# Patient Record
Sex: FEMALE | Race: BLACK OR AFRICAN AMERICAN | Employment: OTHER | URBAN - METROPOLITAN AREA
[De-identification: names, ages, dates, MRNs, and addresses within clinical notes are randomized per-mention and may not be internally consistent; named-entity substitution may affect disease eponyms.]

---

## 2017-01-04 ENCOUNTER — HOSPITAL ENCOUNTER (OUTPATIENT)
Dept: MAMMOGRAPHY | Age: 67
Discharge: HOME | End: 2017-01-04
Attending: INTERNAL MEDICINE
Payer: MEDICARE

## 2017-01-04 DIAGNOSIS — N63.0 LUMP OF BREAST: ICD-10-CM

## 2017-01-04 DIAGNOSIS — Z12.31 VISIT FOR SCREENING MAMMOGRAM: ICD-10-CM

## 2017-01-04 PROCEDURE — 76642 ULTRASOUND BREAST LIMITED: CPT

## 2017-01-04 PROCEDURE — 77066 DX MAMMO INCL CAD BI: CPT

## 2017-09-12 ENCOUNTER — HOSPITAL ENCOUNTER (OUTPATIENT)
Dept: BONE DENSITY | Age: 67
Discharge: HOME | End: 2017-09-12
Attending: NURSE PRACTITIONER
Payer: MEDICARE

## 2017-09-12 DIAGNOSIS — M81.0 OSTEOPOROSIS, SENILE: ICD-10-CM

## 2017-09-12 PROCEDURE — 77080 DXA BONE DENSITY AXIAL: CPT

## 2018-05-25 ENCOUNTER — HOSPITAL ENCOUNTER (OUTPATIENT)
Dept: MAMMOGRAPHY | Age: 68
Discharge: HOME | End: 2018-05-25
Payer: MEDICARE

## 2018-05-25 DIAGNOSIS — R92.8 ABNORMAL MAMMOGRAM: ICD-10-CM

## 2018-05-25 PROCEDURE — 77065 DX MAMMO INCL CAD UNI: CPT

## 2018-08-26 ENCOUNTER — HOSPITAL ENCOUNTER (EMERGENCY)
Age: 68
Discharge: HOME | End: 2018-08-26
Attending: EMERGENCY MEDICINE | Admitting: EMERGENCY MEDICINE
Payer: MEDICARE

## 2018-08-26 VITALS
HEIGHT: 64 IN | SYSTOLIC BLOOD PRESSURE: 135 MMHG | BODY MASS INDEX: 23.97 KG/M2 | TEMPERATURE: 98.2 F | WEIGHT: 140.4 LBS | RESPIRATION RATE: 16 BRPM | DIASTOLIC BLOOD PRESSURE: 84 MMHG | HEART RATE: 66 BPM | OXYGEN SATURATION: 97 %

## 2018-08-26 DIAGNOSIS — B34.9 VIRAL SYNDROME: Primary | ICD-10-CM

## 2018-08-26 LAB — S PYO AG THROAT QL: NEGATIVE

## 2018-08-26 PROCEDURE — 87880 STREP A ASSAY W/OPTIC: CPT

## 2018-08-26 PROCEDURE — 74011250636 HC RX REV CODE- 250/636: Performed by: EMERGENCY MEDICINE

## 2018-08-26 PROCEDURE — 87070 CULTURE OTHR SPECIMN AEROBIC: CPT | Performed by: EMERGENCY MEDICINE

## 2018-08-26 PROCEDURE — 99283 EMERGENCY DEPT VISIT LOW MDM: CPT

## 2018-08-26 RX ORDER — DEXAMETHASONE 4 MG/1
10 TABLET ORAL
Status: COMPLETED | OUTPATIENT
Start: 2018-08-26 | End: 2018-08-26

## 2018-08-26 RX ADMIN — DEXAMETHASONE 10 MG: 4 TABLET ORAL at 08:31

## 2018-08-26 NOTE — ED TRIAGE NOTES
Triage note: Pt states she has been experiencing a sore throat \"like 2 balls in my throat\" x 1 week.

## 2018-08-26 NOTE — ED PROVIDER NOTES
HPI Comments: 70 y.o. female with past medical history significant for hepatitis C, cirrhosis, tuberculosis and neuropathy who presents with chief complaint of sore throat. Patient complains of 1 week of sore throat described as \"like there are two balls in my throat. \"  Patient states gets sinus problems seasonally and has been having sinus drainage. Patient also complains of chills. Patient is concerned that she may need an antibiotic because she is on Harvoni for hepatitis C. No known exposure to strep. Patient denies cough, sweats, appetite change, ear pain. There are no other acute medical concerns at this time. Social hx: Nonsmoker  PCP: Ady Werner NP    Note written by Deidre Arredondo. Tammy Adams, as dictated by Dawit Florez MD 8:17 AM      The history is provided by the patient. Past Medical History:   Diagnosis Date    Cirrhosis ([facility] [address]     Hepatitis C     Neuropathy     Tuberculosis        History reviewed. No pertinent surgical history. Family History:   Problem Relation Age of Onset    Breast Cancer Maternal Grandmother        Social History     Social History    Marital status:      Spouse name: N/A    Number of children: N/A    Years of education: N/A     Occupational History    Not on file. Social History Main Topics    Smoking status: Never Smoker    Smokeless tobacco: Never Used    Alcohol use No    Drug use: No    Sexual activity: Not on file     Other Topics Concern    Not on file     Social History Narrative         ALLERGIES: Review of patient's allergies indicates no known allergies. Review of Systems   Constitutional: Positive for chills. Negative for appetite change and diaphoresis. HENT: Positive for sore throat. Respiratory: Negative for cough. All other systems reviewed and are negative.       Vitals:    08/26/18 0801   BP: 135/84   Pulse: 66   Resp: 16   Temp: 98.2 °F (36.8 °C)   SpO2: 97%   Weight: 63.7 kg (140 lb 6.4 oz) Height: 5' 4\" (1.626 m)            Physical Exam   Constitutional: She is oriented to person, place, and time. She appears well-developed and well-nourished. No distress. NAD, AxOx4, speaking in complete sentences     HENT:   Head: Normocephalic and atraumatic. Head is without right periorbital erythema and without left periorbital erythema. Right Ear: Hearing and external ear normal. No drainage, swelling or tenderness. No decreased hearing is noted. Left Ear: Hearing and external ear normal. No drainage, swelling or tenderness. No decreased hearing is noted. Nose: Rhinorrhea present. No nasal septal hematoma. Right sinus exhibits no maxillary sinus tenderness and no frontal sinus tenderness. Left sinus exhibits no maxillary sinus tenderness and no frontal sinus tenderness. Mouth/Throat: Uvula is midline and oropharynx is clear and moist. No oral lesions. No trismus in the jaw. Normal dentition. No dental abscesses or uvula swelling. No oropharyngeal exudate, posterior oropharyngeal edema, posterior oropharyngeal erythema or tonsillar abscesses. Eyes: Conjunctivae and EOM are normal. Pupils are equal, round, and reactive to light. Right eye exhibits no discharge. Left eye exhibits no discharge. No scleral icterus. Neck: Normal range of motion. Neck supple. No JVD present. No tracheal deviation present. Cardiovascular: Normal rate, regular rhythm, normal heart sounds and intact distal pulses. Exam reveals no gallop and no friction rub. No murmur heard. Pulmonary/Chest: Effort normal and breath sounds normal. No respiratory distress. She has no wheezes. She has no rales. She exhibits no tenderness. Abdominal: Soft. Bowel sounds are normal. There is no tenderness. There is no rebound and no guarding. nttp     Genitourinary: No vaginal discharge found. Musculoskeletal: Normal range of motion. She exhibits no edema or tenderness.    Neurological: She is alert and oriented to person, place, and time. She has normal reflexes. No cranial nerve deficit. She exhibits normal muscle tone. Coordination normal.   pt has motor/ CV/ Sensation grossly intact to all extremities, R = L in strength;   Skin: Skin is warm and dry. No rash noted. No erythema. No pallor. Psychiatric: She has a normal mood and affect. Her behavior is normal. Thought content normal.   Nursing note and vitals reviewed. [facility]      ED Course       Procedures    8:41 AM  Deborah Flint's  results have been reviewed with her. She has been counseled regarding her diagnosis. She verbally conveys understanding and agreement of the signs, symptoms, diagnosis, treatment and prognosis and additionally agrees to Call/ Arrange follow up as recommended with Dr. Chayo aKng, [facility] in 24 - 48 hours. She also agrees with the care-plan and conveys that all of her questions have been answered. I have also put together some discharge instructions for her that include: 1) educational information regarding their diagnosis, 2) how to care for their diagnosis at home, as well a 3) list of reasons why they would want to return to the ED prior to their follow-up appointment, should their condition change or for concerns.

## 2018-08-26 NOTE — ED NOTES
MD reviewed discharge instructions and options with patient and patient verbalized understanding. RN reviewed discharge instructions using teachback method. Pt ambulated to exit without difficulty and in no signs of acute distress. Ambulatory out of department, and he  will drive home. No complaints or needs expressed at this time. Patient was counseled on medications prescribed at discharge. VSS, verbalized relief from most intense pain. Patient to call pcp in the morning for appointment.

## 2018-08-26 NOTE — DISCHARGE INSTRUCTIONS

## 2018-08-28 LAB
BACTERIA SPEC CULT: NORMAL
SERVICE CMNT-IMP: NORMAL

## 2018-11-27 ENCOUNTER — HOSPITAL ENCOUNTER (OUTPATIENT)
Dept: MAMMOGRAPHY | Age: 68
Discharge: HOME | End: 2018-11-27
Payer: MEDICARE

## 2018-11-27 DIAGNOSIS — R92.8 ABNORMAL MAMMOGRAM: ICD-10-CM

## 2018-11-27 DIAGNOSIS — R92.1 BREAST CALCIFICATIONS: ICD-10-CM

## 2018-11-27 PROCEDURE — 77065 DX MAMMO INCL CAD UNI: CPT

## 2019-03-28 ENCOUNTER — HOSPITAL ENCOUNTER (OUTPATIENT)
Dept: GENERAL RADIOLOGY | Age: 69
Discharge: HOME | End: 2019-03-28
Payer: MEDICARE

## 2019-03-28 DIAGNOSIS — M19.90 OSTEOARTHRITIS: ICD-10-CM

## 2019-03-28 PROCEDURE — 73562 X-RAY EXAM OF KNEE 3: CPT

## 2019-06-12 ENCOUNTER — HOSPITAL ENCOUNTER (OUTPATIENT)
Dept: MAMMOGRAPHY | Age: 69
Discharge: HOME | End: 2019-06-12
Attending: FAMILY MEDICINE
Payer: MEDICARE

## 2019-06-12 DIAGNOSIS — R92.8 ABNORMAL MAMMOGRAM: ICD-10-CM

## 2019-06-12 PROCEDURE — 77066 DX MAMMO INCL CAD BI: CPT

## 2019-10-21 ENCOUNTER — HOSPITAL ENCOUNTER (OUTPATIENT)
Dept: PREADMISSION TESTING | Age: 69
Discharge: HOME | End: 2019-10-21
Payer: MEDICARE

## 2019-10-21 VITALS
RESPIRATION RATE: 16 BRPM | SYSTOLIC BLOOD PRESSURE: 123 MMHG | WEIGHT: 134 LBS | BODY MASS INDEX: 23.74 KG/M2 | HEIGHT: 63 IN | DIASTOLIC BLOOD PRESSURE: 74 MMHG | HEART RATE: 59 BPM | TEMPERATURE: 98.4 F

## 2019-10-21 LAB
ABO + RH BLD: NORMAL
ANION GAP SERPL CALC-SCNC: 5 MMOL/L (ref 5–15)
APPEARANCE UR: CLEAR
ATRIAL RATE: 53 BPM
BACTERIA URNS QL MICRO: NEGATIVE /HPF
BILIRUB UR QL: NEGATIVE
BLOOD GROUP ANTIBODIES SERPL: NORMAL
BUN SERPL-MCNC: 14 MG/DL (ref 6–20)
BUN/CREAT SERPL: 17 (ref 12–20)
CALCIUM SERPL-MCNC: 8.6 MG/DL (ref 8.5–10.1)
CALCULATED P AXIS, ECG09: 58 DEGREES
CALCULATED R AXIS, ECG10: 12 DEGREES
CALCULATED T AXIS, ECG11: 22 DEGREES
CHLORIDE SERPL-SCNC: 110 MMOL/L (ref 97–108)
CO2 SERPL-SCNC: 27 MMOL/L (ref 21–32)
COLOR UR: NORMAL
CREAT SERPL-MCNC: 0.81 MG/DL (ref 0.55–1.02)
DIAGNOSIS, 93000: NORMAL
EPITH CASTS URNS QL MICRO: NORMAL /LPF
ERYTHROCYTE [DISTWIDTH] IN BLOOD BY AUTOMATED COUNT: 13.1 % (ref 11.5–14.5)
EST. AVERAGE GLUCOSE BLD GHB EST-MCNC: 108 MG/DL
GLUCOSE SERPL-MCNC: 92 MG/DL (ref 65–100)
GLUCOSE UR STRIP.AUTO-MCNC: NEGATIVE MG/DL
HBA1C MFR BLD: 5.4 % (ref 4.2–6.3)
HCT VFR BLD AUTO: 40 % (ref 35–47)
HGB BLD-MCNC: 12.8 G/DL (ref 11.5–16)
HGB UR QL STRIP: NEGATIVE
HYALINE CASTS URNS QL MICRO: NORMAL /LPF (ref 0–5)
INR PPP: 1 (ref 0.9–1.1)
KETONES UR QL STRIP.AUTO: NEGATIVE MG/DL
LEUKOCYTE ESTERASE UR QL STRIP.AUTO: NEGATIVE
MCH RBC QN AUTO: 29.9 PG (ref 26–34)
MCHC RBC AUTO-ENTMCNC: 32 G/DL (ref 30–36.5)
MCV RBC AUTO: 93.5 FL (ref 80–99)
NITRITE UR QL STRIP.AUTO: NEGATIVE
NRBC # BLD: 0 K/UL (ref 0–0.01)
NRBC BLD-RTO: 0 PER 100 WBC
P-R INTERVAL, ECG05: 152 MS
PH UR STRIP: 5.5 [PH] (ref 5–8)
PLATELET # BLD AUTO: 175 K/UL (ref 150–400)
PMV BLD AUTO: 11.8 FL (ref 8.9–12.9)
POTASSIUM SERPL-SCNC: 4.1 MMOL/L (ref 3.5–5.1)
PROT UR STRIP-MCNC: NEGATIVE MG/DL
PROTHROMBIN TIME: 10.6 SEC (ref 9–11.1)
Q-T INTERVAL, ECG07: 450 MS
QRS DURATION, ECG06: 72 MS
QTC CALCULATION (BEZET), ECG08: 422 MS
RBC # BLD AUTO: 4.28 M/UL (ref 3.8–5.2)
RBC #/AREA URNS HPF: NORMAL /HPF (ref 0–5)
SODIUM SERPL-SCNC: 142 MMOL/L (ref 136–145)
SP GR UR REFRACTOMETRY: 1.02 (ref 1–1.03)
SPECIMEN EXP DATE BLD: NORMAL
UA: UC IF INDICATED,UAUC: NORMAL
UROBILINOGEN UR QL STRIP.AUTO: 0.2 EU/DL (ref 0.2–1)
VENTRICULAR RATE, ECG03: 53 BPM
WBC # BLD AUTO: 5.4 K/UL (ref 3.6–11)
WBC URNS QL MICRO: NORMAL /HPF (ref 0–4)

## 2019-10-21 PROCEDURE — 86900 BLOOD TYPING SEROLOGIC ABO: CPT

## 2019-10-21 PROCEDURE — 85610 PROTHROMBIN TIME: CPT

## 2019-10-21 PROCEDURE — 80048 BASIC METABOLIC PNL TOTAL CA: CPT

## 2019-10-21 PROCEDURE — 93005 ELECTROCARDIOGRAM TRACING: CPT

## 2019-10-21 PROCEDURE — 83036 HEMOGLOBIN GLYCOSYLATED A1C: CPT

## 2019-10-21 PROCEDURE — 81001 URINALYSIS AUTO W/SCOPE: CPT

## 2019-10-21 PROCEDURE — 85027 COMPLETE CBC AUTOMATED: CPT

## 2019-10-21 RX ORDER — ALENDRONATE SODIUM 70 MG/1
70 TABLET ORAL
COMMUNITY
End: 2021-11-13

## 2019-10-21 RX ORDER — IBUPROFEN 800 MG/1
800 TABLET ORAL 2 TIMES DAILY
COMMUNITY
End: 2019-11-07

## 2019-10-21 RX ORDER — PREGABALIN 150 MG/1
150 CAPSULE ORAL 3 TIMES DAILY
COMMUNITY
End: 2021-05-14 | Stop reason: SDUPTHER

## 2019-10-21 NOTE — PERIOP NOTES
DO NOT EAT ANYTHING AFTER MIDNIGHT, except as instructed THE NIGHT BEFORE SURGERY. PT GIVEN OPPORTUNITY TO ASK ADDITIONAL QUESTIONS. PRE-OPERATIVE INSTRUCTIONS REVIEWED WITH PATIENT. PATIENT GIVEN 2-BOTTLES OF CHG SOAP. INSTRUCTIONS TO BE REVIEWED IN CLASS ON USE OF CHG SOAP. PATIENT GIVEN SSI INFECTION FAQ SHEET. MRSA / MSSA TREATMENT INSTRUCTION SHEET GIVEN WITH AN EXPLANATION TO PATIENT THAT THEY WILL BE NOTIFIED IF TREATMENT INSTRUCTIONS NEED TO BE INITIATED. PATIENT WAS GIVEN THE OPPORTUNITY TO ASK QUESTIONS ON THE INFORMATION PROVIDED.

## 2019-10-22 LAB
BACTERIA SPEC CULT: NORMAL
BACTERIA SPEC CULT: NORMAL
SERVICE CMNT-IMP: NORMAL

## 2019-11-01 ENCOUNTER — ANESTHESIA EVENT (OUTPATIENT)
Dept: SURGERY | Age: 69
End: 2019-11-01
Payer: MEDICARE

## 2019-11-03 NOTE — H&P
PCP: No Pcp  There is no problem list on file for this patient.     Subjective:   Chief Complaint: Pain of the Right Knee     HPI: Shade Gambino is a 78 y.o. female who presents today for evaluation and treatment of her right knee pain. She localizes the pain to her lateral joint line. She reports she is a very active and the pain interferes with her ability to exercise. She states the pain interferes with her normal daily activities. She states the pain keeps her awake at night. She reports having cortisone injections to her right knee every five months. She notes her most recent injection was in August.  She states the injection provided only temporary relief. She states she has tried ibuprofen for the pain as well with minimal relief. She is ambulating with significant antalgic gait today in clinic. She asks about hyaluronic acid injections.     Of note, she states she takes lyrica for her bilateral leg neuropathy.       Objective:      There were no vitals filed for this visit. Height: 5' 3\"   Weight: 135 lb   Body mass index is 23.91 kg/m².     Constitutional:  No acute distress. Well nourished. Well developed. Eyes:  Sclera are nonicteric. Respiratory:  No labored breathing. Cardiovascular:  No marked edema. Skin:  No marked skin ulcers. Neurological:  No marked sensory loss noted. Psychiatric: Alert and oriented x3.     Musculoskeletal : Right Knee:  She has good range of motion of the right knee with diffuse crepitus. Ligaments intact. No effusion. Negative patellofemoral grind. She is tender over the lateral joint line. Negative Karolyn's sign medially and laterally. Strong pedal pulses. No pedal edema. Skin healthy and intact. No apparent atrophy.         Radiographs:             No imaging obtained    Assessment:      1. Primary osteoarthritis of right knee    2. Pre-op evaluation       Plan:      I reviewed x-rays ordered of the right knee 3/28/19 on the [employer] system with the patient. She has valgus deformity with bone-on-bone osteoarthritis of of the lateral compartment with lateral osteophytes. She has moderate degenerative changes of the patellofemoral joint. I explained to the patient that  She may ultimately require right total knee replacement surgery in the future. I discussed the diagnosis of right knee osteoarthritis with the patient as well as treatment options. We discussed treatment options including total knee replacement surgery vs hyaluronic acid injections vs further cortisone injections. I explained to the patient that recent studies show that hyaluronic acid injections are no better than placebo for knee osteoarthritis. She reports cortisone injections have begun to provide decreasing benefit. She has failed conservative management. We discussed right total knee replacement surgery at length with pt including expected outcomes and post-op recovery as well as risks and complications, specifically DVT, PE, infection, and nerve injury. After much discussion, pt desires to proceed with surgery. Her most recent cortisone injection was in August. We will schedule surgery at pt's convenience at least three months after August.  We will refer her to PT for a pre-op evaluation today. Follow-up for scheduled surgery.             Orders Placed This Encounter    Ambulatory referral to Physical Therapy      Return for Scheduled surgery.      Medical documentation was entered by me, Luisa Dunbar, Medical Scribe for Dr. Yahaira Sutton.    10/1/2019  5:39 PM  I, Tia Velazquez MD, personally, performed the services described in this documentation, as scribed in my presence, and is accurate and complete

## 2019-11-04 ENCOUNTER — HOSPITAL ENCOUNTER (OUTPATIENT)
Age: 69
Setting detail: OBSERVATION
Discharge: HOME HEALTH | End: 2019-11-07
Attending: ORTHOPAEDIC SURGERY | Admitting: ORTHOPAEDIC SURGERY
Payer: MEDICARE

## 2019-11-04 ENCOUNTER — ANESTHESIA (OUTPATIENT)
Dept: SURGERY | Age: 69
End: 2019-11-04
Payer: MEDICARE

## 2019-11-04 DIAGNOSIS — M17.11 PRIMARY OSTEOARTHRITIS OF RIGHT KNEE: Primary | ICD-10-CM

## 2019-11-04 PROBLEM — M17.12 PRIMARY LOCALIZED OSTEOARTHRITIS OF LEFT KNEE: Status: ACTIVE | Noted: 2019-11-04

## 2019-11-04 LAB
GLUCOSE BLD STRIP.AUTO-MCNC: 106 MG/DL (ref 65–100)
SERVICE CMNT-IMP: ABNORMAL

## 2019-11-04 PROCEDURE — C1713 ANCHOR/SCREW BN/BN,TIS/BN: HCPCS | Performed by: ORTHOPAEDIC SURGERY

## 2019-11-04 PROCEDURE — 74011250636 HC RX REV CODE- 250/636: Performed by: ORTHOPAEDIC SURGERY

## 2019-11-04 PROCEDURE — 74011250636 HC RX REV CODE- 250/636: Performed by: PHYSICIAN ASSISTANT

## 2019-11-04 PROCEDURE — 77030005513 HC CATH URETH FOL11 MDII -B: Performed by: ORTHOPAEDIC SURGERY

## 2019-11-04 PROCEDURE — 77030006822 HC BLD SAW SAG BRSM -B: Performed by: ORTHOPAEDIC SURGERY

## 2019-11-04 PROCEDURE — 74011250637 HC RX REV CODE- 250/637: Performed by: PHYSICIAN ASSISTANT

## 2019-11-04 PROCEDURE — 77030040922 HC BLNKT HYPOTHRM STRY -A

## 2019-11-04 PROCEDURE — 77030007866 HC KT SPN ANES BBMI -B: Performed by: ANESTHESIOLOGY

## 2019-11-04 PROCEDURE — 82962 GLUCOSE BLOOD TEST: CPT

## 2019-11-04 PROCEDURE — 74011000250 HC RX REV CODE- 250: Performed by: NURSE ANESTHETIST, CERTIFIED REGISTERED

## 2019-11-04 PROCEDURE — C1776 JOINT DEVICE (IMPLANTABLE): HCPCS | Performed by: ORTHOPAEDIC SURGERY

## 2019-11-04 PROCEDURE — 77030011640 HC PAD GRND REM COVD -A: Performed by: ORTHOPAEDIC SURGERY

## 2019-11-04 PROCEDURE — 74011000258 HC RX REV CODE- 258: Performed by: NURSE ANESTHETIST, CERTIFIED REGISTERED

## 2019-11-04 PROCEDURE — 76210000006 HC OR PH I REC 0.5 TO 1 HR: Performed by: ORTHOPAEDIC SURGERY

## 2019-11-04 PROCEDURE — 77030018846 HC SOL IRR STRL H20 ICUM -A: Performed by: ORTHOPAEDIC SURGERY

## 2019-11-04 PROCEDURE — 74011250636 HC RX REV CODE- 250/636: Performed by: ANESTHESIOLOGY

## 2019-11-04 PROCEDURE — 77030028907 HC WRP KNEE WO BGS SOLM -B

## 2019-11-04 PROCEDURE — 76010000162 HC OR TIME 1.5 TO 2 HR INTENSV-TIER 1: Performed by: ORTHOPAEDIC SURGERY

## 2019-11-04 PROCEDURE — 74011000250 HC RX REV CODE- 250: Performed by: ANESTHESIOLOGY

## 2019-11-04 PROCEDURE — 97530 THERAPEUTIC ACTIVITIES: CPT

## 2019-11-04 PROCEDURE — 77030014077 HC TOWER MX CEM J&J -C: Performed by: ORTHOPAEDIC SURGERY

## 2019-11-04 PROCEDURE — 77030003601 HC NDL NRV BLK BBMI -A

## 2019-11-04 PROCEDURE — 77030018836 HC SOL IRR NACL ICUM -A: Performed by: ORTHOPAEDIC SURGERY

## 2019-11-04 PROCEDURE — 76060000034 HC ANESTHESIA 1.5 TO 2 HR: Performed by: ORTHOPAEDIC SURGERY

## 2019-11-04 PROCEDURE — 77030035236 HC SUT PDS STRATFX BARB J&J -B: Performed by: ORTHOPAEDIC SURGERY

## 2019-11-04 PROCEDURE — 77030031139 HC SUT VCRL2 J&J -A: Performed by: ORTHOPAEDIC SURGERY

## 2019-11-04 PROCEDURE — 74011250636 HC RX REV CODE- 250/636: Performed by: NURSE ANESTHETIST, CERTIFIED REGISTERED

## 2019-11-04 PROCEDURE — 77030000032 HC CUF TRNQT ZIMM -B: Performed by: ORTHOPAEDIC SURGERY

## 2019-11-04 PROCEDURE — 74011000250 HC RX REV CODE- 250: Performed by: PHYSICIAN ASSISTANT

## 2019-11-04 PROCEDURE — 74011000250 HC RX REV CODE- 250: Performed by: ORTHOPAEDIC SURGERY

## 2019-11-04 PROCEDURE — 97161 PT EVAL LOW COMPLEX 20 MIN: CPT

## 2019-11-04 PROCEDURE — 99218 HC RM OBSERVATION: CPT

## 2019-11-04 PROCEDURE — 65390000012 HC CONDITION CODE 44 OBSERVATION

## 2019-11-04 PROCEDURE — 77030027138 HC INCENT SPIROMETER -A

## 2019-11-04 PROCEDURE — 77030008462 HC STPLR SKN PROX J&J -A: Performed by: ORTHOPAEDIC SURGERY

## 2019-11-04 DEVICE — CAP KNEE W/ VE SURF AND PAT PERSONA: Type: IMPLANTABLE DEVICE | Site: KNEE | Status: FUNCTIONAL

## 2019-11-04 DEVICE — IMPLANTABLE DEVICE: Type: IMPLANTABLE DEVICE | Site: KNEE | Status: FUNCTIONAL

## 2019-11-04 DEVICE — PAT PSN VE POLY 35MM -- PERSONA: Type: IMPLANTABLE DEVICE | Site: KNEE | Status: FUNCTIONAL

## 2019-11-04 DEVICE — SMARTSET HV HIGH VISCOSITY BONE CEMENT 40G
Type: IMPLANTABLE DEVICE | Site: KNEE | Status: FUNCTIONAL
Brand: SMARTSET

## 2019-11-04 RX ORDER — CELECOXIB 200 MG/1
200 CAPSULE ORAL ONCE
Status: COMPLETED | OUTPATIENT
Start: 2019-11-04 | End: 2019-11-04

## 2019-11-04 RX ORDER — SODIUM CHLORIDE 0.9 % (FLUSH) 0.9 %
5-40 SYRINGE (ML) INJECTION AS NEEDED
Status: DISCONTINUED | OUTPATIENT
Start: 2019-11-04 | End: 2019-11-07 | Stop reason: HOSPADM

## 2019-11-04 RX ORDER — PREGABALIN 75 MG/1
150 CAPSULE ORAL 3 TIMES DAILY
Status: DISCONTINUED | OUTPATIENT
Start: 2019-11-04 | End: 2019-11-07 | Stop reason: HOSPADM

## 2019-11-04 RX ORDER — SODIUM CHLORIDE 0.9 % (FLUSH) 0.9 %
5-40 SYRINGE (ML) INJECTION EVERY 8 HOURS
Status: DISCONTINUED | OUTPATIENT
Start: 2019-11-04 | End: 2019-11-07 | Stop reason: HOSPADM

## 2019-11-04 RX ORDER — DIPHENHYDRAMINE HYDROCHLORIDE 50 MG/ML
12.5 INJECTION, SOLUTION INTRAMUSCULAR; INTRAVENOUS AS NEEDED
Status: DISCONTINUED | OUTPATIENT
Start: 2019-11-04 | End: 2019-11-04 | Stop reason: HOSPADM

## 2019-11-04 RX ORDER — SODIUM CHLORIDE, SODIUM LACTATE, POTASSIUM CHLORIDE, CALCIUM CHLORIDE 600; 310; 30; 20 MG/100ML; MG/100ML; MG/100ML; MG/100ML
100 INJECTION, SOLUTION INTRAVENOUS CONTINUOUS
Status: DISCONTINUED | OUTPATIENT
Start: 2019-11-04 | End: 2019-11-04 | Stop reason: HOSPADM

## 2019-11-04 RX ORDER — FENTANYL CITRATE 50 UG/ML
25 INJECTION, SOLUTION INTRAMUSCULAR; INTRAVENOUS
Status: DISCONTINUED | OUTPATIENT
Start: 2019-11-04 | End: 2019-11-04 | Stop reason: HOSPADM

## 2019-11-04 RX ORDER — MELOXICAM 7.5 MG/1
15 TABLET ORAL DAILY
Status: DISCONTINUED | OUTPATIENT
Start: 2019-11-05 | End: 2019-11-07 | Stop reason: HOSPADM

## 2019-11-04 RX ORDER — HYDROMORPHONE HYDROCHLORIDE 1 MG/ML
0.2 INJECTION, SOLUTION INTRAMUSCULAR; INTRAVENOUS; SUBCUTANEOUS
Status: DISCONTINUED | OUTPATIENT
Start: 2019-11-04 | End: 2019-11-04 | Stop reason: HOSPADM

## 2019-11-04 RX ORDER — SODIUM CHLORIDE 0.9 % (FLUSH) 0.9 %
5-40 SYRINGE (ML) INJECTION EVERY 8 HOURS
Status: DISCONTINUED | OUTPATIENT
Start: 2019-11-04 | End: 2019-11-04 | Stop reason: HOSPADM

## 2019-11-04 RX ORDER — FACIAL-BODY WIPES
10 EACH TOPICAL DAILY PRN
Status: DISCONTINUED | OUTPATIENT
Start: 2019-11-06 | End: 2019-11-07 | Stop reason: HOSPADM

## 2019-11-04 RX ORDER — TRANEXAMIC ACID 100 MG/ML
INJECTION, SOLUTION INTRAVENOUS AS NEEDED
Status: DISCONTINUED | OUTPATIENT
Start: 2019-11-04 | End: 2019-11-04 | Stop reason: HOSPADM

## 2019-11-04 RX ORDER — HYDROXYZINE HYDROCHLORIDE 10 MG/1
10 TABLET, FILM COATED ORAL
Status: DISCONTINUED | OUTPATIENT
Start: 2019-11-04 | End: 2019-11-07 | Stop reason: HOSPADM

## 2019-11-04 RX ORDER — SODIUM CHLORIDE 9 MG/ML
125 INJECTION, SOLUTION INTRAVENOUS CONTINUOUS
Status: DISPENSED | OUTPATIENT
Start: 2019-11-04 | End: 2019-11-05

## 2019-11-04 RX ORDER — PROPOFOL 10 MG/ML
INJECTION, EMULSION INTRAVENOUS AS NEEDED
Status: DISCONTINUED | OUTPATIENT
Start: 2019-11-04 | End: 2019-11-04 | Stop reason: HOSPADM

## 2019-11-04 RX ORDER — FAMOTIDINE 20 MG/1
20 TABLET, FILM COATED ORAL EVERY 12 HOURS
Status: DISCONTINUED | OUTPATIENT
Start: 2019-11-04 | End: 2019-11-07 | Stop reason: HOSPADM

## 2019-11-04 RX ORDER — ACETAMINOPHEN 500 MG
1000 TABLET ORAL ONCE
Status: COMPLETED | OUTPATIENT
Start: 2019-11-04 | End: 2019-11-04

## 2019-11-04 RX ORDER — POLYETHYLENE GLYCOL 3350 17 G/17G
17 POWDER, FOR SOLUTION ORAL DAILY
Status: DISCONTINUED | OUTPATIENT
Start: 2019-11-05 | End: 2019-11-07 | Stop reason: HOSPADM

## 2019-11-04 RX ORDER — ONDANSETRON 2 MG/ML
4 INJECTION INTRAMUSCULAR; INTRAVENOUS
Status: ACTIVE | OUTPATIENT
Start: 2019-11-04 | End: 2019-11-05

## 2019-11-04 RX ORDER — FENTANYL CITRATE 50 UG/ML
50 INJECTION, SOLUTION INTRAMUSCULAR; INTRAVENOUS AS NEEDED
Status: DISCONTINUED | OUTPATIENT
Start: 2019-11-04 | End: 2019-11-04 | Stop reason: HOSPADM

## 2019-11-04 RX ORDER — ACETAMINOPHEN 500 MG
1000 TABLET ORAL EVERY 6 HOURS
Status: DISCONTINUED | OUTPATIENT
Start: 2019-11-04 | End: 2019-11-06

## 2019-11-04 RX ORDER — SODIUM CHLORIDE 0.9 % (FLUSH) 0.9 %
5-40 SYRINGE (ML) INJECTION AS NEEDED
Status: DISCONTINUED | OUTPATIENT
Start: 2019-11-04 | End: 2019-11-04 | Stop reason: HOSPADM

## 2019-11-04 RX ORDER — BUPIVACAINE HYDROCHLORIDE 5 MG/ML
INJECTION, SOLUTION EPIDURAL; INTRACAUDAL
Status: COMPLETED | OUTPATIENT
Start: 2019-11-04 | End: 2019-11-04

## 2019-11-04 RX ORDER — ONDANSETRON 4 MG/1
4 TABLET, ORALLY DISINTEGRATING ORAL
Status: DISCONTINUED | OUTPATIENT
Start: 2019-11-04 | End: 2019-11-07 | Stop reason: HOSPADM

## 2019-11-04 RX ORDER — SODIUM CHLORIDE, SODIUM LACTATE, POTASSIUM CHLORIDE, CALCIUM CHLORIDE 600; 310; 30; 20 MG/100ML; MG/100ML; MG/100ML; MG/100ML
INJECTION, SOLUTION INTRAVENOUS
Status: DISCONTINUED | OUTPATIENT
Start: 2019-11-04 | End: 2019-11-04 | Stop reason: HOSPADM

## 2019-11-04 RX ORDER — CEFAZOLIN SODIUM/WATER 2 G/20 ML
2 SYRINGE (ML) INTRAVENOUS EVERY 8 HOURS
Status: COMPLETED | OUTPATIENT
Start: 2019-11-04 | End: 2019-11-05

## 2019-11-04 RX ORDER — OXYCODONE HYDROCHLORIDE 5 MG/1
10 TABLET ORAL
Status: DISCONTINUED | OUTPATIENT
Start: 2019-11-04 | End: 2019-11-07 | Stop reason: HOSPADM

## 2019-11-04 RX ORDER — MORPHINE SULFATE 2 MG/ML
2 INJECTION, SOLUTION INTRAMUSCULAR; INTRAVENOUS
Status: ACTIVE | OUTPATIENT
Start: 2019-11-04 | End: 2019-11-05

## 2019-11-04 RX ORDER — PROPOFOL 10 MG/ML
INJECTION, EMULSION INTRAVENOUS
Status: DISCONTINUED | OUTPATIENT
Start: 2019-11-04 | End: 2019-11-04 | Stop reason: HOSPADM

## 2019-11-04 RX ORDER — MIDAZOLAM HYDROCHLORIDE 1 MG/ML
1 INJECTION, SOLUTION INTRAMUSCULAR; INTRAVENOUS AS NEEDED
Status: DISCONTINUED | OUTPATIENT
Start: 2019-11-04 | End: 2019-11-04 | Stop reason: HOSPADM

## 2019-11-04 RX ORDER — BUPIVACAINE HYDROCHLORIDE 7.5 MG/ML
INJECTION, SOLUTION EPIDURAL; RETROBULBAR
Status: COMPLETED | OUTPATIENT
Start: 2019-11-04 | End: 2019-11-04

## 2019-11-04 RX ORDER — CEFAZOLIN SODIUM/WATER 2 G/20 ML
2 SYRINGE (ML) INTRAVENOUS ONCE
Status: COMPLETED | OUTPATIENT
Start: 2019-11-04 | End: 2019-11-04

## 2019-11-04 RX ORDER — PREGABALIN 75 MG/1
75 CAPSULE ORAL ONCE
Status: DISCONTINUED | OUTPATIENT
Start: 2019-11-04 | End: 2019-11-04 | Stop reason: HOSPADM

## 2019-11-04 RX ORDER — LIDOCAINE HYDROCHLORIDE 10 MG/ML
0.1 INJECTION, SOLUTION EPIDURAL; INFILTRATION; INTRACAUDAL; PERINEURAL AS NEEDED
Status: DISCONTINUED | OUTPATIENT
Start: 2019-11-04 | End: 2019-11-04 | Stop reason: HOSPADM

## 2019-11-04 RX ORDER — AMOXICILLIN 250 MG
1 CAPSULE ORAL 2 TIMES DAILY
Status: DISCONTINUED | OUTPATIENT
Start: 2019-11-04 | End: 2019-11-07 | Stop reason: HOSPADM

## 2019-11-04 RX ORDER — OXYCODONE HYDROCHLORIDE 5 MG/1
5 TABLET ORAL
Status: DISCONTINUED | OUTPATIENT
Start: 2019-11-04 | End: 2019-11-07 | Stop reason: HOSPADM

## 2019-11-04 RX ORDER — ROPIVACAINE HYDROCHLORIDE 5 MG/ML
150 INJECTION, SOLUTION EPIDURAL; INFILTRATION; PERINEURAL AS NEEDED
Status: DISCONTINUED | OUTPATIENT
Start: 2019-11-04 | End: 2019-11-04 | Stop reason: HOSPADM

## 2019-11-04 RX ORDER — NALOXONE HYDROCHLORIDE 0.4 MG/ML
0.4 INJECTION, SOLUTION INTRAMUSCULAR; INTRAVENOUS; SUBCUTANEOUS AS NEEDED
Status: DISCONTINUED | OUTPATIENT
Start: 2019-11-04 | End: 2019-11-07 | Stop reason: HOSPADM

## 2019-11-04 RX ORDER — MIDAZOLAM HYDROCHLORIDE 1 MG/ML
0.5 INJECTION, SOLUTION INTRAMUSCULAR; INTRAVENOUS
Status: DISCONTINUED | OUTPATIENT
Start: 2019-11-04 | End: 2019-11-04 | Stop reason: HOSPADM

## 2019-11-04 RX ADMIN — OXYCODONE HYDROCHLORIDE 5 MG: 5 TABLET ORAL at 14:25

## 2019-11-04 RX ADMIN — SODIUM CHLORIDE, POTASSIUM CHLORIDE, SODIUM LACTATE AND CALCIUM CHLORIDE: 600; 310; 30; 20 INJECTION, SOLUTION INTRAVENOUS at 10:25

## 2019-11-04 RX ADMIN — BUPIVACAINE HYDROCHLORIDE 25 ML: 5 INJECTION, SOLUTION EPIDURAL; INTRACAUDAL; PERINEURAL at 10:25

## 2019-11-04 RX ADMIN — Medication 2 G: at 10:52

## 2019-11-04 RX ADMIN — SODIUM CHLORIDE, SODIUM LACTATE, POTASSIUM CHLORIDE, AND CALCIUM CHLORIDE 100 ML/HR: 600; 310; 30; 20 INJECTION, SOLUTION INTRAVENOUS at 09:45

## 2019-11-04 RX ADMIN — PROPOFOL 50 MG: 10 INJECTION, EMULSION INTRAVENOUS at 10:32

## 2019-11-04 RX ADMIN — FENTANYL CITRATE 50 MCG: 50 INJECTION, SOLUTION INTRAMUSCULAR; INTRAVENOUS at 10:19

## 2019-11-04 RX ADMIN — ACETAMINOPHEN 1000 MG: 500 TABLET ORAL at 15:03

## 2019-11-04 RX ADMIN — PREGABALIN 150 MG: 75 CAPSULE ORAL at 15:03

## 2019-11-04 RX ADMIN — ACETAMINOPHEN 1000 MG: 500 TABLET ORAL at 09:36

## 2019-11-04 RX ADMIN — BUPIVACAINE HYDROCHLORIDE 7.5 MG: 7.5 INJECTION, SOLUTION EPIDURAL; RETROBULBAR at 10:45

## 2019-11-04 RX ADMIN — PROPOFOL 100 MCG/KG/MIN: 10 INJECTION, EMULSION INTRAVENOUS at 10:32

## 2019-11-04 RX ADMIN — OXYCODONE HYDROCHLORIDE 5 MG: 5 TABLET ORAL at 21:00

## 2019-11-04 RX ADMIN — TRANEXAMIC ACID 1 G: 100 INJECTION, SOLUTION INTRAVENOUS at 10:55

## 2019-11-04 RX ADMIN — Medication 2 G: at 18:28

## 2019-11-04 RX ADMIN — ACETAMINOPHEN 1000 MG: 500 TABLET ORAL at 21:00

## 2019-11-04 RX ADMIN — SODIUM CHLORIDE 125 ML/HR: 900 INJECTION, SOLUTION INTRAVENOUS at 21:00

## 2019-11-04 RX ADMIN — DOCUSATE SODIUM AND SENNOSIDES 1 TABLET: 50; 8.6 TABLET, FILM COATED ORAL at 18:28

## 2019-11-04 RX ADMIN — PREGABALIN 150 MG: 75 CAPSULE ORAL at 21:00

## 2019-11-04 RX ADMIN — SODIUM CHLORIDE 125 ML/HR: 900 INJECTION, SOLUTION INTRAVENOUS at 14:27

## 2019-11-04 RX ADMIN — CELECOXIB 200 MG: 200 CAPSULE ORAL at 09:36

## 2019-11-04 RX ADMIN — PHENYLEPHRINE HYDROCHLORIDE 80 MCG: 10 INJECTION INTRAVENOUS at 10:53

## 2019-11-04 RX ADMIN — MIDAZOLAM 2 MG: 1 INJECTION INTRAMUSCULAR; INTRAVENOUS at 10:19

## 2019-11-04 NOTE — OP NOTES
11/4/2019  OPERATIVE REPORT      PREOPERATIVE DIAGNOSIS: Osteoarthritis, right knee. POSTOPERATIVE DIAGNOSIS: Osteoarthritis, right knee. OPERATIVE PROCEDURE: right total knee replacement. SURGEON: Karl Monroy MD    ASSISTANT SURGEON: Leonel Dumas, [facility]    ANESTHESIA: Spinal.    Antibiotic:Ancef    INDICATIONS: : A 78 y.o.  female  with end stage osteoarthritis to the right knee, not responsive to conservative management. COMPLICATIONS:None    PROCEDURE: The patient was taken to the operating room, underwent  placement of spinal anesthesia. The knee and leg were prepped and  draped in the usual fashion. The leg was exsanguinated with the Esmarch  bandage and tourniquet inflated to 350 mmHg. A longitudinal midline  incision made down through skin and subcutaneous tissue. A medial  parapatellar arthrotomy was performed, and extended proximally along the  medial border of the quadriceps tendon, distally along the medial border of  the insertion of the patella tendon. Medial release was performed. Retropatellar fat pad was sharply excised. The patella was subluxated  laterally, the knee was flexed to 90 degrees. Drill was used to enter the  intramedullary canal of the distal femur. The 5 degree intramedullary guide  was applied and the distal femoral cut was performed. The femur was sized  to a 6. A 6 cutting block was applied, and the anterior, posterior,  chamfer cuts were performed. The extramedullary tibial guide was applied, and the tibia was cut in  neutral alignment. The tibia was sized to a F. Knee was balanced to varus  and valgus stress. Flexion and extension gaps were equal. Trial reduction  was performed, using 10 mm insert. Excellent range of motion and stability  were noted. The patella was everted, and the patella was cut using freehand  technique. Patella was sized to a 35. Drill holes were placed, and patella  button applied. The patella tracked normally.  All trial components were  removed, and surfaces were prepared using drill and punch. All residual  meniscal tissue was sharply excised. Hemostasis was obtained using Bovie  apparatus. All components were cemented in place, taking care to remove all  excess cement. The knee was maintained in full extension while the cement  hardened. The tourniquet was let down after a total tourniquet time of 37  minutes. Hemostasis was obtained using the Bovie apparatus. The joint was  extensively irrigated with antibiotic solution. The arthrotomy was repaired  using #2 Vicryl in interrupted figure-of-eight fashion. Subcutaneous tissue  was approximated using 2-0 Vicryl in interrupted fashion. Skin edges were  approximated using stapling apparatus. Joint was infiltrated with 30 mL of  0.5% Marcaine with epinephrine. Bulky sterile dressing was applied, and the  patient was transferred to recovery room in stable condition. There were no  Complications. The Physician assistant was critical during the procedure in assisting with positioning ,retraction and wound closure. ESTIMATED BLOOD LOSS: 100 cc.     SPECIMEN: Bone      Marco A Silva M.D.  11/4/2019  11:58 AM

## 2019-11-04 NOTE — PROGRESS NOTES
Problem: Mobility Impaired (Adult and Pediatric)  Goal: *Acute Goals and Plan of Care (Insert Text)  Description  FUNCTIONAL STATUS PRIOR TO ADMISSION: Patient was independent and active without use of DME.    HOME SUPPORT PRIOR TO ADMISSION: The patient lived alone with no local support. Physical Therapy Goals  Initiated 11/4/2019    1. Patient will move from supine to sit and sit to supine  and roll side to side in bed with modified independence within 4 days. 2. Patient will perform sit to stand with modified independence within 4 days. 3. Patient will ambulate with modified independence for 200 feet with the least restrictive device within 4 days. 4. Patient will ascend/descend 4 stairs with 1 handrail(s) with modified independence within 4 days. 5. Patient will perform home exercise program per protocol with modified independence within 4 days. 6. Patient will demonstrate AROM 0-90 degrees in operative joint within 4 days. Outcome: Progressing Towards Goal   PHYSICAL THERAPY EVALUATION  Patient: Chastity Whitmore [de-identified]78 y.o. female)  Date: 11/4/2019  Primary Diagnosis: Primary osteoarthritis of right knee [M17.11]  Procedure(s) (LRB):  RIGHT TOTAL KNEE REPLACEMENT. (Right) Day of Surgery   Precautions:   Fall, WBAT      ASSESSMENT  Based on the objective data described below, the patient presents with decreased mobility after R TKA, POD0. Patient was able to mobilize to the [facility] and back with the walker with min to mod assist and VSS. Limited walking to marching in place for 2 minutes at the bedside due to residual numbness and decreased control of the RLE post spinal.    Patient lives alone and will need a RW for home use. She will need to be able to care for all of her own self care in order to be able to return home safely and may benefit from an OT consult to address ADL deficits and concerns.   Expect that she will progress well, however, and be able to return home with HHPT per pathway. Current Level of Function Impacting Discharge (mobility/balance): mod assist due to residual weakness/decreased sensation in RLE post spinal    Functional Outcome Measure: The patient scored Total: 55/100 on the Barthel Index which is indicative of moderately impaired ability to care for basic self needs/dependency on others. Other factors to consider for discharge: patient lives alone     Patient will benefit from skilled therapy intervention to address the above noted impairments. PLAN :  Recommendations and Planned Interventions: bed mobility training, transfer training, gait training, therapeutic exercises, patient and family training/education and therapeutic activities      Frequency/Duration: Patient will be followed by physical therapy:  twice daily to address goals. Recommendation for discharge: (in order for the patient to meet his/her long term goals)  Physical therapy at least 2 days/week in the home     This discharge recommendation:  Has been made in collaboration with the attending provider and/or case management    IF patient discharges home will need the following DME: rolling walker and order placed in chart today          SUBJECTIVE:   Patient stated I want to do everything I need to do.     OBJECTIVE DATA SUMMARY:   HISTORY:    Past Medical History:   Diagnosis Date    'light-for-dates' infant with signs of fetal malnutrition     Arthritis     OSTEO    Chronic pain     Cirrhosis ([facility] [address]     Hepatitis C     Liver disease 1980s    HEPATITIS C, TREATED AND NOW GONE    Neuropathy     Tuberculosis     Tuberculosis 1962    +ANNA MARIE TEST # 3; TREATED AND NOW LUNGS HAVE ALWAYS BEEN CLEAR     Past Surgical History:   Procedure Laterality Date    HX HEENT      WISDOM TEETH    HX ORTHOPAEDIC Left 2018    BROKEN WRIST, HAS A PLATE       Personal factors and/or comorbidities impacting plan of care: R TKA, lives alone    Home Situation  Home Environment: Apartment  One/Two Story Residence: One story  Living Alone: Yes  Support Systems: Friends \ neighbors  Patient Expects to be Discharged to[de-identified] Apartment  Current DME Used/Available at Home: None    EXAMINATION/PRESENTATION/DECISION MAKING:   Critical Behavior:  Neurologic State: Alert  Orientation Level: Oriented X4  Cognition: Follows commands     Hearing: Auditory  Auditory Impairment: None  Skin:  postop ace in place with no drainage ntoed  Edema: none noted  Range Of Motion:  AROM: Within functional limits(R knee -5 to 70 with post op bandage in place)                       Strength:    Strength: Within functional limits(RLE 3/5 grossly)                    Tone & Sensation:   Tone: Normal              Sensation: Impaired(decreased RLE post spinal)               Coordination:  Coordination: Within functional limits(decreased RLE post spinal)  Vision:      Functional Mobility:  Bed Mobility:     Supine to Sit: Modified independent; Additional time  Sit to Supine: Modified independent; Additional time     Transfers:  Sit to Stand: Minimum assistance; Adaptive equipment; Additional time  Stand to Sit: Minimum assistance; Additional time; Adaptive equipment        Bed to Chair: Moderate assistance; Adaptive equipment; Additional time              Balance:   Sitting: Intact; Without support  Standing: Impaired; With support  Standing - Static: Good  Standing - Dynamic : Fair  Ambulation/Gait Training:  Distance (ft): 3 Feet (ft)  Assistive Device: Gait belt;Walker, rolling  Ambulation - Level of Assistance: Moderate assistance; Adaptive equipment; Additional time        Gait Abnormalities: Antalgic;Decreased step clearance; Step to gait; Path deviations(walked in place at the EOB for 2 min due to residual numbnes)        Base of Support: Widened;Shift to left  Stance: Right decreased  Speed/Anita: Pace decreased (<100 feet/min)  Step Length: Left shortened;Right shortened  Swing Pattern: Right asymmetrical     Interventions: Safety awareness training; Tactile cues; Verbal cues            Stairs: Therapeutic Exercises: Ankle pumps, quad sets, heel slides    Functional Measure:  Barthel Index:    Bathin  Bladder: 10  Bowels: 10  Groomin  Dressin  Feeding: 10  Mobility: 0  Stairs: 0  Toilet Use: 5  Transfer (Bed to Chair and Back): 10  Total: 55/100       The Barthel ADL Index: Guidelines  1. The index should be used as a record of what a patient does, not as a record of what a patient could do. 2. The main aim is to establish degree of independence from any help, physical or verbal, however minor and for whatever reason. 3. The need for supervision renders the patient not independent. 4. A patient's performance should be established using the best available evidence. Asking the patient, friends/relatives and nurses are the usual sources, but direct observation and common sense are also important. However direct testing is not needed. 5. Usually the patient's performance over the preceding 24-48 hours is important, but occasionally longer periods will be relevant. 6. Middle categories imply that the patient supplies over 50 per cent of the effort. 7. Use of aids to be independent is allowed. Ian Durand., Barthel, D.W. ([id number]). Functional evaluation: the Barthel Index. [facility] (14)2. Aden Barone, SENAITJCLINT, Jamil Lizama., Coby Matos., [place], [address] [place] [address] (). Measuring the change indisability after inpatient rehabilitation; comparison of the responsiveness of the Barthel Index and Functional Norton Measure. Journal of Neurology, Neurosurgery, and Psychiatry, 66(4), 852-845. Zeke Pack, N.J.A, SOPHY Valentin, & Skyler Melgar, MKelechiA. (2004.) Assessment of post-stroke quality of life in cost-effectiveness studies: The usefulness of the Barthel Index and the EuroQoL-5D.  Quality of Life Research, 15, 162-34        Physical Therapy Evaluation Charge Determination   History Examination Presentation Decision-Making   MEDIUM  Complexity : 1-2 comorbidities / personal factors will impact the outcome/ POC  MEDIUM Complexity : 3 Standardized tests and measures addressing body structure, function, activity limitation and / or participation in recreation  MEDIUM Complexity : Evolving with changing characteristics  LOW Complexity : FOTO score of       Based on the above components, the patient evaluation is determined to be of the following complexity level: LOW     Pain Rating:  Minimal to moderate pain, primarily posterior    Activity Tolerance:   Good  Please refer to the flowsheet for vital signs taken during this treatment. After treatment patient left in no apparent distress:   Supine in bed, Call bell within reach, Caregiver / family present, Side rails x 3 and       COMMUNICATION/EDUCATION:   The patients plan of care was discussed with: Registered Nurse. Fall prevention education was provided and the patient/caregiver indicated understanding., Patient/family have participated as able in goal setting and plan of care. and Patient/family agree to work toward stated goals and plan of care.     Thank you for this referral.  Sulaiman Joe, PT   Time Calculation: 21 mins

## 2019-11-04 NOTE — PROGRESS NOTES
Occupational Therapy   Orders received, chart review completed. Note patient POD #0 from RIGHT TOTAL KNEE REPLACEMENT. OT will follow up tomorrow for evaluation. Recommend OOB to chair three times a day for meals and self-completion of ADLs as able and medically stable. Thank you.

## 2019-11-04 NOTE — ANESTHESIA PROCEDURE NOTES
Peripheral Block    Performed by: Farhat Gloria MD  Authorized by: Farhat Gloria MD       Pre-procedure: Indications: at surgeon's request and post-op pain management    Preanesthetic Checklist: patient identified, risks and benefits discussed, site marked, timeout performed, anesthesia consent given and patient being monitored      Block Type:   Block Type:   Adductor canal  Monitoring:  Standard ASA monitoring, responsive to questions, continuous pulse ox, frequent vital sign checks, heart rate and oxygen  Injection Technique:  Single shot  Procedures: ultrasound guided    Patient Position: supine  Prep: alcohol    Location:  Mid thigh  Needle Gauge:  21 G  Needle Localization:  Ultrasound guidance    Assessment:    Injection Assessment:  Incremental injection every 5 mL, local visualized surrounding nerve on ultrasound, negative aspiration for blood, no intravascular symptoms, ultrasound image on chart, no paresthesia and negative aspiration for CSF  Patient tolerance:  Patient tolerated the procedure well with no immediate complications

## 2019-11-04 NOTE — ANESTHESIA POSTPROCEDURE EVALUATION
Procedure(s):  RIGHT TOTAL KNEE REPLACEMENT. Jatinder Ortizia spinal    Anesthesia Post Evaluation      Multimodal analgesia: multimodal analgesia used between 6 hours prior to anesthesia start to PACU discharge  Patient location during evaluation: bedside  Patient participation: waiting for patient participation  Level of consciousness: awake  Pain management: adequate  Airway patency: patent  Anesthetic complications: no  Cardiovascular status: acceptable  Respiratory status: unassisted  Hydration status: acceptable  Comments: Post-Anesthesia Evaluation and Assessment    I have evaluated the patient and they are ready for PACU discharge. Patient: Falguni Jiménez MRN: [id number]  SSN: [id number]   YOB: 1950  Age: 78 y.o. Sex: female      Cardiovascular Function/Vital Signs  /61   Pulse 64   Temp 36.5 °C (97.7 °F)   Resp 16   Ht 5' 3\" (1.6 m)   Wt 60.8 kg (134 lb)   SpO2 99%   BMI 23.74 kg/m²     Patient is status post Spinal anesthesia for Procedure(s):  RIGHT TOTAL KNEE REPLACEMENT. .    Nausea/Vomiting: None    Postoperative hydration reviewed and adequate. Pain:  Pain Scale 1: Numeric (0 - 10) (11/04/19 1232)  Pain Intensity 1: 0 (11/04/19 1232)   Managed    Neurological Status:   Neuro (WDL): Exceptions to WDL (11/04/19 1232)  Neuro  LLE Motor Response: Numbness (11/04/19 1232)  RLE Motor Response: Numbness (11/04/19 1232)   At baseline    Mental Status, Level of Consciousness: Alert and  oriented to person, place, and time    Pulmonary Status:   O2 Device: Nasal cannula (11/04/19 1232)   Adequate oxygenation and airway patent    Complications related to anesthesia: None    Post-anesthesia assessment completed.  No concerns    Signed By: Ivonne Salcido MD    November 4, 2019                   Vitals Value Taken Time   /61 11/4/2019 12:45 PM   Temp 36.5 °C (97.7 °F) 11/4/2019 12:32 PM   Pulse 60 11/4/2019 12:58 PM   Resp 13 11/4/2019 12:58 PM   SpO2 99 % 11/4/2019 12:58 PM   Vitals shown include unvalidated device data.

## 2019-11-04 NOTE — PROGRESS NOTES
CM received notification that patient has been downgraded from Impatient to Observation status. Code 40 completed and provided to patient for review. Observation notice provided in writing to patient and/or caregiver as well as verbal explanation of the policy. Patients who are in outpatient status also receive the Observation notice.       RONAL Barnes/FAY  Care Management  5:12 PM

## 2019-11-04 NOTE — ANESTHESIA PREPROCEDURE EVALUATION
Relevant Problems   No relevant active problems       Anesthetic History   No history of anesthetic complications            Review of Systems / Medical History  Patient summary reviewed, nursing notes reviewed and pertinent labs reviewed    Pulmonary  Within defined limits                 Neuro/Psych   Within defined limits           Cardiovascular  Within defined limits                     GI/Hepatic/Renal  Within defined limits              Endo/Other  Within defined limits           Other Findings              Physical Exam    Airway  Mallampati: II  TM Distance: > 6 cm  Neck ROM: normal range of motion   Mouth opening: Normal     Cardiovascular  Regular rate and rhythm,  S1 and S2 normal,  no murmur, click, rub, or gallop             Dental  No notable dental hx       Pulmonary  Breath sounds clear to auscultation               Abdominal  GI exam deferred       Other Findings            Anesthetic Plan    ASA: 2  Anesthesia type: spinal      Post-op pain plan if not by surgeon: peripheral nerve block single    Induction: Intravenous  Anesthetic plan and risks discussed with: Patient

## 2019-11-04 NOTE — ANESTHESIA PROCEDURE NOTES
Spinal Block    Start time: 11/4/2019 10:35 AM  End time: 11/4/2019 10:45 AM  Performed by: Liss Hamilton CRNA  Authorized by: Rehan Mccloud MD     Pre-procedure:   Indications: primary anesthetic  Preanesthetic Checklist: patient identified, risks and benefits discussed, anesthesia consent, site marked, patient being monitored and timeout performed    Timeout Time: 10:35          Spinal Block:   Patient Position:  Seated  Prep Region:  Lumbar  Prep: DuraPrep and patient draped      Location:  L2-3  Technique:  Single shot    Local Dose (mL):  1    Needle:   Needle Type:  Pencil-tip  Needle Gauge:  25 G  Attempts:  2      Events: CSF confirmed and no blood with aspiration        Assessment:  Insertion:  Uncomplicated  Patient tolerance:  Patient tolerated the procedure well with no immediate complications

## 2019-11-04 NOTE — BRIEF OP NOTE
BRIEF OPERATIVE NOTE    Date of Procedure: 11/4/2019   Preoperative Diagnosis: OSTEOARTHRITIS OF THE RIGHT KNEE  Postoperative Diagnosis: OSTEOARTHRITIS OF THE RIGHT KNEE    Procedure(s):  RIGHT TOTAL KNEE REPLACEMENT. Surgeon(s) and Role:     * Tete Quevedo MD - Primary         Surgical Assistant: Valentin Shea, [facility]    Surgical Staff:  Jacob Star: Héctor Dumont RN  Circ-Relief: Migdalia Woods RN  Physician Assistant: Shad Lopez PA-C  Scrub Tech-1: Beverly Garrido  Scrub RN-Relief: Norbert Tam RN  Surg Asst-1: Harinderfie Curb  Event Time In Time Out   Incision Start 1113    Incision Close       Anesthesia: Spinal   Estimated Blood Loss: 100cc  Specimens: * No specimens in log *   Findings: DJD   Complications: none  Implants:   Implant Name Type Inv.  Item Serial No.  Lot No. LRB No. Used Action   CEMENT BNE SMARTSET HV 40GM -- ORDER IN SETS OF 20 - SNA  CEMENT BNE SMARTSET HV 40GM -- ORDER IN SETS OF 20 NA [facility] [employer] [id number] Right 2 Implanted   PAT PSN VE POLY 35MM -- PERSONA - SNA  PAT PSN VE POLY 35MM -- PERSONA NA [employer] [id number] Right 1 Implanted   FEM CR AFSANEH CCR STD SZ 6 RT -- PERSONA - SNA  FEM CR AFSANEH CCR STD SZ 6 RT -- PERSONA [employer] [id number] Right 1 Implanted   TIB PRN NP STM 5 DEG SZ FR -- PERSONA - SNA  TIB PRN NP STM 5 DEG SZ FR -- PERSONA NA [employer] [id number] Right 1 Implanted   PERSONA The Personalized Knee System Vivacit-E Highly Crosslinked Polyethylene Articular Surface Medial Congruent Right 10 mm Height   NA [employer] [id number] Right 1 Implanted

## 2019-11-04 NOTE — PERIOP NOTES
TRANSFER - OUT REPORT:    Verbal report given to Krystal(name) on Deborah Us  being transferred to 565(unit) for routine post - op       Report consisted of patients Situation, Background, Assessment and   Recommendations(SBAR). Time Pre op antibiotic given:1052  Anesthesia Stop time: 6306  Allen Present on Transfer to floor:no  Order for Allen on Chart:no  Discharge Prescriptions with Chart:no    Information from the following report(s) SBAR, Kardex, OR Summary, Procedure Summary, Intake/Output, MAR, Recent Results and Med Rec Status was reviewed with the receiving nurse. Opportunity for questions and clarification was provided. Is the patient on 02? NO       L/Min        Other     Is the patient on a monitor? NO    Is the nurse transporting with the patient? NO    Surgical Waiting Area notified of patient's transfer from PACU? YES      The following personal items collected during your admission accompanied patient upon transfer:   Dental Appliance:    Vision:    Hearing Aid:    Jewelry:    Clothing: Clothing: With patient(clothing bag to [employer])  Other Valuables: Other Valuables: Other (comment)(dentures to pacu)  Valuables sent to safe:        Dentures in mouth. Clothes sent to floor.

## 2019-11-04 NOTE — INTERVAL H&P NOTE
H&P Update: 
Mckenzie Leigh was seen and examined. History and physical has been reviewed. The patient has been examined.  There have been no significant clinical changes since the completion of the originally dated History and Physical.

## 2019-11-04 NOTE — PROGRESS NOTES
TRANSFER - IN REPORT:    Verbal report received from Orestes Tucker RN(name) on [address]  being received from PACU(unit) for ordered procedure      Report consisted of patients Situation, Background, Assessment and   Recommendations(SBAR). Information from the following report(s) SBAR, Kardex and Recent Results was reviewed with the receiving nurse. Opportunity for questions and clarification was provided. Assessment completed upon patients arrival to unit and care assumed.          Primary Nurse Cara Vickers RN and Asya Rayo RN performed a dual skin assessment on this patient No impairment noted  Logan score is 20

## 2019-11-04 NOTE — ROUTINE PROCESS
Patient: Mary Stern MRN: [id number]  SSN: [id number] YOB: 1950  Age: 78 y.o. Sex: female Patient is status post Procedure(s): RIGHT TOTAL KNEE REPLACEMENT. [place] Surgeon(s) and Role: Oly Medrano MD - Primary Local/Dose/Irrigation:  See [facility] Peripheral IV 11/04/19 Left Arm (Active) Airway - Endotracheal Tube 11/04/19 (Active) Dressing/Packing:  Wound Knee Right-Dressing Type: 4 x 4;ABD pad;Cast padding;Elastic bandage;Non adherent (11/04/19 1155) Splint/Cast:  ] Other:

## 2019-11-05 LAB
ANION GAP SERPL CALC-SCNC: 4 MMOL/L (ref 5–15)
BUN SERPL-MCNC: 13 MG/DL (ref 6–20)
BUN/CREAT SERPL: 15 (ref 12–20)
CALCIUM SERPL-MCNC: 7.9 MG/DL (ref 8.5–10.1)
CHLORIDE SERPL-SCNC: 112 MMOL/L (ref 97–108)
CO2 SERPL-SCNC: 26 MMOL/L (ref 21–32)
CREAT SERPL-MCNC: 0.85 MG/DL (ref 0.55–1.02)
GLUCOSE SERPL-MCNC: 92 MG/DL (ref 65–100)
HGB BLD-MCNC: 10.9 G/DL (ref 11.5–16)
POTASSIUM SERPL-SCNC: 3.5 MMOL/L (ref 3.5–5.1)
SODIUM SERPL-SCNC: 142 MMOL/L (ref 136–145)

## 2019-11-05 PROCEDURE — 74011250637 HC RX REV CODE- 250/637: Performed by: PHYSICIAN ASSISTANT

## 2019-11-05 PROCEDURE — 36415 COLL VENOUS BLD VENIPUNCTURE: CPT

## 2019-11-05 PROCEDURE — 85018 HEMOGLOBIN: CPT

## 2019-11-05 PROCEDURE — 97116 GAIT TRAINING THERAPY: CPT

## 2019-11-05 PROCEDURE — 80048 BASIC METABOLIC PNL TOTAL CA: CPT

## 2019-11-05 PROCEDURE — 74011250636 HC RX REV CODE- 250/636: Performed by: PHYSICIAN ASSISTANT

## 2019-11-05 PROCEDURE — 99218 HC RM OBSERVATION: CPT

## 2019-11-05 RX ADMIN — OXYCODONE HYDROCHLORIDE 10 MG: 5 TABLET ORAL at 15:01

## 2019-11-05 RX ADMIN — ACETAMINOPHEN 1000 MG: 500 TABLET ORAL at 16:09

## 2019-11-05 RX ADMIN — OXYCODONE HYDROCHLORIDE 10 MG: 5 TABLET ORAL at 22:01

## 2019-11-05 RX ADMIN — ACETAMINOPHEN 1000 MG: 500 TABLET ORAL at 21:22

## 2019-11-05 RX ADMIN — APIXABAN 2.5 MG: 2.5 TABLET, FILM COATED ORAL at 06:54

## 2019-11-05 RX ADMIN — OXYCODONE HYDROCHLORIDE 10 MG: 5 TABLET ORAL at 06:54

## 2019-11-05 RX ADMIN — SODIUM CHLORIDE 500 ML: 900 INJECTION, SOLUTION INTRAVENOUS at 10:42

## 2019-11-05 RX ADMIN — SODIUM CHLORIDE 125 ML/HR: 900 INJECTION, SOLUTION INTRAVENOUS at 10:37

## 2019-11-05 RX ADMIN — OXYCODONE HYDROCHLORIDE 10 MG: 5 TABLET ORAL at 11:30

## 2019-11-05 RX ADMIN — OXYCODONE HYDROCHLORIDE 10 MG: 5 TABLET ORAL at 18:54

## 2019-11-05 RX ADMIN — Medication 10 ML: at 15:03

## 2019-11-05 RX ADMIN — DOCUSATE SODIUM AND SENNOSIDES 1 TABLET: 50; 8.6 TABLET, FILM COATED ORAL at 09:12

## 2019-11-05 RX ADMIN — PREGABALIN 150 MG: 75 CAPSULE ORAL at 09:13

## 2019-11-05 RX ADMIN — MELOXICAM 15 MG: 7.5 TABLET ORAL at 09:13

## 2019-11-05 RX ADMIN — PREGABALIN 150 MG: 75 CAPSULE ORAL at 16:09

## 2019-11-05 RX ADMIN — APIXABAN 2.5 MG: 2.5 TABLET, FILM COATED ORAL at 20:05

## 2019-11-05 RX ADMIN — Medication 2 G: at 02:45

## 2019-11-05 RX ADMIN — POLYETHYLENE GLYCOL 3350 17 G: 17 POWDER, FOR SOLUTION ORAL at 09:14

## 2019-11-05 RX ADMIN — DOCUSATE SODIUM AND SENNOSIDES 1 TABLET: 50; 8.6 TABLET, FILM COATED ORAL at 18:55

## 2019-11-05 RX ADMIN — ACETAMINOPHEN 1000 MG: 500 TABLET ORAL at 10:35

## 2019-11-05 RX ADMIN — OXYCODONE HYDROCHLORIDE 5 MG: 5 TABLET ORAL at 02:45

## 2019-11-05 RX ADMIN — PREGABALIN 150 MG: 75 CAPSULE ORAL at 21:23

## 2019-11-05 RX ADMIN — ACETAMINOPHEN 1000 MG: 500 TABLET ORAL at 02:45

## 2019-11-05 NOTE — PROGRESS NOTES
Bedside shift change report given to Alvaro Grayson RN (oncoming nurse) by PRIMO King RN (offgoing nurse). Report included the following information SBAR, Kardex and Recent Results.

## 2019-11-05 NOTE — PROGRESS NOTES
Problem: Falls - Risk of  Goal: *Absence of Falls  Description  Document Rand Kinsley Fall Risk and appropriate interventions in the flowsheet.   Outcome: Progressing Towards Goal  Note:   Fall Risk Interventions:  Mobility Interventions: PT Consult for mobility concerns         Medication Interventions: Patient to call before getting OOB    Elimination Interventions: Call light in reach, Patient to call for help with toileting needs              Problem: Knee Replacement: Post-Op Day 1  Goal: *Hemodynamically stable  Outcome: Progressing Towards Goal

## 2019-11-05 NOTE — PROGRESS NOTES
Occupational Therapy Note  11/5/2019    Holding OT at this time due to pt near syncope with physical therapy. Will follow up with pt at later time pending availability and appropriateness.     Thank you,  ROZ Donaldson/LEAH

## 2019-11-05 NOTE — PROGRESS NOTES
Problem: Mobility Impaired (Adult and Pediatric)  Goal: *Acute Goals and Plan of Care (Insert Text)  Description  FUNCTIONAL STATUS PRIOR TO ADMISSION: Patient was independent and active without use of DME.    HOME SUPPORT PRIOR TO ADMISSION: The patient lived alone with no local support. Physical Therapy Goals  Initiated 11/4/2019    1. Patient will move from supine to sit and sit to supine  and roll side to side in bed with modified independence within 4 days. 2. Patient will perform sit to stand with modified independence within 4 days. 3. Patient will ambulate with modified independence for 200 feet with the least restrictive device within 4 days. 4. Patient will ascend/descend 4 stairs with 1 handrail(s) with modified independence within 4 days. 5. Patient will perform home exercise program per protocol with modified independence within 4 days. 6. Patient will demonstrate AROM 0-90 degrees in operative joint within 4 days. Outcome: Progressing Towards Goal   PHYSICAL THERAPY TREATMENT  Patient: Pablo Mcgill (15 y.o. female)  Date: 11/5/2019  Diagnosis: Primary osteoarthritis of right knee [M17.11]  Primary localized osteoarthritis of left knee [M17.12] Primary osteoarthritis of right knee  Procedure(s) (LRB):  RIGHT TOTAL KNEE REPLACEMENT. (Right) 1 Day Post-Op  Precautions: Fall, WBAT  Chart, physical therapy assessment, plan of care and goals were reviewed. ASSESSMENT  Patient continues with skilled PT services and is progressing towards goals. Her motivation is excellent, but she was only able to walk about 61' before becoming vagal.  BPs as noted in flowsheet. She was returned to bed and RN in room to assist.  In addition, she has foot drop on the R side which impacts her gait greatly and increases her fall risk. An AFO is less than idea at this stage due to expected post op edema and we would like to encourage muscle activation as much as possible.   Expect that this will increase her length of stay, however, in order to ensure that she is able to return home safely and maximize her recovery. We will follow up later today to progress activity as she is able to tolerate as long as her VS are stable. Current Level of Function Impacting Discharge (mobility/balance): only tolerating short bits of activity due to hypotension with activity    Other factors to consider for discharge: lives alone, foot drop         PLAN :  Patient continues to benefit from skilled intervention to address the above impairments. Continue treatment per established plan of care. to address goals. Recommendation for discharge: (in order for the patient to meet his/her long term goals)  To be determined: most likely home with HHPT, but pending progress     This discharge recommendation:  Has been made in collaboration with the attending provider and/or case management    IF patient discharges home will need the following DME: rolling walker and it has been ordered        SUBJECTIVE:   Patient stated I want to push myself as much as I can.     OBJECTIVE DATA SUMMARY:   Critical Behavior:  Neurologic State: Appropriate for age  Orientation Level: Appropriate for age  Cognition: Appropriate for age attention/concentration     Functional Mobility Training:  Bed Mobility:     Supine to Sit: Modified independent; Additional time  Sit to Supine: Modified independent; Additional time           Transfers:  Sit to Stand: Minimum assistance; Adaptive equipment; Additional time  Stand to Sit: Contact guard assistance; Adaptive equipment; Additional time        Bed to Chair: Minimum assistance; Adaptive equipment; Additional time                    Balance:  Sitting: Intact; Without support  Standing: Impaired; With support  Standing - Static: Good  Standing - Dynamic : Fair(needs encouragement to put R foot flat on floor)  Ambulation/Gait Training:  Distance (ft): 60 Feet (ft)  Assistive Device: Gait belt;Walker, rolling  Ambulation - Level of Assistance: Minimal assistance; Adaptive equipment; Additional time        Gait Abnormalities: Antalgic;Decreased step clearance; Foot drop(on the R)        Base of Support: Widened;Shift to left  Stance: Right decreased  Speed/Anita: Pace decreased (<100 feet/min)  Step Length: Left shortened;Right shortened  Swing Pattern: Right asymmetrical                 Stairs: Therapeutic Exercises:     Pain Rating: Moderate pain after activity, primarily in the posteriomedial RLE    Activity Tolerance:   Poor and signs and symptoms of orthostatic hypotension  Please refer to the flowsheet for vital signs taken during this treatment.     After treatment patient left in no apparent distress:   Supine in bed, Call bell within reach, Caregiver / family present and Side rails x 3    COMMUNICATION/COLLABORATION:   The patients plan of care was discussed with: Registered Nurse,  and NAIDA Gabriel, PT   Time Calculation: 30 mins

## 2019-11-05 NOTE — ROUTINE PROCESS
Bedside shift change report given to Carlito Lu and Jenn Fernandes (oncoming nurse) by Rg Solis (offgoing nurse). Report included the following information SBAR.

## 2019-11-05 NOTE — PROGRESS NOTES
CARMEN: Home with home health. [facility] patient. Home health pending. Care Management Interventions  PCP Verified by CM: Yes  Mode of Transport at Discharge: Other (see comment)(friends/car)  Transition of Care Consult (CM Consult): Home Health, Discharge [address]: Yes  MyChart Signup: No  Discharge Durable Medical Equipment: No(patient owns cane and walker)  Physical Therapy Consult: Yes  Occupational Therapy Consult: Yes  Speech Therapy Consult: No  Current Support Network: Lives Alone  Confirm Follow Up Transport: Friends  Plan discussed with Pt/Family/Caregiver: Yes  Freedom of Choice Offered: Yes  Discharge Location  Discharge Placement: Home with home health     Reason for Admission:  RIGHT TOTAL KNEE REPLACEMENT                    RRAT Score:     8                Plan for utilizing home health:  Offered freedom of choice, preference is [address]. Referral sent. [place] of choice letter signed and placed on hard chart. Current Advanced Directive/Advance Care Plan: not on file                         Transition of Care Plan:   Home with home health. CM received call from [place] with [address]. They are not in network with [facility]on Sykes. CM sent referral to [facility] preferred provider, [facility].     Laurel Huston, BSW/CRM

## 2019-11-05 NOTE — PROGRESS NOTES
Problem: Mobility Impaired (Adult and Pediatric)  Goal: *Acute Goals and Plan of Care (Insert Text)  Description  FUNCTIONAL STATUS PRIOR TO ADMISSION: Patient was independent and active without use of DME.    HOME SUPPORT PRIOR TO ADMISSION: The patient lived alone with no local support. Physical Therapy Goals  Initiated 11/4/2019    1. Patient will move from supine to sit and sit to supine  and roll side to side in bed with modified independence within 4 days. 2. Patient will perform sit to stand with modified independence within 4 days. 3. Patient will ambulate with modified independence for 200 feet with the least restrictive device within 4 days. 4. Patient will ascend/descend 4 stairs with 1 handrail(s) with modified independence within 4 days. 5. Patient will perform home exercise program per protocol with modified independence within 4 days. 6. Patient will demonstrate AROM 0-90 degrees in operative joint within 4 days. 11/5/2019 1124 by Silvia Hernandez PT  Outcome: Progressing Towards Goal   PHYSICAL THERAPY TREATMENT  Patient: Deana Dash (57 y.o. female)  Date: 11/5/2019  Diagnosis: Primary osteoarthritis of right knee [M17.11]  Primary localized osteoarthritis of left knee [M17.12] Primary osteoarthritis of right knee  Procedure(s) (LRB):  RIGHT TOTAL KNEE REPLACEMENT. (Right) 1 Day Post-Op  Precautions: Fall, WBAT  Chart, physical therapy assessment, plan of care and goals were reviewed. ASSESSMENT  Patient continues with skilled PT services and is progressing towards goals. She was able to better tolerate activity this afternoon, with no complaints of lightheadedness or dizziness. Her gait is less painful with a short step length and she is tolerating weight bearing on the RLE better. She still demonstrates R foot drop with gait.   Discussed with patient the importance of fall prevention especially in light of the foot drop and will continue to reinforce that as we prepare for eventual D/C home. She is not clear for D/C home from a PT standpoint, but expect that she will continue to progress and be able to return home with HHPT in 1-2 days. Current Level of Function Impacting Discharge (mobility/balance): min assist for short to moderate distance ambulation    Other factors to consider for discharge: lives alone, R foot drop         PLAN :  Patient continues to benefit from skilled intervention to address the above impairments. Continue treatment per established plan of care. to address goals. Recommendation for discharge: (in order for the patient to meet his/her long term goals)  Physical therapy at least 2 days/week in the home     This discharge recommendation:  Has been made in collaboration with the attending provider and/or case management    IF patient discharges home will need the following DME: RW has been delivered for discharge       SUBJECTIVE:   Patient stated I feel it stretching, but that's OK.     OBJECTIVE DATA SUMMARY:   Critical Behavior:  Neurologic State: Alert, Appropriate for age  Orientation Level: Oriented X4  Cognition: Follows commands, Appropriate safety awareness, Appropriate for age attention/concentration, Appropriate decision making     Functional Mobility Training:  Bed Mobility:     Supine to Sit: Modified independent; Additional time  Sit to Supine: (up in chair after)           Transfers:  Sit to Stand: Minimum assistance; Adaptive equipment; Additional time  Stand to Sit: Contact guard assistance; Adaptive equipment; Additional time        Bed to Chair: Minimum assistance; Adaptive equipment; Additional time                    Balance:  Sitting: Intact; Without support  Standing: Impaired; With support  Standing - Static: Good  Standing - Dynamic : Fair(needs encouragement to put R foot flat on floor)  Ambulation/Gait Training:  Distance (ft): 90 Feet (ft)  Assistive Device: Gait belt;Walker, rolling  Ambulation - Level of Assistance: Minimal assistance; Adaptive equipment; Additional time        Gait Abnormalities: Antalgic;Decreased step clearance; Foot drop(on the R)        Base of Support: Widened;Shift to left  Stance: Right decreased  Speed/Anita: Pace decreased (<100 feet/min)  Step Length: Left shortened;Right shortened  Swing Pattern: Right asymmetrical                 Stairs: Therapeutic Exercises:   Knee flexion stretching in chair, ankle DF stretching in bed  Pain Rating: Moderate pain after activity    Activity Tolerance:   Fair and requires rest breaks  Please refer to the flowsheet for vital signs taken during this treatment.     After treatment patient left in no apparent distress:   Sitting in chair, Call bell within reach and Caregiver / family present    COMMUNICATION/COLLABORATION:   The patients plan of care was discussed with: Registered Nurse    Pilar Pelletier, PT   Time Calculation: 25 mins

## 2019-11-05 NOTE — PROGRESS NOTES
Ortho Daily Progress Note    11/5/2019  10:04 AM    POD:  1 Day Post-Op  S/P:  Procedure(s):  RIGHT TOTAL KNEE REPLACEMENT. Afebrile/VSS NAD, A&O x 3  Doing well without complaints of nausea  Pain well controlled  Calves soft/NTTP Bilaterally  Incision OK, no drainage or dehiscence. leg soft. Dressing clean and dry  Moving lower extremities well. Neurocirculatory exam intact and within normal range, except on the right where she has an apparent foot drop. She is unable to dorsiflex the foot, and also describes numbness along her anteromedial calf     Lab Results   Component Value Date/Time    HGB 10.9 (L) 11/05/2019 02:57 AM    INR 1.0 10/21/2019 08:40 AM     Recent Labs     11/05/19  0257 10/21/19  0840   CREA 0.85 0.81   BUN 13 14     Estimated Creatinine Clearance: 50.9 mL/min (based on SCr of 0.85 mg/dL).     PLAN: Watch RLE closely, may be transient, will likely obtain AFO if this persists by time of DC expected by tomorrow  DVT prophylaxis: Eliquis 2.5 mg bid for 2 weeks post op  WBAT with PT-mobilization  Pain Control: tylenol, oxycodone, mobic  Plan to D/C likely tomorrow      NAIDA Lvoe

## 2019-11-06 PROCEDURE — 97116 GAIT TRAINING THERAPY: CPT

## 2019-11-06 PROCEDURE — 97110 THERAPEUTIC EXERCISES: CPT

## 2019-11-06 PROCEDURE — 97535 SELF CARE MNGMENT TRAINING: CPT

## 2019-11-06 PROCEDURE — 99218 HC RM OBSERVATION: CPT

## 2019-11-06 PROCEDURE — 74011250637 HC RX REV CODE- 250/637: Performed by: PHYSICIAN ASSISTANT

## 2019-11-06 PROCEDURE — 97165 OT EVAL LOW COMPLEX 30 MIN: CPT

## 2019-11-06 RX ORDER — AMOXICILLIN 250 MG
1 CAPSULE ORAL 2 TIMES DAILY
Qty: 30 TAB | Refills: 0 | Status: SHIPPED | OUTPATIENT
Start: 2019-11-06 | End: 2020-03-13

## 2019-11-06 RX ORDER — ACETAMINOPHEN 325 MG/1
325 TABLET ORAL EVERY 6 HOURS
Status: DISCONTINUED | OUTPATIENT
Start: 2019-11-06 | End: 2019-11-07 | Stop reason: HOSPADM

## 2019-11-06 RX ORDER — OXYCODONE HYDROCHLORIDE 5 MG/1
5 TABLET ORAL
Qty: 60 TAB | Refills: 0 | Status: SHIPPED | OUTPATIENT
Start: 2019-11-06 | End: 2019-11-16

## 2019-11-06 RX ORDER — MELOXICAM 7.5 MG/1
7.5 TABLET ORAL DAILY
Qty: 30 TAB | Refills: 0 | Status: SHIPPED | OUTPATIENT
Start: 2019-11-07 | End: 2020-03-13

## 2019-11-06 RX ADMIN — Medication 10 ML: at 19:02

## 2019-11-06 RX ADMIN — DOCUSATE SODIUM AND SENNOSIDES 1 TABLET: 50; 8.6 TABLET, FILM COATED ORAL at 08:52

## 2019-11-06 RX ADMIN — OXYCODONE HYDROCHLORIDE 10 MG: 5 TABLET ORAL at 07:12

## 2019-11-06 RX ADMIN — ACETAMINOPHEN 325 MG: 325 TABLET, FILM COATED ORAL at 14:44

## 2019-11-06 RX ADMIN — PREGABALIN 150 MG: 75 CAPSULE ORAL at 08:52

## 2019-11-06 RX ADMIN — OXYCODONE HYDROCHLORIDE 10 MG: 5 TABLET ORAL at 10:41

## 2019-11-06 RX ADMIN — PREGABALIN 150 MG: 75 CAPSULE ORAL at 20:37

## 2019-11-06 RX ADMIN — FAMOTIDINE 20 MG: 20 TABLET ORAL at 08:52

## 2019-11-06 RX ADMIN — OXYCODONE HYDROCHLORIDE 10 MG: 5 TABLET ORAL at 13:47

## 2019-11-06 RX ADMIN — PREGABALIN 150 MG: 75 CAPSULE ORAL at 16:21

## 2019-11-06 RX ADMIN — APIXABAN 2.5 MG: 2.5 TABLET, FILM COATED ORAL at 07:12

## 2019-11-06 RX ADMIN — MELOXICAM 15 MG: 7.5 TABLET ORAL at 08:52

## 2019-11-06 RX ADMIN — OXYCODONE HYDROCHLORIDE 10 MG: 5 TABLET ORAL at 20:02

## 2019-11-06 RX ADMIN — ACETAMINOPHEN 1000 MG: 500 TABLET ORAL at 07:12

## 2019-11-06 RX ADMIN — APIXABAN 2.5 MG: 2.5 TABLET, FILM COATED ORAL at 19:01

## 2019-11-06 RX ADMIN — DOCUSATE SODIUM AND SENNOSIDES 1 TABLET: 50; 8.6 TABLET, FILM COATED ORAL at 18:59

## 2019-11-06 RX ADMIN — POLYETHYLENE GLYCOL 3350 17 G: 17 POWDER, FOR SOLUTION ORAL at 08:52

## 2019-11-06 RX ADMIN — ACETAMINOPHEN 325 MG: 325 TABLET, FILM COATED ORAL at 20:02

## 2019-11-06 NOTE — PROGRESS NOTES
CARMEN: [facility] has accepted patient. Anticipate discharge home tomorrow. CM received notification that [facility] is unable to accept patient. [facility] has also denied referral. ORIN called and left message for Yusuf Hector ([phone]) to find out what other home care companies are in network with [facility]. ORIN spoke with Rico Donald. She recommended sending a referral to [facility]. CM sent referral via [facility].     SHIVA Mendoza/CRM

## 2019-11-06 NOTE — PROGRESS NOTES
Problem: Mobility Impaired (Adult and Pediatric)  Goal: *Acute Goals and Plan of Care (Insert Text)  Description  FUNCTIONAL STATUS PRIOR TO ADMISSION: Patient was independent and active without use of DME.    HOME SUPPORT PRIOR TO ADMISSION: The patient lived alone with no local support. Physical Therapy Goals  Initiated 11/4/2019    1. Patient will move from supine to sit and sit to supine  and roll side to side in bed with modified independence within 4 days. 2. Patient will perform sit to stand with modified independence within 4 days. 3. Patient will ambulate with modified independence for 200 feet with the least restrictive device within 4 days. 4. Patient will ascend/descend 4 stairs with 1 handrail(s) with modified independence within 4 days. 5. Patient will perform home exercise program per protocol with modified independence within 4 days. 6. Patient will demonstrate AROM 0-90 degrees in operative joint within 4 days. 11/6/2019 1303 by Moe Arreguin PT  Outcome: Progressing Towards Goal   PHYSICAL THERAPY TREATMENT  Patient: Travon Hernandez (11 y.o. female)  Date: 11/6/2019  Diagnosis: Primary osteoarthritis of right knee [M17.11]  Primary localized osteoarthritis of left knee [M17.12] Primary osteoarthritis of right knee  Procedure(s) (LRB):  RIGHT TOTAL KNEE REPLACEMENT. (Right) 2 Days Post-Op  Precautions: Fall, WBAT  Chart, physical therapy assessment, plan of care and goals were reviewed. ASSESSMENT  Based on the objective data described below, patient demonstrates improvements in gait mechanics. Pt received AFO for RLE from orthopedic team. Pt with improvements in step clearance and bernardo. Pt required moderate cues for managing rolling walker, performing heel strike at initial contact on RLE, and upright posture. Pt with fair carryover and compensating with R hip flexion to clear RLE. Pt then returned to room and transferred to chair to perform RLE exercises.  Pt then returned to bed to rest.   Patient would benefit from at least 1-2 more inpatient PT sessions to continue gait training with AFO and increase activity tolerance, RN made aware. Current Level of Function Impacting Discharge (mobility/balance): supervision for transfers and ambulation    Other factors to consider for discharge: R foot drop, received AFO, lives alone, weakness         PLAN :  Patient continues to benefit from skilled intervention to address the above impairments. Continue treatment per established plan of care. to address goals. Recommendation for discharge: (in order for the patient to meet his/her long term goals)  Physical therapy at least 2 days/week in the home     This discharge recommendation:  Has been made in collaboration with the attending provider and/or case management    IF patient discharges home will need the following DME: patient owns DME required for discharge       SUBJECTIVE:   Patient stated Shelley Grijalva, this is strange.  referring to ambulating with AFO     OBJECTIVE DATA SUMMARY:   Critical Behavior:  Neurologic State: Alert  Orientation Level: Oriented X4  Cognition: Appropriate decision making, Appropriate for age attention/concentration, Appropriate safety awareness, Follows commands  Safety/Judgement: Awareness of environment, Fall prevention, Good awareness of safety precautions, Home safety, Insight into deficits    Range of Motion:  AROM: Within functional limits    Functional Mobility Training:  Bed Mobility:     Supine to Sit: Modified independent  Sit to Supine: Supervision  Scooting: Modified independent    Transfers:  Sit to Stand: Contact guard assistance; Additional time; Adaptive equipment  Stand to Sit: Contact guard assistance; Adaptive equipment; Additional time    Balance:  Sitting: Intact  Standing: Impaired; With support  Standing - Static: Good  Standing - Dynamic : Fair  Ambulation/Gait Training:  Distance (ft): 80 Feet (ft)  Assistive Device: Gait belt;Walker, rolling  Ambulation - Level of Assistance: Contact guard assistance; Adaptive equipment; Additional time        Gait Abnormalities: Antalgic;Decreased step clearance     Base of Support: Shift to left  Stance: Right decreased  Speed/Anita: Slow  Step Length: Left shortened  Swing Pattern: Left asymmetrical;Right asymmetrical      Therapeutic Exercises:     EXERCISE   Sets   Reps   Active Active Assist   Passive Self ROM   Comments   Ankle Pumps 1 10 ?                                        ?                                        ?                                        ?                                        Assistance for R DF   Quad Sets   ? ?                                        ?                                        ?                                           Hamstring Sets   ? ?                                        ?                                        ?                                           Short Arc Quads   ? ?                                        ?                                        ?                                           Knee Extension Stretch     ? ?                                          ?                                          ?                                           Heel Slides 1 10 ?                                        ?                                        ?                                        ?                                           Long Arc Quads 1 10 ?                                        ?                                        ?                                        ?                                           Knee Flexion Stretch   ?                                         ?                                        ?                                        ? Straight Leg Raises   ? ?                                        ?                                        ?                                               Activity Tolerance:   Good  Please refer to the flowsheet for vital signs taken during this treatment.     After treatment patient left in no apparent distress:   Supine in bed, Call bell within reach and Side rails x 3    COMMUNICATION/COLLABORATION:   The patients plan of care was discussed with: Registered Nurse    Akbar Farmer, PT, DPT   Time Calculation: 23 mins

## 2019-11-06 NOTE — PROGRESS NOTES
Ortho Daily Progress Note    11/6/2019  9:01 AM    POD:  2 Days Post-Op  S/P:  Procedure(s):  RIGHT TOTAL KNEE REPLACEMENT. Afebrile/VSS, NAD, A&O x3  Doing well without complaints of nausea  Pain well controlled  Calves soft/NTTP Bilaterally  Incision OK, no drainage or dehiscence. Leg soft. Dressing clean and dry- will change today   Neurocirculatory exam intact and within normal range, except right foot drop that persists and numbness on the anterolateral aspect of right calf. No numbness on right thigh. Lab Results   Component Value Date/Time    HGB 10.9 (L) 11/05/2019 02:57 AM    INR 1.0 10/21/2019 08:40 AM     Recent Labs     11/05/19  0257 10/21/19  0840   CREA 0.85 0.81   BUN 13 14     Estimated Creatinine Clearance: 50.9 mL/min (based on SCr of 0.85 mg/dL).     PLAN: will fit with AFO today   DVT prophylaxis: Eliquis 2.5mg BID for 2 weeks    WBAT with PT-mobilization  Pain Control: Oxycodone, tylenol, ice    Plan to D/C home today after PT      NAIDA Enrique

## 2019-11-06 NOTE — PROGRESS NOTES
Bedside and Verbal shift change report given to Llewellyn Hatchet, RN (oncoming nurse) by Mary Elizondo RN (offgoing nurse). Report included the following information SBAR, Kardex, Procedure Summary, Intake/Output, MAR and Recent Results.

## 2019-11-06 NOTE — PROGRESS NOTES
Spiritual Care Assessment/Progress Note  [facility]      NAME: Yolanda Jack      MRN: [id number]  AGE: 78 y.o.  SEX: female  Baptist Affiliation: [facility]   Language: English     11/6/2019     Total Time (in minutes): 35     Spiritual Assessment begun in [facility] through conversation with:         [x]Patient        [] Family    [] Friend(s)        Reason for Consult: Request by patient     Spiritual beliefs: (Please include comment if needed)     [x] Identifies with a joceline tradition:         [x] Supported by a joceline community:            [] Claims no spiritual orientation:           [] Seeking spiritual identity:                [] Adheres to an individual form of spirituality:           [] Not able to assess:                           Identified resources for coping:      [x] Prayer                               [] Music                  [] Guided Imagery     [x] Family/friends                 [] Pet visits     [] Devotional reading                         [] Unknown     [] Other:                                              Interventions offered during this visit: (See comments for more details)    Patient Interventions: Affirmation of emotions/emotional suffering, Affirmation of joceline, Bible or other spiritual literature provided, Coping skills reviewed/reinforced, Iconic (affirming the presence of God/Higher Power), Initial/Spiritual assessment, patient floor, Normalization of emotional/spiritual concerns, Prayer (actual), Prayer (assurance of), Baptist beliefs/image of God discussed           Plan of Care:     [x] Support spiritual and/or cultural needs    [] Support AMD and/or advance care planning process      [] Support grieving process   [] Coordinate Rites and/or Rituals    [] Coordination with community clergy   [] No spiritual needs identified at this time   [] Detailed Plan of Care below (See Comments)  [] Make referral to Music Therapy  [] Make referral to Pet Therapy     [] Make referral to Addiction services  [] Make referral to [facility]  [] Make referral to Spiritual Care Partner  [] No future visits requested        [x] Follow up visits as needed     Comments: Pt requested Bible.  visit in room 565.  listened as pt shared about her joceline,  Pt relies on her joceline and prayer to cope. Pt attends [employer] and has much support from her Taoist family. [facility] scripture verses that encouraged both of us.  prayed with pt.  left a Bible with patient and assured pt of continued prayer and spiritual support.     Carmen Reza,  Intern, MDiv  [phone]

## 2019-11-06 NOTE — PROGRESS NOTES
Bedside and Verbal shift change report given to Liza Watson RN (oncoming nurse) by Rosana Tello RN (offgoing nurse). Report included the following information SBAR, Kardex, OR Summary, Intake/Output, MAR and Recent Results.

## 2019-11-06 NOTE — PROGRESS NOTES
Foot drop persists  Had valgus knee, presumed stretch injury to peroneal nerve  Will get fitted for AFO  Discussed at length with patient

## 2019-11-06 NOTE — DISCHARGE SUMMARY
Orthopedic Service Discharge Summary    Patient ID:  Santosh Donaldson  [id number]  female  78 y.o.  1950    Admit date: 11/4/2019    Discharge date and time: 11/7/19     Admitting Physician: Brit Mensah MD     Discharge Physician: Brit Mensah MD    Consulting Physician(s): Treatment Team: Attending Provider: Aurelia Castro MD; Utilization Review: Darline Tabor RN; Care Manager: Jonathan Zafar    Date of Surgery: 11/4/2019     Preoperative Diagnosis:  OSTEOARTHRITIS OF THE RIGHT KNEE    Postoperative Diagnosis: OSTEOARTHRITIS OF THE RIGHT KNEE    Procedure(s): Procedure(s):  RIGHT TOTAL KNEE REPLACEMENT. Surgeon: Nathanael Waldrop) and Role:     * Aurelia Castro MD - Primary      Anesthesia:  Spinal    Preoperative Medical Clearance:                           HPI:  Pt is a 78 y.o. female who has a history of Primary osteoarthritis of right knee [M17.11]  Primary localized osteoarthritis of left knee [M17.12]  with pain and limitations of activities of daily living who presents at this time for a right TKA following the failure of conservative management. PMH:   Past Medical History:   Diagnosis Date    'light-for-dates' infant with signs of fetal malnutrition     Arthritis     OSTEO    Chronic pain     Cirrhosis ([facility] [address]     Hepatitis C     Liver disease 1980s    HEPATITIS C, TREATED AND NOW GONE    Neuropathy     Tuberculosis     Tuberculosis 1962    +ANNA MARIE TEST # 3; TREATED AND NOW LUNGS HAVE ALWAYS BEEN CLEAR       Medications upon admission :   Prior to Admission Medications   Prescriptions Last Dose Informant Patient Reported? Taking? alendronate (FOSAMAX) 70 mg tablet 11/1/2019  Yes No   Sig: Take 70 mg by mouth every seven (7) days. PT TAKES EVERY FRIDAY   ascorbic acid/collagen hydr (COLLAGEN PLUS VITAMIN C PO) 10/28/2019 at Unknown time  Yes Yes   Sig: Take 1 Dose by mouth daily.    cholecalciferol, vitamin D3, (VITAMIN D3 PO) 11/3/2019 at Unknown time  Yes Yes   Sig: Take 1 Tab by mouth every thirty (30) days. ibuprofen (MOTRIN) 600 mg tablet 11/1/2019  No No   Sig: Take 1 Tab by mouth every six (6) hours as needed for Pain. ibuprofen (MOTRIN) 800 mg tablet 11/1/2019  Yes No   Sig: Take 800 mg by mouth two (2) times a day. multivitamin (MULTI-DELYN, WELLESSE) liqd 10/28/2019 at Unknown time  Yes Yes   Sig: Take 5 mL by mouth daily. pregabalin (LYRICA) 150 mg capsule 11/4/2019 at 0700  Yes Yes   Sig: Take 150 mg by mouth three (3) times daily. traMADol (ULTRAM) 50 mg tablet Unknown at Unknown time  No No   Sig: Take 1 Tab by mouth every six (6) hours as needed for Pain. Max Daily Amount: 200 mg. Facility-Administered Medications: None        Allergies:  No Known Allergies     Hospital Course: The patient underwent surgery. Complications:  Right foot drop likely due to peroneal stretch from valgus knee deformity; patient tolerated the procedure well. Was taken to the PACU in stable condition and then transferred to the Orthopedics floor. Condition on discharge:  Stable    Perioperative Antibiotics:  Ancef     Postoperative Pain Management:  Tylenol, oxycodone, Meloxicam    DVT Prophylaxis: Eliquis 2.5mg BID for 2 weeks    Postoperative transfusions: none    Post Op complications: right foot drop    Hemoglobin at discharge:    Lab Results   Component Value Date/Time    HGB 10.9 (L) 11/05/2019 02:57 AM    INR 1.0 10/21/2019 08:40 AM       Incision - clean, dry and intact. No significant erythema or swelling. Neurovascular exam within normal limits. Wound appears to be healing without any evidence of infection. Physical Therapy started on the day following surgery and progressed to ambulation with the aid of a rolling walker for distances of 150 feet with moderate assistance. Range of motion  limited by pain. At the time of discharge, able to go up and down stairs and had understanding of precautions needed following surgery.       Discharged to: Home.    Discharge instructions:  -See Full Summary of discharge instructions attached  -Anticoagulate with Eliquis  -Resume pre hospital diet            -Resume home medications   Current Discharge Medication List      START taking these medications    Details   apixaban (ELIQUIS) 2.5 mg tablet Take 1 Tab by mouth every twelve (12) hours. Indications: Deep Vein Thrombosis Prevention  Qty: 26 Tab, Refills: 0      meloxicam (MOBIC) 7.5 mg tablet Take 1 Tab by mouth daily. Qty: 30 Tab, Refills: 0      oxyCODONE IR (ROXICODONE) 5 mg immediate release tablet Take 1 Tab by mouth every four (4) hours as needed for Pain for up to 10 days. Max Daily Amount: 30 mg.  Qty: 60 Tab, Refills: 0    Associated Diagnoses: Primary osteoarthritis of right knee      senna-docusate (PERICOLACE) 8.6-50 mg per tablet Take 1 Tab by mouth two (2) times a day. Indications: constipation  Qty: 30 Tab, Refills: 0         CONTINUE these medications which have NOT CHANGED    Details   pregabalin (LYRICA) 150 mg capsule Take 150 mg by mouth three (3) times daily. multivitamin (MULTI-DELYN, WELLESSE) liqd Take 5 mL by mouth daily. ascorbic acid/collagen hydr (COLLAGEN PLUS VITAMIN C PO) Take 1 Dose by mouth daily. cholecalciferol, vitamin D3, (VITAMIN D3 PO) Take 1 Tab by mouth every thirty (30) days. alendronate (FOSAMAX) 70 mg tablet Take 70 mg by mouth every seven (7) days. PT TAKES EVERY FRIDAY      traMADol (ULTRAM) 50 mg tablet Take 1 Tab by mouth every six (6) hours as needed for Pain. Max Daily Amount: 200 mg.   Qty: 20 Tab, Refills: 0         STOP taking these medications       ibuprofen (MOTRIN) 800 mg tablet Comments:   Reason for Stopping:         ibuprofen (MOTRIN) 600 mg tablet Comments:   Reason for Stopping:            per medical continuation form  -Discharge activity: Activity as tolerated and No heavy lifting, pushing, pulling with the implant side for 2 months, wear AFO most of the time, except for showering, bathing, etc.   -Ambulate with Walkers, Type:  Walker, appropriate total joint protocol  -Wound Care Keep wound clean and dry, Reinforce dressing PRN, Ice to area for comfort and As directed  -Follow up in office in 2-3 weeks      Signed:  NAIDA Valle  11/6/2019  9:21 AM        Rickey Desai MD

## 2019-11-06 NOTE — PROGRESS NOTES
Bedside shift change report given to [address] [place], [place] [address]akiko Medley (oncoming nurse) by Sarwat Sales (offgoing nurse). Report included the following information SBAR, Kardex, Intake/Output, MAR and Recent Results.

## 2019-11-06 NOTE — PROGRESS NOTES
Problem: Falls - Risk of  Goal: *Absence of Falls  Description  Document Chayo Yao Fall Risk and appropriate interventions in the flowsheet.   Note:   Fall Risk Interventions:  Mobility Interventions: Communicate number of staff needed for ambulation/transfer, Patient to call before getting OOB, PT Consult for mobility concerns         Medication Interventions: Evaluate medications/consider consulting pharmacy, Patient to call before getting OOB, Teach patient to arise slowly    Elimination Interventions: Call light in reach, Elevated toilet seat, Patient to call for help with toileting needs, Stay With Me (per policy), Toilet paper/wipes in reach, Toileting schedule/hourly rounds              Problem: Knee Replacement: Post-Op Day 2  Goal: Activity/Safety  Note:   Assist patient with ambulation with walker  Patient to call for assistance  Call bell within reach  Patient to work with physical therapy  Goal: *Optimal pain control with oral analgesia  Note:   Assess pain level and characteristics using numeric pain scale  Administer pain medication as ordered/needed  Reassess pain level and characteristics using numeric pain scale  Goal: *Hemodynamically stable  Note:   Assess vital signs as ordered and monitor for changes in vital signs

## 2019-11-06 NOTE — PROGRESS NOTES
Problem: Mobility Impaired (Adult and Pediatric)  Goal: *Acute Goals and Plan of Care (Insert Text)  Description  FUNCTIONAL STATUS PRIOR TO ADMISSION: Patient was independent and active without use of DME.    HOME SUPPORT PRIOR TO ADMISSION: The patient lived alone with no local support. Physical Therapy Goals  Initiated 11/4/2019    1. Patient will move from supine to sit and sit to supine  and roll side to side in bed with modified independence within 4 days. 2. Patient will perform sit to stand with modified independence within 4 days. 3. Patient will ambulate with modified independence for 200 feet with the least restrictive device within 4 days. 4. Patient will ascend/descend 4 stairs with 1 handrail(s) with modified independence within 4 days. 5. Patient will perform home exercise program per protocol with modified independence within 4 days. 6. Patient will demonstrate AROM 0-90 degrees in operative joint within 4 days. Outcome: Progressing Towards Goal   PHYSICAL THERAPY TREATMENT  Patient: Jose Oliva (22 y.o. female)  Date: 11/6/2019  Diagnosis: Primary osteoarthritis of right knee [M17.11]  Primary localized osteoarthritis of left knee [M17.12] Primary osteoarthritis of right knee  Procedure(s) (LRB):  RIGHT TOTAL KNEE REPLACEMENT. (Right) 2 Days Post-Op  Precautions: Fall, WBAT  Chart, physical therapy assessment, plan of care and goals were reviewed. ASSESSMENT  Based on the objective data described below, patient continues to present with R foot drop. Pt received pain meds prior to session. Pt required increased time to ambulate 2/2 foot drop. Pt appears to present with improved clearance of R foot, yet required multiple standing breaks to focus on gait mechanics. Pt fairly steady with no significant LOB or buckling of knees. Pt required to turn around and return to room 2/2 pain. Pt remained seated EOB for OT session.     Of note, pt stated MD to order AFO and awaiting arrival of brace.     Current Level of Function Impacting Discharge (mobility/balance): CGA for transfers and ambulation     Other factors to consider for discharge: lives alone, R foot drop         PLAN :  Patient continues to benefit from skilled intervention to address the above impairments. Continue treatment per established plan of care. to address goals. Recommendation for discharge: (in order for the patient to meet his/her long term goals)  Physical therapy at least 2 days/week in the home     This discharge recommendation:  Has been made in collaboration with the attending provider and/or case management    IF patient discharges home will need the following DME: patient owns DME required for discharge       SUBJECTIVE:   Patient stated I do not know if I am ready to leave today.     OBJECTIVE DATA SUMMARY:   Critical Behavior:  Neurologic State: Alert  Orientation Level: Oriented X4  Cognition: Appropriate decision making, Appropriate for age attention/concentration, Appropriate safety awareness, Follows commands       Range of Motion:      Functional Mobility Training:  Bed Mobility:     Supine to Sit: Modified independent              Transfers:  Sit to Stand: Contact guard assistance; Additional time; Adaptive equipment  Stand to Sit: Contact guard assistance; Adaptive equipment    Balance:  Sitting: Intact  Standing: Impaired; With support  Standing - Static: Good  Standing - Dynamic : Fair  Ambulation/Gait Training:  Distance (ft): 60 Feet (ft)  Assistive Device: Gait belt;Walker, rolling  Ambulation - Level of Assistance: Contact guard assistance; Adaptive equipment; Additional time        Gait Abnormalities: Antalgic;Decreased step clearance; Foot drop(R foot drop)        Base of Support: Shift to left  Stance: Right decreased  Speed/Anita: Pace decreased (<100 feet/min); Slow  Step Length: Left shortened  Swing Pattern: Right asymmetrical      Activity Tolerance:   Fair  Please refer to the flowsheet for vital signs taken during this treatment.     After treatment patient left in no apparent distress:   Sitting EOB with friend present awaiting OT     COMMUNICATION/COLLABORATION:   The patients plan of care was discussed with: Occupational Therapist and Registered Nurse    Bossman Gonzalez PT, DPT   Time Calculation: 15 mins

## 2019-11-06 NOTE — PROGRESS NOTES
Problem: Self Care Deficits Care Plan (Adult)  Goal: *Acute Goals and Plan of Care (Insert Text)  Description  FUNCTIONAL STATUS PRIOR TO ADMISSION: Patient was independent and active without use of DME. Patient was independent for basic and instrumental ADLs. Patient is very active (enjoys playing pickleball and working out at the [employer]) and has good social support from friends    HOME SUPPORT: The patient lived alone with friends to provide assistance and bring meals. Occupational Therapy Goals  Initiated 11/6/2019    1. Patient will perform lower body dressing at supervision/set-up using LH shoe horn and reacher within 7 days. 2. Patient will perform toilet transfers at supervision/set-up using Walkers, Type: Rolling Walker within 7 days. 3. Patient will perform all aspects of toileting at supervision/set-up within 7 days. 4. Patient will tolerate greater than 8 minutes of standing without a rest break at supervision/set-up using Walkers, Type: Rolling Walker during ADL's within 7 days. Outcome: Progressing Towards Goal     OCCUPATIONAL THERAPY EVALUATION  Patient: Robyn Fung (12 y.o. female)  Date: 11/6/2019  Primary Diagnosis: Primary osteoarthritis of right knee [M17.11]  Primary localized osteoarthritis of left knee [M17.12]  Procedure(s) (LRB):  RIGHT TOTAL KNEE REPLACEMENT. (Right) 2 Days Post-Op   Precautions: Fall, WBAT    ASSESSMENT  Based on the objective data described below, the patient presents with increased pain, good participation, good safety awareness, intact balance with rolling walker, and decreased ADL performance and mobility. Patient is POD 2 s/p R TKA and is cleared to participate with therapy with orders for WBAT. Patient is received sitting EOB and offers excellent efforts with therapy. She is receptive to all education regarding home safety, energy conservation, ADL adaptation, and fall prevention.  She performs lower body dressing at EOB without AE with overall min A. Patient with R foot drop and receptive to pacing activity/mobility for fall prevention. Patient will benefit from continued skilled acute OT services to maximize safety and facilitate return to independent ADL performance and related mobility. Current Level of Function Impacting Discharge (ADLs/self-care): min A for lower body dressing, infer CGA for standing toileting and grooming, set up upper body dressing, independent self feeding    Functional Outcome Measure: The patient scored 55/100 on the Barthel outcome measure which is indicative of moderate impairment in ADLs and mobility. Other factors to consider for discharge: lives alone, good support from friends     Patient will benefit from skilled therapy intervention to address the above noted impairments. PLAN :  Recommendations and Planned Interventions: self care training, functional mobility training, therapeutic exercise, balance training, therapeutic activities, endurance activities, patient education, home safety training and family training/education    Frequency/Duration: Patient will be followed by occupational therapy 5 times a week to address goals. Recommendation for discharge: (in order for the patient to meet his/her long term goals)  Occupational therapy at least 2 days/week in the home     This discharge recommendation:  A follow-up discussion with the attending provider and/or case management is planned    IF patient discharges home will need the following DME: patient owns DME required for discharge       SUBJECTIVE:   Patient stated Rafita Elva will do what you all tell me to do.  RN cleared patient for therapy. Patient is pleasant and agreeable to participate.      OBJECTIVE DATA SUMMARY:   HISTORY:   Past Medical History:   Diagnosis Date    'light-for-dates' infant with signs of fetal malnutrition     Arthritis     OSTEO    Chronic pain     Cirrhosis ([facility] [address]     Hepatitis C     Liver disease 1980s    HEPATITIS C, TREATED AND NOW GONE    Neuropathy     Tuberculosis     Tuberculosis 1962    +ANNA MARIE TEST # 3; TREATED AND NOW LUNGS HAVE ALWAYS BEEN CLEAR     Past Surgical History:   Procedure Laterality Date    HX HEENT      WISDOM TEETH    HX ORTHOPAEDIC Left 2018    BROKEN WRIST, HAS A PLATE       Expanded or extensive additional review of patient history:     Home Situation  Home Environment: Apartment  One/Two Story Residence: One story  Living Alone: Yes  Support Systems: Friends \ neighbors  Patient Expects to be Discharged to[de-identified] Apartment  Current DME Used/Available at Home: Cane, straight, Walker, rolling, Raised toilet seat, Grab bars, Shower chair  Tub or Shower Type: Tub/Shower combination    Hand dominance: Right    EXAMINATION OF PERFORMANCE DEFICITS:  Cognitive/Behavioral Status:  Neurologic State: Alert  Orientation Level: Oriented X4  Cognition: Appropriate decision making; Appropriate for age attention/concentration; Appropriate safety awareness; Follows commands  Perception: Appears intact  Perseveration: No perseveration noted  Safety/Judgement: Awareness of environment; Fall prevention;Good awareness of safety precautions; Home safety; Insight into deficits    Hearing: Auditory  Auditory Impairment: None    Vision/Perceptual:    Diplopia: No    Corrective Lenses: Reading glasses    Range of Motion:  AROM: Within functional limits In bilateral UEs    Strength:  Strength: Within functional limits In bilateral UEs    Coordination:  Coordination: Within functional limits In bilateral UEs  Fine Motor Skills-Upper: Left Intact; Right Intact    Gross Motor Skills-Upper: Left Intact; Right Intact    Tone & Sensation:  Tone: Normal  Sensation: Intact    Balance:  Sitting: Intact  Standing: Impaired; With support  Standing - Static: Good  Standing - Dynamic : Fair    Functional Mobility and Transfers for ADLs:  Bed Mobility:  Supine to Sit: Modified independent  Sit to Supine: Minimum assistance(for R LE management)  Scooting: Modified independent    Transfers:  Sit to Stand: Contact guard assistance; Additional time; Adaptive equipment  Stand to Sit: Contact guard assistance; Adaptive equipment    ADL Assessment:  Feeding: Independent    Oral Facial Hygiene/Grooming: Contact guard assistance(infer based on observations)    Bathing: Contact guard assistance;Minimum assistance(infer based on observations)    Upper Body Dressing: Setup(seated)    Lower Body Dressing: Minimum assistance; Additional time(without AE; to dress R LE)    Toileting: Contact guard assistance(infer based on observations)    ADL Intervention and task modifications:  Lower Body Dressing Assistance  Underpants: Stand-by assistance  Pants With Elastic Waist: Minimum assistance(to manage donning over R LE)  Socks: Stand-by assistance  Shoes with Cloth Laces: Minimum assistance(cues to loosen shoe; A to get heel of shoe over R heel)  Leg Crossed Method Used: No  Position Performed: Bending forward method;Seated edge of bed  Cues: Don;Physical assistance;Verbal cues provided; Tactile cues provided    Cognitive Retraining  Safety/Judgement: Awareness of environment; Fall prevention;Good awareness of safety precautions; Home safety; Insight into deficits    Bathing: Patient instructed and indicated understanding when bathing to not submerge wound in water, stand to shower or sponge bathe, cover wound with plastic and tape to ensure no water reaches bandage/wound without cues. Instructed patient to perform shower transfer (when ready) dry for safety prior to attempting wet for safety and fall prevention. Instructed patient to have supervision from friend when performing wet shower transfer for safety. Instructed patient to use clean washcloth and towel to clean/pat dry area around incision for infection control. Patient verbalized understanding of same. Dressing joint: Patient instructed and demonstrated understanding to don/doff Right LE first/last with Minimum assistance. Patient instructed and demonstrated to don all clothing while sitting prior to standing, doff all clothing to knees while standing, then sit to doff clothing off from knees to feet in order to facilitate fall prevention, pain management, and energy conservation with Contact guard assistance. Dressing joint reach exercise: To increase independence with lower body dressing, patient instructed and demonstrated to reach down Right LE in a seated position slowly to prevent tearing/shearing until slight pull is felt, hold at end range for 10 seconds, then return to starting upright position with Supervision. Patient instructed to complete three sets of three repetitions each daily. Home safety: Patient instructed and indicated understanding on home modifications and safety (raise height of ADL objects, appropriate height of chair surfaces, recliner safety, change of floor surfaces, clear pathways) to increase independence and fall prevention. Standing: Patient instructed to walk up to sink/counter top/surfaces, step into walker to increase safety of joint and fall prevention. Patient educated about knee anatomy and educated to avoid rotation of Right LE. Instructed to apply concept to ADLs within the home (no twisting of knee during reaching across body, square off while using objects, slide objects along surfaces). Patient instructed and indicated understanding to increase amount of time standing, observe standing position during ADLs in order to increase even weight bearing through bilateral LEs in order to increase independence with ADLs. Goal to be reached 30 days post - op, per orthopedic surgeon or per PT. Tub/shower transfer: Patient instructed and indicated understanding regarding when it is safe to begin transfer into tub/shower (complete stairs with PT, advance exercises with PT high enough to clear tub/shower height).   Patient instructed to use the same technique as used with stairs when entering and exiting tub/shower (\"up with the non-surgical, down with the surgical leg\"). Rolling Walker Safety: Educated patient about importance of keeping hands free at all times when using rolling walker for fall prevention. Provided information about walker bags/baskets/trays to assist with transporting items safely while in the home to prevent falls. Patient indicated understanding of same. Instructed patient on safe and appropriate hand placement during transfers (push up from sitting surface when standing up, reach back for sitting surface when sitting down, use grab bars, etc.), including never to pull up on rolling walker for fall prevention and safety. Instructed patient to push rolling walker up to surface (countertop, sink, etc.) and  it as opposed to abandoning rolling walker on the side for safety. Patient verbalized understanding of same. Functional Measure:  Barthel Index:    Bathin  Bladder: 10  Bowels: 10  Groomin  Dressin  Feeding: 10  Mobility: 0  Stairs: 0  Toilet Use: 5  Transfer (Bed to Chair and Back): 10  Total: 55/100        The Barthel ADL Index: Guidelines  1. The index should be used as a record of what a patient does, not as a record of what a patient could do. 2. The main aim is to establish degree of independence from any help, physical or verbal, however minor and for whatever reason. 3. The need for supervision renders the patient not independent. 4. A patient's performance should be established using the best available evidence. Asking the patient, friends/relatives and nurses are the usual sources, but direct observation and common sense are also important. However direct testing is not needed. 5. Usually the patient's performance over the preceding 24-48 hours is important, but occasionally longer periods will be relevant. 6. Middle categories imply that the patient supplies over 50 per cent of the effort.   7. Use of aids to be independent is allowed. Franklyn Martinez., Barthel, D.W. ([id number]). Functional evaluation: the Barthel Index. [address] (14)2. MOHIT Bailey, Elige Heimlich., Onelia Gruber., Jose, [address] (1999). Measuring the change indisability after inpatient rehabilitation; comparison of the responsiveness of the Barthel Index and Functional [place] Measure. Journal of Neurology, Neurosurgery, and Psychiatry, 66(4), 720-758. Mulu Brice, ALAN, SOPHY Valentin, & Kristian Man M.A. (2004.) Assessment of post-stroke quality of life in cost-effectiveness studies: The usefulness of the Barthel Index and the EuroQoL-5D. Quality of Life Research, 15, 057-61     Occupational Therapy Evaluation Charge Determination   History Examination Decision-Making   LOW Complexity : Brief history review  LOW Complexity : 1-3 performance deficits relating to physical, cognitive , or psychosocial skils that result in activity limitations and / or participation restrictions  LOW Complexity : No comorbidities that affect functional and no verbal or physical assistance needed to complete eval tasks       Based on the above components, the patient evaluation is determined to be of the following complexity level: LOW   Pain Rating:  Patient reporting minimal to moderate pain in R knee during activity, not limiting to participation. Ice pack applied at end of session. Activity Tolerance:   Good  Please refer to the flowsheet for vital signs taken during this treatment. After treatment patient left in no apparent distress:    Supine in bed, Call bell within reach, Side rails x 3 and ice pack on knee     COMMUNICATION/EDUCATION:   The patients plan of care was discussed with: Physical Therapist and Registered Nurse. Home safety education was provided and the patient/caregiver indicated understanding., Patient/family have participated as able in goal setting and plan of care.  and Patient/family agree to work toward stated goals and plan of care.    This patients plan of care is appropriate for delegation to SHAHID.     Thank you for this referral.  Miriam Gambino, OT  Time Calculation: 41 mins

## 2019-11-07 VITALS
SYSTOLIC BLOOD PRESSURE: 108 MMHG | HEIGHT: 63 IN | BODY MASS INDEX: 23.74 KG/M2 | OXYGEN SATURATION: 98 % | RESPIRATION RATE: 16 BRPM | HEART RATE: 76 BPM | WEIGHT: 134 LBS | DIASTOLIC BLOOD PRESSURE: 72 MMHG | TEMPERATURE: 99.1 F

## 2019-11-07 PROCEDURE — 74011250637 HC RX REV CODE- 250/637: Performed by: PHYSICIAN ASSISTANT

## 2019-11-07 PROCEDURE — 97110 THERAPEUTIC EXERCISES: CPT

## 2019-11-07 PROCEDURE — 99218 HC RM OBSERVATION: CPT

## 2019-11-07 PROCEDURE — 97116 GAIT TRAINING THERAPY: CPT

## 2019-11-07 RX ADMIN — PREGABALIN 150 MG: 75 CAPSULE ORAL at 08:52

## 2019-11-07 RX ADMIN — APIXABAN 2.5 MG: 2.5 TABLET, FILM COATED ORAL at 08:35

## 2019-11-07 RX ADMIN — OXYCODONE HYDROCHLORIDE 10 MG: 5 TABLET ORAL at 08:35

## 2019-11-07 RX ADMIN — ACETAMINOPHEN 325 MG: 325 TABLET, FILM COATED ORAL at 08:52

## 2019-11-07 RX ADMIN — OXYCODONE HYDROCHLORIDE 10 MG: 5 TABLET ORAL at 03:22

## 2019-11-07 RX ADMIN — MELOXICAM 15 MG: 7.5 TABLET ORAL at 08:52

## 2019-11-07 RX ADMIN — ACETAMINOPHEN 325 MG: 325 TABLET, FILM COATED ORAL at 03:22

## 2019-11-07 NOTE — PROGRESS NOTES
Patient wants to wait until she takes pain medication(oxycodone) to take tylenol. 2005-Bedside and Verbal shift change report given to Jana Valle RN (oncoming nurse) by Kamla Porras RN (offgoing nurse). Report included the following information SBAR, Kardex, OR Summary, Intake/Output, MAR and Recent Results.          All charting done by Terrence Ventura RN reviewed by Kamla Porras RN

## 2019-11-07 NOTE — PROGRESS NOTES
Bedside shift change report given to Amaya (oncoming nurse) by Jose Ca (offgoing nurse). Report included the following information SBAR, Kardex, Intake/Output, MAR and Recent Results.

## 2019-11-07 NOTE — PROGRESS NOTES
Ortho Daily Progress Note    11/7/2019  9:21 AM    POD:  3 Days Post-Op  S/P:  Procedure(s):  RIGHT TOTAL KNEE REPLACEMENT. Afebrile/VSS NAD, A&O x 3  Doing well without complaints of nausea  Pain well controlled  Calves soft/NTTP Bilaterally  Incision OK, no drainage or dehiscence. leg soft. Persistent foot drop, has carbon AFO- pt likes the way it fits and feels  Dressing clean and dry  Moving lower extremities well. Neurocirculatory exam intact and within normal range. Lab Results   Component Value Date/Time    HGB 10.9 (L) 11/05/2019 02:57 AM    INR 1.0 10/21/2019 08:40 AM     Recent Labs     11/05/19  0257 10/21/19  0840   CREA 0.85 0.81   BUN 13 14     Estimated Creatinine Clearance: 50.9 mL/min (by C-G formula based on SCr of 0.85 mg/dL).     PLAN: Wear the AFO as directed  DVT prophylaxis: Eliquis 2.5 mh bid for 2 weeks post op  WBAT with PT-mobilization  Pain Control: oxycodone, mobic, tylenol  Plan to D/C home today after PT      NAIDA Estrada

## 2019-11-07 NOTE — PROGRESS NOTES
CARMEN: [facility] has accepted patient. Discharge home today.  notified Umu Lele with Leo Sy ([phone]) of discharge today.     SHIVA Alva/CRM

## 2019-11-07 NOTE — PROGRESS NOTES
Problem: Mobility Impaired (Adult and Pediatric)  Goal: *Acute Goals and Plan of Care (Insert Text)  Description  FUNCTIONAL STATUS PRIOR TO ADMISSION: Patient was independent and active without use of DME.    HOME SUPPORT PRIOR TO ADMISSION: The patient lived alone with no local support. Physical Therapy Goals  Initiated 11/4/2019    1. Patient will move from supine to sit and sit to supine  and roll side to side in bed with modified independence within 4 days. 2. Patient will perform sit to stand with modified independence within 4 days. 3. Patient will ambulate with modified independence for 200 feet with the least restrictive device within 4 days. 4. Patient will ascend/descend 4 stairs with 1 handrail(s) with modified independence within 4 days. 5. Patient will perform home exercise program per protocol with modified independence within 4 days. 6. Patient will demonstrate AROM 0-90 degrees in operative joint within 4 days. Outcome: Progressing Towards Goal   PHYSICAL THERAPY TREATMENT  Patient: Sudha Casas (79 y.o. female)  Date: 11/7/2019  Diagnosis: Primary osteoarthritis of right knee [M17.11]  Primary localized osteoarthritis of left knee [M17.12] Primary osteoarthritis of right knee  Procedure(s) (LRB):  RIGHT TOTAL KNEE REPLACEMENT. (Right) 3 Days Post-Op  Precautions: Fall, WBAT  Chart, physical therapy assessment, plan of care and goals were reviewed. ASSESSMENT  Patient continues with skilled PT services and is progressing towards goals. She is ambulating well with the AFO in place and her safety awareness is good overall. Without the AFO, she has notable foot drop on the R, and often needs to self correct to clear the R foot with a high step. She performed post op exercises in bed, needing assist with ankle DF and worked on activating R ankle DF in both standing and supine. There is some trace activation of the anterior tib but it fatigues quickly.  She is planning on D/C home today and will follow up with HHPT. Reviewed safety considerations and activity recommendations and restrictions and she is clear for D/C home from a PT standpoint. Current Level of Function Impacting Discharge (mobility/balance): modified independent with the AFO in place but supervision without it    Other factors to consider for discharge: R foot drop, lives alone, but has as strong support network         PLAN :  Patient continues to benefit from skilled intervention to address the above impairments. Continue treatment per established plan of care. to address goals. Recommendation for discharge: (in order for the patient to meet his/her long term goals)  Physical therapy at least 2 days/week in the home     This discharge recommendation:  Has been made in collaboration with the attending provider and/or case management    IF patient discharges home will need the following DME: RW has been delivered for discharge       SUBJECTIVE:   Patient stated Edmond Ponce got up to the bathroom myself and it went really well.     OBJECTIVE DATA SUMMARY:   Critical Behavior:  Neurologic State: Alert  Orientation Level: Oriented X4  Cognition: Appropriate decision making, Appropriate for age attention/concentration, Appropriate safety awareness, Follows commands  Safety/Judgement: Awareness of environment, Fall prevention, Good awareness of safety precautions, Home safety, Insight into deficits  Functional Mobility Training:  Bed Mobility:     Supine to Sit: Modified independent; Additional time  Sit to Supine: Modified independent; Additional time; Adaptive equipment(using strap to assist RLE)           Transfers:  Sit to Stand: Modified independent; Adaptive equipment; Additional time  Stand to Sit: Modified independent; Adaptive equipment; Additional time        Bed to Chair: Modified independent; Adaptive equipment; Additional time                    Balance:  Sitting: Intact; Without support  Standing: Intact; With support  Ambulation/Gait Training:  Distance (ft): 120 Feet (ft)(twice)  Assistive Device: Gait belt;Brace/Splint; Walker, rolling(one walk with AFO and one without)  Ambulation - Level of Assistance: Adaptive equipment; Additional time;Supervision;Modified independent(mod I with AFO, supervision without it)        Gait Abnormalities: Antalgic;Decreased step clearance; Foot drop(AFO in place eliminates foot drop)        Base of Support: Widened;Shift to left  Stance: Right decreased  Speed/Anita: Pace decreased (<100 feet/min)  Step Length: Right shortened;Left shortened  Swing Pattern: Right asymmetrical     Interventions: Visual/Demos; Verbal cues;Manual cues; Safety awareness training; Tactile cues           Stairs: Therapeutic Exercises: Ankle pumps, quad sets, heel slides, SAQ, SLR, needing assist for SAQ and SLR  Pain Rating:  Minimal pain    Activity Tolerance:   Good  Please refer to the flowsheet for vital signs taken during this treatment.     After treatment patient left in no apparent distress:   Supine in bed, Call bell within reach and ice in place     COMMUNICATION/COLLABORATION:   The patients plan of care was discussed with: Registered Nurse and NAIDA Coffman, PT   Time Calculation: 37 mins

## 2020-03-13 ENCOUNTER — HOSPITAL ENCOUNTER (OUTPATIENT)
Dept: PREADMISSION TESTING | Age: 70
Discharge: HOME | End: 2020-03-13
Payer: MEDICARE

## 2020-03-13 VITALS
BODY MASS INDEX: 23.92 KG/M2 | TEMPERATURE: 98 F | SYSTOLIC BLOOD PRESSURE: 115 MMHG | WEIGHT: 135 LBS | HEART RATE: 55 BPM | HEIGHT: 63 IN | DIASTOLIC BLOOD PRESSURE: 72 MMHG

## 2020-03-13 LAB
ABO + RH BLD: NORMAL
ANION GAP SERPL CALC-SCNC: 7 MMOL/L (ref 5–15)
APPEARANCE UR: CLEAR
BACTERIA URNS QL MICRO: NEGATIVE /HPF
BILIRUB UR QL CFM: POSITIVE
BLOOD GROUP ANTIBODIES SERPL: NORMAL
BUN SERPL-MCNC: 18 MG/DL (ref 6–20)
BUN/CREAT SERPL: 23 (ref 12–20)
CALCIUM SERPL-MCNC: 9.3 MG/DL (ref 8.5–10.1)
CHLORIDE SERPL-SCNC: 108 MMOL/L (ref 97–108)
CO2 SERPL-SCNC: 25 MMOL/L (ref 21–32)
COLOR UR: ABNORMAL
CREAT SERPL-MCNC: 0.77 MG/DL (ref 0.55–1.02)
EPITH CASTS URNS QL MICRO: ABNORMAL /LPF
ERYTHROCYTE [DISTWIDTH] IN BLOOD BY AUTOMATED COUNT: 13.2 % (ref 11.5–14.5)
EST. AVERAGE GLUCOSE BLD GHB EST-MCNC: 108 MG/DL
GLUCOSE SERPL-MCNC: 89 MG/DL (ref 65–100)
GLUCOSE UR STRIP.AUTO-MCNC: NEGATIVE MG/DL
HBA1C MFR BLD: 5.4 % (ref 4–5.6)
HCT VFR BLD AUTO: 41.8 % (ref 35–47)
HGB BLD-MCNC: 13.6 G/DL (ref 11.5–16)
HGB UR QL STRIP: NEGATIVE
INR PPP: 1 (ref 0.9–1.1)
KETONES UR QL STRIP.AUTO: ABNORMAL MG/DL
LEUKOCYTE ESTERASE UR QL STRIP.AUTO: NEGATIVE
MCH RBC QN AUTO: 29.8 PG (ref 26–34)
MCHC RBC AUTO-ENTMCNC: 32.5 G/DL (ref 30–36.5)
MCV RBC AUTO: 91.7 FL (ref 80–99)
MUCOUS THREADS URNS QL MICRO: ABNORMAL /LPF
NITRITE UR QL STRIP.AUTO: NEGATIVE
NRBC # BLD: 0 K/UL (ref 0–0.01)
NRBC BLD-RTO: 0 PER 100 WBC
PH UR STRIP: 5 [PH] (ref 5–8)
PLATELET # BLD AUTO: 177 K/UL (ref 150–400)
PMV BLD AUTO: 12.1 FL (ref 8.9–12.9)
POTASSIUM SERPL-SCNC: 4 MMOL/L (ref 3.5–5.1)
PROT UR STRIP-MCNC: NEGATIVE MG/DL
PROTHROMBIN TIME: 10.6 SEC (ref 9–11.1)
RBC # BLD AUTO: 4.56 M/UL (ref 3.8–5.2)
RBC #/AREA URNS HPF: ABNORMAL /HPF (ref 0–5)
SODIUM SERPL-SCNC: 140 MMOL/L (ref 136–145)
SP GR UR REFRACTOMETRY: 1.03 (ref 1–1.03)
SPECIMEN EXP DATE BLD: NORMAL
UA: UC IF INDICATED,UAUC: ABNORMAL
UROBILINOGEN UR QL STRIP.AUTO: 1 EU/DL (ref 0.2–1)
WBC # BLD AUTO: 7.3 K/UL (ref 3.6–11)
WBC URNS QL MICRO: ABNORMAL /HPF (ref 0–4)

## 2020-03-13 PROCEDURE — 81001 URINALYSIS AUTO W/SCOPE: CPT

## 2020-03-13 PROCEDURE — 80048 BASIC METABOLIC PNL TOTAL CA: CPT

## 2020-03-13 PROCEDURE — 85610 PROTHROMBIN TIME: CPT

## 2020-03-13 PROCEDURE — 85027 COMPLETE CBC AUTOMATED: CPT

## 2020-03-13 PROCEDURE — 83036 HEMOGLOBIN GLYCOSYLATED A1C: CPT

## 2020-03-13 PROCEDURE — 86900 BLOOD TYPING SEROLOGIC ABO: CPT

## 2020-03-13 RX ORDER — IBUPROFEN 200 MG
200 TABLET ORAL
COMMUNITY
End: 2022-04-24

## 2020-03-13 NOTE — PERIOP NOTES
PATIENT GIVEN SURGICAL SITE INFECTION FAQ HANDOUT AND HAND WASHING TIP SHEET. PREOP INSTRUCTIONS REVIEWED AND PATIENT VERBALIZES UNDERSTANDING OF INSTRUCTIONS. PATIENT HAS BEEN GIVEN THE OPPORTUNITY TO ASK ADDITIONAL QUESTIONS. GAVE PATIENT 2 BOTTLES OF CHG CLEANSER AND INSTRUCTIONS. PATIENT VERBALIZED UNDERSTANDING.     NO PREOP JOINT CLASS TODAY, PATIENT HAD RT TOTAL KNEE REPLACEMENT 11/2019 , EKG FROM 10/2019 IN [facility].

## 2020-03-14 LAB
BACTERIA SPEC CULT: NORMAL
BACTERIA SPEC CULT: NORMAL
SERVICE CMNT-IMP: NORMAL

## 2020-03-16 NOTE — PERIOP NOTES
Spoke with Alex Galeano at Dr. Amy Hernandez office and reported + bilirubin. Labs and EKG faxed to Dr. Amy Hernandez office.   Labs and ekg faxed to PCP    Chart given to Rosemary Fournier NP

## 2020-09-13 ENCOUNTER — HOSPITAL ENCOUNTER (EMERGENCY)
Age: 70
Discharge: HOME | End: 2020-09-13
Attending: STUDENT IN AN ORGANIZED HEALTH CARE EDUCATION/TRAINING PROGRAM | Admitting: STUDENT IN AN ORGANIZED HEALTH CARE EDUCATION/TRAINING PROGRAM
Payer: MEDICARE

## 2020-09-13 VITALS
TEMPERATURE: 98.3 F | DIASTOLIC BLOOD PRESSURE: 78 MMHG | RESPIRATION RATE: 18 BRPM | HEART RATE: 72 BPM | SYSTOLIC BLOOD PRESSURE: 167 MMHG | OXYGEN SATURATION: 100 %

## 2020-09-13 DIAGNOSIS — M79.10 MYALGIA: ICD-10-CM

## 2020-09-13 DIAGNOSIS — R52 GENERALIZED BODY ACHES: ICD-10-CM

## 2020-09-13 DIAGNOSIS — Z20.822 ENCOUNTER FOR LABORATORY TESTING FOR COVID-19 VIRUS: Primary | ICD-10-CM

## 2020-09-13 PROCEDURE — 87635 SARS-COV-2 COVID-19 AMP PRB: CPT

## 2020-09-13 PROCEDURE — 99282 EMERGENCY DEPT VISIT SF MDM: CPT

## 2020-09-13 RX ORDER — CYCLOBENZAPRINE HCL 10 MG
10 TABLET ORAL
Qty: 12 TAB | Refills: 0 | Status: SHIPPED | OUTPATIENT
Start: 2020-09-13 | End: 2021-11-13

## 2020-09-13 NOTE — ED TRIAGE NOTES
Arrived from home c/o body aches for one week, which is keeping patient up. Inability to taste food. Patient has been taking Ibuprofen with no relief.

## 2020-09-13 NOTE — ED PROVIDER NOTES
Pleasant 25-year-old woman presenting with concern for possible coronavirus exposure/mild symptoms of COVID-19. She states that she is normally in very good health. Does not have any chronic medical conditions, she had a history of hep C but this is been cured. She does not drink alcohol or smoke cigarettes. She is normally very active. She has felt fatigued for the past week, has lost her appetite and feels that her sense of taste and smell is diminished. She has not however had cough, fever or shortness of breath. No headache. No vomiting. She did have some mild diarrhea. No hematochezia.   No            Past Medical History:   Diagnosis Date    'light-for-dates' infant with signs of fetal malnutrition     Arthritis     OSTEO    Chronic pain     Cirrhosis ([facility] [address]     Hepatitis C     Liver disease 1980s    HEPATITIS C, TREATED AND NOW GONE    Neuropathy     Tuberculosis     Tuberculosis 1962    +ANNA MARIE TEST # 3; TREATED AND NOW LUNGS HAVE ALWAYS BEEN CLEAR       Past Surgical History:   Procedure Laterality Date    HX COLONOSCOPY      HISTORY OF POLYP    HX ENDOSCOPY      HX HEENT      WISDOM TEETH    HX KNEE REPLACEMENT Right 11/2019    RIGHT TOTAL KNEE REPLACEMENT    HX ORTHOPAEDIC Left 2018    BROKEN WRIST, HAS A PLATE         Family History:   Problem Relation Age of Onset    Other Mother         UNKNOWN HEALTH HX    Other Father         UNKNOWN HEALTH HX    Anesth Problems Neg Hx     Breast Cancer Maternal Grandmother        Social History     Socioeconomic History    Marital status: SINGLE     Spouse name: Not on file    Number of children: Not on file    Years of education: Not on file    Highest education level: Not on file   Occupational History    Not on file   Social Needs    Financial resource strain: Not on file    Food insecurity     Worry: Not on file     Inability: Not on file    Transportation needs     Medical: Not on file     Non-medical: Not on file   Tobacco Use  Smoking status: Former Smoker     Packs/day: 1.00     Last attempt to quit: 1970     Years since quittin.7    Smokeless tobacco: Never Used   Substance and Sexual Activity    Alcohol use: Not Currently    Drug use: Not Currently    Sexual activity: Not on file   Lifestyle    Physical activity     Days per week: Not on file     Minutes per session: Not on file    Stress: Not on file   Relationships    Social connections     Talks on phone: Not on file     Gets together: Not on file     Attends [employer] service: Not on file     Active member of club or organization: Not on file     Attends meetings of clubs or organizations: Not on file     Relationship status: Not on file    Intimate partner violence     Fear of current or ex partner: Not on file     Emotionally abused: Not on file     Physically abused: Not on file     Forced sexual activity: Not on file   Other Topics Concern    Not on file   Social History Narrative    ** Merged History Encounter **              ALLERGIES: Patient has no known allergies. Review of Systems   Constitutional: Negative for chills and fever. Eyes: Negative for photophobia. Respiratory: Negative for shortness of breath. Cardiovascular: Negative for chest pain. Gastrointestinal: Positive for diarrhea. Negative for abdominal pain, nausea and vomiting. Genitourinary: Negative for dysuria. Musculoskeletal: Negative for back pain. Neurological: Negative for headaches. Psychiatric/Behavioral: Negative for confusion. All other systems reviewed and are negative. Vitals:    20 1254   BP: (!) 167/78   Pulse: 72   Resp: 18   Temp: 98.3 °F (36.8 °C)   SpO2: 100%            Physical Exam  Vitals signs reviewed. Constitutional:       General: She is not in acute distress. HENT:      Head: Normocephalic and atraumatic. Mouth/Throat:      Mouth: Mucous membranes are moist.      Pharynx: Oropharynx is clear.    Eyes:      Pupils: Pupils are equal, round, and reactive to light. Cardiovascular:      Rate and Rhythm: Normal rate and regular rhythm. Heart sounds: Normal heart sounds. Pulmonary:      Effort: Pulmonary effort is normal.      Breath sounds: Normal breath sounds. Abdominal:      General: There is no distension. Tenderness: There is no abdominal tenderness. There is no guarding or rebound. Musculoskeletal: Normal range of motion. Skin:     General: Skin is warm and dry. Capillary Refill: Capillary refill takes less than 2 seconds. Neurological:      General: No focal deficit present. Mental Status: She is alert and oriented to person, place, and time. Psychiatric:         Mood and Affect: Mood normal.         Behavior: Behavior normal.          MDM  Number of Diagnoses or Management Options  Encounter for laboratory testing for COVID-19 virus:   Generalized body aches:   Myalgia:   Diagnosis management comments: 69year-old with myalgias and generalized body aches. Decreased sense of smell/taste. Will check for COVID-19. Lives alone. Will isolate until her results are available. Well-appearing. Vital signs without concerning features for severe or moderate severity COVID-19. Alternatively she may simply have had another viral infection or nonspecific symptoms. Otherwise we will follow-up with her primary care physician.          Procedures

## 2020-09-14 ENCOUNTER — PATIENT OUTREACH (OUTPATIENT)
Dept: CASE MANAGEMENT | Age: 70
End: 2020-09-14

## 2020-09-14 LAB
HEALTH STATUS, XMCV2T: NORMAL
SARS-COV-2, COV2NT: NOT DETECTED
SOURCE, COVRS: NORMAL
SPECIMEN SOURCE, FCOV2M: NORMAL
SPECIMEN TYPE, XMCV1T: NORMAL

## 2020-09-14 NOTE — ACP (ADVANCE CARE PLANNING)
Advance Care Planning:   Does patient have an Advance Directive: currently not on file; education provided    Keiko Hernández remains as healthcare decision maker.

## 2020-09-14 NOTE — PROGRESS NOTES
Education Provided due to At Risk Condition:  Myalgia/COVID testing  Patient contacted regarding recent discharge and COVID-19 risk. Discussed COVID-19 related testing which was available at this time. Test results were negative. Patient informed of results, if available? yes    Care Transition Nurse/ Ambulatory Care Manager/ LPN Care Coordinator contacted the patient by telephone to perform post discharge assessment. Verified name and  with patient as identifiers. Patient has following risk factors of: diabetes. CTN/ACM/LPN reviewed discharge instructions, medical action plan and red flags related to discharge diagnosis. Reviewed and educated them on any new and changed medications related to discharge diagnosis. Advised obtaining a 90-day supply of all daily and as-needed medications. Advance Care Planning:   Does patient have an Advance Directive: currently not on file; education provided    Bonita Knight remains as healthcare decision maker. Education provided regarding infection prevention, and signs and symptoms of COVID-19 and when to seek medical attention with patient who verbalized understanding. Discussed exposure protocols and quarantine from [address] you at higher risk for severe illness  and given an opportunity for questions and concerns. The patient agrees to contact the COVID-19 hotline [phone] or PCP office for questions related to their healthcare. CTN/ACM/LPN provided contact information for future reference. From CDC: Are you at higher risk for severe illness?  Wash your hands often.  Avoid close contact (6 feet, which is about two arm lengths) with people who are sick.  Put distance between yourself and other people if COVID-19 is spreading in your community.  Clean and disinfect frequently touched surfaces.  Avoid all cruise travel and non-essential air travel.    Call your healthcare professional if you have concerns about COVID-19 and your underlying condition or if you are sick. For more information on steps you can take to protect yourself, see CDC's How to Protect Yourself      Patient/family/caregiver given information for [facility] and agrees to enroll no    Plan for follow-up call in 7-14 days based on severity of symptoms and risk factors.

## 2020-09-15 ENCOUNTER — PATIENT OUTREACH (OUTPATIENT)
Dept: CASE MANAGEMENT | Age: 70
End: 2020-09-15

## 2020-09-15 NOTE — PROGRESS NOTES
Education Provided due to At Risk Condition:  Myalgia/COVID testing f/u  Medications discussed:  Patient reports participating in a contact sport;  States masks are not worn and perspiration is transferred. Discussion:  Vitamin supplements:  C, B and green tea tincture which she places in water. Adequate hydration and proper rest/sleep recommended and follow-up with PCP as agreed.   DILLON

## 2020-09-30 ENCOUNTER — PATIENT OUTREACH (OUTPATIENT)
Dept: CASE MANAGEMENT | Age: 70
End: 2020-09-30

## 2020-09-30 NOTE — PROGRESS NOTES
Education Provided due to At Risk Condition:  Myalgia/COVID testing f/u  Patient resolved from Transition of Care episode on 9/30/2020. ACM/CTN was unsuccessful at contacting this patient today. Patient/family was provided the following resources and education related to COVID-19 during the initial call:                         Signs, symptoms and red flags related to COVID-19            CDC exposure and quarantine guidelines            [facility]it exposure contact - [phone]            Contact for their local Department of Health                 Patient has not had any additional ED or hospital visits. No further outreach scheduled with this CTN/ACM. Episode of Care resolved. Patient has this CTN/ACM contact information if future needs arise.

## 2021-05-14 ENCOUNTER — OFFICE VISIT (OUTPATIENT)
Dept: INTERNAL MEDICINE CLINIC | Age: 71
End: 2021-05-14
Payer: MEDICARE

## 2021-05-14 VITALS
OXYGEN SATURATION: 94 % | HEART RATE: 76 BPM | HEIGHT: 63 IN | WEIGHT: 180.2 LBS | BODY MASS INDEX: 31.93 KG/M2 | DIASTOLIC BLOOD PRESSURE: 87 MMHG | RESPIRATION RATE: 16 BRPM | TEMPERATURE: 98.6 F | SYSTOLIC BLOOD PRESSURE: 146 MMHG

## 2021-05-14 DIAGNOSIS — Z13.31 SCREENING FOR DEPRESSION: ICD-10-CM

## 2021-05-14 DIAGNOSIS — Z00.00 WELLNESS EXAMINATION: ICD-10-CM

## 2021-05-14 DIAGNOSIS — G62.9 PERIPHERAL POLYNEUROPATHY: ICD-10-CM

## 2021-05-14 DIAGNOSIS — R03.0 WHITE COAT SYNDROME WITH HIGH BLOOD PRESSURE BUT WITHOUT HYPERTENSION: ICD-10-CM

## 2021-05-14 DIAGNOSIS — E66.9 OBESITY (BMI 30.0-34.9): Primary | ICD-10-CM

## 2021-05-14 DIAGNOSIS — E78.5 DYSLIPIDEMIA: ICD-10-CM

## 2021-05-14 PROCEDURE — G8399 PT W/DXA RESULTS DOCUMENT: HCPCS | Performed by: INTERNAL MEDICINE

## 2021-05-14 PROCEDURE — 99214 OFFICE O/P EST MOD 30 MIN: CPT | Performed by: INTERNAL MEDICINE

## 2021-05-14 PROCEDURE — 1101F PT FALLS ASSESS-DOCD LE1/YR: CPT | Performed by: INTERNAL MEDICINE

## 2021-05-14 PROCEDURE — 1090F PRES/ABSN URINE INCON ASSESS: CPT | Performed by: INTERNAL MEDICINE

## 2021-05-14 PROCEDURE — 3017F COLORECTAL CA SCREEN DOC REV: CPT | Performed by: INTERNAL MEDICINE

## 2021-05-14 PROCEDURE — G8417 CALC BMI ABV UP PARAM F/U: HCPCS | Performed by: INTERNAL MEDICINE

## 2021-05-14 PROCEDURE — G0439 PPPS, SUBSEQ VISIT: HCPCS | Performed by: INTERNAL MEDICINE

## 2021-05-14 PROCEDURE — G8536 NO DOC ELDER MAL SCRN: HCPCS | Performed by: INTERNAL MEDICINE

## 2021-05-14 PROCEDURE — G8427 DOCREV CUR MEDS BY ELIG CLIN: HCPCS | Performed by: INTERNAL MEDICINE

## 2021-05-14 PROCEDURE — G8432 DEP SCR NOT DOC, RNG: HCPCS | Performed by: INTERNAL MEDICINE

## 2021-05-14 NOTE — PROGRESS NOTES
[address] [place] [address] and Primary Care  [place] [address]  [address]  [place] [address]  Phone:  [phone]  Fax: [phone]       Chief Complaint   Patient presents with    New Patient     establish care, patient states that she is in need of refills (states that she is in a Pfizer trial for Lyrica).  Annual Wellness Visit   . SUBJECTIVE:    Lis Eisenberg is a 69 y.o. female comes in as a new patient. The patient states that she had hepatitis C a decade ago, which was complicated by apparent fibrosis. She was effectively treated and has not had any adverse effects since then. She developed paresthesia and numbness of her distal lower extremities, which she attributes to hepatitis C, apparently being told this by a physician at [facility]. As a result, she takes Lyrica, which has been the only effective agent to date. Gabapentin has not been effective. She gets Lyrica from [employer], discounted. She has been healthy all of her life. She has a history of obesity and osteoarthritis. Current Outpatient Medications   Medication Sig Dispense Refill    pregabalin (LYRICA) 150 mg capsule Take 150 mg by mouth three (3) times daily.  cyclobenzaprine (FLEXERIL) 10 mg tablet Take 1 Tab by mouth two (2) times daily as needed for Muscle Spasm(s). 12 Tab 0    ibuprofen (MOTRIN) 200 mg tablet Take 200 mg by mouth every six (6) hours as needed for Pain.  multivitamin (MULTI-DELYN, WELLESSE) liqd Take 5 mL by mouth daily.  ascorbic acid/collagen hydr (COLLAGEN PLUS VITAMIN C PO) Take 1 Dose by mouth daily.  cholecalciferol, vitamin D3, (VITAMIN D3 PO) Take 1 Tab by mouth every thirty (30) days. 50,000 UNITS      alendronate (FOSAMAX) 70 mg tablet Take 70 mg by mouth every seven (7) days. PT TAKES EVERY Friday.        Past Medical History:   Diagnosis Date    'light-for-dates' infant with signs of fetal malnutrition     Arthritis     OSTEO    Chronic pain     Cirrhosis ([facility] [address]     Hepatitis C     Liver disease     HEPATITIS C, TREATED AND NOW GONE    Neuropathy     Tuberculosis     Tuberculosis     +ANNA MARIE TEST # 3; TREATED AND NOW LUNGS HAVE ALWAYS BEEN CLEAR     Past Surgical History:   Procedure Laterality Date    HX COLONOSCOPY      HISTORY OF POLYP    HX ENDOSCOPY      HX HEENT      WISDOM TEETH    HX KNEE REPLACEMENT Right 2019    RIGHT TOTAL KNEE REPLACEMENT    HX ORTHOPAEDIC Left 2018    BROKEN WRIST, HAS A PLATE     No Known Allergies      REVIEW OF SYSTEMS:  General: negative for - chills or fever  ENT: negative for - headaches, nasal congestion or tinnitus  Respiratory: negative for - cough, hemoptysis, shortness of breath or wheezing  Cardiovascular : negative for - chest pain, edema, palpitations or shortness of breath  Gastrointestinal: negative for - abdominal pain, blood in stools, heartburn or nausea/vomiting  Genito-Urinary: no dysuria, trouble voiding, or hematuria  Musculoskeletal: negative for - gait disturbance, joint pain, joint stiffness or joint swelling  Neurological: no TIA or stroke symptoms  Hematologic: no bruises, no bleeding, no swollen glands  Integument: no lumps, mole changes, nail changes or rash  Endocrine: no malaise/lethargy or unexpected weight changes      Social History     Socioeconomic History    Marital status: SINGLE     Spouse name: Not on file    Number of children: Not on file    Years of education: Not on file    Highest education level: Not on file   Tobacco Use    Smoking status: Former Smoker     Packs/day: 1.00     Quit date:      Years since quittin.4    Smokeless tobacco: Never Used   Substance and Sexual Activity    Alcohol use: Not Currently    Drug use: Not Currently    Sexual activity: Not Currently   Social History Narrative    ** Merged History Encounter **          Family History   Problem Relation Age of Onset    Other Mother         UNKNOWN HEALTH [place] Other Father UNKNOWN HEALTH HX    Anesth Problems Neg Hx     Breast Cancer Maternal Grandmother        OBJECTIVE:    Visit Vitals  BP (!) 146/87   Pulse 76   Temp 98.6 °F (37 °C) (Oral)   Resp 16   Ht 5' 3\" (1.6 m)   Wt 180 lb 3.2 oz (81.7 kg)   SpO2 94%   BMI 31.92 kg/m²     CONSTITUTIONAL: well , well nourished, appears age appropriate  EYES: perrla, eom intact  ENMT:moist mucous membranes, pharynx clear  NECK: supple. Thyroid normal  RESPIRATORY: Chest: clear to ascultation and percussion   CARDIOVASCULAR: Heart: regular rate and rhythm  GASTROINTESTINAL: Abdomen: soft, bowel sounds active  HEMATOLOGIC: no pathological lymph nodes palpated  MUSCULOSKELETAL: Extremities: no edema, pulse 1+   INTEGUMENT: No unusual rashes or suspicious skin lesions noted. Nails appear normal.  NEUROLOGIC: non-focal exam   MENTAL STATUS: alert and oriented, appropriate affect      ASSESSMENT:  1. Obesity (BMI 30.0-34.9)    2. Peripheral polyneuropathy    3. White coat syndrome with high blood pressure but without hypertension    4. Dyslipidemia        PLAN:  1. The patient is indeed obese and does need to lose weight. This can be accomplished by eating meals, eliminating snacks, and avoiding the consumption of processed carbohydrates. 2. She has a severe peripheral neuropathy of undetermined etiology. In any event she remains on her Lyrica, which has been quite effective. She is getting this discounted from [employer]. 3. Blood pressure is probably normal for now. I will continue to monitor this. 4. She has an increased cardiovascular risk which would be deemed mild to moderate. I will await the results of her inflammatory marker and lipid profile.     .  Orders Placed This Encounter    APOLIPOPROTEIN B    CBC WITH AUTOMATED DIFF    CRP, HIGH SENSITIVITY    LIPID PANEL    METABOLIC PANEL, COMPREHENSIVE    TSH 3RD GENERATION    URINALYSIS W/ RFLX MICROSCOPIC               Merritt Montalvo MD  This is the Subsequent Medicare Annual Wellness Exam, performed 12 months or more after the Initial AWV or the last Subsequent AWV    I have reviewed the patient's medical history in detail and updated the computerized patient record. Assessment/Plan   Education and counseling provided:  Are appropriate based on today's review and evaluation    1. Obesity (BMI 30.0-34.9)  2. Peripheral polyneuropathy  -     pregabalin (Lyrica) 150 mg capsule; Take 1 Cap by mouth three (3) times daily. Max Daily Amount: 450 mg., Normal, Disp-270 Cap, R-3  -     CBC WITH AUTOMATED DIFF; Future  -     COLLECTION VENOUS BLOOD,VENIPUNCTURE  -     METABOLIC PANEL, COMPREHENSIVE; Future  -     URINALYSIS W/ RFLX MICROSCOPIC; Future  3. White coat syndrome with high blood pressure but without hypertension  4. Dyslipidemia  -     APOLIPOPROTEIN B; Future  -     CRP, HIGH SENSITIVITY; Future  -     LIPID PANEL; Future  -     TSH 3RD GENERATION; Future       Depression Risk Factor Screening   No flowsheet data found. Alcohol Risk Screen    Do you average more than 1 drink per night or more than 7 drinks a week:  No    On any one occasion in the past three months have you have had more than 3 drinks containing alcohol:  No        Functional Ability and Level of Safety    Hearing: Hearing is good. Activities of Daily Living: The home contains: no safety equipment. Patient does total self care      Ambulation: with no difficulty     Fall Risk:  No flowsheet data found.    Abuse Screen:  Patient is not abused       Cognitive Screening    Has your family/caregiver stated any concerns about your memory: no     Cognitive Screening: Not applicable    Health Maintenance Due     Health Maintenance Due   Topic Date Due    Hepatitis C Screening  Never done    COVID-19 Vaccine (1) Never done    DTaP/Tdap/Td series (1 - Tdap) Never done    Colorectal Cancer Screening Combo  Never done    Pneumococcal 65+ years (1 of 1 - PPSV23) Never done    Breast Cancer Screen Mammogram 06/12/2021       Patient Care Team   Patient Care Team:  Ephraim Westfall MD as PCP - General (Family Medicine)  Henna Dobson MD as PCP - REHABILITATION [facility] Empaneled Provider  Ephraim Westfall MD (Family Medicine)    History     Patient Active Problem List   Diagnosis Code    Primary osteoarthritis of right knee M17.11    Primary localized osteoarthritis of left knee M17.12     Past Medical History:   Diagnosis Date    'light-for-dates' infant with signs of fetal malnutrition     Arthritis     OSTEO    Chronic pain     Cirrhosis ([address]     Hepatitis C     Liver disease 1980s    HEPATITIS C, TREATED AND NOW GONE    Neuropathy     Tuberculosis     Tuberculosis 1962    +ANNA MARIE TEST # 3; TREATED AND NOW LUNGS HAVE ALWAYS BEEN CLEAR      Past Surgical History:   Procedure Laterality Date    HX COLONOSCOPY      HISTORY OF POLYP    HX ENDOSCOPY      HX HEENT      WISDOM TEETH    HX KNEE REPLACEMENT Right 11/2019    RIGHT TOTAL KNEE REPLACEMENT    HX ORTHOPAEDIC Left 2018    BROKEN WRIST, HAS A PLATE     Current Outpatient Medications   Medication Sig Dispense Refill    pregabalin (Lyrica) 150 mg capsule Take 1 Cap by mouth three (3) times daily. Max Daily Amount: 450 mg. 270 Cap 3    cyclobenzaprine (FLEXERIL) 10 mg tablet Take 1 Tab by mouth two (2) times daily as needed for Muscle Spasm(s). 12 Tab 0    ibuprofen (MOTRIN) 200 mg tablet Take 200 mg by mouth every six (6) hours as needed for Pain.  multivitamin (MULTI-DELYN, WELLESSE) liqd Take 5 mL by mouth daily.  ascorbic acid/collagen hydr (COLLAGEN PLUS VITAMIN C PO) Take 1 Dose by mouth daily.  cholecalciferol, vitamin D3, (VITAMIN D3 PO) Take 1 Tab by mouth every thirty (30) days. 50,000 UNITS      alendronate (FOSAMAX) 70 mg tablet Take 70 mg by mouth every seven (7) days. PT TAKES EVERY Friday.        No Known Allergies    Family History   Problem Relation Age of Onset    Other Mother         UNKNOWN HEALTH HX    Other Father         UNKNOWN HEALTH HX    Anesth Problems Neg Hx     Breast Cancer Maternal Grandmother      Social History     Tobacco Use    Smoking status: Former Smoker     Packs/day: 1.00     Quit date:      Years since quittin.4    Smokeless tobacco: Never Used   Substance Use Topics    Alcohol use: Not Currently         Marcellus Murphy MD

## 2021-05-14 NOTE — PROGRESS NOTES
Chief Complaint   Patient presents with    New Patient     establish care, patient states that she is in need of refills (states that she is in a Pfizer trial for Lyrica).  Annual Wellness Visit         1. Have you been to the ER, urgent care clinic since your last visit? Hospitalized since your last visit? No    2. Have you seen or consulted any other health care providers outside of the [address] [place] [address] since your last visit? Include any pap smears or colon screening.  No

## 2021-05-15 LAB
ALBUMIN SERPL-MCNC: 4.1 G/DL (ref 3.7–4.7)
ALBUMIN/GLOB SERPL: 1.3 {RATIO} (ref 1.2–2.2)
ALP SERPL-CCNC: 152 IU/L (ref 39–117)
ALT SERPL-CCNC: 107 IU/L (ref 0–32)
APO B SERPL-MCNC: 76 MG/DL
APPEARANCE UR: CLEAR
AST SERPL-CCNC: 82 IU/L (ref 0–40)
BASOPHILS # BLD AUTO: 0 X10E3/UL (ref 0–0.2)
BASOPHILS NFR BLD AUTO: 0 %
BILIRUB SERPL-MCNC: 0.4 MG/DL (ref 0–1.2)
BILIRUB UR QL STRIP: NEGATIVE
BUN SERPL-MCNC: 18 MG/DL (ref 8–27)
BUN/CREAT SERPL: 25 (ref 12–28)
CALCIUM SERPL-MCNC: 9 MG/DL (ref 8.7–10.3)
CHLORIDE SERPL-SCNC: 107 MMOL/L (ref 96–106)
CHOLEST SERPL-MCNC: 183 MG/DL (ref 100–199)
CO2 SERPL-SCNC: 24 MMOL/L (ref 20–29)
COLOR UR: YELLOW
CREAT SERPL-MCNC: 0.73 MG/DL (ref 0.57–1)
CRP SERPL HS-MCNC: 1.02 MG/L (ref 0–3)
EOSINOPHIL # BLD AUTO: 0 X10E3/UL (ref 0–0.4)
EOSINOPHIL NFR BLD AUTO: 1 %
ERYTHROCYTE [DISTWIDTH] IN BLOOD BY AUTOMATED COUNT: 13.5 % (ref 11.7–15.4)
GLOBULIN SER CALC-MCNC: 3.2 G/DL (ref 1.5–4.5)
GLUCOSE SERPL-MCNC: 93 MG/DL (ref 65–99)
GLUCOSE UR QL: NEGATIVE
HCT VFR BLD AUTO: 40.4 % (ref 34–46.6)
HDLC SERPL-MCNC: 66 MG/DL
HGB BLD-MCNC: 13.4 G/DL (ref 11.1–15.9)
HGB UR QL STRIP: NEGATIVE
IMM GRANULOCYTES # BLD AUTO: 0 X10E3/UL (ref 0–0.1)
IMM GRANULOCYTES NFR BLD AUTO: 0 %
KETONES UR QL STRIP: NEGATIVE
LDLC SERPL CALC-MCNC: 72 MG/DL (ref 0–99)
LEUKOCYTE ESTERASE UR QL STRIP: NEGATIVE
LYMPHOCYTES # BLD AUTO: 1.2 X10E3/UL (ref 0.7–3.1)
LYMPHOCYTES NFR BLD AUTO: 20 %
MCH RBC QN AUTO: 30 PG (ref 26.6–33)
MCHC RBC AUTO-ENTMCNC: 33.2 G/DL (ref 31.5–35.7)
MCV RBC AUTO: 90 FL (ref 79–97)
MICRO URNS: NORMAL
MONOCYTES # BLD AUTO: 0.6 X10E3/UL (ref 0.1–0.9)
MONOCYTES NFR BLD AUTO: 10 %
NEUTROPHILS # BLD AUTO: 4 X10E3/UL (ref 1.4–7)
NEUTROPHILS NFR BLD AUTO: 69 %
NITRITE UR QL STRIP: NEGATIVE
PH UR STRIP: 7 [PH] (ref 5–7.5)
PLATELET # BLD AUTO: 175 X10E3/UL (ref 150–450)
POTASSIUM SERPL-SCNC: 4.3 MMOL/L (ref 3.5–5.2)
PROT SERPL-MCNC: 7.3 G/DL (ref 6–8.5)
PROT UR QL STRIP: NORMAL
RBC # BLD AUTO: 4.47 X10E6/UL (ref 3.77–5.28)
SODIUM SERPL-SCNC: 145 MMOL/L (ref 134–144)
SP GR UR: 1.02 (ref 1–1.03)
TRIGL SERPL-MCNC: 281 MG/DL (ref 0–149)
TSH SERPL DL<=0.005 MIU/L-ACNC: 1.52 UIU/ML (ref 0.45–4.5)
UROBILINOGEN UR STRIP-MCNC: 1 MG/DL (ref 0.2–1)
VLDLC SERPL CALC-MCNC: 45 MG/DL (ref 5–40)
WBC # BLD AUTO: 5.8 X10E3/UL (ref 3.4–10.8)

## 2021-05-15 RX ORDER — PREGABALIN 150 MG/1
150 CAPSULE ORAL 3 TIMES DAILY
Qty: 270 CAP | Refills: 3 | Status: SHIPPED | OUTPATIENT
Start: 2021-05-15 | End: 2021-11-09

## 2021-05-15 NOTE — PATIENT INSTRUCTIONS
Medicare Wellness Visit, Female The best way to live healthy is to have a lifestyle where you eat a well-balanced diet, exercise regularly, limit alcohol use, and quit all forms of tobacco/nicotine, if applicable. Regular preventive services are another way to keep healthy. Preventive services (vaccines, screening tests, monitoring & exams) can help personalize your care plan, which helps you manage your own care. Screening tests can find health problems at the earliest stages, when they are easiest to treat. [facility] follows the current, evidence-based guidelines published by the [facility] Teddy Busch ([facility]) when recommending preventive services for our patients. Because we follow these guidelines, sometimes recommendations change over time as research supports it. (For example, mammograms used to be recommended annually. Even though Medicare will still pay for an annual mammogram, the newer guidelines recommend a mammogram every two years for women of average risk). Of course, you and your doctor may decide to screen more often for some diseases, based on your risk and your co-morbidities (chronic disease you are already diagnosed with). Preventive services for you include: - Medicare offers their members a free annual wellness visit, which is time for you and your primary care provider to discuss and plan for your preventive service needs. Take advantage of this benefit every year! 
-All adults over the age of 72 should receive the recommended pneumonia vaccines. Current USPSTF guidelines recommend a series of two vaccines for the best pneumonia protection.  
-All adults should have a flu vaccine yearly and a tetanus vaccine every 10 years.  
-All adults age 48 and older should receive the shingles vaccines (series of two vaccines).      
-All adults age 38-68 who are overweight should have a diabetes screening test once every three years.  
-All adults born between [birth year] and 1965 should be screened once for Hepatitis C. 
-Other screening tests and preventive services for persons with diabetes include: an eye exam to screen for diabetic retinopathy, a kidney function test, a foot exam, and stricter control over your cholesterol.  
-Cardiovascular screening for adults with routine risk involves an electrocardiogram (ECG) at intervals determined by your doctor.  
-Colorectal cancer screenings should be done for adults age 54-65 with no increased risk factors for colorectal cancer. There are a number of acceptable methods of screening for this type of cancer. Each test has its own benefits and drawbacks. Discuss with your doctor what is most appropriate for you during your annual wellness visit. The different tests include: colonoscopy (considered the best screening method), a fecal occult blood test, a fecal DNA test, and sigmoidoscopy. 
 
-A bone mass density test is recommended when a woman turns 65 to screen for osteoporosis. This test is only recommended one time, as a screening. Some providers will use this same test as a disease monitoring tool if you already have osteoporosis. -Breast cancer screenings are recommended every other year for women of normal risk, age 54-69. 
-Cervical cancer screenings for women over age 72 are only recommended with certain risk factors. Here is a list of your current Health Maintenance items (your personalized list of preventive services) with a due date: 
Health Maintenance Due Topic Date Due  
 Hepatitis C Test  Never done  COVID-19 Vaccine (1) Never done  DTaP/Tdap/Td  (1 - Tdap) Never done  Colorectal Screening  Never done  Pneumococcal Vaccine (1 of 1 - PPSV23) Never done  Mammogram  06/12/2021

## 2021-11-10 ENCOUNTER — OFFICE VISIT (OUTPATIENT)
Dept: INTERNAL MEDICINE CLINIC | Age: 71
End: 2021-11-10
Payer: MEDICARE

## 2021-11-10 VITALS
DIASTOLIC BLOOD PRESSURE: 83 MMHG | WEIGHT: 187 LBS | TEMPERATURE: 98.7 F | OXYGEN SATURATION: 96 % | RESPIRATION RATE: 16 BRPM | HEART RATE: 75 BPM | SYSTOLIC BLOOD PRESSURE: 138 MMHG | BODY MASS INDEX: 33.13 KG/M2 | HEIGHT: 63 IN

## 2021-11-10 DIAGNOSIS — E66.9 OBESITY (BMI 30.0-34.9): ICD-10-CM

## 2021-11-10 DIAGNOSIS — G62.9 PERIPHERAL POLYNEUROPATHY: Primary | ICD-10-CM

## 2021-11-10 DIAGNOSIS — J30.0 VASOMOTOR RHINITIS: ICD-10-CM

## 2021-11-10 PROCEDURE — 3017F COLORECTAL CA SCREEN DOC REV: CPT | Performed by: INTERNAL MEDICINE

## 2021-11-10 PROCEDURE — G8432 DEP SCR NOT DOC, RNG: HCPCS | Performed by: INTERNAL MEDICINE

## 2021-11-10 PROCEDURE — G8536 NO DOC ELDER MAL SCRN: HCPCS | Performed by: INTERNAL MEDICINE

## 2021-11-10 PROCEDURE — G8399 PT W/DXA RESULTS DOCUMENT: HCPCS | Performed by: INTERNAL MEDICINE

## 2021-11-10 PROCEDURE — 99213 OFFICE O/P EST LOW 20 MIN: CPT | Performed by: INTERNAL MEDICINE

## 2021-11-10 PROCEDURE — G8427 DOCREV CUR MEDS BY ELIG CLIN: HCPCS | Performed by: INTERNAL MEDICINE

## 2021-11-10 PROCEDURE — 1090F PRES/ABSN URINE INCON ASSESS: CPT | Performed by: INTERNAL MEDICINE

## 2021-11-10 PROCEDURE — G8417 CALC BMI ABV UP PARAM F/U: HCPCS | Performed by: INTERNAL MEDICINE

## 2021-11-10 PROCEDURE — 1101F PT FALLS ASSESS-DOCD LE1/YR: CPT | Performed by: INTERNAL MEDICINE

## 2021-11-10 RX ORDER — PREGABALIN 150 MG/1
CAPSULE ORAL
Qty: 90 CAPSULE | Refills: 4 | Status: SHIPPED | OUTPATIENT
Start: 2021-11-10 | End: 2021-11-13 | Stop reason: SDUPTHER

## 2021-11-10 NOTE — PROGRESS NOTES
Chief Complaint   Patient presents with    Medication Refill     Lyrica   . SUBECTIVE:    Andressa Beaulieu is a 66 y.o. female comes in for return visit stating that she can no longer get her Lyrica paid for from the drug company. She will have to pay cash. This is will be a bit much for her. She still would like to try because this is the only thing that has helped her. She has used gabapentin in the past, but it was not successful in alleviating the discomfort. She has had no meaningful weight loss since I last saw her. She also has a nasal congestion with post nasal drip. Current Outpatient Medications   Medication Sig Dispense Refill    pregabalin (Lyrica) 150 mg capsule Take 1 capsule by mouth 2 times a day as directed by physician. max daily dose is 450mg 60 Capsule 4    ibuprofen (MOTRIN) 200 mg tablet Take 200 mg by mouth every six (6) hours as needed for Pain.  multivitamin (MULTI-DELYN, WELLESSE) liqd Take 5 mL by mouth daily.  ascorbic acid/collagen hydr (COLLAGEN PLUS VITAMIN C PO) Take 1 Dose by mouth daily.  cholecalciferol, vitamin D3, (VITAMIN D3 PO) Take 1 Tab by mouth every thirty (30) days.  50,000 UNITS (Patient not taking: Reported on 11/10/2021)       Past Medical History:   Diagnosis Date    'light-for-dates' infant with signs of fetal malnutrition     Arthritis     OSTEO    Chronic pain     Cirrhosis ([facility] [address]     Hepatitis C     Liver disease 1980s    HEPATITIS C, TREATED AND NOW GONE    Neuropathy     Tuberculosis     Tuberculosis 1962    +ANNA MARIE TEST # 3; TREATED AND NOW LUNGS HAVE ALWAYS BEEN CLEAR     Past Surgical History:   Procedure Laterality Date    HX COLONOSCOPY      HISTORY OF POLYP    HX ENDOSCOPY      HX HEENT      WISDOM TEETH    HX KNEE REPLACEMENT Right 11/2019    RIGHT TOTAL KNEE REPLACEMENT    HX ORTHOPAEDIC Left 2018    BROKEN WRIST, HAS A PLATE     No Known Allergies    REVIEW OF SYSTEMS:  Review of Systems - Negative except ENT ROS: negative for - headaches, hearing change, nasal congestion, oral lesions, tinnitus, visual changes or   Respiratory ROS: no cough, shortness of breath, or wheezing  Cardiovascular ROS: no chest pain or dyspnea on exertion  Gastrointestinal ROS: no abdominal pain, change in bowel habits, or black or blood  Genito-Urinary ROS: no dysuria, trouble voiding, or hematuria  Musculoskeletal ROS: negative  Neurological ROS: no TIA or stroke symptoms      Social History     Socioeconomic History    Marital status: SINGLE   Tobacco Use    Smoking status: Former Smoker     Packs/day: 1.00     Quit date:      Years since quittin.9    Smokeless tobacco: Never Used   Vaping Use    Vaping Use: Never used   Substance and Sexual Activity    Alcohol use: Not Currently    Drug use: Not Currently    Sexual activity: Not Currently   Social History Narrative    ** Merged History Encounter **        r  Family History   Problem Relation Age of Onset    Other Mother         UNKNOWN HEALTH HX    Other Father         UNKNOWN HEALTH HX    Anesth Problems Neg Hx     Breast Cancer Maternal Grandmother        OBJECTIVE:  Visit Vitals  /83   Pulse 75   Temp 98.7 °F (37.1 °C) (Oral)   Resp 16   Ht 5' 3\" (1.6 m)   Wt 187 lb (84.8 kg)   SpO2 96%   BMI 33.13 kg/m²     ENT: perrla,  eom intact  NECK: supple. Thyroid normal, no JVD  CHEST: clear to ascultation and percussion   HEART: regular rate and rhythm  ABD: soft, bowel sounds active,   EXTREMITIES: no edema, pulse 1+  INTEGUMENT: clear      ASSESSMENT:  1. Peripheral polyneuropathy    2. Obesity (BMI 30.0-34.9)    3. Vasomotor rhinitis        PLAN:  1. The patient has peripheral polyneuropathy. I will give her Lyrica, put in a 90 day supply and give her discount in her prescription card. Hopefully this will be reasonable enough that she can afford it. 2. She needs to lose weight.   This can be accomplished by eating meals, eliminating snacks, and avoiding the consumption of processed carbohydrates. 3. She has a vasomotor rhinitis and I suggest a topical steroid nasal spray, but she is not interested in that at this point. She can however use fluticasone over-the-counter two sprays to each nostril once a day. .  Orders Placed This Encounter    DISCONTD: pregabalin (Lyrica) 150 mg capsule    pregabalin (Lyrica) 150 mg capsule       Follow-up and Dispositions    · Return in about 3 months (around 2/10/2022).            Elana Estrada MD

## 2021-11-10 NOTE — PROGRESS NOTES
Chief Complaint   Patient presents with    Medication Refill     Lyrica         1. Have you been to the ER, urgent care clinic since your last visit? Hospitalized since your last visit? No    2. Have you seen or consulted any other health care providers outside of the [address] [place] [address] since your last visit? Include any pap smears or colon screening.  No

## 2021-11-11 ENCOUNTER — APPOINTMENT (OUTPATIENT)
Dept: GENERAL RADIOLOGY | Age: 71
End: 2021-11-11
Attending: NURSE PRACTITIONER
Payer: MEDICARE

## 2021-11-11 ENCOUNTER — HOSPITAL ENCOUNTER (EMERGENCY)
Age: 71
Discharge: HOME | End: 2021-11-11
Attending: EMERGENCY MEDICINE
Payer: MEDICARE

## 2021-11-11 VITALS
SYSTOLIC BLOOD PRESSURE: 140 MMHG | HEART RATE: 91 BPM | DIASTOLIC BLOOD PRESSURE: 96 MMHG | OXYGEN SATURATION: 93 % | RESPIRATION RATE: 16 BRPM | TEMPERATURE: 97 F

## 2021-11-11 DIAGNOSIS — U07.1 COVID-19: Primary | ICD-10-CM

## 2021-11-11 LAB
COVID-19 RAPID TEST, COVR: DETECTED
SOURCE, COVRS: ABNORMAL

## 2021-11-11 PROCEDURE — 87635 SARS-COV-2 COVID-19 AMP PRB: CPT

## 2021-11-11 PROCEDURE — 71045 X-RAY EXAM CHEST 1 VIEW: CPT

## 2021-11-11 PROCEDURE — 99281 EMR DPT VST MAYX REQ PHY/QHP: CPT

## 2021-11-11 NOTE — Clinical Note
[address]  [address] [facility] [id number]  [phone]    Work/School Note    Date: 11/11/2021     To Whom It May concern:    Deborah Us was evaulated by the following provider(s):  Attending Provider: Laura Castellanos MD  Nurse Practitioner: Timmy Morin NP.   Marcebridgette Barnse virus is suspected. Per the CDC guidelines we recommend home isolation until the following conditions are all met:    1. At least 10 days have passed since symptoms first appeared and  2. At least 24 hours have passed since last fever without the use of fever-reducing medications and  3.  Symptoms (e.g., cough, shortness of breath) have improved    Sincerely,          Nav Plasencia NP

## 2021-11-12 ENCOUNTER — PATIENT OUTREACH (OUTPATIENT)
Dept: CASE MANAGEMENT | Age: 71
End: 2021-11-12

## 2021-11-13 RX ORDER — PREGABALIN 150 MG/1
CAPSULE ORAL
Qty: 60 CAPSULE | Refills: 4 | Status: SHIPPED | OUTPATIENT
Start: 2021-11-13 | End: 2022-02-15 | Stop reason: SDUPTHER

## 2021-11-20 ENCOUNTER — APPOINTMENT (OUTPATIENT)
Dept: GENERAL RADIOLOGY | Age: 71
DRG: 177 | End: 2021-11-20
Attending: EMERGENCY MEDICINE
Payer: MEDICARE

## 2021-11-20 ENCOUNTER — HOSPITAL ENCOUNTER (EMERGENCY)
Dept: CT IMAGING | Age: 71
Discharge: HOME | DRG: 177 | End: 2021-11-20
Attending: EMERGENCY MEDICINE
Payer: MEDICARE

## 2021-11-20 ENCOUNTER — HOSPITAL ENCOUNTER (INPATIENT)
Age: 71
LOS: 8 days | Discharge: HOME | DRG: 177 | End: 2021-11-28
Attending: EMERGENCY MEDICINE | Admitting: HOSPITALIST
Payer: MEDICARE

## 2021-11-20 DIAGNOSIS — U07.1 COVID-19 VIRUS INFECTION: Primary | ICD-10-CM

## 2021-11-20 DIAGNOSIS — J18.9 PNEUMONIA OF BOTH LUNGS DUE TO INFECTIOUS ORGANISM, UNSPECIFIED PART OF LUNG: ICD-10-CM

## 2021-11-20 DIAGNOSIS — E87.6 HYPOKALEMIA: ICD-10-CM

## 2021-11-20 DIAGNOSIS — J96.01 ACUTE RESPIRATORY FAILURE WITH HYPOXIA (HCC): ICD-10-CM

## 2021-11-20 DIAGNOSIS — G93.40 ACUTE ENCEPHALOPATHY: ICD-10-CM

## 2021-11-20 DIAGNOSIS — R92.1 BREAST CALCIFICATIONS: ICD-10-CM

## 2021-11-20 LAB
ALBUMIN SERPL-MCNC: 2.6 G/DL (ref 3.5–5)
ALBUMIN/GLOB SERPL: 0.5 {RATIO} (ref 1.1–2.2)
ALP SERPL-CCNC: 68 U/L (ref 45–117)
ALT SERPL-CCNC: 35 U/L (ref 12–78)
ANION GAP SERPL CALC-SCNC: 7 MMOL/L (ref 5–15)
AST SERPL-CCNC: 60 U/L (ref 15–37)
BASE EXCESS BLD CALC-SCNC: 4.7 MMOL/L
BASE EXCESS BLDV CALC-SCNC: 9 MMOL/L
BASOPHILS # BLD: 0 K/UL (ref 0–0.1)
BASOPHILS NFR BLD: 0 % (ref 0–1)
BILIRUB SERPL-MCNC: 0.7 MG/DL (ref 0.2–1)
BNP SERPL-MCNC: 1255 PG/ML
BUN SERPL-MCNC: 15 MG/DL (ref 6–20)
BUN/CREAT SERPL: 22 (ref 12–20)
CALCIUM SERPL-MCNC: 8.9 MG/DL (ref 8.5–10.1)
CHLORIDE SERPL-SCNC: 104 MMOL/L (ref 97–108)
CO2 SERPL-SCNC: 30 MMOL/L (ref 21–32)
COMMENT, HOLDF: NORMAL
COMMENT, HOLDF: NORMAL
CREAT SERPL-MCNC: 0.68 MG/DL (ref 0.55–1.02)
CRP SERPL-MCNC: 9.42 MG/DL (ref 0–0.6)
DIFFERENTIAL METHOD BLD: ABNORMAL
EOSINOPHIL # BLD: 0 K/UL (ref 0–0.4)
EOSINOPHIL NFR BLD: 0 % (ref 0–7)
ERYTHROCYTE [DISTWIDTH] IN BLOOD BY AUTOMATED COUNT: 12.6 % (ref 11.5–14.5)
ETHANOL SERPL-MCNC: <10 MG/DL
GLOBULIN SER CALC-MCNC: 5.3 G/DL (ref 2–4)
GLUCOSE SERPL-MCNC: 108 MG/DL (ref 65–100)
HCO3 BLD-SCNC: 25 MMOL/L (ref 22–26)
HCO3 BLDV-SCNC: 32.5 MMOL/L (ref 23–28)
HCT VFR BLD AUTO: 42.6 % (ref 35–47)
HGB BLD-MCNC: 14.1 G/DL (ref 11.5–16)
IMM GRANULOCYTES # BLD AUTO: 0.1 K/UL (ref 0–0.04)
IMM GRANULOCYTES NFR BLD AUTO: 1 % (ref 0–0.5)
LACTATE BLD-SCNC: 1.34 MMOL/L (ref 0.4–2)
LACTATE SERPL-SCNC: 1.5 MMOL/L (ref 0.4–2)
LIPASE SERPL-CCNC: 338 U/L (ref 73–393)
LYMPHOCYTES # BLD: 0.7 K/UL (ref 0.8–3.5)
LYMPHOCYTES NFR BLD: 12 % (ref 12–49)
MCH RBC QN AUTO: 31.1 PG (ref 26–34)
MCHC RBC AUTO-ENTMCNC: 33.1 G/DL (ref 30–36.5)
MCV RBC AUTO: 93.8 FL (ref 80–99)
MONOCYTES # BLD: 0.5 K/UL (ref 0–1)
MONOCYTES NFR BLD: 8 % (ref 5–13)
NEUTS SEG # BLD: 4.6 K/UL (ref 1.8–8)
NEUTS SEG NFR BLD: 79 % (ref 32–75)
NRBC # BLD: 0.06 K/UL (ref 0–0.01)
NRBC BLD-RTO: 1 PER 100 WBC
PCO2 BLD: 24.6 MMHG (ref 35–45)
PCO2 BLDV: 39.3 MMHG (ref 41–51)
PH BLD: 7.62 [PH] (ref 7.35–7.45)
PH BLDV: 7.53 [PH] (ref 7.32–7.42)
PLATELET # BLD AUTO: 242 K/UL (ref 150–400)
PMV BLD AUTO: 10.9 FL (ref 8.9–12.9)
PO2 BLD: 29 MMHG (ref 80–100)
PO2 BLDV: 41 MMHG (ref 25–40)
POTASSIUM SERPL-SCNC: 3.3 MMOL/L (ref 3.5–5.1)
PROCALCITONIN SERPL-MCNC: <0.05 NG/ML
PROT SERPL-MCNC: 7.9 G/DL (ref 6.4–8.2)
RBC # BLD AUTO: 4.54 M/UL (ref 3.8–5.2)
RBC MORPH BLD: ABNORMAL
SAMPLES BEING HELD,HOLD: NORMAL
SAMPLES BEING HELD,HOLD: NORMAL
SAO2 % BLD: 70.2 % (ref 92–97)
SAO2 % BLDV: 81.4 % (ref 65–88)
SERVICE CMNT-IMP: ABNORMAL
SERVICE CMNT-IMP: ABNORMAL
SODIUM SERPL-SCNC: 141 MMOL/L (ref 136–145)
SPECIMEN TYPE: ABNORMAL
SPECIMEN TYPE: ABNORMAL
TROPONIN-HIGH SENSITIVITY: 13 NG/L (ref 0–51)
WBC # BLD AUTO: 5.9 K/UL (ref 3.6–11)

## 2021-11-20 PROCEDURE — 99285 EMERGENCY DEPT VISIT HI MDM: CPT

## 2021-11-20 PROCEDURE — 82077 ASSAY SPEC XCP UR&BREATH IA: CPT

## 2021-11-20 PROCEDURE — 36415 COLL VENOUS BLD VENIPUNCTURE: CPT

## 2021-11-20 PROCEDURE — 74011000636 HC RX REV CODE- 636: Performed by: RADIOLOGY

## 2021-11-20 PROCEDURE — 87040 BLOOD CULTURE FOR BACTERIA: CPT

## 2021-11-20 PROCEDURE — 83605 ASSAY OF LACTIC ACID: CPT

## 2021-11-20 PROCEDURE — 87086 URINE CULTURE/COLONY COUNT: CPT

## 2021-11-20 PROCEDURE — 71045 X-RAY EXAM CHEST 1 VIEW: CPT

## 2021-11-20 PROCEDURE — 65660000001 HC RM ICU INTERMED STEPDOWN

## 2021-11-20 PROCEDURE — 96365 THER/PROPH/DIAG IV INF INIT: CPT

## 2021-11-20 PROCEDURE — 84145 PROCALCITONIN (PCT): CPT

## 2021-11-20 PROCEDURE — 71275 CT ANGIOGRAPHY CHEST: CPT

## 2021-11-20 PROCEDURE — 82803 BLOOD GASES ANY COMBINATION: CPT

## 2021-11-20 PROCEDURE — 77010033711 HC HIGH FLOW OXYGEN

## 2021-11-20 PROCEDURE — 87449 NOS EACH ORGANISM AG IA: CPT

## 2021-11-20 PROCEDURE — 74011000250 HC RX REV CODE- 250: Performed by: EMERGENCY MEDICINE

## 2021-11-20 PROCEDURE — 80053 COMPREHEN METABOLIC PANEL: CPT

## 2021-11-20 PROCEDURE — 86140 C-REACTIVE PROTEIN: CPT

## 2021-11-20 PROCEDURE — 84484 ASSAY OF TROPONIN QUANT: CPT

## 2021-11-20 PROCEDURE — 74011250636 HC RX REV CODE- 250/636: Performed by: HOSPITALIST

## 2021-11-20 PROCEDURE — 85025 COMPLETE CBC W/AUTO DIFF WBC: CPT

## 2021-11-20 PROCEDURE — 74011250637 HC RX REV CODE- 250/637: Performed by: HOSPITALIST

## 2021-11-20 PROCEDURE — 70450 CT HEAD/BRAIN W/O DYE: CPT

## 2021-11-20 PROCEDURE — 83690 ASSAY OF LIPASE: CPT

## 2021-11-20 PROCEDURE — 83880 ASSAY OF NATRIURETIC PEPTIDE: CPT

## 2021-11-20 PROCEDURE — 96366 THER/PROPH/DIAG IV INF ADDON: CPT

## 2021-11-20 PROCEDURE — 74011250636 HC RX REV CODE- 250/636: Performed by: EMERGENCY MEDICINE

## 2021-11-20 PROCEDURE — 87899 AGENT NOS ASSAY W/OPTIC: CPT

## 2021-11-20 PROCEDURE — 93005 ELECTROCARDIOGRAM TRACING: CPT

## 2021-11-20 RX ORDER — DEXAMETHASONE SODIUM PHOSPHATE 10 MG/ML
10 INJECTION INTRAMUSCULAR; INTRAVENOUS ONCE
Status: COMPLETED | OUTPATIENT
Start: 2021-11-20 | End: 2021-11-20

## 2021-11-20 RX ORDER — SODIUM CHLORIDE 0.9 % (FLUSH) 0.9 %
5-40 SYRINGE (ML) INJECTION AS NEEDED
Status: DISCONTINUED | OUTPATIENT
Start: 2021-11-20 | End: 2021-11-28 | Stop reason: HOSPADM

## 2021-11-20 RX ORDER — ACETAMINOPHEN 325 MG/1
650 TABLET ORAL
Status: DISCONTINUED | OUTPATIENT
Start: 2021-11-20 | End: 2021-11-28 | Stop reason: HOSPADM

## 2021-11-20 RX ORDER — ACETAMINOPHEN 650 MG/1
650 SUPPOSITORY RECTAL
Status: DISCONTINUED | OUTPATIENT
Start: 2021-11-20 | End: 2021-11-28 | Stop reason: HOSPADM

## 2021-11-20 RX ORDER — POLYETHYLENE GLYCOL 3350 17 G/17G
17 POWDER, FOR SOLUTION ORAL DAILY PRN
Status: DISCONTINUED | OUTPATIENT
Start: 2021-11-20 | End: 2021-11-28 | Stop reason: HOSPADM

## 2021-11-20 RX ORDER — DEXAMETHASONE SODIUM PHOSPHATE 4 MG/ML
6 INJECTION, SOLUTION INTRA-ARTICULAR; INTRALESIONAL; INTRAMUSCULAR; INTRAVENOUS; SOFT TISSUE EVERY 24 HOURS
Status: DISCONTINUED | OUTPATIENT
Start: 2021-11-21 | End: 2021-11-28 | Stop reason: HOSPADM

## 2021-11-20 RX ORDER — ONDANSETRON 4 MG/1
4 TABLET, ORALLY DISINTEGRATING ORAL
Status: DISCONTINUED | OUTPATIENT
Start: 2021-11-20 | End: 2021-11-28 | Stop reason: HOSPADM

## 2021-11-20 RX ORDER — ONDANSETRON 2 MG/ML
4 INJECTION INTRAMUSCULAR; INTRAVENOUS
Status: DISCONTINUED | OUTPATIENT
Start: 2021-11-20 | End: 2021-11-28 | Stop reason: HOSPADM

## 2021-11-20 RX ORDER — SODIUM CHLORIDE 0.9 % (FLUSH) 0.9 %
5-40 SYRINGE (ML) INJECTION EVERY 8 HOURS
Status: DISCONTINUED | OUTPATIENT
Start: 2021-11-20 | End: 2021-11-28 | Stop reason: HOSPADM

## 2021-11-20 RX ORDER — POTASSIUM CHLORIDE 750 MG/1
10 TABLET, FILM COATED, EXTENDED RELEASE ORAL
Status: COMPLETED | OUTPATIENT
Start: 2021-11-20 | End: 2021-11-20

## 2021-11-20 RX ORDER — PREGABALIN 75 MG/1
150 CAPSULE ORAL 2 TIMES DAILY
Status: DISCONTINUED | OUTPATIENT
Start: 2021-11-20 | End: 2021-11-28 | Stop reason: HOSPADM

## 2021-11-20 RX ORDER — ALBUTEROL SULFATE 90 UG/1
2 AEROSOL, METERED RESPIRATORY (INHALATION)
Status: DISCONTINUED | OUTPATIENT
Start: 2021-11-20 | End: 2021-11-28 | Stop reason: HOSPADM

## 2021-11-20 RX ORDER — ENOXAPARIN SODIUM 100 MG/ML
30 INJECTION SUBCUTANEOUS EVERY 12 HOURS
Status: DISCONTINUED | OUTPATIENT
Start: 2021-11-20 | End: 2021-11-20 | Stop reason: DRUGHIGH

## 2021-11-20 RX ORDER — ENOXAPARIN SODIUM 100 MG/ML
40 INJECTION SUBCUTANEOUS EVERY 12 HOURS
Status: DISCONTINUED | OUTPATIENT
Start: 2021-11-20 | End: 2021-11-23

## 2021-11-20 RX ORDER — POTASSIUM CHLORIDE 7.45 MG/ML
10 INJECTION INTRAVENOUS
Status: COMPLETED | OUTPATIENT
Start: 2021-11-20 | End: 2021-11-20

## 2021-11-20 RX ADMIN — PREGABALIN 150 MG: 75 CAPSULE ORAL at 22:55

## 2021-11-20 RX ADMIN — AZITHROMYCIN MONOHYDRATE 500 MG: 500 INJECTION, POWDER, LYOPHILIZED, FOR SOLUTION INTRAVENOUS at 21:06

## 2021-11-20 RX ADMIN — POTASSIUM CHLORIDE 10 MEQ: 750 TABLET, FILM COATED, EXTENDED RELEASE ORAL at 21:06

## 2021-11-20 RX ADMIN — CEFTRIAXONE SODIUM 2 G: 2 INJECTION, POWDER, FOR SOLUTION INTRAMUSCULAR; INTRAVENOUS at 21:05

## 2021-11-20 RX ADMIN — DEXAMETHASONE SODIUM PHOSPHATE 10 MG: 10 INJECTION, SOLUTION INTRAMUSCULAR; INTRAVENOUS at 21:05

## 2021-11-20 RX ADMIN — ACETAMINOPHEN 650 MG: 325 TABLET ORAL at 21:06

## 2021-11-20 RX ADMIN — ENOXAPARIN SODIUM 40 MG: 100 INJECTION SUBCUTANEOUS at 22:57

## 2021-11-20 RX ADMIN — IOPAMIDOL 85 ML: 755 INJECTION, SOLUTION INTRAVENOUS at 19:29

## 2021-11-20 RX ADMIN — SODIUM CHLORIDE, POTASSIUM CHLORIDE, SODIUM LACTATE AND CALCIUM CHLORIDE 500 ML: 600; 310; 30; 20 INJECTION, SOLUTION INTRAVENOUS at 17:48

## 2021-11-20 RX ADMIN — POTASSIUM CHLORIDE 10 MEQ: 7.46 INJECTION, SOLUTION INTRAVENOUS at 17:48

## 2021-11-20 NOTE — ED PROVIDER NOTES
Patient is here because of altered mental status and hypoxemia. She was recently diagnosed with Covid 19 on November 11 in the emergency department and discharged home. Allegedly she lives alone, per EMS. Power of  has not been able to get in touch with her the past few days and EMS was called. They found the patient in her bed. House did not appear to be in [address] They state that she was confused. EMS reports that her normal baseline mental status is awake alert and oriented person place and time. EMS reports that they do not know what her CODE STATUS is. They state that the power of  should be going to the emergency department. EMS reports that her oxygen saturation was in the 60s on room air. They do not believe she uses oxygen at home. Improved to 93% with 15 L per nonrebreather mask which they will titrate down to 6 L via nasal cannula with still having an SPO2 in the low 90s. Here, patient reports cough and shortness of breath denies any other complaints. Denies falls. Denies tobacco alcohol illegal drugs.            Past Medical History:   Diagnosis Date    'light-for-dates' infant with signs of fetal malnutrition     Arthritis     OSTEO    Chronic pain     [facility]osis ([facility] [address]     Hepatitis C     Liver disease 1980s    HEPATITIS C, TREATED AND NOW GONE    Neuropathy     Tuberculosis     Tuberculosis 1962    +ANNA MARIE TEST # 3; TREATED AND NOW LUNGS HAVE ALWAYS BEEN CLEAR       Past Surgical History:   Procedure Laterality Date    HX COLONOSCOPY      HISTORY OF POLYP    HX ENDOSCOPY      HX HEENT      WISDOM TEETH    HX KNEE REPLACEMENT Right 11/2019    RIGHT TOTAL KNEE REPLACEMENT    HX ORTHOPAEDIC Left 2018    BROKEN WRIST, HAS A PLATE         Family History:   Problem Relation Age of Onset    Other Mother         UNKNOWN HEALTH HX    Other Father         UNKNOWN HEALTH HX    Anesth Problems Neg Hx     Breast Cancer Maternal Grandmother        Social History Socioeconomic History    Marital status: SINGLE     Spouse name: Not on file    Number of children: Not on file    Years of education: Not on file    Highest education level: Not on file   Occupational History    Not on file   Tobacco Use    Smoking status: Former Smoker     Packs/day: 1.00     Quit date: 5     Years since quittin.9    Smokeless tobacco: Never Used   Vaping Use    Vaping Use: Never used   Substance and Sexual Activity    Alcohol use: Not Currently    Drug use: Not Currently    Sexual activity: Not Currently   Other Topics Concern    Not on file   Social History Narrative    ** Merged History Encounter **          Social Determinants of Health     Financial Resource Strain:     Difficulty of Paying Living Expenses: Not on file   Food Insecurity:     Worried About Running Out of Food in the Last Year: Not on file    Ole of Food in the Last Year: Not on file   Transportation Needs:     Lack of Transportation (Medical): Not on file    Lack of Transportation (Non-Medical):  Not on file   Physical Activity:     Days of Exercise per Week: Not on file    Minutes of Exercise per Session: Not on file   Stress:     Feeling of Stress : Not on file   Social Connections:     Frequency of Communication with Friends and Family: Not on file    Frequency of Social Gatherings with Friends and Family: Not on file    Attends [employer] Services: Not on file    Active Member of [address] [place] [address] or Organizations: Not on file    Attends Club or Organization Meetings: Not on file    Marital Status: Not on file   Intimate Partner Violence:     Fear of Current or Ex-Partner: Not on file    Emotionally Abused: Not on file    Physically Abused: Not on file    Sexually Abused: Not on file   Housing Stability:     Unable to Pay for Housing in the Last Year: Not on file    Number of [place] in the Last Year: Not on file    Unstable Housing in the Last Year: Not on file         ALLERGIES: Patient has no known allergies. Review of Systems   Unable to perform ROS: Mental status change       Vitals:    11/20/21 1638 11/20/21 1803   BP:  129/78   Pulse:  84   Resp:  20   SpO2: 97% 95%            Physical Exam  Vitals and nursing note reviewed. Constitutional:       General: She is not in acute distress. Appearance: She is well-developed and normal weight. She is not ill-appearing, toxic-appearing or diaphoretic. HENT:      Head: Normocephalic and atraumatic. Mouth/Throat:      Mouth: Mucous membranes are moist.      Pharynx: Oropharynx is clear. Eyes:      General: No scleral icterus. Extraocular Movements: Extraocular movements intact. Right eye: Normal extraocular motion and no nystagmus. Left eye: Normal extraocular motion and no nystagmus. Pupils: Pupils are equal, round, and reactive to light. Pupils are equal.      Comments: Pupils 3 mm and reactive bilaterally. No nystagmus. No proptosis. Neck:      Meningeal: Brudzinski's sign and Kernig's sign absent. Cardiovascular:      Rate and Rhythm: Normal rate and regular rhythm. Pulmonary:      Effort: Pulmonary effort is normal.      Breath sounds: Rales (Lower lobes bilaterally.) present. Comments: No tachypnea. No accessory muscle use. Abdominal:      General: Bowel sounds are normal. There is no distension. Palpations: Abdomen is soft. There is no mass. Tenderness: There is no abdominal tenderness. There is no guarding. Musculoskeletal:         General: No swelling or tenderness. Normal range of motion. Cervical back: Normal range of motion and neck supple. No rigidity. Lymphadenopathy:      Cervical: No cervical adenopathy. Skin:     General: Skin is warm and dry. Capillary Refill: Capillary refill takes less than 2 seconds. Coloration: Skin is not cyanotic or pale. Findings: No erythema or rash. Neurological:      Mental Status: She is alert.       Cranial Nerves: No cranial nerve deficit, dysarthria or facial asymmetry. Sensory: No sensory deficit. Motor: No weakness. Comments: Oriented to person and place only. Does not know the year. Sensation to light touch intact in all dermatomes of the upper extremities bilaterally and the L4 L5-S1 dermatomes bilaterally. 2+ DTRs bilaterally in the patella. No inducible ankle clonus. No truncal ataxia. Extremities are not rigid and there is no tremor. Psychiatric:         Mood and Affect: Mood normal.         Speech: Speech normal.         Behavior: Behavior normal.          [facility]  ED Course as of 11/1950   Sat Nov 20, 2021   1549 Patient examined upon arrival by EMS. Patient awake. Knows name. Knows where she is does not know the year. Reviewed previous ED note. She was recently diagnosed with Covid 19 on 11 November. Per that note she was stable for discharge from the emergency department. Per EMS she has not been seen recently. She has no evidence of trauma on exam.  No focal neurologic deficits. Is hypoxemic here in the 80s while on 6 L nasal cannula. Nonrebreather mask applied, and it did not improve SPO2. Respiratory has been called to start high flow nasal cannula. Does have rales in lower lobes. Concerned that this may be worsening Covid. Also considering PE. We will try to get in touch with caregiver. Will get CT head, CT PE study, chest x-ray. Will get labs. [AF]   6899 EKG with normal sinus rhythm, rate 89, normal MD interval, normal QRS, nonspecific ST changes, QTC is prolonged at 496 ms. [AF]   200 Called patient's emergency contact is listed in electronic medical record, but that emergency contact is not her power of . She does not know who the power of  is. She will call with additional people who know the patient to try to get information about power of . Information regarding patient's power of  was not available to EMS.  Will talk to case management to try to help in getting contact information however  tell clarify goals of care particularly if high flow nasal cannula does not improve patient's oxygen saturation. [AF]   1637 XR CHEST PORT [AF]   1642 LACTIC ACID:    Lactic acid 1.5 [AF]   1649 LIPASE:    Lipase 338  POA  and Mr Leung: [phone] and [phone] and [phone]. Patient has been worsening shortness of breath for the past few days. Power of  has not been able to in touch with patient for the past 2 days. The power of  son was able to visit the patient today who found the patient to be more confused. EMS was called. Per power of  patient would allow BiPAP, CPAP, intubation, antibiotics and all other treatments. However, she does not want CPR. [AF]   1652 LIPASE:    Lipase 338 [AF]   1652 PRO-BNP(!):    NT pro-BNP 1,255(!) [AF]   1652 COMPREHENSIVE METABOLIC PANEL(!):    Sodium 141   Potassium 3.3(!)   Chloride 104   CO2 30   Anion gap 7   Glucose 108(!)   BUN 15   Creatinine 0.68   BUN/Creatinine ratio 22(!)   GFR est AA >60   GFR est non-AA >60   Calcium 8.9   Bilirubin, total 0.7   ALT 35   AST 60(!)   Alk. phosphatase 68   Protein, total 7.9   Albumin 2.6(!)   Globulin 5.3(!)   A-G Ratio 0.5(!) [AF]   1705 TROPONIN-HIGH SENSITIVITY:    Troponin-High Sensitivity 13 [AF]   1758 CBC WITH AUTOMATED DIFF(!):    WBC 5.9   RBC 4.54   HGB 14.1   HCT 42.6   MCV 93.8   MCH 31.1   MCHC 33.1   RDW 12.6   PLATELET 382   MPV 63.2   NRBC 1.0(!)   ABSOLUTE NRBC 0.06(!)   NEUTROPHILS PENDING   LYMPHOCYTES PENDING   MONOCYTES PENDING   EOSINOPHILS PENDING   BASOPHILS PENDING   IMMATURE GRANULOCYTES PENDING   ABS. NEUTROPHILS PENDING   ABS. LYMPHOCYTES PENDING   ABS. MONOCYTES PENDING   ABS. EOSINOPHILS PENDING   ABS. BASOPHILS PENDING   ABS. IMM. GRANS.  PENDING   DF PENDING [AF]   1803 BLOOD GAS,LACTIC ACID, POC(!!):    pH (POC) 7.62(!!)   pCO2 (POC) 24.6(!)   pO2 (POC) 29(!!)   HCO3 (POC) 25.0 sO2 (POC) 70.2(!)   Base excess (POC) 4.7   Specimen type (POC) VENOUS BLOOD   Performed by Amy Serrano   Lactic Acid (POC) 1.34 [AF]   1814 CBC WITH AUTOMATED DIFF(!):    WBC 5.9   RBC 4.54   HGB 14.1   HCT 42.6   MCV 93.8   MCH 31.1   MCHC 33.1   RDW 12.6   PLATELET 316   MPV 33.6   NRBC 1.0(!)   ABSOLUTE NRBC 0.06(!)   NEUTROPHILS 79(!)   LYMPHOCYTES 12   MONOCYTES 8   EOSINOPHILS 0   BASOPHILS 0   IMMATURE GRANULOCYTES 1(!)   ABS. NEUTROPHILS 4.6   ABS. LYMPHOCYTES 0.7(!)   ABS. MONOCYTES 0.5   ABS. EOSINOPHILS 0.0   ABS. BASOPHILS 0.0   ABS. IMM. GRANS. 0.1(!)   DF SMEAR SCANNED   RBC COMMENTS NORMOCYTIC, NORMOCHROMIC [AF]   1843 Patient rechecked. Patient in no acute distress. Oxygen saturation greater than 93% on high flow nasal cannula. Wait for CT scans. [AF]   2278 POC VENOUS BLOOD GAS(!!):    pH, venous (POC) 7.53(!!)   pCO2, venous (POC) 39.3(!)   pO2, venous (POC) 41(!)   HCO3, venous (POC) 32.5(!)   sO2, venous (POC) 81.4   Base excess, venous (POC) 9.0   Specimen type (POC) VENOUS BLOOD   Performed by Mariama Moreira [AF]   1941 CT HEAD WO CONT [AF]   1946 CT scan with possible superimposed bacterial pneumonia on top of Covid pneumonia. Will start antibiotics and call for admission. Patient will be notified after discharge to follow-up with primary care doctor for mammogram. [AF]      ED Course User Index  [AF] DO Omayra Marie Serve Consult for Admission  7:47 PM    ED Room Number: OW33/23  Patient Name and age:  Damaso Cohn 66 y.o.  female  Working Diagnosis:   1. COVID-19 virus infection    2. Acute respiratory failure with hypoxia (HCC)    3. Acute encephalopathy    4. Hypokalemia    5. Pneumonia of both lungs due to infectious organism, unspecified part of lung    6. Breast calcifications        COVID-19 Suspicion:  yes  Sepsis present:  no  Reassessment needed: yes  Code Status:  OK for bipap, cpap, intubation. No to chest compressions/cpr.     Readmission: no  Isolation Requirements:  yes  Recommended Level of Care:  step down  Department:[facility] Adult ED - [phone]  Other: On high flow nasal cannula.          Procedures

## 2021-11-20 NOTE — PROGRESS NOTES
11/20/21 1638   Oxygen Therapy   O2 Sat (%) 97 %   Pulse via Oximetry 82 beats per minute   O2 Device Hi flow nasal cannula;  Heated   O2 Flow Rate (L/min) 40 l/min   O2 Temperature 93.2 °F (34 °C)   FIO2 (%) 95 %   Pre-Treatment   Breathing Pattern Regular       Patient placed on HFNC per MD order

## 2021-11-20 NOTE — ED TRIAGE NOTES
Triage: Pt arrives from home via EMS with CC of AMS The patient was diagnosed with Covid 11/11. Per EMS the pt's family had not heard from her in a few days so they called for a well fare check. EMS arrived and found the pt minimally responsive with O2 sats in the 60s. They applied a non rebreather at 15L and the patient's oxygen saturations recovered as well as her mentation. They weaned her down to 6L NC but upon arrival oxygen sats 74% Non rebreather reapplied but oxygen sats only 85%. Respiratory Therapist paged for application of hi flow nasal cannula.

## 2021-11-21 LAB
ALBUMIN SERPL-MCNC: 2.2 G/DL (ref 3.5–5)
ALBUMIN/GLOB SERPL: 0.5 {RATIO} (ref 1.1–2.2)
ALP SERPL-CCNC: 59 U/L (ref 45–117)
ALT SERPL-CCNC: 37 U/L (ref 12–78)
AMMONIA PLAS-SCNC: 34 UMOL/L
ANION GAP SERPL CALC-SCNC: 6 MMOL/L (ref 5–15)
ANION GAP SERPL CALC-SCNC: 9 MMOL/L (ref 5–15)
AST SERPL-CCNC: 73 U/L (ref 15–37)
ATRIAL RATE: 89 BPM
BACTERIA SPEC CULT: NORMAL
BILIRUB SERPL-MCNC: 0.5 MG/DL (ref 0.2–1)
BUN SERPL-MCNC: 16 MG/DL (ref 6–20)
BUN SERPL-MCNC: 17 MG/DL (ref 6–20)
BUN/CREAT SERPL: 24 (ref 12–20)
BUN/CREAT SERPL: 27 (ref 12–20)
CALCIUM SERPL-MCNC: 8.3 MG/DL (ref 8.5–10.1)
CALCIUM SERPL-MCNC: 8.4 MG/DL (ref 8.5–10.1)
CALCULATED P AXIS, ECG09: 34 DEGREES
CALCULATED R AXIS, ECG10: -7 DEGREES
CALCULATED T AXIS, ECG11: 12 DEGREES
CHLORIDE SERPL-SCNC: 104 MMOL/L (ref 97–108)
CHLORIDE SERPL-SCNC: 104 MMOL/L (ref 97–108)
CO2 SERPL-SCNC: 26 MMOL/L (ref 21–32)
CO2 SERPL-SCNC: 28 MMOL/L (ref 21–32)
CREAT SERPL-MCNC: 0.64 MG/DL (ref 0.55–1.02)
CREAT SERPL-MCNC: 0.66 MG/DL (ref 0.55–1.02)
D DIMER PPP FEU-MCNC: 1.57 MG/L FEU (ref 0–0.65)
DIAGNOSIS, 93000: NORMAL
ERYTHROCYTE [DISTWIDTH] IN BLOOD BY AUTOMATED COUNT: 12.8 % (ref 11.5–14.5)
GLOBULIN SER CALC-MCNC: 4.7 G/DL (ref 2–4)
GLUCOSE SERPL-MCNC: 142 MG/DL (ref 65–100)
GLUCOSE SERPL-MCNC: 142 MG/DL (ref 65–100)
HCT VFR BLD AUTO: 36.8 % (ref 35–47)
HGB BLD-MCNC: 12 G/DL (ref 11.5–16)
MCH RBC QN AUTO: 30.7 PG (ref 26–34)
MCHC RBC AUTO-ENTMCNC: 32.6 G/DL (ref 30–36.5)
MCV RBC AUTO: 94.1 FL (ref 80–99)
NRBC # BLD: 0.04 K/UL (ref 0–0.01)
NRBC BLD-RTO: 0.6 PER 100 WBC
P-R INTERVAL, ECG05: 130 MS
PLATELET # BLD AUTO: 231 K/UL (ref 150–400)
PMV BLD AUTO: 10.9 FL (ref 8.9–12.9)
POTASSIUM SERPL-SCNC: 3.6 MMOL/L (ref 3.5–5.1)
POTASSIUM SERPL-SCNC: 3.8 MMOL/L (ref 3.5–5.1)
PROT SERPL-MCNC: 6.9 G/DL (ref 6.4–8.2)
Q-T INTERVAL, ECG07: 408 MS
QRS DURATION, ECG06: 70 MS
QTC CALCULATION (BEZET), ECG08: 496 MS
RBC # BLD AUTO: 3.91 M/UL (ref 3.8–5.2)
SERVICE CMNT-IMP: NORMAL
SODIUM SERPL-SCNC: 138 MMOL/L (ref 136–145)
SODIUM SERPL-SCNC: 139 MMOL/L (ref 136–145)
VENTRICULAR RATE, ECG03: 89 BPM
WBC # BLD AUTO: 6.2 K/UL (ref 3.6–11)

## 2021-11-21 PROCEDURE — 65660000001 HC RM ICU INTERMED STEPDOWN

## 2021-11-21 PROCEDURE — 36415 COLL VENOUS BLD VENIPUNCTURE: CPT

## 2021-11-21 PROCEDURE — 74011250637 HC RX REV CODE- 250/637: Performed by: HOSPITALIST

## 2021-11-21 PROCEDURE — 82140 ASSAY OF AMMONIA: CPT

## 2021-11-21 PROCEDURE — XW033H5 INTRODUCTION OF TOCILIZUMAB INTO PERIPHERAL VEIN, PERCUTANEOUS APPROACH, NEW TECHNOLOGY GROUP 5: ICD-10-PCS | Performed by: INTERNAL MEDICINE

## 2021-11-21 PROCEDURE — 94760 N-INVAS EAR/PLS OXIMETRY 1: CPT

## 2021-11-21 PROCEDURE — 74011636637 HC RX REV CODE- 636/637: Performed by: INTERNAL MEDICINE

## 2021-11-21 PROCEDURE — 85027 COMPLETE CBC AUTOMATED: CPT

## 2021-11-21 PROCEDURE — 85379 FIBRIN DEGRADATION QUANT: CPT

## 2021-11-21 PROCEDURE — 74011000258 HC RX REV CODE- 258: Performed by: INTERNAL MEDICINE

## 2021-11-21 PROCEDURE — 80053 COMPREHEN METABOLIC PANEL: CPT

## 2021-11-21 PROCEDURE — 74011250636 HC RX REV CODE- 250/636: Performed by: HOSPITALIST

## 2021-11-21 PROCEDURE — 65660000000 HC RM CCU STEPDOWN

## 2021-11-21 PROCEDURE — 77010033711 HC HIGH FLOW OXYGEN

## 2021-11-21 PROCEDURE — 74011250637 HC RX REV CODE- 250/637: Performed by: INTERNAL MEDICINE

## 2021-11-21 RX ORDER — GUAIFENESIN 600 MG/1
600 TABLET, EXTENDED RELEASE ORAL EVERY 12 HOURS
Status: DISCONTINUED | OUTPATIENT
Start: 2021-11-21 | End: 2021-11-28 | Stop reason: HOSPADM

## 2021-11-21 RX ORDER — BENZONATATE 100 MG/1
100 CAPSULE ORAL
Status: DISCONTINUED | OUTPATIENT
Start: 2021-11-21 | End: 2021-11-28 | Stop reason: HOSPADM

## 2021-11-21 RX ADMIN — Medication 10 ML: at 21:32

## 2021-11-21 RX ADMIN — GUAIFENESIN 600 MG: 600 TABLET, EXTENDED RELEASE ORAL at 15:22

## 2021-11-21 RX ADMIN — LACTULOSE 15 ML: 20 SOLUTION ORAL at 18:47

## 2021-11-21 RX ADMIN — ENOXAPARIN SODIUM 40 MG: 100 INJECTION SUBCUTANEOUS at 10:22

## 2021-11-21 RX ADMIN — PREGABALIN 150 MG: 75 CAPSULE ORAL at 18:46

## 2021-11-21 RX ADMIN — PREGABALIN 150 MG: 75 CAPSULE ORAL at 10:22

## 2021-11-21 RX ADMIN — TOCILIZUMAB 648 MG: 180 INJECTION, SOLUTION SUBCUTANEOUS at 10:15

## 2021-11-21 RX ADMIN — ENOXAPARIN SODIUM 40 MG: 100 INJECTION SUBCUTANEOUS at 21:31

## 2021-11-21 RX ADMIN — Medication 1 CAPSULE: at 10:22

## 2021-11-21 RX ADMIN — DEXAMETHASONE SODIUM PHOSPHATE 6 MG: 4 INJECTION, SOLUTION INTRAMUSCULAR; INTRAVENOUS at 21:31

## 2021-11-21 RX ADMIN — GUAIFENESIN 600 MG: 600 TABLET, EXTENDED RELEASE ORAL at 21:31

## 2021-11-21 NOTE — PROGRESS NOTES
[address] [facility] Adult  Hospitalist Group                                                                                          Hospitalist Progress Note  Tam Jo MD  Answering service: [phone] OR [phone] from in house phone        Date of Service:  2021  NAME:  Sarika Welch  :  1950  MRN:  [id number]      Admission Summary:   66 y.o lady w/ hep C cirrhosis and COVID-19 diagnosed on  who presented via EMS after her POA could not reach her. EMS found the patient confused and hypoxic to the 60s on room air. Interval history / Subjective:     Seems more alert than described on admission. Oriented. Denies any significant shortness of breath at this time. Able to tell me events leading up to admission. Assessment & Plan:     COVID + PNA  Acute hypoxic respiratory failure  - O2 to maintain saturations over 90%, currently on HF 35L at 70% FiO2  - Continue dexamethasone  - Out of the window for remdesivir   - Encouraged prone positioning, incentive spirometry   - Procalcitonin is < 0.05 discontinue antibiotics   - Discussed risk and benefit of Actemra, patient is agreeable. CRP 9  - Monitor inflammatory markers   - AC prophylaxis per protocol     Metabolic encephalopathy   - Suspect secondary to COVID, hypoxia  - Resolved now and AxO x 4.   - Keep day and night schedule, avoid sedating medications. Hypokalemia - replete and monitor   HepC cirrhosis? - check ammonia, not on diuretics at home per med rec, external Rx and patient. Cautious with fluid. CRITICAL CARE ATTESTATION:  I had a face to face encounter with the patient, reviewed and interpreted patient data including clinical events, labs, images, vital signs, I/O's, and examined patient. I have discussed the case and the plan and management of the patient's care with the consulting services, the bedside nurses and necessary ancillary providers.        NOTE OF PERSONAL INVOLVEMENT IN CARE   This patient has a high probability of imminent, clinically significant deterioration, which requires the highest level of preparedness to intervene urgently. I participated in the decision-making and personally managed or directed the management of the following life and organ supporting interventions that required my frequent assessment to treat or prevent imminent deterioration. I personally spent 35 minutes of critical care time. This is time spent at this critically ill patient's bedside actively involved in patient care as well as the coordination of care and discussions with the patient's family. This does not include any procedural time which has been billed separately. Code status: FULL  DVT prophylaxis: Lovenox     Care Plan discussed with: Patient/Family  Anticipated Disposition: Home w/Family  Anticipated Discharge: Greater than 48 hours     Hospital Problems  Date Reviewed: 11/10/2021          Codes Class Noted POA    Acute respiratory failure with hypoxia [facility]) ICD-10-CM: J96.01  ICD-9-CM: 518.81  11/20/2021 Unknown                Review of Systems:   A comprehensive review of systems was negative except for that written in the HPI. Vital Signs:    Last 24hrs VS reviewed since prior progress note. Most recent are:  Visit Vitals  /75 (BP 1 Location: Right upper arm, BP Patient Position: At rest)   Pulse 65   Temp 98.5 °F (36.9 °C)   Resp 18   Ht 5' 8\" (1.727 m)   Wt 83.9 kg (185 lb)   SpO2 94%   BMI 28.13 kg/m²         Intake/Output Summary (Last 24 hours) at 11/21/2021 1351  Last data filed at 11/20/2021 2118  Gross per 24 hour   Intake 300 ml   Output    Net 300 ml        Physical Examination:     I had a face to face encounter with this patient and independently examined them on 11/21/2021 as outlined below:          Constitutional:  No acute distress, cooperative, pleasant    ENT:  Oral mucosa moist, oropharynx benign. Resp:  Course bilaterally. No wheezing/rhonchi/rales.  No accessory muscle use. On 35LHFNC   CV:  Regular rhythm, normal rate, no murmurs, gallops, rubs    GI:  Soft, non distended, non tender. normoactive bowel sounds, no hepatosplenomegaly     Musculoskeletal:  No edema, warm, 2+ pulses throughout    Neurologic:  Moves all extremities. AAOx4, CN II-XII reviewed            Data Review:    Review and/or order of clinical lab test  Review and/or order of tests in the radiology section of CPT  Review and/or order of tests in the medicine section of CPT      Labs:     Recent Labs     11/21/21 0319 11/20/21  1601   WBC 6.2 5.9   HGB 12.0 14.1   HCT 36.8 42.6    242     Recent Labs     11/21/21 0319 11/20/21  1602     138 141   K 3.8  3.6 3.3*     104 104   CO2 26  28 30   BUN 16  17 15   CREA 0.66  0.64 0.68   *  142* 108*   CA 8.4*  8.3* 8.9     Recent Labs     11/21/21 0319 11/20/21  1602   ALT 37 35   AP 59 68   TBILI 0.5 0.7   TP 6.9 7.9   ALB 2.2* 2.6*   GLOB 4.7* 5.3*   LPSE  --  338     No results for input(s): INR, PTP, APTT, INREXT in the last 72 hours. No results for input(s): FE, TIBC, PSAT, FERR in the last 72 hours. No results found for: FOL, RBCF   No results for input(s): PH, PCO2, PO2 in the last 72 hours. No results for input(s): CPK, CKNDX, TROIQ in the last 72 hours.     No lab exists for component: CPKMB  Lab Results   Component Value Date/Time    Cholesterol, total 183 05/14/2021 12:00 AM    HDL Cholesterol 66 05/14/2021 12:00 AM    LDL, calculated 72 05/14/2021 12:00 AM    Triglyceride 281 (H) 05/14/2021 12:00 AM     Lab Results   Component Value Date/Time    Glucose (POC) 106 (H) 11/04/2019 09:47 AM     Lab Results   Component Value Date/Time    Color Yellow 05/14/2021 12:00 AM    Appearance Clear 05/14/2021 12:00 AM    Specific gravity 1.030 03/13/2020 12:17 PM    pH (UA) 7.0 05/14/2021 12:00 AM    Protein NEGATIVE  03/13/2020 12:17 PM    Glucose NEGATIVE  03/13/2020 12:17 PM    Ketone Negative 05/14/2021 12:00 AM Bilirubin Negative 05/14/2021 12:00 AM    Urobilinogen 1.0 03/13/2020 12:17 PM    Nitrites Negative 05/14/2021 12:00 AM    Leukocyte Esterase Negative 05/14/2021 12:00 AM    Epithelial cells FEW 03/13/2020 12:17 PM    Bacteria NEGATIVE  03/13/2020 12:17 PM    WBC 0-4 03/13/2020 12:17 PM    RBC 0-5 03/13/2020 12:17 PM         Medications Reviewed:     Current Facility-Administered Medications   Medication Dose Route Frequency    sodium chloride (NS) flush 5-40 mL  5-40 mL IntraVENous Q8H    sodium chloride (NS) flush 5-40 mL  5-40 mL IntraVENous PRN    acetaminophen (TYLENOL) tablet 650 mg  650 mg Oral Q6H PRN    Or    acetaminophen (TYLENOL) suppository 650 mg  650 mg Rectal Q6H PRN    polyethylene glycol (MIRALAX) packet 17 g  17 g Oral DAILY PRN    ondansetron (ZOFRAN ODT) tablet 4 mg  4 mg Oral Q8H PRN    Or    ondansetron (ZOFRAN) injection 4 mg  4 mg IntraVENous Q6H PRN    dexamethasone (DECADRON) 4 mg/mL injection 6 mg  6 mg IntraVENous Q24H    L.acidophilus-paracasei-S.thermophil-bifidobacter (RISAQUAD) 8 billion cell capsule  1 Capsule Oral DAILY    albuterol (PROVENTIL HFA, VENTOLIN HFA, PROAIR HFA) inhaler 2 Puff  2 Puff Inhalation Q4H PRN    pregabalin (LYRICA) capsule 150 mg  150 mg Oral BID    enoxaparin (LOVENOX) injection 40 mg  40 mg SubCUTAneous Q12H     ______________________________________________________________________  EXPECTED LENGTH OF STAY: - - -  ACTUAL LENGTH OF STAY:          1                 Elvie Mckoy MD

## 2021-11-21 NOTE — ROUTINE PROCESS
TRANSFER - OUT REPORT:    Verbal report given to RN Do(name) on Deborah Us  being transferred to 4(unit) for routine progression of care       Report consisted of patients Situation, Background, Assessment and   Recommendations(SBAR). Information from the following report(s) SBAR, Kardex and ED Summary was reviewed with the receiving nurse. Lines:   Peripheral IV 11/20/21 Left Antecubital (Active)   Site Assessment Clean, dry, & intact 11/20/21 1747   Phlebitis Assessment 0 11/20/21 1747   Infiltration Assessment 0 11/20/21 1747   Dressing Status Clean, dry, & intact 11/20/21 1747       Peripheral IV 11/20/21 Right Wrist (Active)        Opportunity for questions and clarification was provided.       Patient transported with:   O2 @ 40 liters  Registered Nurse

## 2021-11-21 NOTE — PROGRESS NOTES
Problem: Falls - Risk of  Goal: *Absence of Falls  Description: Document Letcher Sena Fall Risk and appropriate interventions in the flowsheet. Outcome: Progressing Towards Goal  Note: Fall Risk Interventions:  Mobility Interventions: Bed/chair exit alarm              Elimination Interventions: Call light in reach              Problem: Pressure Injury - Risk of  Goal: *Prevention of pressure injury  Description: Document Logan Scale and appropriate interventions in the flowsheet.   Outcome: Progressing Towards Goal  Note: Pressure Injury Interventions:       Moisture Interventions: Absorbent underpads    Activity Interventions: PT/OT evaluation    Mobility Interventions: HOB 30 degrees or less    Nutrition Interventions: Discuss nutritional consult with provider    Friction and Shear Interventions: HOB 30 degrees or less                Problem: Gas Exchange - Impaired  Goal: Absence of hypoxia  Outcome: Not Progressing Towards Goal

## 2021-11-21 NOTE — ACP (ADVANCE CARE PLANNING)
Advance Care Planning     Advance Care Planning (ACP) Physician/NP/PA Conversation      Date of Conversation: 11/20/2021  Conducted with: Patient with Decision Making Capacity    Healthcare Decision Maker:   No healthcare decision makers have been documented. Click here to complete [address] including selection of the Healthcare Decision Maker Relationship (ie \"Primary\")    Today we documented her friend as MPOA, and filled out AMD    Care Preferences:    Hospitalization: \"If your health worsens and it becomes clear that your chance of recovery is unlikely, what would be your preference regarding hospitalization? \"  The patient would prefer hospitalization. Ventilation: \"If you were unable to breathe on your own and your chance of recovery was unlikely, what would be your preference about the use of a ventilator (breathing machine) if it was available to you? \"   The patient is unsure. Resuscitation: \"In the event your heart stopped as a result of an underlying serious health condition, would you want attempts to be made to restart your heart, or would you prefer a natural death? \"   The patient is unsure. Additional topics discussed: treatment goals, benefit/burden of treatment options, artificial nutrition, ventilation preferences, hospitalization preferences and resuscitation preferences    Long discussion about COVID, different levels of respiratory support, and high risk of decline given elevated CRP, and comorbidities. Pt was vaccinated with J and J one dose last year. We reviewed difference between NC, HFNC, BiPAP and intuabtion. We reviewed the difference between full code and DNR. Pt does not have any siblings, parents, or children. She has never appointed a mPOA but would name her friend, Dayne Ferraro. Patient unsure about code status, and would like to think about this some more. AMD given to patient to fill out for MPOA.      Conversation Outcomes / Follow-Up Plan:   ACP in process - information provided, considering goals and options  Reviewed DNR/DNI and patient elects Full Code (Attempt Resuscitation)     Length of Voluntary ACP Conversation in minutes:  21 minutes    Srinivasa Stovall MD         Addendum 11/21 5340 - received call and perfect serve from RN that states patient decided on DNR status. Called Mrs. Us over the phone and discussed, she elected DNR/DNI. Will change code status in computer and complete DDNR tomorrow.

## 2021-11-21 NOTE — H&P
HISTORY AND PHYSICAL      PCP: Sammie Nelson MD  History source: ER report, the patient (poor historian)      CC: COVID-19      HPI: 66 y.o lady w/ hep C cirrhosis and COVID-19 diagnosed on 11/11 who presented via EMS after her POA could not reach her. EMS found the patient confused and hypoxic to the 60s on room air. The patient doesn't know why she is here and states she feels fine and wants to go home. She denies pain or dyspnea (currently on high flow O2) but was complaining of a cough and dyspnea earlier per report. She is a limited historian. PMH/PSH:  Past Medical History:   Diagnosis Date    'light-for-dates' infant with signs of fetal malnutrition     Arthritis     OSTEO    Chronic pain     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 Authorizing Provider   pregabalin (Lyrica) 150 mg capsule Take 1 capsule by mouth 2 times a day as directed by physician. max daily dose is 450mg 11/13/21   Sammie Nelson MD   ibuprofen (MOTRIN) 200 mg tablet Take 200 mg by mouth every six (6) hours as needed for Pain. Provider, Historical   multivitamin (MULTI-DELYN, WELLESSE) liqd Take 5 mL by mouth daily. Provider, Historical   ascorbic acid/collagen hydr (COLLAGEN PLUS VITAMIN C PO) Take 1 Dose by mouth daily. Provider, Historical   cholecalciferol, vitamin D3, (VITAMIN D3 PO) Take 1 Tab by mouth every thirty (30) days.  50,000 UNITS  Patient not taking: Reported on 11/10/2021    Provider, Historical       Allergies:  No Known Allergies    FH:  Family History   Problem Relation Age of Onset    Other Mother         UNKNOWN HEALTH HX    Other Father         UNKNOWN HEALTH HX    Anesth Problems Neg Hx     Breast Cancer Maternal Grandmother        SH:  Social History     Tobacco Use    Smoking status: Former Smoker     Packs/day: 1.00     Quit date: 1970     Years since quittin.9    Smokeless tobacco: Never Used   Substance Use Topics    Alcohol use: Not Currently       ROS: Review of systems not obtained due to patient factors. PHYSICAL EXAM:  Visit Vitals  /78 (BP 1 Location: Right upper arm, BP Patient Position: At rest)   Pulse 84   Resp 20   SpO2 95%       Gen: NAD, non-toxic  HEENT: anicteric sclerae, normal conjunctiva  Neck: supple, trachea midline, no adenopathy  Heart: RRR, no MRG, no JVD, no peripheral edema  Lungs: bibasilar crackles, non-labored respirations on high flow O2  Abd: soft, NT, ND, BS+  Extr: warm  Skin: dry, no rash  Neuro: CN II-XII grossly intact, normal speech, moves all extremities  Psych: irritable and confused but able to be reoriented      Labs/Imaging:  Recent Results (from the past 24 hour(s))   CBC WITH AUTOMATED DIFF    Collection Time: 21  4:01 PM   Result Value Ref Range    WBC 5.9 3.6 - 11.0 K/uL    RBC 4.54 3.80 - 5.20 M/uL    HGB 14.1 11.5 - 16.0 g/dL    HCT 42.6 35.0 - 47.0 %    MCV 93.8 80.0 - 99.0 FL    MCH 31.1 26.0 - 34.0 PG    MCHC 33.1 30.0 - 36.5 g/dL    RDW 12.6 11.5 - 14.5 %    PLATELET 990 254 - 930 K/uL    MPV 10.9 8.9 - 12.9 FL    NRBC 1.0 (H) 0  WBC    ABSOLUTE NRBC 0.06 (H) 0.00 - 0.01 K/uL    NEUTROPHILS 79 (H) 32 - 75 %    LYMPHOCYTES 12 12 - 49 %    MONOCYTES 8 5 - 13 %    EOSINOPHILS 0 0 - 7 %    BASOPHILS 0 0 - 1 %    IMMATURE GRANULOCYTES 1 (H) 0.0 - 0.5 %    ABS. NEUTROPHILS 4.6 1.8 - 8.0 K/UL    ABS. LYMPHOCYTES 0.7 (L) 0.8 - 3.5 K/UL    ABS. MONOCYTES 0.5 0.0 - 1.0 K/UL    ABS. EOSINOPHILS 0.0 0.0 - 0.4 K/UL    ABS. BASOPHILS 0.0 0.0 - 0.1 K/UL    ABS. IMM. GRANS. 0.1 (H) 0.00 - 0.04 K/UL    DF SMEAR SCANNED      RBC COMMENTS NORMOCYTIC, NORMOCHROMIC     METABOLIC PANEL, COMPREHENSIVE    Collection Time: 11/20/21  4:02 PM   Result Value Ref Range    Sodium 141 136 - 145 mmol/L    Potassium 3.3 (L) 3.5 - 5.1 mmol/L    Chloride 104 97 - 108 mmol/L    CO2 30 21 - 32 mmol/L    Anion gap 7 5 - 15 mmol/L    Glucose 108 (H) 65 - 100 mg/dL    BUN 15 6 - 20 MG/DL    Creatinine 0.68 0.55 - 1.02 MG/DL    BUN/Creatinine ratio 22 (H) 12 - 20      GFR est AA >60 >60 ml/min/1.73m2    GFR est non-AA >60 >60 ml/min/1.73m2    Calcium 8.9 8.5 - 10.1 MG/DL    Bilirubin, total 0.7 0.2 - 1.0 MG/DL    ALT (SGPT) 35 12 - 78 U/L    AST (SGOT) 60 (H) 15 - 37 U/L    Alk. phosphatase 68 45 - 117 U/L    Protein, total 7.9 6.4 - 8.2 g/dL    Albumin 2.6 (L) 3.5 - 5.0 g/dL    Globulin 5.3 (H) 2.0 - 4.0 g/dL    A-G Ratio 0.5 (L) 1.1 - 2.2     NT-PRO BNP    Collection Time: 11/20/21  4:02 PM   Result Value Ref Range    NT pro-BNP 1,255 (H) <125 PG/ML   TROPONIN-HIGH SENSITIVITY    Collection Time: 11/20/21  4:02 PM   Result Value Ref Range    Troponin-High Sensitivity 13 0 - 51 ng/L   ETHYL ALCOHOL    Collection Time: 11/20/21  4:02 PM   Result Value Ref Range    ALCOHOL(ETHYL),SERUM <10 <10 MG/DL   LIPASE    Collection Time: 11/20/21  4:02 PM   Result Value Ref Range    Lipase 338 73 - 393 U/L   LACTIC ACID    Collection Time: 11/20/21  4:02 PM   Result Value Ref Range    Lactic acid 1.5 0.4 - 2.0 MMOL/L   SAMPLES BEING HELD    Collection Time: 11/20/21  4:02 PM   Result Value Ref Range    SAMPLES BEING HELD 1BLU, 1LAV     COMMENT        Add-on orders for these samples will be processed based on acceptable specimen integrity and analyte stability, which may vary by analyte.    BLOOD GAS,LACTIC ACID, POC    Collection Time: 11/20/21  5:42 PM   Result Value Ref Range    pH (POC) 7.62 (HH) 7.35 - 7.45      pCO2 (POC) 24.6 (L) 35.0 - 45.0 MMHG    pO2 (POC) 29 (LL) 80 - 100 MMHG    HCO3 (POC) 25.0 22 - 26 MMOL/L    sO2 (POC) 70.2 (L) 92 - 97 %    Base excess (POC) 4.7 mmol/L    Specimen type (POC) VENOUS BLOOD      Performed by [facility] CLAIRE PHELPS     Lactic Acid (POC) 1.34 0.40 - 2.00 mmol/L   POC VENOUS BLOOD GAS    Collection Time: 11/20/21  6:33 PM   Result Value Ref Range    pH, venous (POC) 7.53 (HH) 7.32 - 7.42      pCO2, venous (POC) 39.3 (L) 41 - 51 MMHG    pO2, venous (POC) 41 (H) 25 - 40 mmHg    HCO3, venous (POC) 32.5 (H) 23.0 - 28.0 MMOL/L    sO2, venous (POC) 81.4 65 - 88 %    Base excess, venous (POC) 9.0 mmol/L    Specimen type (POC) VENOUS BLOOD      Performed by [address] [place] [address]    Collection Time: 11/20/21  6:47 PM   Result Value Ref Range    SAMPLES BEING HELD 1UA, 1UC     COMMENT        Add-on orders for these samples will be processed based on acceptable specimen integrity and analyte stability, which may vary by analyte. Recent Labs     11/20/21  1601   WBC 5.9   HGB 14.1   HCT 42.6        Recent Labs     11/20/21  1602      K 3.3*      CO2 30   BUN 15   CREA 0.68   *   CA 8.9     Recent Labs     11/20/21  1602   ALT 35   AP 68   TBILI 0.7   TP 7.9   ALB 2.6*   GLOB 5.3*   LPSE 338       No results for input(s): CPK, CKNDX, TROIQ in the last 72 hours. No lab exists for component: CPKMB    No results for input(s): INR, PTP, APTT, INREXT in the last 72 hours. No results for input(s): PH, PCO2, PO2 in the last 72 hours. CT HEAD WO CONT    Result Date: 11/20/2021  No acute intracranial abnormality on this noncontrast head CT. CTA CHEST W OR W WO CONT    Result Date: 11/20/2021  1. No pulmonary embolism. 2. Moderate bilateral COVID19 pneumonia. More dense pneumonia in the right lower lobe may be superimposed bacterial pneumonia or aspiration pneumonia, especially given the debris in the distal trachea and right main bronchus.  3. Left breast calcifications. Patient is overdue for diagnostic left breast mammography, which can be performed as an outpatient when the patient's medical condition improves. XR CHEST PORT    Result Date: 11/20/2021  1. Increasing peripheral opacities bilaterally which can be seen with COVID pneumonia           Assessment & Plan:     Acute respiratory failure with hypoxia due to COVID-19 pneumonia:  -IV dexamethasone  -IV Zosyn for possible concomitant bacterial/aspiration pneumonia  -f/u cultures and COVID labs    AMS: suspect metabolic encephalopathy in the setting of the above. She may have underlying cognitive impairment.     Hypokalemia:  -replete    Hep C cirrhosis    Breast calcifications:  -f/u as an outpatient    DVT ppx: sq Lovenox  Code status: full  Disposition: TBD    Signed By: Lucie Pathak MD     November 20, 2021

## 2021-11-21 NOTE — ED NOTES
2320: Nurse Cruz attempt to give report to 4W. 4W state that the room that was assigned is not set up for covid. ER charge nurse notified.

## 2021-11-22 ENCOUNTER — DOCUMENTATION ONLY (OUTPATIENT)
Dept: CASE MANAGEMENT | Age: 71
End: 2021-11-22

## 2021-11-22 LAB
ANION GAP SERPL CALC-SCNC: 6 MMOL/L (ref 5–15)
BASOPHILS # BLD: 0 K/UL (ref 0–0.1)
BASOPHILS NFR BLD: 0 % (ref 0–1)
BUN SERPL-MCNC: 17 MG/DL (ref 6–20)
BUN/CREAT SERPL: 30 (ref 12–20)
CALCIUM SERPL-MCNC: 8.2 MG/DL (ref 8.5–10.1)
CHLORIDE SERPL-SCNC: 104 MMOL/L (ref 97–108)
CO2 SERPL-SCNC: 28 MMOL/L (ref 21–32)
CREAT SERPL-MCNC: 0.57 MG/DL (ref 0.55–1.02)
DIFFERENTIAL METHOD BLD: ABNORMAL
EOSINOPHIL # BLD: 0 K/UL (ref 0–0.4)
EOSINOPHIL NFR BLD: 0 % (ref 0–7)
ERYTHROCYTE [DISTWIDTH] IN BLOOD BY AUTOMATED COUNT: 12.3 % (ref 11.5–14.5)
FLUID CULTURE, SPNG2: NORMAL
GLUCOSE SERPL-MCNC: 118 MG/DL (ref 65–100)
HCT VFR BLD AUTO: 35.3 % (ref 35–47)
HGB BLD-MCNC: 11.6 G/DL (ref 11.5–16)
IMM GRANULOCYTES # BLD AUTO: 0 K/UL (ref 0–0.04)
IMM GRANULOCYTES NFR BLD AUTO: 0 % (ref 0–0.5)
L PNEUMO1 AG UR QL IA: NEGATIVE
LYMPHOCYTES # BLD: 0.6 K/UL (ref 0.8–3.5)
LYMPHOCYTES NFR BLD: 9 % (ref 12–49)
MAGNESIUM SERPL-MCNC: 2.5 MG/DL (ref 1.6–2.4)
MCH RBC QN AUTO: 30.7 PG (ref 26–34)
MCHC RBC AUTO-ENTMCNC: 32.9 G/DL (ref 30–36.5)
MCV RBC AUTO: 93.4 FL (ref 80–99)
MONOCYTES # BLD: 0.4 K/UL (ref 0–1)
MONOCYTES NFR BLD: 7 % (ref 5–13)
NEUTS SEG # BLD: 5.2 K/UL (ref 1.8–8)
NEUTS SEG NFR BLD: 84 % (ref 32–75)
NRBC # BLD: 0 K/UL (ref 0–0.01)
NRBC BLD-RTO: 0 PER 100 WBC
ORGANISM ID, SPNG3: NORMAL
PHOSPHATE SERPL-MCNC: 3.6 MG/DL (ref 2.6–4.7)
PLATELET # BLD AUTO: 260 K/UL (ref 150–400)
PLEASE NOTE, SPNG4: NORMAL
PMV BLD AUTO: 10.4 FL (ref 8.9–12.9)
POTASSIUM SERPL-SCNC: 3.7 MMOL/L (ref 3.5–5.1)
RBC # BLD AUTO: 3.78 M/UL (ref 3.8–5.2)
RBC MORPH BLD: ABNORMAL
S PNEUM AG SPEC QL LA: NEGATIVE
SODIUM SERPL-SCNC: 138 MMOL/L (ref 136–145)
SPECIMEN SOURCE: NORMAL
SPECIMEN SOURCE: NORMAL
SPECIMEN, SPNG1: NORMAL
WBC # BLD AUTO: 6.2 K/UL (ref 3.6–11)

## 2021-11-22 PROCEDURE — 84100 ASSAY OF PHOSPHORUS: CPT

## 2021-11-22 PROCEDURE — 74011250637 HC RX REV CODE- 250/637: Performed by: INTERNAL MEDICINE

## 2021-11-22 PROCEDURE — 74011250636 HC RX REV CODE- 250/636: Performed by: HOSPITALIST

## 2021-11-22 PROCEDURE — 83735 ASSAY OF MAGNESIUM: CPT

## 2021-11-22 PROCEDURE — 74011250637 HC RX REV CODE- 250/637: Performed by: HOSPITALIST

## 2021-11-22 PROCEDURE — 65660000000 HC RM CCU STEPDOWN

## 2021-11-22 PROCEDURE — 36415 COLL VENOUS BLD VENIPUNCTURE: CPT

## 2021-11-22 PROCEDURE — 80048 BASIC METABOLIC PNL TOTAL CA: CPT

## 2021-11-22 PROCEDURE — 77010033711 HC HIGH FLOW OXYGEN

## 2021-11-22 PROCEDURE — 85025 COMPLETE CBC W/AUTO DIFF WBC: CPT

## 2021-11-22 PROCEDURE — 65660000001 HC RM ICU INTERMED STEPDOWN

## 2021-11-22 RX ADMIN — PREGABALIN 150 MG: 75 CAPSULE ORAL at 09:18

## 2021-11-22 RX ADMIN — ENOXAPARIN SODIUM 40 MG: 100 INJECTION SUBCUTANEOUS at 09:18

## 2021-11-22 RX ADMIN — PREGABALIN 150 MG: 75 CAPSULE ORAL at 19:14

## 2021-11-22 RX ADMIN — DEXAMETHASONE SODIUM PHOSPHATE 6 MG: 4 INJECTION, SOLUTION INTRAMUSCULAR; INTRAVENOUS at 20:47

## 2021-11-22 RX ADMIN — LACTULOSE 15 ML: 20 SOLUTION ORAL at 09:18

## 2021-11-22 RX ADMIN — Medication 10 ML: at 20:47

## 2021-11-22 RX ADMIN — GUAIFENESIN 600 MG: 600 TABLET, EXTENDED RELEASE ORAL at 20:46

## 2021-11-22 RX ADMIN — LACTULOSE 15 ML: 20 SOLUTION ORAL at 19:14

## 2021-11-22 RX ADMIN — GUAIFENESIN 600 MG: 600 TABLET, EXTENDED RELEASE ORAL at 09:18

## 2021-11-22 RX ADMIN — ENOXAPARIN SODIUM 40 MG: 100 INJECTION SUBCUTANEOUS at 20:47

## 2021-11-22 RX ADMIN — Medication 1 CAPSULE: at 09:18

## 2021-11-22 RX ADMIN — Medication 10 ML: at 06:00

## 2021-11-22 NOTE — PROGRESS NOTES
CARMEN: Anticipate discharge home pending medical progress. Transportation likely in car with friend/family. RUR: 11%    CM attempted to reach patient via room phone and cell phone, but did not get an answer.     Amalia Jones, [facility]  [phone]

## 2021-11-22 NOTE — PROGRESS NOTES
[address] [facility] Adult  Hospitalist Group                                                                                          Hospitalist Progress Note  Steve Lewis MD  Answering service: [phone] from in house phone        Date of Service:  2021  NAME:  Aracelis Beckham  :  1950  MRN:  [id number]      Admission Summary:   66 y.o lady w/ hep C cirrhosis and COVID-19 diagnosed on  who presented via EMS after her POA could not reach her. EMS found the patient confused and hypoxic to the 60s on room air. Interval history / Subjective:   No acute events overnight  Remains on HFNC  Does have soft stool, once last night and another episode this morning but no abdominal pain or vomiting     Assessment & Plan:     COVID + PNA  Acute hypoxic respiratory failure  - O2 to maintain saturations over 90%, remains on HF 35L at 70% FiO2  - Continue dexamethasone  - Out of the window for remdesivir   - Encouraged prone positioning, incentive spirometry   - Procalcitonin is < 0.05 antibiotics discontinued   - CRP 9, s/p Actemra on   - Continue to monitor inflammatory markers   - AC prophylaxis per protocol   - Wean down O2 as tolerated. Metabolic encephalopathy - resolved  - Suspect secondary to COVID, hypoxia  - Resolved now and AxO x 4.   - Keep day and night schedule, avoid sedating medications. Hypokalemia - replaced. Continue to monitor   HepC cirrhosis? - ammonia 34, hypoalbuminemia, not on diuretics at home per med rec. Cautious with fluid.  Continue lactulose with goal BM 2-3 per day     Code status: FULL  DVT prophylaxis: Lovenox     Care Plan discussed with: Patient/Family  Anticipated Disposition: Home w/Family  Anticipated Discharge: Greater than 48 hours     Hospital Problems  Date Reviewed: 11/10/2021          Codes Class Noted POA    Acute respiratory failure with hypoxia [facility]) ICD-10-CM: J96.01  ICD-9-CM: 518.81  2021 Unknown                Review of Systems:   A comprehensive review of systems was negative except for that written in the HPI. Vital Signs:    Last 24hrs VS reviewed since prior progress note. Most recent are:  Visit Vitals  BP (!) 100/48 (BP 1 Location: Right upper arm, BP Patient Position: At rest)   Pulse (!) 56   Temp 98.6 °F (37 °C)   Resp 24   Ht 5' 8\" (1.727 m)   Wt 83.9 kg (185 lb)   SpO2 91%   BMI 28.13 kg/m²       No intake or output data in the 24 hours ending 11/22/21 0256     Physical Examination:     I had a face to face encounter with this patient and independently examined them on 11/22/2021 as outlined below:          Constitutional:  No acute distress, cooperative, pleasant    ENT:  Oral mucosa moist, oropharynx benign. Resp:  Course bilaterally. No wheezing/rhonchi/rales. No accessory muscle use. On 35LHFNC   CV:  Regular rhythm, normal rate, no murmurs, gallops, rubs    GI:  Soft, non distended, non tender. normoactive bowel sounds, no hepatosplenomegaly     Musculoskeletal:  No edema, warm, 2+ pulses throughout    Neurologic:  Moves all extremities. AAOx4, CN II-XII reviewed            Data Review:    Review and/or order of clinical lab test  Review and/or order of tests in the radiology section of CPT  Review and/or order of tests in the medicine section of CPT      Labs:     Recent Labs     11/22/21  0535 11/21/21 0319   WBC 6.2 6.2   HGB 11.6 12.0   HCT 35.3 36.8    231     Recent Labs     11/22/21  0535 11/21/21 0319 11/20/21  1602    139  138 141   K 3.7 3.8  3.6 3.3*    104  104 104   CO2 28 26  28 30   BUN 17 16  17 15   CREA 0.57 0.66  0.64 0.68   * 142*  142* 108*   CA 8.2* 8.4*  8.3* 8.9   MG 2.5*  --   --    PHOS 3.6  --   --      Recent Labs     11/21/21 0319 11/20/21  1602   ALT 37 35   AP 59 68   TBILI 0.5 0.7   TP 6.9 7.9   ALB 2.2* 2.6*   GLOB 4.7* 5.3*   LPSE  --  338     No results for input(s): INR, PTP, APTT, INREXT, INREXT in the last 72 hours.    No results for input(s): FE, TIBC, PSAT, FERR in the last 72 hours. No results found for: FOL, RBCF   No results for input(s): PH, PCO2, PO2 in the last 72 hours. No results for input(s): CPK, CKNDX, TROIQ in the last 72 hours.     No lab exists for component: CPKMB  Lab Results   Component Value Date/Time    Cholesterol, total 183 05/14/2021 12:00 AM    HDL Cholesterol 66 05/14/2021 12:00 AM    LDL, calculated 72 05/14/2021 12:00 AM    Triglyceride 281 (H) 05/14/2021 12:00 AM     Lab Results   Component Value Date/Time    Glucose (POC) 106 (H) 11/04/2019 09:47 AM     Lab Results   Component Value Date/Time    Color Yellow 05/14/2021 12:00 AM    Appearance Clear 05/14/2021 12:00 AM    Specific gravity 1.030 03/13/2020 12:17 PM    pH (UA) 7.0 05/14/2021 12:00 AM    Protein NEGATIVE  03/13/2020 12:17 PM    Glucose NEGATIVE  03/13/2020 12:17 PM    Ketone Negative 05/14/2021 12:00 AM    Bilirubin Negative 05/14/2021 12:00 AM    Urobilinogen 1.0 03/13/2020 12:17 PM    Nitrites Negative 05/14/2021 12:00 AM    Leukocyte Esterase Negative 05/14/2021 12:00 AM    Epithelial cells FEW 03/13/2020 12:17 PM    Bacteria NEGATIVE  03/13/2020 12:17 PM    WBC 0-4 03/13/2020 12:17 PM    RBC 0-5 03/13/2020 12:17 PM         Medications Reviewed:     Current Facility-Administered Medications   Medication Dose Route Frequency    lactulose (CHRONULAC) 10 gram/15 mL solution 15 mL  10 g Oral BID    guaiFENesin ER (MUCINEX) tablet 600 mg  600 mg Oral Q12H    benzonatate (TESSALON) capsule 100 mg  100 mg Oral TID PRN    sodium chloride (NS) flush 5-40 mL  5-40 mL IntraVENous Q8H    sodium chloride (NS) flush 5-40 mL  5-40 mL IntraVENous PRN    acetaminophen (TYLENOL) tablet 650 mg  650 mg Oral Q6H PRN    Or    acetaminophen (TYLENOL) suppository 650 mg  650 mg Rectal Q6H PRN    polyethylene glycol (MIRALAX) packet 17 g  17 g Oral DAILY PRN    ondansetron (ZOFRAN ODT) tablet 4 mg  4 mg Oral Q8H PRN    Or    ondansetron (ZOFRAN) injection 4 mg 4 mg IntraVENous Q6H PRN    dexamethasone (DECADRON) 4 mg/mL injection 6 mg  6 mg IntraVENous Q24H    L.acidophilus-paracasei-S.thermophil-bifidobacter (RISAQUAD) 8 billion cell capsule  1 Capsule Oral DAILY    albuterol (PROVENTIL HFA, VENTOLIN HFA, PROAIR HFA) inhaler 2 Puff  2 Puff Inhalation Q4H PRN    pregabalin (LYRICA) capsule 150 mg  150 mg Oral BID    enoxaparin (LOVENOX) injection 40 mg  40 mg SubCUTAneous Q12H     ______________________________________________________________________  EXPECTED LENGTH OF STAY: - - -  ACTUAL LENGTH OF STAY:          2                 Lissa Crabtree MD

## 2021-11-22 NOTE — PROGRESS NOTES
SSED/CM referral received and appreciated. Noted patient admitted to the hospital on 11/20/21 will be followed by inpatient unit CM.

## 2021-11-23 PROCEDURE — 94760 N-INVAS EAR/PLS OXIMETRY 1: CPT

## 2021-11-23 PROCEDURE — 74011250636 HC RX REV CODE- 250/636: Performed by: INTERNAL MEDICINE

## 2021-11-23 PROCEDURE — 65660000000 HC RM CCU STEPDOWN

## 2021-11-23 PROCEDURE — 74011250637 HC RX REV CODE- 250/637: Performed by: HOSPITALIST

## 2021-11-23 PROCEDURE — 77010033711 HC HIGH FLOW OXYGEN

## 2021-11-23 PROCEDURE — 65660000001 HC RM ICU INTERMED STEPDOWN

## 2021-11-23 PROCEDURE — 74011250637 HC RX REV CODE- 250/637: Performed by: INTERNAL MEDICINE

## 2021-11-23 PROCEDURE — 74011250636 HC RX REV CODE- 250/636: Performed by: HOSPITALIST

## 2021-11-23 RX ORDER — FUROSEMIDE 10 MG/ML
40 INJECTION INTRAMUSCULAR; INTRAVENOUS DAILY
Status: DISCONTINUED | OUTPATIENT
Start: 2021-11-23 | End: 2021-11-28 | Stop reason: HOSPADM

## 2021-11-23 RX ORDER — ENOXAPARIN SODIUM 100 MG/ML
30 INJECTION SUBCUTANEOUS EVERY 12 HOURS
Status: DISCONTINUED | OUTPATIENT
Start: 2021-11-23 | End: 2021-11-28 | Stop reason: HOSPADM

## 2021-11-23 RX ADMIN — FUROSEMIDE 40 MG: 10 INJECTION, SOLUTION INTRAVENOUS at 10:32

## 2021-11-23 RX ADMIN — ENOXAPARIN SODIUM 40 MG: 100 INJECTION SUBCUTANEOUS at 10:33

## 2021-11-23 RX ADMIN — PREGABALIN 150 MG: 75 CAPSULE ORAL at 10:32

## 2021-11-23 RX ADMIN — LACTULOSE 15 ML: 20 SOLUTION ORAL at 10:32

## 2021-11-23 RX ADMIN — Medication 1 CAPSULE: at 10:32

## 2021-11-23 RX ADMIN — DEXAMETHASONE SODIUM PHOSPHATE 6 MG: 4 INJECTION, SOLUTION INTRAMUSCULAR; INTRAVENOUS at 20:25

## 2021-11-23 RX ADMIN — ENOXAPARIN SODIUM 30 MG: 100 INJECTION SUBCUTANEOUS at 20:26

## 2021-11-23 RX ADMIN — GUAIFENESIN 600 MG: 600 TABLET, EXTENDED RELEASE ORAL at 20:26

## 2021-11-23 RX ADMIN — GUAIFENESIN 600 MG: 600 TABLET, EXTENDED RELEASE ORAL at 10:32

## 2021-11-23 RX ADMIN — PREGABALIN 150 MG: 75 CAPSULE ORAL at 19:16

## 2021-11-23 NOTE — PROGRESS NOTES
11/23/21 1115   Oxygen Therapy   O2 Sat (%) 90 %   O2 Device Heated; Hi flow nasal cannula   O2 Flow Rate (L/min) 35 l/min   FIO2 (%) 75 %   Patient found on 25L and approximately 75% Fi02 and was barely maintaining an oxygen saturation of 89-90%. I increased the flow back to 35L. Flow should remain current settings until we're able to get the Fi02 down to at least 50%. Will continue to monitor.

## 2021-11-23 NOTE — PROGRESS NOTES
Bedside and Verbal shift change report given to Jong Carrasco (oncoming nurse) by Jeni Barker (offgoing nurse). Report included the following information SBAR, Kardex, ED Summary, Procedure Summary, Intake/Output, MAR, Recent Results and Cardiac Rhythm SR -SB.

## 2021-11-23 NOTE — PROGRESS NOTES
[facility] Adult  Hospitalist Group                                                                                          Hospitalist Progress Note  Alejandra Puga MD  Answering service: [phone] OR [phone] from in house phone        Date of Service:  2021  NAME:  Wyatt Umana  :  1950  MRN:  [id number]      Admission Summary:   66 y.o lady w/ hep C cirrhosis and COVID-19 diagnosed on  who presented via EMS after her POA could not reach her. EMS found the patient confused and hypoxic to the 60s on room air. Interval history / Subjective:   No acute events overnight  Initially weaned to 25L, however then required increased flow. Now back at [facility] at 70% FiO2. Patient thinks her breathing is about the same, not any worse. Assessment & Plan:     COVID + PNA  Acute hypoxic respiratory failure  - O2 to maintain saturations over 90%, remains on HF 35L at 70% FiO2  - Continue dexamethasone  - Out of the window for remdesivir   - Encouraged prone positioning, incentive spirometry   - Procalcitonin is < 0.05 antibiotics discontinued   - CRP 9, s/p Actemra on   - Continue to monitor inflammatory markers   - AC prophylaxis per protocol   - Wean down O2 as tolerated. - Start diuresis with lasix 40mg daily     Metabolic encephalopathy - resolved  - Suspect secondary to COVID, hypoxia  - Resolved now and AxO x 4.   - Keep day and night schedule, avoid sedating medications. Hypokalemia - replaced. Continue to monitor   HepC cirrhosis? - ammonia 34, hypoalbuminemia, not on diuretics at home per med rec. Cautious with fluid. Hold lactulose today given many many liquid stools.  No history of HE    Code status: FULL  DVT prophylaxis: Lovenox     Care Plan discussed with: Patient/Family  Anticipated Disposition: Home w/Family  Anticipated Discharge: Greater than 48 hours     Hospital Problems  Date Reviewed: 11/10/2021          Codes Class Noted POA    Acute respiratory failure with hypoxia [facility]) ICD-10-CM: J96.01  ICD-9-CM: 518.81  11/20/2021 Unknown                Review of Systems:   A comprehensive review of systems was negative except for that written in the HPI. Vital Signs:    Last 24hrs VS reviewed since prior progress note. Most recent are:  Visit Vitals  /72 (BP 1 Location: Right upper arm, BP Patient Position: At rest)   Pulse (!) 58   Temp 98.6 °F (37 °C)   Resp 16   Ht 5' 8\" (1.727 m)   Wt 83.9 kg (185 lb)   SpO2 96%   BMI 28.13 kg/m²       No intake or output data in the 24 hours ending 11/23/21 1555     Physical Examination:     I had a face to face encounter with this patient and independently examined them on 11/23/2021 as outlined below:          Constitutional:  No acute distress, cooperative, pleasant    ENT:  Oral mucosa moist, oropharynx benign. Resp:  Course bilaterally. No wheezing/rhonchi/rales. No accessory muscle use. On 35LHFNC   CV:  Regular rhythm, normal rate, no murmurs, gallops, rubs    GI:  Soft, non distended, non tender. normoactive bowel sounds, no hepatosplenomegaly     Musculoskeletal:  No edema, warm, 2+ pulses throughout    Neurologic:  Moves all extremities.   AAOx4, CN II-XII reviewed            Data Review:    Review and/or order of clinical lab test  Review and/or order of tests in the radiology section of CPT  Review and/or order of tests in the medicine section of CPT      Labs:     Recent Labs     11/22/21 0535 11/21/21 0319   WBC 6.2 6.2   HGB 11.6 12.0   HCT 35.3 36.8    231     Recent Labs     11/22/21  0535 11/21/21 0319 11/20/21  1602    139  138 141   K 3.7 3.8  3.6 3.3*    104  104 104   CO2 28 26  28 30   BUN 17 16  17 15   CREA 0.57 0.66  0.64 0.68   * 142*  142* 108*   CA 8.2* 8.4*  8.3* 8.9   MG 2.5*  --   --    PHOS 3.6  --   --      Recent Labs     11/21/21 0319 11/20/21  1602   ALT 37 35   AP 59 68   TBILI 0.5 0.7   TP 6.9 7.9   ALB 2.2* 2.6*   GLOB 4.7* 5.3*   LPSE  -- 338     No results for input(s): INR, PTP, APTT, INREXT, INREXT in the last 72 hours. No results for input(s): FE, TIBC, PSAT, FERR in the last 72 hours. No results found for: FOL, RBCF   No results for input(s): PH, PCO2, PO2 in the last 72 hours. No results for input(s): CPK, CKNDX, TROIQ in the last 72 hours.     No lab exists for component: CPKMB  Lab Results   Component Value Date/Time    Cholesterol, total 183 05/14/2021 12:00 AM    HDL Cholesterol 66 05/14/2021 12:00 AM    LDL, calculated 72 05/14/2021 12:00 AM    Triglyceride 281 (H) 05/14/2021 12:00 AM     Lab Results   Component Value Date/Time    Glucose (POC) 106 (H) 11/04/2019 09:47 AM     Lab Results   Component Value Date/Time    Color Yellow 05/14/2021 12:00 AM    Appearance Clear 05/14/2021 12:00 AM    Specific gravity 1.030 03/13/2020 12:17 PM    pH (UA) 7.0 05/14/2021 12:00 AM    Protein NEGATIVE  03/13/2020 12:17 PM    Glucose NEGATIVE  03/13/2020 12:17 PM    Ketone Negative 05/14/2021 12:00 AM    Bilirubin Negative 05/14/2021 12:00 AM    Urobilinogen 1.0 03/13/2020 12:17 PM    Nitrites Negative 05/14/2021 12:00 AM    Leukocyte Esterase Negative 05/14/2021 12:00 AM    Epithelial cells FEW 03/13/2020 12:17 PM    Bacteria NEGATIVE  03/13/2020 12:17 PM    WBC 0-4 03/13/2020 12:17 PM    RBC 0-5 03/13/2020 12:17 PM         Medications Reviewed:     Current Facility-Administered Medications   Medication Dose Route Frequency    furosemide (LASIX) injection 40 mg  40 mg IntraVENous DAILY    enoxaparin (LOVENOX) injection 30 mg  30 mg SubCUTAneous Q12H    [Held by provider] lactulose (CHRONULAC) 10 gram/15 mL solution 15 mL  10 g Oral BID    guaiFENesin ER (MUCINEX) tablet 600 mg  600 mg Oral Q12H    benzonatate (TESSALON) capsule 100 mg  100 mg Oral TID PRN    sodium chloride (NS) flush 5-40 mL  5-40 mL IntraVENous Q8H    sodium chloride (NS) flush 5-40 mL  5-40 mL IntraVENous PRN    acetaminophen (TYLENOL) tablet 650 mg  650 mg Oral Q6H PRN Or    acetaminophen (TYLENOL) suppository 650 mg  650 mg Rectal Q6H PRN    polyethylene glycol (MIRALAX) packet 17 g  17 g Oral DAILY PRN    ondansetron (ZOFRAN ODT) tablet 4 mg  4 mg Oral Q8H PRN    Or    ondansetron (ZOFRAN) injection 4 mg  4 mg IntraVENous Q6H PRN    dexamethasone (DECADRON) 4 mg/mL injection 6 mg  6 mg IntraVENous Q24H    L.acidophilus-paracasei-S.thermophil-bifidobacter (RISAQUAD) 8 billion cell capsule  1 Capsule Oral DAILY    albuterol (PROVENTIL HFA, VENTOLIN HFA, PROAIR HFA) inhaler 2 Puff  2 Puff Inhalation Q4H PRN    pregabalin (LYRICA) capsule 150 mg  150 mg Oral BID     ______________________________________________________________________  EXPECTED LENGTH OF STAY: - - -  ACTUAL LENGTH OF STAY:          3                 Chris Valencia MD

## 2021-11-23 NOTE — PROGRESS NOTES
CARMEN:  Anticipate discharge home pending medical progress. Transportation assistance likely needed. Reason for Admission:  Acute respiratory failure with hypoxia                     RUR Score:  10%                   Plan for utilizing home health:  Open to assistance as recommended        PCP: First and Last name:  Earl Robert MD     Name of Practice:    Are you a current patient: Yes/No: Yes   Approximate date of last visit: two weeks ago   Can you participate in a virtual visit with your PCP:                     Current Advanced Directive/Advance Care Plan: DNR      Healthcare Decision Maker:   Click here to complete [address] including selection of the Healthcare Decision Maker Relationship (ie \"Primary\")                         Transition of Care Plan:   CM unable to meet with patient at bedside due to Covid status. CM spoke with patient on the phone. Patient is alert and oriented x4. Demographics confirmed. Patient resides in a senior community with no stairs. She stated she has no assistance in the home and was unable to manage her apartment in the week or so prior to being admitted. No family contacts. No DME. Ambulates independently and drives. Hx: Hep C cirrhosis, arthritis, chronic pain. PCP confirmed and pharmacy is [facility] [address] [facility] [place] [place] Final phone: [phone]. Patient's concern for discharge is having someone to assist in the home with housekeeping. Emergency contact: Gloria Hurd, friend/POA, [phone]      Medicare pt has received, reviewed, and signed 1st IM letter informing them of their right to appeal the discharge. Signed copied has been placed on pt bedside chart. Care Management Interventions  PCP Verified by CM: Yes (Dr. Sonny Ring)  Mode of Transport at Discharge:  Other (see comment) (needs assistance with transportation--Roundtrip)  MyChart Signup: No  Discharge Durable Medical Equipment: No  Physical Therapy Consult: No  Occupational Therapy Consult: No  Speech Therapy Consult: No  Support Systems: [employer], Friend/Neighbor  Confirm Follow Up Transport: Other (see comment) (may need assistance with Roundtrip)  The Plan for Transition of Care is Related to the Following Treatment Goals : home  Discharge Location  Discharge Placement: [address]  [phone]

## 2021-11-24 LAB
ANION GAP SERPL CALC-SCNC: 11 MMOL/L (ref 5–15)
BASOPHILS # BLD: 0 K/UL (ref 0–0.1)
BASOPHILS NFR BLD: 0 % (ref 0–1)
BUN SERPL-MCNC: 16 MG/DL (ref 6–20)
BUN/CREAT SERPL: 23 (ref 12–20)
CALCIUM SERPL-MCNC: 9 MG/DL (ref 8.5–10.1)
CHLORIDE SERPL-SCNC: 100 MMOL/L (ref 97–108)
CO2 SERPL-SCNC: 24 MMOL/L (ref 21–32)
CREAT SERPL-MCNC: 0.69 MG/DL (ref 0.55–1.02)
DIFFERENTIAL METHOD BLD: ABNORMAL
EOSINOPHIL # BLD: 0 K/UL (ref 0–0.4)
EOSINOPHIL NFR BLD: 0 % (ref 0–7)
ERYTHROCYTE [DISTWIDTH] IN BLOOD BY AUTOMATED COUNT: 12 % (ref 11.5–14.5)
GLUCOSE SERPL-MCNC: 133 MG/DL (ref 65–100)
HCT VFR BLD AUTO: 39.7 % (ref 35–47)
HGB BLD-MCNC: 13.1 G/DL (ref 11.5–16)
IMM GRANULOCYTES # BLD AUTO: 0 K/UL (ref 0–0.04)
IMM GRANULOCYTES NFR BLD AUTO: 0 % (ref 0–0.5)
LYMPHOCYTES # BLD: 0.5 K/UL (ref 0.8–3.5)
LYMPHOCYTES NFR BLD: 9 % (ref 12–49)
MAGNESIUM SERPL-MCNC: 2.3 MG/DL (ref 1.6–2.4)
MCH RBC QN AUTO: 30.8 PG (ref 26–34)
MCHC RBC AUTO-ENTMCNC: 33 G/DL (ref 30–36.5)
MCV RBC AUTO: 93.2 FL (ref 80–99)
MONOCYTES # BLD: 0.5 K/UL (ref 0–1)
MONOCYTES NFR BLD: 8 % (ref 5–13)
NEUTS SEG # BLD: 4.7 K/UL (ref 1.8–8)
NEUTS SEG NFR BLD: 83 % (ref 32–75)
NRBC # BLD: 0 K/UL (ref 0–0.01)
NRBC BLD-RTO: 0 PER 100 WBC
PHOSPHATE SERPL-MCNC: 4.1 MG/DL (ref 2.6–4.7)
PLATELET # BLD AUTO: 324 K/UL (ref 150–400)
PMV BLD AUTO: 10.6 FL (ref 8.9–12.9)
POTASSIUM SERPL-SCNC: 3.2 MMOL/L (ref 3.5–5.1)
RBC # BLD AUTO: 4.26 M/UL (ref 3.8–5.2)
RBC MORPH BLD: ABNORMAL
SODIUM SERPL-SCNC: 135 MMOL/L (ref 136–145)
WBC # BLD AUTO: 5.7 K/UL (ref 3.6–11)

## 2021-11-24 PROCEDURE — 80048 BASIC METABOLIC PNL TOTAL CA: CPT

## 2021-11-24 PROCEDURE — 74011250637 HC RX REV CODE- 250/637: Performed by: HOSPITALIST

## 2021-11-24 PROCEDURE — 65660000000 HC RM CCU STEPDOWN

## 2021-11-24 PROCEDURE — 84100 ASSAY OF PHOSPHORUS: CPT

## 2021-11-24 PROCEDURE — 77010033711 HC HIGH FLOW OXYGEN

## 2021-11-24 PROCEDURE — 83735 ASSAY OF MAGNESIUM: CPT

## 2021-11-24 PROCEDURE — 74011250636 HC RX REV CODE- 250/636: Performed by: HOSPITALIST

## 2021-11-24 PROCEDURE — 36415 COLL VENOUS BLD VENIPUNCTURE: CPT

## 2021-11-24 PROCEDURE — 74011250636 HC RX REV CODE- 250/636: Performed by: INTERNAL MEDICINE

## 2021-11-24 PROCEDURE — 65660000001 HC RM ICU INTERMED STEPDOWN

## 2021-11-24 PROCEDURE — 74011250637 HC RX REV CODE- 250/637: Performed by: INTERNAL MEDICINE

## 2021-11-24 PROCEDURE — 94760 N-INVAS EAR/PLS OXIMETRY 1: CPT

## 2021-11-24 PROCEDURE — 85025 COMPLETE CBC W/AUTO DIFF WBC: CPT

## 2021-11-24 RX ORDER — ALBUMIN HUMAN 250 G/1000ML
25 SOLUTION INTRAVENOUS ONCE
Status: COMPLETED | OUTPATIENT
Start: 2021-11-25 | End: 2021-11-25

## 2021-11-24 RX ORDER — POTASSIUM CHLORIDE 750 MG/1
40 TABLET, FILM COATED, EXTENDED RELEASE ORAL
Status: COMPLETED | OUTPATIENT
Start: 2021-11-24 | End: 2021-11-24

## 2021-11-24 RX ADMIN — ENOXAPARIN SODIUM 30 MG: 100 INJECTION SUBCUTANEOUS at 21:33

## 2021-11-24 RX ADMIN — PREGABALIN 150 MG: 75 CAPSULE ORAL at 20:09

## 2021-11-24 RX ADMIN — BENZONATATE 100 MG: 100 CAPSULE ORAL at 15:31

## 2021-11-24 RX ADMIN — Medication 1 CAPSULE: at 08:32

## 2021-11-24 RX ADMIN — Medication 10 ML: at 08:36

## 2021-11-24 RX ADMIN — ACETAMINOPHEN 650 MG: 325 TABLET ORAL at 09:46

## 2021-11-24 RX ADMIN — ENOXAPARIN SODIUM 30 MG: 100 INJECTION SUBCUTANEOUS at 08:34

## 2021-11-24 RX ADMIN — GUAIFENESIN 600 MG: 600 TABLET, EXTENDED RELEASE ORAL at 08:33

## 2021-11-24 RX ADMIN — DEXAMETHASONE SODIUM PHOSPHATE 6 MG: 4 INJECTION, SOLUTION INTRAMUSCULAR; INTRAVENOUS at 20:10

## 2021-11-24 RX ADMIN — POTASSIUM CHLORIDE 40 MEQ: 750 TABLET, FILM COATED, EXTENDED RELEASE ORAL at 15:30

## 2021-11-24 RX ADMIN — FUROSEMIDE 40 MG: 10 INJECTION, SOLUTION INTRAVENOUS at 08:34

## 2021-11-24 RX ADMIN — BENZONATATE 100 MG: 100 CAPSULE ORAL at 09:46

## 2021-11-24 RX ADMIN — PREGABALIN 150 MG: 75 CAPSULE ORAL at 08:32

## 2021-11-24 NOTE — PROGRESS NOTES
[facility] [facility] Adult  Hospitalist Group                                                                                          Hospitalist Progress Note  Alejandar Puga MD  Answering service: [phone] OR [phone] from in house phone        Date of Service:  2021  NAME:  Wyatt Umana  :  1950  MRN:  [id number]      Admission Summary:   66 y.o lady w/ hep C cirrhosis and COVID-19 diagnosed on  who presented via EMS after her POA could not reach her. EMS found the patient confused and hypoxic to the 60s on room air. Interval history / Subjective:   Doing well. Had some dizziness yesterday with lasix. Saturating well, no overnight events. Weaned HFNC to 25L     Assessment & Plan:     COVID + PNA  Acute hypoxic respiratory failure  - O2 to maintain saturations over 90%, remains on HF 25L at 60% FiO2  - Continue dexamethasone  - Out of the window for remdesivir   - Encouraged prone positioning, incentive spirometry   - Procalcitonin is < 0.05 antibiotics discontinued   - CRP 9, s/p Actemra on   - Continue to monitor inflammatory markers   - AC prophylaxis per protocol   - Wean down O2 as tolerated. - Continue diuresis with lasix 40mg daily as tolerated. Output not charted. Metabolic encephalopathy - resolved  - Suspect secondary to COVID, hypoxia  - Resolved now and AxO x 4.   - Keep day and night schedule, avoid sedating medications. Hypokalemia - replaced. Continue to monitor   HepC cirrhosis? - ammonia 34, hypoalbuminemia, not on diuretics at home per med rec. Cautious with fluid. Hold lactulose given many many liquid stools.  No history of HE    Code status: FULL  DVT prophylaxis: Lovenox     Care Plan discussed with: Patient/Family  Anticipated Disposition: Home w/Family  Anticipated Discharge: Greater than 48 hours     Hospital Problems  Date Reviewed: 11/10/2021          Codes Class Noted POA    Acute respiratory failure with hypoxia ([facility]) ICD-10-CM: J96.01  ICD-9-CM: 518.81  11/20/2021 Unknown                Review of Systems:   A comprehensive review of systems was negative except for that written in the HPI. Vital Signs:    Last 24hrs VS reviewed since prior progress note. Most recent are:  Visit Vitals  BP (!) 120/57 (BP 1 Location: Right upper arm, BP Patient Position: At rest)   Pulse 63   Temp 98.4 °F (36.9 °C)   Resp 16   Ht 5' 8\" (1.727 m)   Wt 85 kg (187 lb 4.9 oz)   SpO2 96%   BMI 28.48 kg/m²         Intake/Output Summary (Last 24 hours) at 11/24/2021 1504  Last data filed at 11/24/2021 1010  Gross per 24 hour   Intake 360 ml   Output 500 ml   Net -140 ml        Physical Examination:     I had a face to face encounter with this patient and independently examined them on 11/24/2021 as outlined below:          Constitutional:  No acute distress, cooperative, pleasant    ENT:  Oral mucosa moist, oropharynx benign. Resp:  Course bilaterally. No wheezing/rhonchi/rales. No accessory muscle use. On 35LHFNC   CV:  Regular rhythm, normal rate, no murmurs, gallops, rubs    GI:  Soft, non distended, non tender. normoactive bowel sounds, no hepatosplenomegaly     Musculoskeletal:  No edema, warm, 2+ pulses throughout    Neurologic:  Moves all extremities. AAOx4, CN II-XII reviewed            Data Review:    Review and/or order of clinical lab test  Review and/or order of tests in the radiology section of CPT  Review and/or order of tests in the medicine section of CPT      Labs:     Recent Labs     11/24/21  0512 11/22/21  0535   WBC 5.7 6.2   HGB 13.1 11.6   HCT 39.7 35.3    260     Recent Labs     11/24/21  0512 11/22/21  0535   * 138   K 3.2* 3.7    104   CO2 24 28   BUN 16 17   CREA 0.69 0.57   * 118*   CA 9.0 8.2*   MG 2.3 2.5*   PHOS 4.1 3.6     No results for input(s): ALT, AP, TBIL, TBILI, TP, ALB, GLOB, GGT, AML, LPSE in the last 72 hours.     No lab exists for component: SGOT, GPT, AMYP, HLPSE  No results for input(s): INR, PTP, APTT, INREXT, INREXT in the last 72 hours. No results for input(s): FE, TIBC, PSAT, FERR in the last 72 hours. No results found for: FOL, RBCF   No results for input(s): PH, PCO2, PO2 in the last 72 hours. No results for input(s): CPK, CKNDX, TROIQ in the last 72 hours.     No lab exists for component: CPKMB  Lab Results   Component Value Date/Time    Cholesterol, total 183 05/14/2021 12:00 AM    HDL Cholesterol 66 05/14/2021 12:00 AM    LDL, calculated 72 05/14/2021 12:00 AM    Triglyceride 281 (H) 05/14/2021 12:00 AM     Lab Results   Component Value Date/Time    Glucose (POC) 106 (H) 11/04/2019 09:47 AM     Lab Results   Component Value Date/Time    Color Yellow 05/14/2021 12:00 AM    Appearance Clear 05/14/2021 12:00 AM    Specific gravity 1.030 03/13/2020 12:17 PM    pH (UA) 7.0 05/14/2021 12:00 AM    Protein NEGATIVE  03/13/2020 12:17 PM    Glucose NEGATIVE  03/13/2020 12:17 PM    Ketone Negative 05/14/2021 12:00 AM    Bilirubin Negative 05/14/2021 12:00 AM    Urobilinogen 1.0 03/13/2020 12:17 PM    Nitrites Negative 05/14/2021 12:00 AM    Leukocyte Esterase Negative 05/14/2021 12:00 AM    Epithelial cells FEW 03/13/2020 12:17 PM    Bacteria NEGATIVE  03/13/2020 12:17 PM    WBC 0-4 03/13/2020 12:17 PM    RBC 0-5 03/13/2020 12:17 PM         Medications Reviewed:     Current Facility-Administered Medications   Medication Dose Route Frequency    furosemide (LASIX) injection 40 mg  40 mg IntraVENous DAILY    enoxaparin (LOVENOX) injection 30 mg  30 mg SubCUTAneous Q12H    [Held by provider] lactulose (CHRONULAC) 10 gram/15 mL solution 15 mL  10 g Oral BID    guaiFENesin ER (MUCINEX) tablet 600 mg  600 mg Oral Q12H    benzonatate (TESSALON) capsule 100 mg  100 mg Oral TID PRN    sodium chloride (NS) flush 5-40 mL  5-40 mL IntraVENous Q8H    sodium chloride (NS) flush 5-40 mL  5-40 mL IntraVENous PRN    acetaminophen (TYLENOL) tablet 650 mg  650 mg Oral Q6H PRN    Or    acetaminophen (TYLENOL) suppository 650 mg  650 mg Rectal Q6H PRN    polyethylene glycol (MIRALAX) packet 17 g  17 g Oral DAILY PRN    ondansetron (ZOFRAN ODT) tablet 4 mg  4 mg Oral Q8H PRN    Or    ondansetron (ZOFRAN) injection 4 mg  4 mg IntraVENous Q6H PRN    dexamethasone (DECADRON) 4 mg/mL injection 6 mg  6 mg IntraVENous Q24H    L.acidophilus-paracasei-S.thermophil-bifidobacter (RISAQUAD) 8 billion cell capsule  1 Capsule Oral DAILY    albuterol (PROVENTIL HFA, VENTOLIN HFA, PROAIR HFA) inhaler 2 Puff  2 Puff Inhalation Q4H PRN    pregabalin (LYRICA) capsule 150 mg  150 mg Oral BID     ______________________________________________________________________  EXPECTED LENGTH OF STAY: - - -  ACTUAL LENGTH OF STAY:          4                 Claudetta Grange, MD

## 2021-11-24 NOTE — PROGRESS NOTES
Bedside shift change report given to [address] [place] [address] (oncoming nurse) by Cesar Anguiano (offgoing nurse). Report included the following information SBAR, Kardex, ED Summary, Intake/Output, MAR and Cardiac Rhythm NSR/Sinus Adamaris Atkinson.

## 2021-11-24 NOTE — PROGRESS NOTES
Bedside shift change report given to Tiffanie Nowak (oncoming nurse) by Jm HAIR (offgoing nurse). Report included the following information SBAR, Intake/Output, MAR, Med Rec Status and Cardiac Rhythm nsr.

## 2021-11-24 NOTE — PROGRESS NOTES
Spiritual Care Assessment/Progress Note  [facility]      NAME: Bartolo Parham      MRN: [id number]  AGE: 66 y.o. SEX: female  Latter day Affiliation: Ness [employer]   Language: English     11/24/2021     Total Time (in minutes): 7     [facility] through conversation with:         []Patient        [] Family    [] Friend(s)        Reason for Consult: Request by patient     Spiritual beliefs: (Please include comment if needed)     [] Identifies with a joceline tradition:         [] Supported by a joceline community:            [] Claims no spiritual orientation:           [] Seeking spiritual identity:                [] Adheres to an individual form of spirituality:           [x] Not able to assess:                           Identified resources for coping:      [] Prayer                               [] Music                  [] Guided Imagery     [] Family/friends                 [] Pet visits     [] Devotional reading                         [x] Unknown     [] Other:                                               Interventions offered during this visit: (See comments for more details)    Patient Interventions: Other (comment) (Bible request)           Plan of Care:     [] Support spiritual and/or cultural needs    [] Support AMD and/or advance care planning process      [] Support grieving process   [] Coordinate Rites and/or Rituals    [] Coordination with community clergy   [] No spiritual needs identified at this time   [] Detailed Plan of Care below (See Comments)  [] Make referral to Music Therapy  [] Make referral to Pet Therapy     [] Make referral to Addiction services  [] Make referral to [facility]  [] Make referral to Spiritual Care Partner  [] No future visits requested        [] Contact Spiritual Care for further referrals     Comments: Request for a Bible by patient in Room 440. RN took Bible into patient.  Please contact Spiritual Care for any further referrals. Visited by: Lauro Bowie.  Kayleigh Perkins, [address] [place] [address] paging Service [phone])

## 2021-11-24 NOTE — PROGRESS NOTES
CARMEN:  Anticipate discharge home pending medical progress. Transportation assistance likely needed. RUR: 8%    Disposition: Patient admitted 11/20 with acute respiratory failure with hypoxia. Patient positive for Covid. Currently on 25L high flow. CM will continue to follow for discharge needs.     Jame Lindquist, [address] [place] [address],[address]  [phone]

## 2021-11-25 LAB
ANION GAP SERPL CALC-SCNC: 9 MMOL/L (ref 5–15)
BACTERIA SPEC CULT: NORMAL
BASOPHILS # BLD: 0 K/UL (ref 0–0.1)
BASOPHILS NFR BLD: 0 % (ref 0–1)
BUN SERPL-MCNC: 16 MG/DL (ref 6–20)
BUN/CREAT SERPL: 19 (ref 12–20)
CALCIUM SERPL-MCNC: 9.1 MG/DL (ref 8.5–10.1)
CHLORIDE SERPL-SCNC: 99 MMOL/L (ref 97–108)
CO2 SERPL-SCNC: 26 MMOL/L (ref 21–32)
CREAT SERPL-MCNC: 0.83 MG/DL (ref 0.55–1.02)
DIFFERENTIAL METHOD BLD: ABNORMAL
EOSINOPHIL # BLD: 0 K/UL (ref 0–0.4)
EOSINOPHIL NFR BLD: 0 % (ref 0–7)
ERYTHROCYTE [DISTWIDTH] IN BLOOD BY AUTOMATED COUNT: 11.9 % (ref 11.5–14.5)
GLUCOSE BLD STRIP.AUTO-MCNC: 107 MG/DL (ref 65–117)
GLUCOSE SERPL-MCNC: 155 MG/DL (ref 65–100)
HCT VFR BLD AUTO: 37.8 % (ref 35–47)
HGB BLD-MCNC: 12.6 G/DL (ref 11.5–16)
IMM GRANULOCYTES # BLD AUTO: 0.1 K/UL (ref 0–0.04)
IMM GRANULOCYTES NFR BLD AUTO: 1 % (ref 0–0.5)
LYMPHOCYTES # BLD: 0.5 K/UL (ref 0.8–3.5)
LYMPHOCYTES NFR BLD: 9 % (ref 12–49)
MAGNESIUM SERPL-MCNC: 2.3 MG/DL (ref 1.6–2.4)
MCH RBC QN AUTO: 30.4 PG (ref 26–34)
MCHC RBC AUTO-ENTMCNC: 33.3 G/DL (ref 30–36.5)
MCV RBC AUTO: 91.3 FL (ref 80–99)
MONOCYTES # BLD: 0.3 K/UL (ref 0–1)
MONOCYTES NFR BLD: 6 % (ref 5–13)
NEUTS SEG # BLD: 4.6 K/UL (ref 1.8–8)
NEUTS SEG NFR BLD: 84 % (ref 32–75)
NRBC # BLD: 0 K/UL (ref 0–0.01)
NRBC BLD-RTO: 0 PER 100 WBC
PHOSPHATE SERPL-MCNC: 3.5 MG/DL (ref 2.6–4.7)
PLATELET # BLD AUTO: 323 K/UL (ref 150–400)
PMV BLD AUTO: 10.3 FL (ref 8.9–12.9)
POTASSIUM SERPL-SCNC: 3.1 MMOL/L (ref 3.5–5.1)
RBC # BLD AUTO: 4.14 M/UL (ref 3.8–5.2)
RBC MORPH BLD: ABNORMAL
SERVICE CMNT-IMP: NORMAL
SERVICE CMNT-IMP: NORMAL
SODIUM SERPL-SCNC: 134 MMOL/L (ref 136–145)
WBC # BLD AUTO: 5.5 K/UL (ref 3.6–11)

## 2021-11-25 PROCEDURE — 74011250637 HC RX REV CODE- 250/637: Performed by: INTERNAL MEDICINE

## 2021-11-25 PROCEDURE — 74011250637 HC RX REV CODE- 250/637: Performed by: HOSPITALIST

## 2021-11-25 PROCEDURE — 77010033678 HC OXYGEN DAILY

## 2021-11-25 PROCEDURE — 74011250636 HC RX REV CODE- 250/636: Performed by: INTERNAL MEDICINE

## 2021-11-25 PROCEDURE — 77010033711 HC HIGH FLOW OXYGEN

## 2021-11-25 PROCEDURE — 65660000001 HC RM ICU INTERMED STEPDOWN

## 2021-11-25 PROCEDURE — 36415 COLL VENOUS BLD VENIPUNCTURE: CPT

## 2021-11-25 PROCEDURE — 74011250636 HC RX REV CODE- 250/636: Performed by: HOSPITALIST

## 2021-11-25 PROCEDURE — 80048 BASIC METABOLIC PNL TOTAL CA: CPT

## 2021-11-25 PROCEDURE — 85025 COMPLETE CBC W/AUTO DIFF WBC: CPT

## 2021-11-25 PROCEDURE — 84100 ASSAY OF PHOSPHORUS: CPT

## 2021-11-25 PROCEDURE — P9047 ALBUMIN (HUMAN), 25%, 50ML: HCPCS | Performed by: INTERNAL MEDICINE

## 2021-11-25 PROCEDURE — 83735 ASSAY OF MAGNESIUM: CPT

## 2021-11-25 PROCEDURE — 82962 GLUCOSE BLOOD TEST: CPT

## 2021-11-25 PROCEDURE — 65660000000 HC RM CCU STEPDOWN

## 2021-11-25 RX ORDER — POTASSIUM CHLORIDE 750 MG/1
40 TABLET, FILM COATED, EXTENDED RELEASE ORAL ONCE
Status: COMPLETED | OUTPATIENT
Start: 2021-11-25 | End: 2021-11-25

## 2021-11-25 RX ORDER — POTASSIUM CHLORIDE 750 MG/1
40 TABLET, FILM COATED, EXTENDED RELEASE ORAL DAILY
Status: DISCONTINUED | OUTPATIENT
Start: 2021-11-25 | End: 2021-11-28 | Stop reason: HOSPADM

## 2021-11-25 RX ORDER — ROPINIROLE 1 MG/1
0.5 TABLET, FILM COATED ORAL 3 TIMES DAILY
Status: DISCONTINUED | OUTPATIENT
Start: 2021-11-25 | End: 2021-11-28 | Stop reason: HOSPADM

## 2021-11-25 RX ADMIN — ENOXAPARIN SODIUM 30 MG: 100 INJECTION SUBCUTANEOUS at 08:08

## 2021-11-25 RX ADMIN — FUROSEMIDE 40 MG: 10 INJECTION, SOLUTION INTRAVENOUS at 08:08

## 2021-11-25 RX ADMIN — ENOXAPARIN SODIUM 30 MG: 100 INJECTION SUBCUTANEOUS at 21:31

## 2021-11-25 RX ADMIN — ROPINIROLE HYDROCHLORIDE 0.5 MG: 1 TABLET, FILM COATED ORAL at 15:20

## 2021-11-25 RX ADMIN — ACETAMINOPHEN 650 MG: 325 TABLET ORAL at 08:09

## 2021-11-25 RX ADMIN — ALBUMIN (HUMAN) 25 G: 0.25 INJECTION, SOLUTION INTRAVENOUS at 00:04

## 2021-11-25 RX ADMIN — DEXAMETHASONE SODIUM PHOSPHATE 6 MG: 4 INJECTION, SOLUTION INTRAMUSCULAR; INTRAVENOUS at 21:30

## 2021-11-25 RX ADMIN — POTASSIUM CHLORIDE 40 MEQ: 750 TABLET, FILM COATED, EXTENDED RELEASE ORAL at 11:00

## 2021-11-25 RX ADMIN — Medication 10 ML: at 21:31

## 2021-11-25 RX ADMIN — Medication 1 CAPSULE: at 08:08

## 2021-11-25 RX ADMIN — ROPINIROLE HYDROCHLORIDE 0.5 MG: 1 TABLET, FILM COATED ORAL at 21:31

## 2021-11-25 RX ADMIN — GUAIFENESIN 600 MG: 600 TABLET, EXTENDED RELEASE ORAL at 08:08

## 2021-11-25 RX ADMIN — PREGABALIN 150 MG: 75 CAPSULE ORAL at 21:53

## 2021-11-25 RX ADMIN — PREGABALIN 150 MG: 75 CAPSULE ORAL at 08:08

## 2021-11-25 RX ADMIN — Medication 10 ML: at 08:14

## 2021-11-25 RX ADMIN — BENZONATATE 100 MG: 100 CAPSULE ORAL at 08:09

## 2021-11-25 RX ADMIN — POTASSIUM CHLORIDE 40 MEQ: 750 TABLET, FILM COATED, EXTENDED RELEASE ORAL at 15:18

## 2021-11-25 NOTE — PROGRESS NOTES
Problem: Risk for Spread of Infection  Goal: Prevent transmission of infectious organism to others  Description: Prevent the transmission of infectious organisms to other patients, staff members, and visitors. Outcome: Progressing Towards Goal     Problem: Falls - Risk of  Goal: *Absence of Falls  Description: Document Alejo Sena Fall Risk and appropriate interventions in the flowsheet.   Outcome: Progressing Towards Goal  Note: Fall Risk Interventions:  Mobility Interventions: Patient to call before getting OOB         Medication Interventions: Teach patient to arise slowly    Elimination Interventions: Call light in reach

## 2021-11-25 NOTE — PROGRESS NOTES
[address] [facility] Adult  Hospitalist Group                                                                                          Hospitalist Progress Note  Karen Rock MD  Answering service: [phone] from in house phone        Date of Service:  2021  NAME:  Susie Parra  :  1950  MRN:  [id number]      Admission Summary:   66 y.o lady w/ hep C cirrhosis and COVID-19 diagnosed on  who presented via EMS after her POA could not reach her. EMS found the patient confused and hypoxic to the 60s on room air. Interval history / Subjective:   Pt seen and examined. Feels well, no acute events. Only complaint she has is restless legs. Will start ropinirole. sats 96% on HFNC 25L, on 70%. Assessment & Plan:     COVID + PNA  Acute hypoxic respiratory failure: 2nd to above. - O2 to maintain saturations over 90%, remains on HF 25L at 60% FiO2  - Continue dexamethasone- Out of the window for remdesivir   - Encouraged prone positioning, incentive spirometry   - Procalcitonin is < 0.05 antibiotics discontinued   - CRP 9, s/p Actemra on - Continue to monitor inflammatory markers   - AC prophylaxis per protocol - Wean down O2 as tolerated. - Continue diuresis with lasix 40mg daily as tolerated. Metabolic encephalopathy - resolved- Suspect secondary to COVID, hypoxia  Hypokalemia - replaced. Continue to monitor   HepC cirrhosis? - ammonia 34, hypoalbuminemia, not on diuretics at home per med rec. - Cautious with fluid. Hold lactulose given many liquid stools.  No history of HE    Code status: FULL  DVT prophylaxis: Lovenox   Care Plan discussed with: Patient/Family  Anticipated Disposition: Home w/Family Greater than 48 hours     Hospital Problems  Date Reviewed: 11/10/2021          Codes Class Noted POA    Acute respiratory failure with hypoxia [facility]) ICD-10-CM: J96.01  ICD-9-CM: 518.81  2021 Unknown            Review of Systems:   A comprehensive review of systems was negative except for that written in the HPI. Vital Signs:    Last 24hrs VS reviewed since prior progress note. Most recent are:  Visit Vitals  /67 (BP 1 Location: Right upper arm, BP Patient Position: At rest)   Pulse (!) 54   Temp 98.6 °F (37 °C)   Resp 18   Ht 5' 8\" (1.727 m)   Wt 72.8 kg (160 lb 7.9 oz)   SpO2 98%   BMI 24.40 kg/m²         Intake/Output Summary (Last 24 hours) at 11/25/2021 1042  Last data filed at 11/25/2021 0957  Gross per 24 hour   Intake 460 ml   Output 1100 ml   Net -640 ml        Physical Examination:     I had a face to face encounter with this patient and independently examined them on 11/25/2021 as outlined below:    Constitutional:  No acute distress, cooperative, pleasant        Resp:  Course bilaterally. No wheezing/rhonchi/rales. No accessory muscle use. On 35LHFNC        GI:  Soft, non distended, non tender. normoactive bowel sounds, no hepatosplenomegaly     Musculoskeletal:  No edema, warm, 2+ pulses throughout    Neurologic:  Moves all extremities. AAOx4, CN II-XII reviewed       Data Review:    Review and/or order of clinical lab test  Review and/or order of tests in the radiology section of CPT  Review and/or order of tests in the medicine section of CPT    Labs:     Recent Labs     11/25/21  0148 11/24/21  0512   WBC 5.5 5.7   HGB 12.6 13.1   HCT 37.8 39.7    324     Recent Labs     11/25/21  0148 11/24/21  0512   * 135*   K 3.1* 3.2*   CL 99 100   CO2 26 24   BUN 16 16   CREA 0.83 0.69   * 133*   CA 9.1 9.0   MG 2.3 2.3   PHOS 3.5 4.1     No results for input(s): ALT, AP, TBIL, TBILI, TP, ALB, GLOB, GGT, AML, LPSE in the last 72 hours. No lab exists for component: SGOT, GPT, AMYP, HLPSE  No results for input(s): INR, PTP, APTT, INREXT, INREXT in the last 72 hours. No results for input(s): FE, TIBC, PSAT, FERR in the last 72 hours. No results found for: FOL, RBCF   No results for input(s): PH, PCO2, PO2 in the last 72 hours.   No results for input(s): CPK, CKNDX, TROIQ in the last 72 hours.     No lab exists for component: CPKMB  Lab Results   Component Value Date/Time    Cholesterol, total 183 05/14/2021 12:00 AM    HDL Cholesterol 66 05/14/2021 12:00 AM    LDL, calculated 72 05/14/2021 12:00 AM    Triglyceride 281 (H) 05/14/2021 12:00 AM     Lab Results   Component Value Date/Time    Glucose (POC) 106 (H) 11/04/2019 09:47 AM     Lab Results   Component Value Date/Time    Color Yellow 05/14/2021 12:00 AM    Appearance Clear 05/14/2021 12:00 AM    Specific gravity 1.030 03/13/2020 12:17 PM    pH (UA) 7.0 05/14/2021 12:00 AM    Protein NEGATIVE  03/13/2020 12:17 PM    Glucose NEGATIVE  03/13/2020 12:17 PM    Ketone Negative 05/14/2021 12:00 AM    Bilirubin Negative 05/14/2021 12:00 AM    Urobilinogen 1.0 03/13/2020 12:17 PM    Nitrites Negative 05/14/2021 12:00 AM    Leukocyte Esterase Negative 05/14/2021 12:00 AM    Epithelial cells FEW 03/13/2020 12:17 PM    Bacteria NEGATIVE  03/13/2020 12:17 PM    WBC 0-4 03/13/2020 12:17 PM    RBC 0-5 03/13/2020 12:17 PM         Medications Reviewed:     Current Facility-Administered Medications   Medication Dose Route Frequency    furosemide (LASIX) injection 40 mg  40 mg IntraVENous DAILY    enoxaparin (LOVENOX) injection 30 mg  30 mg SubCUTAneous Q12H    [Held by provider] lactulose (CHRONULAC) 10 gram/15 mL solution 15 mL  10 g Oral BID    guaiFENesin ER (MUCINEX) tablet 600 mg  600 mg Oral Q12H    benzonatate (TESSALON) capsule 100 mg  100 mg Oral TID PRN    sodium chloride (NS) flush 5-40 mL  5-40 mL IntraVENous Q8H    sodium chloride (NS) flush 5-40 mL  5-40 mL IntraVENous PRN    acetaminophen (TYLENOL) tablet 650 mg  650 mg Oral Q6H PRN    Or    acetaminophen (TYLENOL) suppository 650 mg  650 mg Rectal Q6H PRN    polyethylene glycol (MIRALAX) packet 17 g  17 g Oral DAILY PRN    ondansetron (ZOFRAN ODT) tablet 4 mg  4 mg Oral Q8H PRN    Or    ondansetron (ZOFRAN) injection 4 mg  4 mg IntraVENous Q6H PRN    dexamethasone (DECADRON) 4 mg/mL injection 6 mg  6 mg IntraVENous Q24H    L.acidophilus-paracasei-S.thermophil-bifidobacter (RISAQUAD) 8 billion cell capsule  1 Capsule Oral DAILY    albuterol (PROVENTIL HFA, VENTOLIN HFA, PROAIR HFA) inhaler 2 Puff  2 Puff Inhalation Q4H PRN    pregabalin (LYRICA) capsule 150 mg  150 mg Oral BID     ______________________________________________________________________  EXPECTED LENGTH OF STAY: - - -  ACTUAL LENGTH OF STAY:          Rosmery Cleary MD

## 2021-11-26 PROCEDURE — 77010033711 HC HIGH FLOW OXYGEN

## 2021-11-26 PROCEDURE — 74011250636 HC RX REV CODE- 250/636: Performed by: INTERNAL MEDICINE

## 2021-11-26 PROCEDURE — 65660000001 HC RM ICU INTERMED STEPDOWN

## 2021-11-26 PROCEDURE — 74011250637 HC RX REV CODE- 250/637: Performed by: INTERNAL MEDICINE

## 2021-11-26 PROCEDURE — 74011250636 HC RX REV CODE- 250/636: Performed by: HOSPITALIST

## 2021-11-26 PROCEDURE — 74011250637 HC RX REV CODE- 250/637: Performed by: HOSPITALIST

## 2021-11-26 PROCEDURE — 94760 N-INVAS EAR/PLS OXIMETRY 1: CPT

## 2021-11-26 RX ADMIN — Medication 10 ML: at 22:23

## 2021-11-26 RX ADMIN — ROPINIROLE HYDROCHLORIDE 0.5 MG: 1 TABLET, FILM COATED ORAL at 17:08

## 2021-11-26 RX ADMIN — ENOXAPARIN SODIUM 30 MG: 100 INJECTION SUBCUTANEOUS at 22:23

## 2021-11-26 RX ADMIN — DEXAMETHASONE SODIUM PHOSPHATE 6 MG: 4 INJECTION, SOLUTION INTRAMUSCULAR; INTRAVENOUS at 22:23

## 2021-11-26 RX ADMIN — ENOXAPARIN SODIUM 30 MG: 100 INJECTION SUBCUTANEOUS at 09:19

## 2021-11-26 RX ADMIN — ROPINIROLE HYDROCHLORIDE 0.5 MG: 1 TABLET, FILM COATED ORAL at 09:19

## 2021-11-26 RX ADMIN — POTASSIUM CHLORIDE 40 MEQ: 750 TABLET, FILM COATED, EXTENDED RELEASE ORAL at 11:30

## 2021-11-26 RX ADMIN — ROPINIROLE HYDROCHLORIDE 0.5 MG: 1 TABLET, FILM COATED ORAL at 22:23

## 2021-11-26 RX ADMIN — PREGABALIN 150 MG: 75 CAPSULE ORAL at 19:06

## 2021-11-26 RX ADMIN — Medication 10 ML: at 14:44

## 2021-11-26 RX ADMIN — PREGABALIN 150 MG: 75 CAPSULE ORAL at 09:19

## 2021-11-26 RX ADMIN — Medication 10 ML: at 06:00

## 2021-11-26 RX ADMIN — FUROSEMIDE 40 MG: 10 INJECTION, SOLUTION INTRAVENOUS at 09:19

## 2021-11-26 NOTE — PROGRESS NOTES
Bedside and Verbal shift change report given to Edenilson Song (oncoming nurse) by Rolanda Swartz (offgoing nurse). Report included the following information SBAR, Kardex, MAR, Accordion and Cardiac Rhythm SR-SB.

## 2021-11-26 NOTE — PROGRESS NOTES
[facility] [facility] Adult  Hospitalist Group                                                                                          Hospitalist Progress Note  Jim Olsen MD  Answering service: [phone] from in house phone        Date of Service:  2021  NAME:  Uziel Le  :  1950  MRN:  [id number]    Admission Summary:   66 y.o lady w/ hep C cirrhosis and COVID-19 diagnosed on  who presented via EMS after her POA could not reach her. EMS found the patient confused and hypoxic to the 60s on room air. Interval history / Subjective:   Pt seen and examined. Feels well, sats high 90s on 25L via HFNC. Will get RT to wean down as able. Continue weaning down. Expectorating phlegm now. Encouraged. Use IS     Assessment & Plan:     COVID + PNA  Acute hypoxic respiratory failure: 2nd to above. - O2 to maintain saturations over 90%, remains on HF 25L at 60% FiO2  - Continue dexamethasone- Out of the window for remdesivir   - Encouraged prone positioning, incentive spirometry   - Procalcitonin is < 0.05 antibiotics discontinued   - CRP 9, s/p Actemra on - Continue to monitor inflammatory markers   - AC prophylaxis per protocol - Wean down O2 as tolerated. - Continue diuresis with lasix 40mg daily as tolerated. Metabolic encephalopathy - resolved- Suspect secondary to COVID, hypoxia  Hypokalemia - replaced. Continue to monitor   HepC cirrhosis? - ammonia 34, hypoalbuminemia, not on diuretics at home per med rec. - Cautious with fluid. Hold lactulose given many liquid stools. No history of HE  Restless legs: try ropinirole.      Code status: FULL  DVT prophylaxis: Lovenox   Care Plan discussed with: Patient/Family  Anticipated Disposition: Home w/Family Greater than 48 hours     Hospital Problems  Date Reviewed: 11/10/2021          Codes Class Noted POA    Acute respiratory failure with hypoxia [facility]) ICD-10-CM: J96.01  ICD-9-CM: 518.81  2021 Unknown            Review of Systems:   A comprehensive review of systems was negative except for that written in the HPI. Vital Signs:    Last 24hrs VS reviewed since prior progress note. Most recent are:  Visit Vitals  BP (!) 109/58 (BP 1 Location: Right upper arm, BP Patient Position: At rest)   Pulse 71   Temp 98.2 °F (36.8 °C)   Resp 19   Ht 5' 8\" (1.727 m)   Wt 73.5 kg (162 lb 0.6 oz)   SpO2 99%   BMI 24.64 kg/m²       No intake or output data in the 24 hours ending 11/26/21 1103     Physical Examination:     I had a face to face encounter with this patient and independently examined them on 11/26/2021 as outlined below:    Constitutional:  No acute distress, cooperative, pleasant        Resp:  Course bilaterally. No wheezing/rhonchi/rales. No accessory muscle use. On 35LHFNC        GI:  Soft, non distended, non tender. normoactive bowel sounds, no hepatosplenomegaly     Musculoskeletal:  No edema, warm, 2+ pulses throughout    Neurologic:  Moves all extremities. AAOx4, CN II-XII reviewed       Data Review:    Review and/or order of clinical lab test  Review and/or order of tests in the radiology section of CPT  Review and/or order of tests in the medicine section of CPT    Labs:     Recent Labs     11/25/21  0148 11/24/21  0512   WBC 5.5 5.7   HGB 12.6 13.1   HCT 37.8 39.7    324     Recent Labs     11/25/21  0148 11/24/21  0512   * 135*   K 3.1* 3.2*   CL 99 100   CO2 26 24   BUN 16 16   CREA 0.83 0.69   * 133*   CA 9.1 9.0   MG 2.3 2.3   PHOS 3.5 4.1     No results for input(s): ALT, AP, TBIL, TBILI, TP, ALB, GLOB, GGT, AML, LPSE in the last 72 hours. No lab exists for component: SGOT, GPT, AMYP, HLPSE  No results for input(s): INR, PTP, APTT, INREXT, INREXT in the last 72 hours. No results for input(s): FE, TIBC, PSAT, FERR in the last 72 hours. No results found for: FOL, RBCF   No results for input(s): PH, PCO2, PO2 in the last 72 hours.   No results for input(s): CPK, CKNDX, TROIQ in the last 72 hours.    No lab exists for component: CPKMB  Lab Results   Component Value Date/Time    Cholesterol, total 183 05/14/2021 12:00 AM    HDL Cholesterol 66 05/14/2021 12:00 AM    LDL, calculated 72 05/14/2021 12:00 AM    Triglyceride 281 (H) 05/14/2021 12:00 AM     Lab Results   Component Value Date/Time    Glucose (POC) 107 11/25/2021 11:52 AM    Glucose (POC) 106 (H) 11/04/2019 09:47 AM     Lab Results   Component Value Date/Time    Color Yellow 05/14/2021 12:00 AM    Appearance Clear 05/14/2021 12:00 AM    Specific gravity 1.030 03/13/2020 12:17 PM    pH (UA) 7.0 05/14/2021 12:00 AM    Protein NEGATIVE  03/13/2020 12:17 PM    Glucose NEGATIVE  03/13/2020 12:17 PM    Ketone Negative 05/14/2021 12:00 AM    Bilirubin Negative 05/14/2021 12:00 AM    Urobilinogen 1.0 03/13/2020 12:17 PM    Nitrites Negative 05/14/2021 12:00 AM    Leukocyte Esterase Negative 05/14/2021 12:00 AM    Epithelial cells FEW 03/13/2020 12:17 PM    Bacteria NEGATIVE  03/13/2020 12:17 PM    WBC 0-4 03/13/2020 12:17 PM    RBC 0-5 03/13/2020 12:17 PM         Medications Reviewed:     Current Facility-Administered Medications   Medication Dose Route Frequency    potassium chloride SR (KLOR-CON 10) tablet 40 mEq  40 mEq Oral DAILY    rOPINIRole (REQUIP) tablet 0.5 mg  0.5 mg Oral TID    furosemide (LASIX) injection 40 mg  40 mg IntraVENous DAILY    enoxaparin (LOVENOX) injection 30 mg  30 mg SubCUTAneous Q12H    [Held by provider] lactulose (CHRONULAC) 10 gram/15 mL solution 15 mL  10 g Oral BID    guaiFENesin ER (MUCINEX) tablet 600 mg  600 mg Oral Q12H    benzonatate (TESSALON) capsule 100 mg  100 mg Oral TID PRN    sodium chloride (NS) flush 5-40 mL  5-40 mL IntraVENous Q8H    sodium chloride (NS) flush 5-40 mL  5-40 mL IntraVENous PRN    acetaminophen (TYLENOL) tablet 650 mg  650 mg Oral Q6H PRN    Or    acetaminophen (TYLENOL) suppository 650 mg  650 mg Rectal Q6H PRN    polyethylene glycol (MIRALAX) packet 17 g  17 g Oral DAILY PRN  ondansetron (ZOFRAN ODT) tablet 4 mg  4 mg Oral Q8H PRN    Or    ondansetron (ZOFRAN) injection 4 mg  4 mg IntraVENous Q6H PRN    dexamethasone (DECADRON) 4 mg/mL injection 6 mg  6 mg IntraVENous Q24H    L.acidophilus-paracasei-S.thermophil-bifidobacter (RISAQUAD) 8 billion cell capsule  1 Capsule Oral DAILY    albuterol (PROVENTIL HFA, VENTOLIN HFA, PROAIR HFA) inhaler 2 Puff  2 Puff Inhalation Q4H PRN    pregabalin (LYRICA) capsule 150 mg  150 mg Oral BID     ______________________________________________________________________  EXPECTED LENGTH OF STAY: - - -  ACTUAL LENGTH OF STAY:          6                 Hollie Monterroso MD

## 2021-11-27 LAB
ALBUMIN SERPL-MCNC: 3.2 G/DL (ref 3.5–5)
ALBUMIN/GLOB SERPL: 0.7 {RATIO} (ref 1.1–2.2)
ALP SERPL-CCNC: 57 U/L (ref 45–117)
ALT SERPL-CCNC: 122 U/L (ref 12–78)
ANION GAP SERPL CALC-SCNC: 6 MMOL/L (ref 5–15)
AST SERPL-CCNC: 68 U/L (ref 15–37)
BILIRUB SERPL-MCNC: 0.6 MG/DL (ref 0.2–1)
BUN SERPL-MCNC: 19 MG/DL (ref 6–20)
BUN/CREAT SERPL: 24 (ref 12–20)
CALCIUM SERPL-MCNC: 9.6 MG/DL (ref 8.5–10.1)
CHLORIDE SERPL-SCNC: 101 MMOL/L (ref 97–108)
CO2 SERPL-SCNC: 24 MMOL/L (ref 21–32)
CREAT SERPL-MCNC: 0.8 MG/DL (ref 0.55–1.02)
ERYTHROCYTE [DISTWIDTH] IN BLOOD BY AUTOMATED COUNT: 12.1 % (ref 11.5–14.5)
GLOBULIN SER CALC-MCNC: 4.5 G/DL (ref 2–4)
GLUCOSE SERPL-MCNC: 160 MG/DL (ref 65–100)
HCT VFR BLD AUTO: 44.8 % (ref 35–47)
HGB BLD-MCNC: 14.8 G/DL (ref 11.5–16)
MCH RBC QN AUTO: 30.5 PG (ref 26–34)
MCHC RBC AUTO-ENTMCNC: 33 G/DL (ref 30–36.5)
MCV RBC AUTO: 92.4 FL (ref 80–99)
NRBC # BLD: 0 K/UL (ref 0–0.01)
NRBC BLD-RTO: 0 PER 100 WBC
PLATELET # BLD AUTO: 373 K/UL (ref 150–400)
PMV BLD AUTO: 10.4 FL (ref 8.9–12.9)
POTASSIUM SERPL-SCNC: 4.7 MMOL/L (ref 3.5–5.1)
PROT SERPL-MCNC: 7.7 G/DL (ref 6.4–8.2)
RBC # BLD AUTO: 4.85 M/UL (ref 3.8–5.2)
SODIUM SERPL-SCNC: 131 MMOL/L (ref 136–145)
WBC # BLD AUTO: 5.7 K/UL (ref 3.6–11)

## 2021-11-27 PROCEDURE — 77010033711 HC HIGH FLOW OXYGEN

## 2021-11-27 PROCEDURE — 74011250636 HC RX REV CODE- 250/636: Performed by: INTERNAL MEDICINE

## 2021-11-27 PROCEDURE — 85027 COMPLETE CBC AUTOMATED: CPT

## 2021-11-27 PROCEDURE — 80053 COMPREHEN METABOLIC PANEL: CPT

## 2021-11-27 PROCEDURE — 74011250636 HC RX REV CODE- 250/636: Performed by: HOSPITALIST

## 2021-11-27 PROCEDURE — 74011250637 HC RX REV CODE- 250/637: Performed by: HOSPITALIST

## 2021-11-27 PROCEDURE — 77010033678 HC OXYGEN DAILY

## 2021-11-27 PROCEDURE — 36415 COLL VENOUS BLD VENIPUNCTURE: CPT

## 2021-11-27 PROCEDURE — 74011250637 HC RX REV CODE- 250/637: Performed by: INTERNAL MEDICINE

## 2021-11-27 PROCEDURE — 65660000001 HC RM ICU INTERMED STEPDOWN

## 2021-11-27 RX ADMIN — POTASSIUM CHLORIDE 40 MEQ: 750 TABLET, FILM COATED, EXTENDED RELEASE ORAL at 08:40

## 2021-11-27 RX ADMIN — ENOXAPARIN SODIUM 30 MG: 100 INJECTION SUBCUTANEOUS at 20:30

## 2021-11-27 RX ADMIN — Medication 10 ML: at 20:30

## 2021-11-27 RX ADMIN — ROPINIROLE HYDROCHLORIDE 0.5 MG: 1 TABLET, FILM COATED ORAL at 20:28

## 2021-11-27 RX ADMIN — ROPINIROLE HYDROCHLORIDE 0.5 MG: 1 TABLET, FILM COATED ORAL at 08:40

## 2021-11-27 RX ADMIN — FUROSEMIDE 40 MG: 10 INJECTION, SOLUTION INTRAVENOUS at 08:41

## 2021-11-27 RX ADMIN — PREGABALIN 150 MG: 75 CAPSULE ORAL at 20:28

## 2021-11-27 RX ADMIN — ENOXAPARIN SODIUM 30 MG: 100 INJECTION SUBCUTANEOUS at 09:00

## 2021-11-27 RX ADMIN — Medication 1 CAPSULE: at 08:40

## 2021-11-27 RX ADMIN — PREGABALIN 150 MG: 75 CAPSULE ORAL at 08:40

## 2021-11-27 RX ADMIN — DEXAMETHASONE SODIUM PHOSPHATE 6 MG: 4 INJECTION, SOLUTION INTRAMUSCULAR; INTRAVENOUS at 20:30

## 2021-11-27 NOTE — PROGRESS NOTES
Bedside shift change report given to Jyoti 6020 (oncoming nurse) by Danette De Anda RN (offgoing nurse). Report included the following information SBAR, Kardex and Cardiac Rhythm SB/SR.

## 2021-11-27 NOTE — PROGRESS NOTES
[facility] [facility] Adult  Hospitalist Group                                                                                          Hospitalist Progress Note  Whit Beverly MD  Answering service: [phone] from in house phone        Date of Service:  2021  NAME:  Venu Hou  :  1950  MRN:  [id number]    Admission Summary:   66 y.o lady w/ hep C cirrhosis and COVID-19 diagnosed on  who presented via EMS after her POA could not reach her. EMS found the patient confused and hypoxic to the 60s on room air. Interval history / Subjective:   Pt seen and examined. Feels great, improving, down to 3L on Midflow NC. sats high 90s, will continue weaning off and if does well consider dc tomorrow     Assessment & Plan:     COVID + PNA  Acute hypoxic respiratory failure: 2nd to above. - O2 to maintain saturations over 90%, remains on HF 25L at 60% FiO2  - Continue dexamethasone- Out of the window for remdesivir   - Encouraged prone positioning, incentive spirometry   - Procalcitonin is < 0.05 antibiotics discontinued   - CRP 9, s/p Actemra on - Continue to monitor inflammatory markers   - AC prophylaxis per protocol - Wean down O2 as tolerated. - Continue diuresis with lasix 40mg daily as tolerated. Metabolic encephalopathy - resolved- Suspect secondary to COVID, hypoxia  Hypokalemia - replaced. Continue to monitor   HepC cirrhosis? - ammonia 34, hypoalbuminemia, not on diuretics at home per med rec. - Cautious with fluid. Hold lactulose given many liquid stools. No history of HE  Restless legs: try ropinirole.      Code status: FULL  DVT prophylaxis: Lovenox   Care Plan discussed with: Patient/Family  Anticipated Disposition: Home w/Family Greater than 48 hours     Hospital Problems  Date Reviewed: 11/10/2021          Codes Class Noted POA    Acute respiratory failure with hypoxia [facility]) ICD-10-CM: J96.01  ICD-9-CM: 518.81  2021 Unknown            Review of Systems:   A comprehensive review of systems was negative except for that written in the HPI. Vital Signs:    Last 24hrs VS reviewed since prior progress note. Most recent are:  Visit Vitals  /62 (BP 1 Location: Right upper arm, BP Patient Position: Lying)   Pulse 75   Temp 98.4 °F (36.9 °C)   Resp 18   Ht 5' 8\" (1.727 m)   Wt 75 kg (165 lb 5.5 oz)   SpO2 94%   BMI 25.14 kg/m²       No intake or output data in the 24 hours ending 11/27/21 1047     Physical Examination:     I had a face to face encounter with this patient and independently examined them on 11/27/2021 as outlined below:    Constitutional:  No acute distress, cooperative, pleasant        Resp:  Course bilaterally. No wheezing/rhonchi/rales. No accessory muscle use. On 35LHFNC        GI:  Soft, non distended, non tender. normoactive bowel sounds, no hepatosplenomegaly     Musculoskeletal:  No edema, warm, 2+ pulses throughout    Neurologic:  Moves all extremities. AAOx4, CN II-XII reviewed       Data Review:    Review and/or order of clinical lab test  Review and/or order of tests in the radiology section of CPT  Review and/or order of tests in the medicine section of CPT    Labs:     Recent Labs     11/27/21 0421 11/25/21  0148   WBC 5.7 5.5   HGB 14.8 12.6   HCT 44.8 37.8    323     Recent Labs     11/27/21 0421 11/25/21  0148   * 134*   K 4.7 3.1*    99   CO2 24 26   BUN 19 16   CREA 0.80 0.83   * 155*   CA 9.6 9.1   MG  --  2.3   PHOS  --  3.5     Recent Labs     11/27/21 0421   *   AP 57   TBILI 0.6   TP 7.7   ALB 3.2*   GLOB 4.5*     No results for input(s): INR, PTP, APTT, INREXT, INREXT in the last 72 hours. No results for input(s): FE, TIBC, PSAT, FERR in the last 72 hours. No results found for: FOL, RBCF   No results for input(s): PH, PCO2, PO2 in the last 72 hours. No results for input(s): CPK, CKNDX, TROIQ in the last 72 hours.     No lab exists for component: CPKMB  Lab Results   Component Value Date/Time    Cholesterol, total 183 05/14/2021 12:00 AM    HDL Cholesterol 66 05/14/2021 12:00 AM    LDL, calculated 72 05/14/2021 12:00 AM    Triglyceride 281 (H) 05/14/2021 12:00 AM     Lab Results   Component Value Date/Time    Glucose (POC) 107 11/25/2021 11:52 AM    Glucose (POC) 106 (H) 11/04/2019 09:47 AM     Lab Results   Component Value Date/Time    Color Yellow 05/14/2021 12:00 AM    Appearance Clear 05/14/2021 12:00 AM    Specific gravity 1.030 03/13/2020 12:17 PM    pH (UA) 7.0 05/14/2021 12:00 AM    Protein NEGATIVE  03/13/2020 12:17 PM    Glucose NEGATIVE  03/13/2020 12:17 PM    Ketone Negative 05/14/2021 12:00 AM    Bilirubin Negative 05/14/2021 12:00 AM    Urobilinogen 1.0 03/13/2020 12:17 PM    Nitrites Negative 05/14/2021 12:00 AM    Leukocyte Esterase Negative 05/14/2021 12:00 AM    Epithelial cells FEW 03/13/2020 12:17 PM    Bacteria NEGATIVE  03/13/2020 12:17 PM    WBC 0-4 03/13/2020 12:17 PM    RBC 0-5 03/13/2020 12:17 PM         Medications Reviewed:     Current Facility-Administered Medications   Medication Dose Route Frequency    potassium chloride SR (KLOR-CON 10) tablet 40 mEq  40 mEq Oral DAILY    rOPINIRole (REQUIP) tablet 0.5 mg  0.5 mg Oral TID    furosemide (LASIX) injection 40 mg  40 mg IntraVENous DAILY    enoxaparin (LOVENOX) injection 30 mg  30 mg SubCUTAneous Q12H    [Held by provider] lactulose (CHRONULAC) 10 gram/15 mL solution 15 mL  10 g Oral BID    guaiFENesin ER (MUCINEX) tablet 600 mg  600 mg Oral Q12H    benzonatate (TESSALON) capsule 100 mg  100 mg Oral TID PRN    sodium chloride (NS) flush 5-40 mL  5-40 mL IntraVENous Q8H    sodium chloride (NS) flush 5-40 mL  5-40 mL IntraVENous PRN    acetaminophen (TYLENOL) tablet 650 mg  650 mg Oral Q6H PRN    Or    acetaminophen (TYLENOL) suppository 650 mg  650 mg Rectal Q6H PRN    polyethylene glycol (MIRALAX) packet 17 g  17 g Oral DAILY PRN    ondansetron (ZOFRAN ODT) tablet 4 mg  4 mg Oral Q8H PRN    Or    ondansetron (ZOFRAN) injection 4 mg  4 mg IntraVENous Q6H PRN    dexamethasone (DECADRON) 4 mg/mL injection 6 mg  6 mg IntraVENous Q24H    L.acidophilus-paracasei-S.thermophil-bifidobacter (RISAQUAD) 8 billion cell capsule  1 Capsule Oral DAILY    albuterol (PROVENTIL HFA, VENTOLIN HFA, PROAIR HFA) inhaler 2 Puff  2 Puff Inhalation Q4H PRN    pregabalin (LYRICA) capsule 150 mg  150 mg Oral BID     ______________________________________________________________________  EXPECTED LENGTH OF STAY: - - -  ACTUAL LENGTH OF STAY:          7                 Marlee Amor MD

## 2021-11-27 NOTE — PROGRESS NOTES
Bedside and Verbal shift change report given to Naomy (oncoming nurse) by Richard Uribe (offgoing nurse).  Report included the following information SBAR, Kardex, MAR, Accordion and Cardiac Rhythm SB-SR.

## 2021-11-28 VITALS
TEMPERATURE: 98 F | HEART RATE: 84 BPM | BODY MASS INDEX: 24.56 KG/M2 | WEIGHT: 162.04 LBS | HEIGHT: 68 IN | SYSTOLIC BLOOD PRESSURE: 117 MMHG | DIASTOLIC BLOOD PRESSURE: 75 MMHG | RESPIRATION RATE: 27 BRPM | OXYGEN SATURATION: 93 %

## 2021-11-28 PROCEDURE — 77010033678 HC OXYGEN DAILY

## 2021-11-28 PROCEDURE — 74011250637 HC RX REV CODE- 250/637: Performed by: HOSPITALIST

## 2021-11-28 PROCEDURE — 74011250636 HC RX REV CODE- 250/636: Performed by: INTERNAL MEDICINE

## 2021-11-28 PROCEDURE — 94760 N-INVAS EAR/PLS OXIMETRY 1: CPT

## 2021-11-28 PROCEDURE — 74011250637 HC RX REV CODE- 250/637: Performed by: INTERNAL MEDICINE

## 2021-11-28 RX ORDER — ROPINIROLE 0.5 MG/1
0.5 TABLET, FILM COATED ORAL 3 TIMES DAILY
Qty: 90 TABLET | Refills: 0 | Status: SHIPPED | OUTPATIENT
Start: 2021-11-28 | End: 2021-12-28

## 2021-11-28 RX ADMIN — Medication 1 CAPSULE: at 08:47

## 2021-11-28 RX ADMIN — FUROSEMIDE 40 MG: 10 INJECTION, SOLUTION INTRAVENOUS at 08:47

## 2021-11-28 RX ADMIN — POTASSIUM CHLORIDE 40 MEQ: 750 TABLET, FILM COATED, EXTENDED RELEASE ORAL at 08:47

## 2021-11-28 RX ADMIN — ROPINIROLE HYDROCHLORIDE 0.5 MG: 1 TABLET, FILM COATED ORAL at 08:47

## 2021-11-28 RX ADMIN — PREGABALIN 150 MG: 75 CAPSULE ORAL at 08:47

## 2021-11-28 RX ADMIN — ACETAMINOPHEN 650 MG: 325 TABLET ORAL at 11:13

## 2021-11-28 RX ADMIN — ENOXAPARIN SODIUM 30 MG: 100 INJECTION SUBCUTANEOUS at 08:47

## 2021-11-28 NOTE — PROGRESS NOTES
Bedside shift change report given to Jyoti 4920 (oncoming nurse) by Lilibeth Dillon (offgoing nurse). Report included the following information SBAR, Kardex, Recent Results, Med Rec Status and Cardiac Rhythm NSR.

## 2021-11-28 NOTE — DISCHARGE SUMMARY
Discharge Summary       PATIENT ID: Janneth Luevano  MRN: [id number]   YOB: 1950    DATE OF ADMISSION: 11/20/2021  3:35 PM    DATE OF DISCHARGE: 11/28/2021   PRIMARY CARE PROVIDER: Davida Davila MD     ATTENDING PHYSICIAN: Laureano Adair MD  DISCHARGING PROVIDER: Laureano Adair MD    To contact this individual call [phone] and ask the  to page. If unavailable ask to be transferred the Adult Hospitalist Department. CONSULTATIONS: None    PROCEDURES/SURGERIES: * No surgery found *    ADMITTING DIAGNOSES & HOSPITAL COURSE:   Evaluated and treated for COVID PNA with respiratory failure. Resolved. Risk of Re-Admission: low  DISCHARGE DIAGNOSES / PLAN:      COVID + PNA  Acute hypoxic respiratory failure: 2nd to above. - resolved. Weaned off O2.  - Continue dexamethasone- Out of the window for remdesivir   - Encouraged prone positioning, incentive spirometry   - Procalcitonin is < 0.05 antibiotics discontinued   - CRP 9, s/p Actemra on 11/21- Continue to monitor inflammatory markers   - AC prophylaxis per protocol - gentle diuresis. Clinically much improved. Off O2, walking well.     Metabolic encephalopathy - resolved- Suspect secondary to COVID, hypoxia  Hypokalemia - replaced. Continue to monitor   HepC cirrhosis? - ammonia 34, hypoalbuminemia, not on diuretics at home per med rec. - Cautious with fluid. Hold lactulose given many liquid stools. No history of HE  Restless legs: try ropinirole. FOLLOW UP APPOINTMENTS:    Follow-up Information     Follow up With Specialties Details Why Contact [facility]    [employer] Area Office on Aging  Call As needed for in home assistance and additional senior resources.  Bre Davila MD Internal Medicine In 1 week  [address] [address]  [phone]           ADDITIONAL CARE RECOMMENDATIONS:  Follow up with PCP    DIET: Resume previous diet    ACTIVITY: Activity as tolerated    DISCHARGE MEDICATIONS:  Current Discharge Medication List      START taking these medications    Details   rOPINIRole (REQUIP) 0.5 mg tablet Take 1 Tablet by mouth three (3) times daily for 30 days. Qty: 90 Tablet, Refills: 0  Start date: 11/28/2021, End date: 12/28/2021         CONTINUE these medications which have NOT CHANGED    Details   pregabalin (Lyrica) 150 mg capsule Take 1 capsule by mouth 2 times a day as directed by physician. max daily dose is 450mg  Qty: 60 Capsule, Refills: 4    Comments: Refill [id number]  Associated Diagnoses: Peripheral polyneuropathy      ibuprofen (MOTRIN) 200 mg tablet Take 200 mg by mouth every six (6) hours as needed for Pain.      multivitamin (MULTI-DELYN, WELLESSE) liqd Take 5 mL by mouth daily. ascorbic acid/collagen hydr (COLLAGEN PLUS VITAMIN C PO) Take 1 Dose by mouth daily. cholecalciferol, vitamin D3, (VITAMIN D3 PO) Take 1 Tab by mouth every thirty (30) days. 50,000 UNITS           NOTIFY YOUR PHYSICIAN FOR ANY OF THE FOLLOWING:   Fever over 101 degrees for 24 hours. Chest pain, shortness of breath, fever, chills, nausea, vomiting, diarrhea, change in mentation, falling, weakness, bleeding. Severe pain or pain not relieved by medications. Or, any other signs or symptoms that you may have questions about. DISPOSITION:    Home With:   OT  PT  HH  RN       Long term SNF/Inpatient Rehab   x Independent/assisted living    Hospice    Other:     PATIENT CONDITION AT DISCHARGE:     Functional status    Poor     Deconditioned     Independent      Cognition     Lucid     Forgetful     Dementia      Catheters/lines (plus indication)    Allen     PICC     PEG     None      Code status     Full code     DNR      PHYSICAL EXAMINATION AT DISCHARGE:  Patient seen and examined at bedside, Condition stable, explained discharge and follow up plans.   /75 (BP 1 Location: Right arm, BP Patient Position: At rest)   Pulse 84   Temp 98 °F (36.7 °C)   Resp 27   Ht 5' 8\" (1.727 m)   Wt 73.5 kg (162 lb 0.6 oz)   SpO2 93%   BMI 24.64 kg/m²   General:  Alert, oriented, No acute distress. Comfortable, pleasant elderly BW. Resp:  No accessory muscle use, Good AE. No crackles. No wheezes. Neuro:  Grossly normal, no focal neuro deficits, follows commands     CHRONIC MEDICAL DIAGNOSES:  Problem List as of 11/28/2021 Date Reviewed: 11/10/2021          Codes Class Noted - Resolved    Acute respiratory failure with hypoxia [facility]) ICD-10-CM: J96.01  ICD-9-CM: 518.81  11/20/2021 - Present        Neuropathy ICD-10-CM: G62.9  ICD-9-CM: 355.9  Unknown - Present        Primary osteoarthritis of right knee ICD-10-CM: M17.11  ICD-9-CM: 715.16  11/4/2019 - Present        Primary localized osteoarthritis of left knee ICD-10-CM: M17.12  ICD-9-CM: 715.16  11/4/2019 - Present              37 minutes were spent with the patient on counseling and coordination of care.     Signed:   Jamal Dumas MD  11/28/2021  9:37 AM

## 2021-11-28 NOTE — DISCHARGE INSTRUCTIONS
Discharge Instructions       PATIENT ID: Sarika Welch  MRN: [id number]   YOB: 1950    DATE OF ADMISSION: 11/20/2021  3:35 PM    DATE OF DISCHARGE: 11/28/2021    PRIMARY CARE PROVIDER: Leander Robles MD     ATTENDING PHYSICIAN: Cameron Law MD  DISCHARGING PROVIDER: Uriel Castro MD    To contact this individual call [phone] and ask the  to page. If unavailable ask to be transferred the Adult Hospitalist Department. DISCHARGE DIAGNOSES COVID virus PNA    CONSULTATIONS: None    PROCEDURES/SURGERIES: * No surgery found *    FOLLOW UP APPOINTMENTS:   Follow-up Information     Follow up With Specialties Details Why Contact [facility]    [employer] Area Office on Aging  Call As needed for in home assistance and additional senior resources. Bre Robles MD Internal Medicine In 1 week  [address] [address]  [phone]             ADDITIONAL CARE RECOMMENDATIONS: follow up with PCP     DIET: Resume previous diet    DISCHARGE MEDICATIONS:  Finished steroid course. Ropinirole for restless legs. · It is important that you take the medication exactly as they are prescribed. · Keep your medication in the bottles provided by the pharmacist and keep a list of the medication names, dosages, and times to be taken in your wallet. · Do not take other medications without consulting your doctor. NOTIFY YOUR PHYSICIAN FOR ANY OF THE FOLLOWING:   Fever over 101 degrees for 24 hours. Chest pain, shortness of breath, fever, chills, nausea, vomiting, diarrhea, change in mentation, falling, weakness, bleeding. Severe pain or pain not relieved by medications. Or, any other signs or symptoms that you may have questions about. It was our pleasure to help take care of you and we hope you get well very soon.     DISPOSITION:    Home With:   OT  PT  [facility]  RN       SNF/Inpatient Rehab/LTAC   x Independent/assisted living    Hospice    Other:     CDMP Checked:   Yes x     PROBLEM LIST Updated:  Yes x     Signed:   Koko Ross MD  11/28/2021  9:40 AM

## 2021-11-28 NOTE — PROGRESS NOTES
Bedside and Verbal shift change report given to Josselyn Lennon (oncoming nurse) by Amalia Rome (offgoing nurse).  Report included the following information SBAR, Kardex, MAR, Accordion and Cardiac Rhythm SB-SR.

## 2021-11-29 ENCOUNTER — PATIENT OUTREACH (OUTPATIENT)
Dept: CASE MANAGEMENT | Age: 71
End: 2021-11-29

## 2021-11-29 NOTE — PROGRESS NOTES
Care Transitions Initial Call    Call within 2 business days of discharge: Yes     Patient: Venu Hou Patient : 1950 MRN: [id number]    Last Discharge [address]       Complaint Diagnosis Description Type Department Provider    21 Altered mental status COVID-19 virus infection . .. ED to Hosp-Admission (Discharged) (ADMIT) Ty Soares MD; Sebastian Moreno,... Was this an external facility discharge? No Discharge Facility: n/a    Challenges to be reviewed by the provider   Additional needs identified to be addressed with provider: no          Method of communication with provider : none    Discussed COVID-19 related testing which was available at this time. Test results were positive. Patient informed of results, if available? Patient hospitalized with COVID-tested positive on 2021. Advance Care Planning:   Does patient have an Advance Directive:  yes; reviewed and current      Primary Decision Maker: Cristopher Borjas - [phone]    Primary Decision Maker: Jacquelyn Yin - [phone]    Inpatient Readmission Risk score: Unplanned Readmit Risk Score: 9.3 ( )    Was this a readmission? no   Patient stated reason for the admission: COVID-resp failure    Patients top risk factors for readmission: medical condition-recovering COVID/some KEYES   Interventions to address risk factors: Scheduled appointment with PCP- and Obtained and reviewed discharge summary and/or continuity of care documents    Care Transition Nurse (CTN) contacted the patient by telephone to perform post hospital discharge assessment. Verified name and  with patient as identifiers. Provided introduction to self, and explanation of the CTN role. CTN reviewed discharge instructions, medical action plan and red flags with patient who verbalized understanding. Were discharge instructions available to patient? yes.  Reviewed appropriate site of care based on symptoms and resources available to patient including: PCP. Patient given an opportunity to ask questions and does not have any further questions or concerns at this time. The patient agrees to contact the PCP office for questions related to their healthcare. Medication reconciliation was performed with patient, who verbalizes understanding of administration of home medications. Advised obtaining a 90-day supply of all daily and as-needed medications. Referral to Pharm D needed: no     Home Health/Outpatient orders at discharge: none     Durable Medical Equipment ordered at discharge: None     Covid Risk Education    Educated patient about risk for severe COVID-19 due to risk factors according to CDC guidelines. CTN reviewed discharge instructions, medical action plan and red flag symptoms with the patient who verbalized understanding. Discussed COVID vaccination status: yes. Education provided on COVID-19 vaccination as appropriate. Discussed exposure protocols and quarantine with CDC Guidelines. Patient was given an opportunity to verbalize any questions and concerns and agrees to contact CTN or health care provider for questions related to their healthcare. Was patient discharged with a pulse oximeter? no.     Discussed follow-up appointments. If no appointment was previously scheduled, appointment scheduling offered: yes. Is follow up appointment scheduled within 7 days of discharge? yes. [address] follow up appointment(s):   Future Appointments   Date Time Provider Amy Plata   12/1/2021  4:30 PM Ela Ragland MD [facility] MAIN BS AMB   2/10/2022 10:45 AM Ela Ragland MD [facility] MAIN BS AMB     Non-BS follow up appointment(s): none    Plan for follow-up call in 7-10 days based on severity of symptoms and risk factors. Plan for next call: self management-monitor red flags and follow up appointment-attend as directed  CTN provided contact information for future needs.     Goals Addressed                 This Visit's Progress     Prevent complications post hospitalization. 11/29/21   Activity- activity intolerance/energy conservation/frequent rest.   Diet- patient denies any GI issues. Eating and drinking without issue.  Patient was provided with senior [facility] information as resource at discharge.  Patient will monitor/report red flags- increased SOB, wheezing, worsening cough, excess phlegm/mucus, increased fatigue, CP, fever greater than 101 that does not respond to fever reducers, N/V/D-s/s dehydration/reduced po intake.    Attend follow up as directed-  [facility] follow up appointment(s):   Future Appointments   Date Time Provider Amy Plata   12/1/2021  4:30 PM Dea Adhikari MD [facility] MAIN BS AMB   2/10/2022 10:45 AM Dea Adhikari MD [facility] MAIN BS AMB     Non-[facility] follow up appointment(s): none

## 2021-12-01 ENCOUNTER — VIRTUAL VISIT (OUTPATIENT)
Dept: INTERNAL MEDICINE CLINIC | Age: 71
End: 2021-12-01
Payer: MEDICARE

## 2021-12-01 DIAGNOSIS — J96.01 ACUTE RESPIRATORY FAILURE WITH HYPOXIA (HCC): ICD-10-CM

## 2021-12-01 DIAGNOSIS — U07.1 PNEUMONIA DUE TO COVID-19 VIRUS: Primary | ICD-10-CM

## 2021-12-01 DIAGNOSIS — J12.82 PNEUMONIA DUE TO COVID-19 VIRUS: Primary | ICD-10-CM

## 2021-12-01 DIAGNOSIS — K74.60 CIRRHOSIS OF LIVER WITHOUT ASCITES, UNSPECIFIED HEPATIC CIRRHOSIS TYPE (HCC): ICD-10-CM

## 2021-12-01 PROCEDURE — G8427 DOCREV CUR MEDS BY ELIG CLIN: HCPCS | Performed by: INTERNAL MEDICINE

## 2021-12-01 PROCEDURE — 1111F DSCHRG MED/CURRENT MED MERGE: CPT | Performed by: INTERNAL MEDICINE

## 2021-12-01 PROCEDURE — 1090F PRES/ABSN URINE INCON ASSESS: CPT | Performed by: INTERNAL MEDICINE

## 2021-12-01 PROCEDURE — G8420 CALC BMI NORM PARAMETERS: HCPCS | Performed by: INTERNAL MEDICINE

## 2021-12-01 PROCEDURE — G8432 DEP SCR NOT DOC, RNG: HCPCS | Performed by: INTERNAL MEDICINE

## 2021-12-01 PROCEDURE — G8399 PT W/DXA RESULTS DOCUMENT: HCPCS | Performed by: INTERNAL MEDICINE

## 2021-12-01 PROCEDURE — 99214 OFFICE O/P EST MOD 30 MIN: CPT | Performed by: INTERNAL MEDICINE

## 2021-12-01 PROCEDURE — 3017F COLORECTAL CA SCREEN DOC REV: CPT | Performed by: INTERNAL MEDICINE

## 2021-12-01 PROCEDURE — G8536 NO DOC ELDER MAL SCRN: HCPCS | Performed by: INTERNAL MEDICINE

## 2021-12-01 PROCEDURE — 1101F PT FALLS ASSESS-DOCD LE1/YR: CPT | Performed by: INTERNAL MEDICINE

## 2021-12-07 ENCOUNTER — PATIENT OUTREACH (OUTPATIENT)
Dept: CASE MANAGEMENT | Age: 71
End: 2021-12-07

## 2021-12-07 NOTE — PROGRESS NOTES
Care Transitions follow up Outreach Attempt    Attempted to reach patient for follow up. Unable to reach patient. Patient: Morse Baumgarten Patient : 1950 MRN: [id number]    Last Discharge [address]       Complaint Diagnosis Description Type Department Provider    21 Altered mental status COVID-19 virus infection . .. ED to Hosp-Admission (Discharged) (ADMIT) Ty Kraft MD; Steph Elizondo,... Noted following upcoming appointments from discharge chart review:   [facility] follow up appointment(s):   Future Appointments   Date Time Provider Amy Plata   2/10/2022 10:45 AM Jacob Charles MD [facility] MAIN BS AMB     Will attempt to reach out to patient next week for follow up.

## 2021-12-12 PROBLEM — U07.1 PNEUMONIA DUE TO COVID-19 VIRUS: Status: ACTIVE | Noted: 2021-12-12

## 2021-12-12 PROBLEM — J12.82 PNEUMONIA DUE TO COVID-19 VIRUS: Status: ACTIVE | Noted: 2021-12-12

## 2021-12-12 NOTE — PROGRESS NOTES
Bartolo Parham is a 66 y.o. female who was seen by synchronous (real-time) audio-video technology on 12/1/2021 for Concern For COVID-19 (Coronavirus) (Patient admitted to [facility] on 11/20/21 for Covid virus. )        Assessment & Plan:   1. COVID pneumonia with respiratory failure. This has improved significantly such that the patient has no O2 requirements at home. She is breathing comfortably. 2. Cirrhosis secondary to hepatitis C. This appears to be reasonably stable for now. 3. Her overall condition continues to improve. She does have assistance at home. She is on supportive medications for now. Subjective: The patient is now convalescing from a COVID-19 infection which included severe respiratory failure secondary to pneumonia. She is in ICU for approximately two days with gradual improvement. O2 requirements improved to the point where she did not require any supplementation. She is now back home and states that she feels much better. Apparently she did not get a COVID vaccine. Prior to Admission medications    Medication Sig Start Date End Date Taking? Authorizing Provider   pregabalin (Lyrica) 150 mg capsule Take 1 capsule by mouth 2 times a day as directed by physician. max daily dose is 450mg 11/13/21  Yes Matias Lorenzo MD   ibuprofen (MOTRIN) 200 mg tablet Take 200 mg by mouth every six (6) hours as needed for Pain. Yes Provider, Historical   multivitamin (MULTI-DELYN, WELLESSE) liqd Take 5 mL by mouth daily. Yes Provider, Historical   ascorbic acid/collagen hydr (COLLAGEN PLUS VITAMIN C PO) Take 1 Dose by mouth daily. Yes Provider, Historical   cholecalciferol, vitamin D3, (VITAMIN D3 PO) Take 1 Tablet by mouth every thirty (30) days. 50,000 UNITS   Yes Provider, Historical   rOPINIRole (REQUIP) 0.5 mg tablet Take 1 Tablet by mouth three (3) times daily for 30 days.   Patient not taking: Reported on 12/1/2021 11/28/21 12/28/21  Rohan Lord MD     Patient Active Problem List    Diagnosis Date Noted    Pneumonia due to COVID-19 virus 12/12/2021    [facility]     Acute respiratory failure with hypoxia ([facility] [address] [facility] 11/20/2021    Neuropathy     Primary osteoarthritis of right knee 11/04/2019    Primary localized osteoarthritis of left knee 11/04/2019     Current Outpatient Medications   Medication Sig Dispense Refill    pregabalin (Lyrica) 150 mg capsule Take 1 capsule by mouth 2 times a day as directed by physician. max daily dose is 450mg 60 Capsule 4    ibuprofen (MOTRIN) 200 mg tablet Take 200 mg by mouth every six (6) hours as needed for Pain.  multivitamin (MULTI-DELYN, WELLESSE) liqd Take 5 mL by mouth daily.  ascorbic acid/collagen hydr (COLLAGEN PLUS VITAMIN C PO) Take 1 Dose by mouth daily.  cholecalciferol, vitamin D3, (VITAMIN D3 PO) Take 1 Tablet by mouth every thirty (30) days. 50,000 UNITS      rOPINIRole (REQUIP) 0.5 mg tablet Take 1 Tablet by mouth three (3) times daily for 30 days. (Patient not taking: Reported on 12/1/2021) 90 Tablet 0     No Known Allergies  Past Medical History:   Diagnosis Date    Arthritis     OSTEO    Chronic pain     Cirrhosis ([facility] [address] [facility]     Hepatitis C     Liver disease 1980s    HEPATITIS C, TREATED AND NOW GONE    Neuropathy     Tuberculosis 1962    +ANNA MARIE TEST # 3; TREATED AND NOW LUNGS HAVE ALWAYS BEEN CLEAR     Past Surgical History:   Procedure Laterality Date    HX COLONOSCOPY      HISTORY OF POLYP    HX ENDOSCOPY      HX HEENT      WISDOM TEETH    HX KNEE REPLACEMENT Right 11/2019    RIGHT TOTAL KNEE REPLACEMENT    HX ORTHOPAEDIC Left 2018    BROKEN WRIST, HAS A PLATE       ROS: 14 point review of systems negative    Objective:   No flowsheet data found.      [INSTRUCTIONS:  \"[x]\" Indicates a positive item  \"[]\" Indicates a negative item  -- DELETE ALL ITEMS NOT EXAMINED]    Constitutional: [x] Appears well-developed and well-nourished [x] No apparent distress      [] Abnormal -     Mental status: [x] Alert and awake  [x] Oriented to person/place/time [x] Able to follow commands    [] Abnormal -     Eyes:   EOM    [x]  Normal    [] Abnormal -   Sclera  [x]  Normal    [] Abnormal -          Discharge [x]  None visible   [] Abnormal -     HENT: [x] Normocephalic, atraumatic  [] Abnormal -   [x] Mouth/Throat: Mucous membranes are moist    External Ears [x] Normal  [] Abnormal -    Neck: [x] No visualized mass [] Abnormal -     Pulmonary/Chest: [x] Respiratory effort normal   [x] No visualized signs of difficulty breathing or respiratory distress        [] Abnormal -      Musculoskeletal:   [x] Normal gait with no signs of ataxia         [x] Normal range of motion of neck        [] Abnormal -     Neurological:        [x] No Facial Asymmetry (Cranial nerve 7 motor function) (limited exam due to video visit)          [x] No gaze palsy        [] Abnormal -          Skin:        [x] No significant exanthematous lesions or discoloration noted on facial skin         [] Abnormal -            Psychiatric:       [x] Normal Affect [] Abnormal -        [x] No Hallucinations    Other pertinent observable physical exam findings:-        We discussed the expected course, resolution and complications of the diagnosis(es) in detail. Medication risks, benefits, costs, interactions, and alternatives were discussed as indicated. I advised her to contact the office if her condition worsens, changes or fails to improve as anticipated. She expressed understanding with the diagnosis(es) and plan. Morse Baumgarten, was evaluated through a synchronous (real-time) audio-video encounter. The patient (or guardian if applicable) is aware that this is a billable service. Verbal consent to proceed has been obtained within the past 12 months.  The visit was conducted pursuant to the emergency declaration under the [facility] waiver authority and the [employer] and [employer] Appropriations Act. Patient identification was verified, and a caregiver was present when appropriate. The patient was located in a state where the provider was credentialed to provide care.       Jenelle Ramirez MD

## 2021-12-12 NOTE — PROGRESS NOTES
Janneth Luevano is a 66 y.o. female who was seen by synchronous (real-time) audio-video technology on 12/1/2021 for Concern For COVID-19 (Coronavirus) (Patient admitted to [facility] on 11/20/21 for Covid virus. )        Assessment & Plan:           Subjective:       Prior to Admission medications    Medication Sig Start Date End Date Taking? Authorizing Provider   pregabalin (Lyrica) 150 mg capsule Take 1 capsule by mouth 2 times a day as directed by physician. max daily dose is 450mg 11/13/21  Yes Davida Davila MD   ibuprofen (MOTRIN) 200 mg tablet Take 200 mg by mouth every six (6) hours as needed for Pain. Yes Provider, Historical   multivitamin (MULTI-DELYN, WELLESSE) liqd Take 5 mL by mouth daily. Yes Provider, Historical   ascorbic acid/collagen hydr (COLLAGEN PLUS VITAMIN C PO) Take 1 Dose by mouth daily. Yes Provider, Historical   cholecalciferol, vitamin D3, (VITAMIN D3 PO) Take 1 Tablet by mouth every thirty (30) days. 50,000 UNITS   Yes Provider, Historical   rOPINIRole (REQUIP) 0.5 mg tablet Take 1 Tablet by mouth three (3) times daily for 30 days. Patient not taking: Reported on 12/1/2021 11/28/21 12/28/21  Bruno Spears MD         ROS    Objective:   No flowsheet data found.      [INSTRUCTIONS:  \"[x]\" Indicates a positive item  \"[]\" Indicates a negative item  -- DELETE ALL ITEMS NOT EXAMINED]    Constitutional: [x] Appears well-developed and well-nourished [x] No apparent distress      [] Abnormal -     Mental status: [x] Alert and awake  [x] Oriented to person/place/time [x] Able to follow commands    [] Abnormal -     Eyes:   EOM    [x]  Normal    [] Abnormal -   Sclera  [x]  Normal    [] Abnormal -          Discharge [x]  None visible   [] Abnormal -     HENT: [x] Normocephalic, atraumatic  [] Abnormal -   [x] Mouth/Throat: Mucous membranes are moist    External Ears [x] Normal  [] Abnormal -    Neck: [x] No visualized mass [] Abnormal -     Pulmonary/Chest: [x] Respiratory effort normal   [x] No visualized signs of difficulty breathing or respiratory distress        [] Abnormal -      Musculoskeletal:   [x] Normal gait with no signs of ataxia         [x] Normal range of motion of neck        [] Abnormal -     Neurological:        [x] No Facial Asymmetry (Cranial nerve 7 motor function) (limited exam due to video visit)          [x] No gaze palsy        [] Abnormal -          Skin:        [x] No significant exanthematous lesions or discoloration noted on facial skin         [] Abnormal -            Psychiatric:       [x] Normal Affect [] Abnormal -        [x] No Hallucinations    Other pertinent observable physical exam findings:-        We discussed the expected course, resolution and complications of the diagnosis(es) in detail. Medication risks, benefits, costs, interactions, and alternatives were discussed as indicated. I advised her to contact the office if her condition worsens, changes or fails to improve as anticipated. She expressed understanding with the diagnosis(es) and plan. Theodora Shresthamary, was evaluated through a synchronous (real-time) audio-video encounter. The patient (or guardian if applicable) is aware that this is a billable service. Verbal consent to proceed has been obtained within the past 12 months. The visit was conducted pursuant to the emergency declaration under the [facility] [address] authority and the [employer] and [employer] General Act. Patient identification was verified, and a caregiver was present when appropriate. The patient was located in a state where the provider was credentialed to provide care.       Hiwot Schmidt MD

## 2021-12-14 ENCOUNTER — PATIENT OUTREACH (OUTPATIENT)
Dept: CASE MANAGEMENT | Age: 71
End: 2021-12-14

## 2021-12-14 NOTE — PROGRESS NOTES
Care Transitions follow up outreach attempt    Attempted to reach patient for follow up. Unable to reach patient. [facility]    Patient: Marina Beyer Patient : 1950 MRN: [id number]    Last Discharge [address]       Complaint Diagnosis Description Type Department Provider    21 Altered mental status COVID-19 virus infection . .. ED to Hosp-Admission (Discharged) (ADMIT) Ty Early MD; Carlos Dwyer,...         Noted following upcoming appointments from discharge chart review:   [facility] follow up appointment(s):   Future Appointments   Date Time Provider Amy Plata   2/10/2022 10:45 AM Alejandro Borrero MD [facility] MAIN BS AMB

## 2021-12-28 ENCOUNTER — PATIENT OUTREACH (OUTPATIENT)
Dept: CASE MANAGEMENT | Age: 71
End: 2021-12-28

## 2021-12-28 NOTE — PROGRESS NOTES
Patient has graduated from the Transitions of Care Coordination  program on 12/28/2021. Patient/family has the ability to self-manage at this time Care management goals have been completed. Patient was not referred to the [facility] team for further management. Goals Addressed                 This Visit's Progress     COMPLETED: Prevent complications post hospitalization. 11/29/21   Activity- activity intolerance/energy conservation/frequent rest.   Diet- patient denies any GI issues. Eating and drinking without issue.  Patient was provided with senior connections information as resource at discharge.  Patient will monitor/report red flags- increased SOB, wheezing, worsening cough, excess phlegm/mucus, increased fatigue, CP, fever greater than 101 that does not respond to fever reducers, N/V/D-s/s dehydration/reduced po intake.  Attend follow up as directed-  [facility] follow up appointment(s):   Future Appointments   Date Time Provider Amy Plata   12/1/2021  4:30 PM Casi Umanzor MD [facility] MAIN BS AMB   2/10/2022 10:45 AM Casi Umanzor MD [facility] MAIN BS AMB     Non-[facility] follow up appointment(s): none            Patient has Care Transition Nurse's contact information for any further questions, concerns, or needs.   Patients upcoming visits:    Future Appointments   Date Time Provider Amy Plata   2/10/2022 10:45 AM Casi Umanzor MD [facility] MAIN BS AMB

## 2022-02-15 ENCOUNTER — OFFICE VISIT (OUTPATIENT)
Dept: INTERNAL MEDICINE CLINIC | Age: 72
End: 2022-02-15
Payer: MEDICARE

## 2022-02-15 VITALS
BODY MASS INDEX: 26.4 KG/M2 | HEIGHT: 68 IN | RESPIRATION RATE: 16 BRPM | TEMPERATURE: 98.5 F | WEIGHT: 174.2 LBS | SYSTOLIC BLOOD PRESSURE: 151 MMHG | OXYGEN SATURATION: 93 % | HEART RATE: 81 BPM | DIASTOLIC BLOOD PRESSURE: 84 MMHG

## 2022-02-15 DIAGNOSIS — G62.9 PERIPHERAL POLYNEUROPATHY: ICD-10-CM

## 2022-02-15 DIAGNOSIS — U07.1 PNEUMONIA DUE TO COVID-19 VIRUS: ICD-10-CM

## 2022-02-15 DIAGNOSIS — R35.0 INCREASED URINARY FREQUENCY: Primary | ICD-10-CM

## 2022-02-15 DIAGNOSIS — J12.82 PNEUMONIA DUE TO COVID-19 VIRUS: ICD-10-CM

## 2022-02-15 PROCEDURE — G8417 CALC BMI ABV UP PARAM F/U: HCPCS | Performed by: INTERNAL MEDICINE

## 2022-02-15 PROCEDURE — 1101F PT FALLS ASSESS-DOCD LE1/YR: CPT | Performed by: INTERNAL MEDICINE

## 2022-02-15 PROCEDURE — G8399 PT W/DXA RESULTS DOCUMENT: HCPCS | Performed by: INTERNAL MEDICINE

## 2022-02-15 PROCEDURE — 3017F COLORECTAL CA SCREEN DOC REV: CPT | Performed by: INTERNAL MEDICINE

## 2022-02-15 PROCEDURE — 99213 OFFICE O/P EST LOW 20 MIN: CPT | Performed by: INTERNAL MEDICINE

## 2022-02-15 PROCEDURE — 1090F PRES/ABSN URINE INCON ASSESS: CPT | Performed by: INTERNAL MEDICINE

## 2022-02-15 PROCEDURE — G8510 SCR DEP NEG, NO PLAN REQD: HCPCS | Performed by: INTERNAL MEDICINE

## 2022-02-15 PROCEDURE — G8427 DOCREV CUR MEDS BY ELIG CLIN: HCPCS | Performed by: INTERNAL MEDICINE

## 2022-02-15 PROCEDURE — G8536 NO DOC ELDER MAL SCRN: HCPCS | Performed by: INTERNAL MEDICINE

## 2022-02-15 RX ORDER — PREGABALIN 150 MG/1
CAPSULE ORAL
Qty: 60 CAPSULE | Refills: 4 | Status: SHIPPED | OUTPATIENT
Start: 2022-02-15 | End: 2022-04-21 | Stop reason: SDUPTHER

## 2022-02-15 NOTE — PROGRESS NOTES
Chief Complaint   Patient presents with    Urinary Frequency   . SUBECTIVE:    Julia Do is a 66 y.o. female comes in for return visit complaining of increased urinary frequency for the last month. She has no odor or dysuria. She states that this has happened on several occasions in the past.  She thinks she has urinary tract infection. Her COVID symptoms have completely abated. She has a problem with her neuropathy for which she uses Lyrica and needs a refill on that medication. Current Outpatient Medications   Medication Sig Dispense Refill    pregabalin (Lyrica) 150 mg capsule Take 1 capsule by mouth 2 times a day as directed by physician. max daily dose is 450mg 60 Capsule 4    ibuprofen (MOTRIN) 200 mg tablet Take 200 mg by mouth every six (6) hours as needed for Pain.  multivitamin (MULTI-DELYN, WELLESSE) liqd Take 5 mL by mouth daily.  ascorbic acid/collagen hydr (COLLAGEN PLUS VITAMIN C PO) Take 1 Dose by mouth daily.  cholecalciferol, vitamin D3, (VITAMIN D3 PO) Take 1 Tablet by mouth every thirty (30) days.  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 # 3; TREATED AND NOW LUNGS HAVE ALWAYS BEEN CLEAR     Past Surgical History:   Procedure Laterality Date    HX COLONOSCOPY      HISTORY OF POLYP    HX ENDOSCOPY      HX HEENT      WISDOM TEETH    HX KNEE REPLACEMENT Right 11/2019    RIGHT TOTAL KNEE REPLACEMENT    HX ORTHOPAEDIC Left 2018    BROKEN WRIST, HAS A PLATE     No Known Allergies    REVIEW OF SYSTEMS:  Review of Systems - Negative except   ENT ROS: negative for - headaches, hearing change, nasal congestion, oral lesions, tinnitus, visual changes or   Respiratory ROS: no cough, shortness of breath, or wheezing  Cardiovascular ROS: no chest pain or dyspnea on exertion  Gastrointestinal ROS: no abdominal pain, change in bowel habits, or black or blood  Genito-Urinary ROS: no dysuria, trouble voiding, or hematuria  Musculoskeletal ROS: negative  Neurological ROS: no TIA or stroke symptoms      Social History     Socioeconomic History    Marital status: SINGLE   Tobacco Use    Smoking status: Former Smoker     Packs/day: 1.00     Quit date:      Years since quittin.1    Smokeless tobacco: Never Used   Vaping Use    Vaping Use: Never used   Substance and Sexual Activity    Alcohol use: Not Currently    Drug use: Not Currently    Sexual activity: Not Currently   Social History Narrative    ** Merged History Encounter **        r  Family History   Problem Relation Age of Onset    Other Mother         UNKNOWN HEALTH HX    Other Father         UNKNOWN HEALTH HX    Anesth Problems Neg Hx     Breast Cancer Maternal Grandmother        OBJECTIVE:  Visit Vitals  BP (!) 151/84   Pulse 81   Temp 98.5 °F (36.9 °C) (Oral)   Resp 16   Ht 5' 8\" (1.727 m)   Wt 174 lb 3.2 oz (79 kg)   SpO2 93%   BMI 26.49 kg/m²     ENT: perrla,  eom intact  NECK: supple. Thyroid normal, no JVD  CHEST: clear to ascultation and percussion   HEART: regular rate and rhythm  ABD: soft, bowel sounds active,   EXTREMITIES: no edema, pulse 1+  INTEGUMENT: clear      ASSESSMENT:  1. Increased urinary frequency    2. Peripheral polyneuropathy    3. Pneumonia due to COVID-19 virus        PLAN:  1. Empiric treatment with an antibiotic although I have my suspicion that this might not be related to her UTI. If no improvement occurs, then an ultrasound of her pelvis will be obtained with the possibility that there may be bladder irritation from either uterine fibroid or a _____. 2. Her polyneuropathy will be treated with Lyrica. 3. She has no pulmonary symptoms related to her COVID infection currently.     .  Orders Placed This Encounter    CULTURE, URINE    URINALYSIS W/ RFLX MICROSCOPIC  pregabalin (Lyrica) 150 mg capsule       Follow-up and Dispositions    · Return in about 3 months (around 5/15/2022).            Yoselyn Perez MD

## 2022-02-15 NOTE — PROGRESS NOTES
Chief Complaint   Patient presents with    Urinary Frequency         1. Have you been to the ER, urgent care clinic since your last visit? Hospitalized since your last visit? No    2. Have you seen or consulted any other health care providers outside of the [address] [place] [address] since your last visit? Include any pap smears or colon screening.  No

## 2022-02-18 LAB — BACTERIA UR CULT: NORMAL

## 2022-03-19 PROBLEM — J12.82 PNEUMONIA DUE TO COVID-19 VIRUS: Status: ACTIVE | Noted: 2021-12-12

## 2022-03-19 PROBLEM — M17.11 PRIMARY OSTEOARTHRITIS OF RIGHT KNEE: Status: ACTIVE | Noted: 2019-11-04

## 2022-03-19 PROBLEM — U07.1 PNEUMONIA DUE TO COVID-19 VIRUS: Status: ACTIVE | Noted: 2021-12-12

## 2022-03-19 PROBLEM — J96.01 ACUTE RESPIRATORY FAILURE WITH HYPOXIA (HCC): Status: ACTIVE | Noted: 2021-11-20

## 2022-03-19 PROBLEM — M17.12 PRIMARY LOCALIZED OSTEOARTHRITIS OF LEFT KNEE: Status: ACTIVE | Noted: 2019-11-04

## 2022-04-21 ENCOUNTER — OFFICE VISIT (OUTPATIENT)
Dept: INTERNAL MEDICINE CLINIC | Age: 72
End: 2022-04-21
Payer: MEDICARE

## 2022-04-21 VITALS
RESPIRATION RATE: 16 BRPM | HEART RATE: 68 BPM | HEIGHT: 68 IN | SYSTOLIC BLOOD PRESSURE: 172 MMHG | BODY MASS INDEX: 25.78 KG/M2 | TEMPERATURE: 98 F | WEIGHT: 170.1 LBS | DIASTOLIC BLOOD PRESSURE: 85 MMHG | OXYGEN SATURATION: 94 %

## 2022-04-21 DIAGNOSIS — G62.9 PERIPHERAL POLYNEUROPATHY: ICD-10-CM

## 2022-04-21 DIAGNOSIS — M17.11 PRIMARY OSTEOARTHRITIS OF RIGHT KNEE: ICD-10-CM

## 2022-04-21 DIAGNOSIS — R10.11 RIGHT UPPER QUADRANT ABDOMINAL PAIN: Primary | ICD-10-CM

## 2022-04-21 DIAGNOSIS — K74.60 CIRRHOSIS OF LIVER WITHOUT ASCITES, UNSPECIFIED HEPATIC CIRRHOSIS TYPE (HCC): ICD-10-CM

## 2022-04-21 PROCEDURE — G8510 SCR DEP NEG, NO PLAN REQD: HCPCS | Performed by: INTERNAL MEDICINE

## 2022-04-21 PROCEDURE — 3017F COLORECTAL CA SCREEN DOC REV: CPT | Performed by: INTERNAL MEDICINE

## 2022-04-21 PROCEDURE — 1090F PRES/ABSN URINE INCON ASSESS: CPT | Performed by: INTERNAL MEDICINE

## 2022-04-21 PROCEDURE — G8427 DOCREV CUR MEDS BY ELIG CLIN: HCPCS | Performed by: INTERNAL MEDICINE

## 2022-04-21 PROCEDURE — 99214 OFFICE O/P EST MOD 30 MIN: CPT | Performed by: INTERNAL MEDICINE

## 2022-04-21 PROCEDURE — G8536 NO DOC ELDER MAL SCRN: HCPCS | Performed by: INTERNAL MEDICINE

## 2022-04-21 PROCEDURE — G8399 PT W/DXA RESULTS DOCUMENT: HCPCS | Performed by: INTERNAL MEDICINE

## 2022-04-21 PROCEDURE — G8417 CALC BMI ABV UP PARAM F/U: HCPCS | Performed by: INTERNAL MEDICINE

## 2022-04-21 PROCEDURE — 1101F PT FALLS ASSESS-DOCD LE1/YR: CPT | Performed by: INTERNAL MEDICINE

## 2022-04-21 RX ORDER — PREGABALIN 150 MG/1
CAPSULE ORAL
Qty: 60 CAPSULE | Refills: 4 | Status: SHIPPED | OUTPATIENT
Start: 2022-04-21 | End: 2022-07-21 | Stop reason: SDUPTHER

## 2022-04-21 NOTE — PROGRESS NOTES
Chief Complaint   Patient presents with    Medication Refill    Abdominal Pain     Every morning per patient. 1. Have you been to the ER, urgent care clinic since your last visit? Hospitalized since your last visit? No    2. Have you seen or consulted any other health care providers outside of the [address] [place] [address] since your last visit? Include any pap smears or colon screening.  No

## 2022-04-21 NOTE — PROGRESS NOTES
[facility]  [place] [address]  [address] [place] [address]  Phone:  [phone]  Fax: [phone]       Chief Complaint   Patient presents with    Medication Refill    Abdominal Pain     Every morning per patient. .      SUBJECTIVE:    Jazz Chaparro is a 66 y.o. female comes in complaining of right upper quadrant pain, which she has had for the last several days. It improves after having a bowel movement. It is typically worse in the morning and abates for the remainder of the day. She has also noted increased urinary frequency. She does have a history of cirrhosis related to alcohol consumption and she continues to drink, although mildly. I explained to her that she has to be totally abstinent. It is quite possible that the neuropathy that she has in her legs is related to chronic alcohol abuse. Current Outpatient Medications   Medication Sig Dispense Refill    pregabalin (Lyrica) 150 mg capsule Take 1 capsule by mouth 2 times a day as directed by physician. max daily dose is 450mg 60 Capsule 4    multivitamin (MULTI-DELYN, WELLESSE) liqd Take 5 mL by mouth daily.  ascorbic acid/collagen hydr (COLLAGEN PLUS VITAMIN C PO) Take 1 Dose by mouth daily.  ibuprofen (MOTRIN) 200 mg tablet Take 200 mg by mouth every six (6) hours as needed for Pain. (Patient not taking: Reported on 4/21/2022)      cholecalciferol, vitamin D3, (VITAMIN D3 PO) Take 1 Tablet by mouth every thirty (30) days.  50,000 UNITS (Patient not taking: Reported on 4/21/2022)       Past Medical History:   Diagnosis Date    Arthritis     OSTEO    Chronic pain     Cirrhosis ([facility] [address]     Hepatitis C     Liver disease 1980s    HEPATITIS C, TREATED AND NOW GONE    Neuropathy     Tuberculosis 1962    +ANNA MARIE TEST # 3; TREATED AND NOW LUNGS HAVE ALWAYS BEEN CLEAR     Past Surgical History:   Procedure Laterality Date    HX COLONOSCOPY      HISTORY OF POLYP    HX ENDOSCOPY      HX HEENT      WISDOM TEETH    HX KNEE REPLACEMENT Right 2019    RIGHT TOTAL KNEE REPLACEMENT    HX ORTHOPAEDIC Left 2018    BROKEN WRIST, HAS A PLATE     No Known Allergies      REVIEW OF SYSTEMS:  General: negative for - chills or fever  ENT: negative for - headaches, nasal congestion or tinnitus  Respiratory: negative for - cough, hemoptysis, shortness of breath or wheezing  Cardiovascular : negative for - chest pain, edema, palpitations or shortness of breath  Gastrointestinal: negative for - abdominal pain, blood in stools, heartburn or nausea/vomiting  Genito-Urinary: no dysuria, trouble voiding, or hematuria  Musculoskeletal: negative for - gait disturbance, joint pain, joint stiffness or joint swelling  Neurological: no TIA or stroke symptoms  Hematologic: no bruises, no bleeding, no swollen glands  Integument: no lumps, mole changes, nail changes or rash  Endocrine: no malaise/lethargy or unexpected weight changes      Social History     Socioeconomic History    Marital status: SINGLE   Tobacco Use    Smoking status: Former Smoker     Packs/day: 1.00     Quit date:      Years since quittin.3    Smokeless tobacco: Never Used   Vaping Use    Vaping Use: Never used   Substance and Sexual Activity    Alcohol use: Not Currently    Drug use: Not Currently    Sexual activity: Not Currently   Social History Narrative    ** Merged History Encounter **          Family History   Problem Relation Age of Onset    Other Mother         UNKNOWN HEALTH HX    Other Father         UNKNOWN HEALTH HX    Anesth Problems Neg Hx     Breast Cancer Maternal Grandmother        OBJECTIVE:    Visit Vitals  BP (!) 172/85   Pulse 68   Temp 98 °F (36.7 °C) (Oral)   Resp 16   Ht 5' 8\" (1.727 m)   Wt 170 lb 1.6 oz (77.2 kg)   SpO2 94%   BMI 25.86 kg/m²     CONSTITUTIONAL: well , well nourished, appears age appropriate  EYES: perrla, eom intact  ENMT:moist mucous membranes, pharynx clear  NECK: supple.  Thyroid normal  RESPIRATORY: Chest: clear to ascultation and percussion   CARDIOVASCULAR: Heart: regular rate and rhythm  GASTROINTESTINAL: Abdomen: soft, bowel sounds active, no localized area of tenderness, no rebound, no organomegaly or masses  HEMATOLOGIC: no pathological lymph nodes palpated  MUSCULOSKELETAL: Extremities: no edema, pulse 1+   INTEGUMENT: No unusual rashes or suspicious skin lesions noted. Nails appear normal.  NEUROLOGIC: non-focal exam   MENTAL STATUS: alert and oriented, appropriate affect      ASSESSMENT:  1. Right upper quadrant abdominal pain    2. Peripheral polyneuropathy    3. Cirrhosis of liver without ascites, unspecified hepatic cirrhosis type ([facility] [address]    4. Primary osteoarthritis of right knee        PLAN:  1. She has right upper quadrant pain, although she is asymptomatic currently. She needs to see a gastroenterologist and I have booked a colonoscopy done and possibly a CT scan. 2. She has peripheral polyneuropathy, which is responsive to Lyrica. 3. She has cirrhosis and I have reminded her that total abstinence is important in this instance.   4. She has moderate osteoarthritis involving her right knee, but remains quite functional.    .  Orders Placed This Encounter    URINALYSIS W/ RFLX MICROSCOPIC    METABOLIC PANEL, COMPREHENSIVE    pregabalin (Lyrica) 150 mg capsule               Haven Pallas, MD

## 2022-04-22 LAB
ALBUMIN SERPL-MCNC: 4.4 G/DL (ref 3.7–4.7)
ALBUMIN/GLOB SERPL: 1.5 {RATIO} (ref 1.2–2.2)
ALP SERPL-CCNC: 92 IU/L (ref 44–121)
ALT SERPL-CCNC: 28 IU/L (ref 0–32)
APPEARANCE UR: CLEAR
AST SERPL-CCNC: 36 IU/L (ref 0–40)
BILIRUB SERPL-MCNC: 0.5 MG/DL (ref 0–1.2)
BILIRUB UR QL STRIP: NEGATIVE
BUN SERPL-MCNC: 13 MG/DL (ref 8–27)
BUN/CREAT SERPL: 18 (ref 12–28)
CALCIUM SERPL-MCNC: 9.1 MG/DL (ref 8.7–10.3)
CHLORIDE SERPL-SCNC: 106 MMOL/L (ref 96–106)
CO2 SERPL-SCNC: 24 MMOL/L (ref 20–29)
COLOR UR: YELLOW
CREAT SERPL-MCNC: 0.72 MG/DL (ref 0.57–1)
EGFR: 89 ML/MIN/1.73
GLOBULIN SER CALC-MCNC: 3 G/DL (ref 1.5–4.5)
GLUCOSE SERPL-MCNC: 97 MG/DL (ref 65–99)
GLUCOSE UR QL STRIP: NEGATIVE
HGB UR QL STRIP: NEGATIVE
KETONES UR QL STRIP: NEGATIVE
LEUKOCYTE ESTERASE UR QL STRIP: NEGATIVE
MICRO URNS: NORMAL
NITRITE UR QL STRIP: NEGATIVE
PH UR STRIP: 7.5 [PH] (ref 5–7.5)
POTASSIUM SERPL-SCNC: 4.2 MMOL/L (ref 3.5–5.2)
PROT SERPL-MCNC: 7.4 G/DL (ref 6–8.5)
PROT UR QL STRIP: NORMAL
SODIUM SERPL-SCNC: 146 MMOL/L (ref 134–144)
SP GR UR STRIP: 1.02 (ref 1–1.03)
UROBILINOGEN UR STRIP-MCNC: 1 MG/DL (ref 0.2–1)

## 2022-05-04 NOTE — ED PROVIDER NOTES
This is a 69-year-old female who presents ambulatory to the emergency room with complaints of a headache and a sore throat. Patient states she woke up today with her symptoms. Was told that her  whom she works in close contact with has tested positive for the COVID-19 virus. Patient comes to the emergency room for further evaluation. Has received the [employer] vaccine approximately 6 months ago. Denies any chest pain, shortness of breath, dizziness, nausea or vomiting, fevers or chills. States her pain is 7 out of 10 and located in her head and her throat. Denies any history of headaches. Has not taken any medication prior to arrival for her symptoms. There are no further complaints at this time. Tish Ozuna MD  Past Medical History:  No date: 'light-for-dates' infant with signs of fetal malnutrition  No date: Arthritis      Comment:  OSTEO  No date: Chronic pain  No date: Cirrhosis ([facility] [address]  No date: Hepatitis C  1980s: Liver disease      Comment:  HEPATITIS C, TREATED AND NOW GONE  No date: Neuropathy  No date: Tuberculosis  1962: Tuberculosis      Comment:  +ANNA MARIE TEST # 3; TREATED AND NOW LUNGS HAVE ALWAYS BEEN                CLEAR  Past Surgical History:  No date: HX COLONOSCOPY      Comment:  HISTORY OF POLYP  No date: HX ENDOSCOPY  No date: HX HEENT      Comment:  WISDOM TEETH  11/2019: HX KNEE REPLACEMENT; Right      Comment:  RIGHT TOTAL KNEE REPLACEMENT  2018: HX ORTHOPAEDIC; Left      Comment:  BROKEN WRIST, HAS A PLATE    Deborah Us was evaluated in the Emergency Department on 11/11/2021 for the symptoms described in the history of present illness. He/she was evaluated in the context of the global COVID-19 pandemic, which necessitated consideration that the patient might be at risk for infection with the SARS-CoV-2 virus that causes COVID-19.  Institutional protocols and algorithms that pertain to the evaluation of patients at risk for COVID-19 are in a state of rapid change based on information released by regulatory bodies including the CDC and federal and state organizations. These policies and algorithms were followed during the patient's care in the ED.     Surrogate Decision Maker (Who do you want to make decisions for you in the event you are not able to?): Extended Emergency Contact Information  Primary Emergency Contact: [address]  Mobile Phone: [phone]  Relation: Friend    Ventilation (Do you want to be intubated and mechanically ventilated?):  Yes    CPR (Do you want chest compressions and electricity in an attempt to restart your heart?): Yes             Past Medical History:   Diagnosis Date    'light-for-dates' infant with signs of fetal malnutrition     Arthritis     OSTEO    Chronic pain     Cirrhosis ([facility] [address]     Hepatitis C     Liver disease     HEPATITIS C, TREATED AND NOW GONE    Neuropathy     Tuberculosis     Tuberculosis     +ANNA MARIE TEST # 3; TREATED AND NOW LUNGS HAVE ALWAYS BEEN CLEAR       Past Surgical History:   Procedure Laterality Date    HX COLONOSCOPY      HISTORY OF POLYP    HX ENDOSCOPY      HX HEENT      WISDOM TEETH    HX KNEE REPLACEMENT Right 2019    RIGHT TOTAL KNEE REPLACEMENT    HX ORTHOPAEDIC Left 2018    BROKEN WRIST, HAS A PLATE         Family History:   Problem Relation Age of Onset    Other Mother         UNKNOWN HEALTH HX    Other Father         UNKNOWN HEALTH HX    Anesth Problems Neg Hx     Breast Cancer Maternal Grandmother        Social History     Socioeconomic History    Marital status: SINGLE     Spouse name: Not on file    Number of children: Not on file    Years of education: Not on file    Highest education level: Not on file   Occupational History    Not on file   Tobacco Use    Smoking status: Former Smoker     Packs/day: 1.00     Quit date:      Years since quittin.8    Smokeless tobacco: Never Used   Vaping Use    Vaping Use: Never used   Substance and Sexual Activity    Alcohol use: Not Currently    Drug use: Not Currently    Sexual activity: Not Currently   Other Topics Concern    Not on file   Social History Narrative    ** Merged History Encounter **          Social Determinants of Health     Financial Resource Strain:     Difficulty of Paying Living Expenses: Not on file   Food Insecurity:     Worried About Running Out of Food in the Last Year: Not on file    Ole of Food in the Last Year: Not on file   Transportation Needs:     Lack of Transportation (Medical): Not on file    Lack of Transportation (Non-Medical): Not on file   Physical Activity:     Days of Exercise per Week: Not on file    Minutes of Exercise per Session: Not on file   Stress:     Feeling of Stress : Not on file   Social Connections:     Frequency of Communication with Friends and Family: Not on file    Frequency of Social Gatherings with Friends and Family: Not on file    Attends [employer] Services: Not on file    Active Member of [address] [place] [address] or Organizations: Not on file    Attends Club or Organization Meetings: Not on file    Marital Status: Not on file   Intimate Partner Violence:     Fear of Current or Ex-Partner: Not on file    Emotionally Abused: Not on file    Physically Abused: Not on file    Sexually Abused: Not on file   Housing Stability:     Unable to Pay for Housing in the Last Year: Not on file    Number of [place] in the Last Year: Not on file    Unstable Housing in the Last Year: Not on file         ALLERGIES: Patient has no known allergies. Review of Systems   Constitutional: Negative for appetite change, chills, diaphoresis, fatigue and fever. HENT: Positive for sore throat. Negative for congestion, ear discharge, ear pain, sinus pressure, sinus pain and trouble swallowing. Eyes: Negative for photophobia, pain, redness and visual disturbance. Respiratory: Negative for chest tightness, shortness of breath and wheezing.     Cardiovascular: Negative for chest pain and palpitations. Gastrointestinal: Negative for abdominal distention, abdominal pain, nausea and vomiting. Endocrine: Negative. Genitourinary: Negative for difficulty urinating, flank pain, frequency and urgency. Musculoskeletal: Negative for back pain, neck pain and neck stiffness. Skin: Negative for color change, pallor, rash and wound. Allergic/Immunologic: Negative. Neurological: Positive for headaches. Negative for dizziness, speech difficulty and weakness. Hematological: Does not bruise/bleed easily. Psychiatric/Behavioral: Negative for behavioral problems. The patient is not nervous/anxious. Vitals:    11/11/21 1335   BP: (!) 140/96   Pulse: 91   Resp: 16   Temp: 97 °F (36.1 °C)   SpO2: 93%            Physical Exam  Vitals and nursing note reviewed. Constitutional:       General: She is not in acute distress. Appearance: Normal appearance. She is well-developed. She is not ill-appearing. HENT:      Head: Normocephalic and atraumatic. Right Ear: External ear normal.      Left Ear: External ear normal.      Nose: Nose normal. No congestion. Mouth/Throat:      Mouth: Mucous membranes are moist.      Pharynx: Posterior oropharyngeal erythema (mild) present. Eyes:      General:         Right eye: No discharge. Left eye: No discharge. Conjunctiva/sclera: Conjunctivae normal.      Pupils: Pupils are equal, round, and reactive to light. Neck:      Vascular: No JVD. Trachea: No tracheal deviation. Cardiovascular:      Rate and Rhythm: Normal rate and regular rhythm. Pulses: Normal pulses. Heart sounds: Normal heart sounds. No murmur heard. No gallop. Pulmonary:      Effort: Pulmonary effort is normal. No respiratory distress. Breath sounds: Normal breath sounds. No wheezing or rales. Chest:      Chest wall: No tenderness. Abdominal:      General: Bowel sounds are normal. There is no distension.       Palpations: Abdomen is soft.      Tenderness: There is no abdominal tenderness. There is no guarding or rebound. Genitourinary:     Comments: negative    Musculoskeletal:         General: No tenderness. Normal range of motion. Cervical back: Normal range of motion and neck supple. Skin:     General: Skin is warm and dry. Capillary Refill: Capillary refill takes less than 2 seconds. Coloration: Skin is not pale. Findings: No erythema or rash. Neurological:      General: No focal deficit present. Mental Status: She is alert and oriented to person, place, and time. Motor: No weakness. Coordination: Coordination normal.   Psychiatric:         Mood and Affect: Mood normal.         Behavior: Behavior normal.         Thought Content: Thought content normal.         Judgment: Judgment normal.          MDM  Number of Diagnoses or Management Options  COVID-19: new and requires workup  Diagnosis management comments: Differential diagnosis includes headache, viral syndrome, COVID-19 virus and others. After physical assessment, review of imaging and laboratory data, patient was diagnosed with COVID-19. Discharged home and isolate per CDC guidelines. Return to the emergency room with worsening symptoms. Patient in agreement with plan of care. Amount and/or Complexity of Data Reviewed  Clinical lab tests: ordered and reviewed  Tests in the radiology section of CPT®: ordered and reviewed         Labs Reviewed   COVID-19 RAPID TEST - Abnormal; Notable for the following components:       Result Value    COVID-19 rapid test Detected (*)     All other components within normal limits     XR CHEST PORT    Result Date: 11/11/2021  No acute cardiopulmonary disease radiographically. .  . 3:47 PM  Pt has been reexamined. Pt has no new complaints, changes or physical findings. Care plan outlined and precautions discussed. All available results were reviewed with pt. All medications were reviewed with pt.  All of pt's questions and concerns were addressed. Pt agrees to F/U as instructed and agrees to return to ED upon further deterioration. Pt is ready to go home. Fred Stallings NP    Please note that this dictation was completed with [employer], the computer voice recognition software. Quite often unanticipated grammatical, syntax, homophones, and other interpretive errors are inadvertently transcribed by the computer software. Please disregard these errors. Please excuse any errors that have escaped final proofreading. Thank you.     Procedures no

## 2022-06-01 ENCOUNTER — HOSPITAL ENCOUNTER (EMERGENCY)
Age: 72
Discharge: HOME | End: 2022-06-01
Attending: EMERGENCY MEDICINE
Payer: MEDICARE

## 2022-06-01 VITALS
BODY MASS INDEX: 29.41 KG/M2 | DIASTOLIC BLOOD PRESSURE: 98 MMHG | HEIGHT: 63 IN | RESPIRATION RATE: 20 BRPM | WEIGHT: 166.01 LBS | HEART RATE: 63 BPM | SYSTOLIC BLOOD PRESSURE: 156 MMHG | OXYGEN SATURATION: 95 % | TEMPERATURE: 98.3 F

## 2022-06-01 DIAGNOSIS — R10.13 ABDOMINAL PAIN, EPIGASTRIC: Primary | ICD-10-CM

## 2022-06-01 DIAGNOSIS — K29.00 ACUTE SUPERFICIAL GASTRITIS WITHOUT HEMORRHAGE: ICD-10-CM

## 2022-06-01 LAB
ALBUMIN SERPL-MCNC: 3.6 G/DL (ref 3.5–5)
ALBUMIN/GLOB SERPL: 0.8 {RATIO} (ref 1.1–2.2)
ALP SERPL-CCNC: 122 U/L (ref 45–117)
ALT SERPL-CCNC: 54 U/L (ref 12–78)
ANION GAP SERPL CALC-SCNC: 5 MMOL/L (ref 5–15)
AST SERPL-CCNC: 52 U/L (ref 15–37)
BASOPHILS # BLD: 0 K/UL (ref 0–0.1)
BASOPHILS NFR BLD: 0 % (ref 0–1)
BILIRUB SERPL-MCNC: 0.4 MG/DL (ref 0.2–1)
BUN SERPL-MCNC: 17 MG/DL (ref 6–20)
BUN/CREAT SERPL: 20 (ref 12–20)
CALCIUM SERPL-MCNC: 8.9 MG/DL (ref 8.5–10.1)
CHLORIDE SERPL-SCNC: 107 MMOL/L (ref 97–108)
CO2 SERPL-SCNC: 29 MMOL/L (ref 21–32)
COMMENT, HOLDF: NORMAL
CREAT SERPL-MCNC: 0.85 MG/DL (ref 0.55–1.02)
DIFFERENTIAL METHOD BLD: ABNORMAL
EOSINOPHIL # BLD: 0 K/UL (ref 0–0.4)
EOSINOPHIL NFR BLD: 0 % (ref 0–7)
ERYTHROCYTE [DISTWIDTH] IN BLOOD BY AUTOMATED COUNT: 13.4 % (ref 11.5–14.5)
GLOBULIN SER CALC-MCNC: 4.3 G/DL (ref 2–4)
GLUCOSE SERPL-MCNC: 131 MG/DL (ref 65–100)
HCT VFR BLD AUTO: 41.8 % (ref 35–47)
HGB BLD-MCNC: 13.7 G/DL (ref 11.5–16)
IMM GRANULOCYTES # BLD AUTO: 0 K/UL (ref 0–0.04)
IMM GRANULOCYTES NFR BLD AUTO: 0 % (ref 0–0.5)
LIPASE SERPL-CCNC: 86 U/L (ref 73–393)
LYMPHOCYTES # BLD: 0.5 K/UL (ref 0.8–3.5)
LYMPHOCYTES NFR BLD: 5 % (ref 12–49)
MCH RBC QN AUTO: 30.9 PG (ref 26–34)
MCHC RBC AUTO-ENTMCNC: 32.8 G/DL (ref 30–36.5)
MCV RBC AUTO: 94.1 FL (ref 80–99)
MONOCYTES # BLD: 0.3 K/UL (ref 0–1)
MONOCYTES NFR BLD: 3 % (ref 5–13)
NEUTS SEG # BLD: 8.6 K/UL (ref 1.8–8)
NEUTS SEG NFR BLD: 92 % (ref 32–75)
NRBC # BLD: 0 K/UL (ref 0–0.01)
NRBC BLD-RTO: 0 PER 100 WBC
PLATELET # BLD AUTO: 183 K/UL (ref 150–400)
PMV BLD AUTO: 10.9 FL (ref 8.9–12.9)
POTASSIUM SERPL-SCNC: 3.7 MMOL/L (ref 3.5–5.1)
PROT SERPL-MCNC: 7.9 G/DL (ref 6.4–8.2)
RBC # BLD AUTO: 4.44 M/UL (ref 3.8–5.2)
RBC MORPH BLD: ABNORMAL
SAMPLES BEING HELD,HOLD: NORMAL
SODIUM SERPL-SCNC: 141 MMOL/L (ref 136–145)
WBC # BLD AUTO: 9.4 K/UL (ref 3.6–11)

## 2022-06-01 PROCEDURE — 80053 COMPREHEN METABOLIC PANEL: CPT

## 2022-06-01 PROCEDURE — 36415 COLL VENOUS BLD VENIPUNCTURE: CPT

## 2022-06-01 PROCEDURE — 99284 EMERGENCY DEPT VISIT MOD MDM: CPT

## 2022-06-01 PROCEDURE — 99283 EMERGENCY DEPT VISIT LOW MDM: CPT

## 2022-06-01 PROCEDURE — 85025 COMPLETE CBC W/AUTO DIFF WBC: CPT

## 2022-06-01 PROCEDURE — 83690 ASSAY OF LIPASE: CPT

## 2022-06-01 PROCEDURE — 74011250637 HC RX REV CODE- 250/637: Performed by: EMERGENCY MEDICINE

## 2022-06-01 RX ORDER — OMEPRAZOLE 20 MG/1
20 CAPSULE, DELAYED RELEASE ORAL DAILY
Qty: 30 CAPSULE | Refills: 0 | Status: SHIPPED | OUTPATIENT
Start: 2022-06-01 | End: 2022-07-01

## 2022-06-01 RX ORDER — ONDANSETRON 4 MG/1
4 TABLET, ORALLY DISINTEGRATING ORAL
Status: DISCONTINUED | OUTPATIENT
Start: 2022-06-01 | End: 2022-06-01 | Stop reason: HOSPADM

## 2022-06-01 RX ORDER — FAMOTIDINE 20 MG/1
40 TABLET, FILM COATED ORAL 2 TIMES DAILY
Qty: 16 TABLET | Refills: 0 | Status: SHIPPED | OUTPATIENT
Start: 2022-06-01 | End: 2022-06-05

## 2022-06-01 RX ORDER — FAMOTIDINE 20 MG/1
40 TABLET, FILM COATED ORAL
Status: DISCONTINUED | OUTPATIENT
Start: 2022-06-01 | End: 2022-06-01 | Stop reason: HOSPADM

## 2022-06-01 RX ORDER — DICYCLOMINE HYDROCHLORIDE 20 MG/1
20 TABLET ORAL
Qty: 20 TABLET | Refills: 0 | Status: SHIPPED | OUTPATIENT
Start: 2022-06-01

## 2022-06-01 RX ADMIN — ALUMINUM HYDROXIDE AND MAGNESIUM HYDROXIDE 30 ML: 200; 200 SUSPENSION ORAL at 09:07

## 2022-06-01 NOTE — ED TRIAGE NOTES
Patient started with abdominal pain at 0730 via EMS. Middle abdominal pain, cramping with nausea vomiting and diarrhea. Denies SOB and CP.

## 2022-06-02 NOTE — ED PROVIDER NOTES
The history is provided by the patient. Abdominal Pain   This is a new problem. The current episode started 1 to 2 hours ago. The problem occurs constantly. The problem has been gradually improving. The pain is associated with vomiting. The pain is located in the epigastric region. The quality of the pain is aching. The pain is moderate. Associated symptoms include nausea and vomiting. Pertinent negatives include no fever and no hematochezia. Nothing worsens the pain. The pain is relieved by nothing. The patient's surgical history non-contributory.        Past Medical History:   Diagnosis Date    Arthritis     OSTEO    Chronic pain     Cirrhosis ([address]     Hepatitis C     Liver disease     HEPATITIS C, TREATED AND NOW GONE    Neuropathy     Tuberculosis     +ANNA MARIE TEST # 3; TREATED AND NOW LUNGS HAVE ALWAYS BEEN CLEAR       Past Surgical History:   Procedure Laterality Date    HX COLONOSCOPY      HISTORY OF POLYP    HX ENDOSCOPY      HX HEENT      WISDOM TEETH    HX KNEE REPLACEMENT Right 2019    RIGHT TOTAL KNEE REPLACEMENT    HX ORTHOPAEDIC Left 2018    BROKEN WRIST, HAS A PLATE         Family History:   Problem Relation Age of Onset    Other Mother         UNKNOWN HEALTH HX    Other Father         UNKNOWN HEALTH HX    Anesth Problems Neg Hx     Breast Cancer Maternal Grandmother        Social History     Socioeconomic History    Marital status: SINGLE     Spouse name: Not on file    Number of children: Not on file    Years of education: Not on file    Highest education level: Not on file   Occupational History    Not on file   Tobacco Use    Smoking status: Former Smoker     Packs/day: 1.00     Quit date:      Years since quittin.4    Smokeless tobacco: Never Used   Vaping Use    Vaping Use: Never used   Substance and Sexual Activity    Alcohol use: Not Currently    Drug use: Not Currently    Sexual activity: Not Currently   Other Topics Concern    Not on file Social History Narrative    ** Merged History Encounter **          Social Determinants of Health     Financial Resource Strain:     Difficulty of Paying Living Expenses: Not on file   Food Insecurity:     Worried About Running Out of Food in the Last Year: Not on file    Ole of Food in the Last Year: Not on file   Transportation Needs:     Lack of Transportation (Medical): Not on file    Lack of Transportation (Non-Medical): Not on file   Physical Activity:     Days of Exercise per Week: Not on file    Minutes of Exercise per Session: Not on file   Stress:     Feeling of Stress : Not on file   Social Connections:     Frequency of Communication with Friends and Family: Not on file    Frequency of Social Gatherings with Friends and Family: Not on file    Attends Restorationism Services: Not on file    Active Member of [address] [place] [address] [employer] or Organizations: Not on file    Attends Club or Organization Meetings: Not on file    Marital Status: Not on file   Intimate Partner Violence:     Fear of Current or Ex-Partner: Not on file    Emotionally Abused: Not on file    Physically Abused: Not on file    Sexually Abused: Not on file   Housing Stability:     Unable to Pay for Housing in the Last Year: Not on file    Number of [place] in the Last Year: Not on file    Unstable Housing in the Last Year: Not on file         ALLERGIES: Patient has no known allergies. Review of Systems   Constitutional: Negative for fever. Gastrointestinal: Positive for abdominal pain, nausea and vomiting. Negative for hematochezia. All other systems reviewed and are negative. Vitals:    06/01/22 0846   BP: (!) 156/98   Pulse: 63   Resp: 20   Temp: 98.3 °F (36.8 °C)   SpO2: 95%   Weight: 75.3 kg (166 lb 0.1 oz)   Height: 5' 3\" (1.6 m)            Physical Exam  Vitals and nursing note reviewed. Constitutional:       General: She is not in acute distress. Appearance: She is well-developed.    HENT:      Head: Normocephalic and atraumatic. Eyes:      Conjunctiva/sclera: Conjunctivae normal.   Cardiovascular:      Rate and Rhythm: Normal rate and regular rhythm. Pulmonary:      Effort: Pulmonary effort is normal. No respiratory distress. Abdominal:      General: There is no distension. Palpations: Abdomen is soft. Tenderness: There is abdominal tenderness (minimal) in the epigastric area. There is no guarding or rebound. Negative signs include Mcdaniels's sign and McBurney's sign. Musculoskeletal:         General: No deformity. Normal range of motion. Cervical back: Neck supple. Skin:     General: Skin is warm and dry. Neurological:      Mental Status: She is alert. Cranial Nerves: No cranial nerve deficit. Psychiatric:         Behavior: Behavior normal.          MDM     66 y.o. female presents with epigastric pain starting this morning when waking. She normally has abdominal pain when she wakes improved when she has a bowel movement and is pending outpatient colonoscopy but this was different. She improved with maalox therapy. Suspect superficial gastritis or severe GERD symptoms. Will start on PPI and H2 blockers empirically. Labs reassuring. She had some RUQ pain while pending discharge so bedside US performed which showed no stones or signs of cholecystitis. Plan to follow up with PCP as needed and return precautions discussed for worsening or new concerning symptoms.      Procedures

## 2022-07-06 ENCOUNTER — TRANSCRIBE ORDER (OUTPATIENT)
Dept: SCHEDULING | Age: 72
End: 2022-07-06

## 2022-07-06 DIAGNOSIS — R63.4 LOSS OF WEIGHT: ICD-10-CM

## 2022-07-06 DIAGNOSIS — R10.13 EPIGASTRIC PAIN: Primary | ICD-10-CM

## 2022-07-14 ENCOUNTER — HOSPITAL ENCOUNTER (OUTPATIENT)
Dept: CT IMAGING | Age: 72
Discharge: HOME | End: 2022-07-14
Attending: SPECIALIST
Payer: MEDICARE

## 2022-07-14 DIAGNOSIS — R10.13 EPIGASTRIC PAIN: ICD-10-CM

## 2022-07-14 DIAGNOSIS — R63.4 LOSS OF WEIGHT: ICD-10-CM

## 2022-07-14 PROCEDURE — 74177 CT ABD & PELVIS W/CONTRAST: CPT

## 2022-07-14 PROCEDURE — 74011000636 HC RX REV CODE- 636: Performed by: RADIOLOGY

## 2022-07-14 RX ADMIN — IOPAMIDOL 100 ML: 755 INJECTION, SOLUTION INTRAVENOUS at 11:00

## 2022-07-21 ENCOUNTER — OFFICE VISIT (OUTPATIENT)
Dept: INTERNAL MEDICINE CLINIC | Age: 72
End: 2022-07-21
Payer: MEDICARE

## 2022-07-21 VITALS
BODY MASS INDEX: 25.4 KG/M2 | HEART RATE: 75 BPM | OXYGEN SATURATION: 97 % | HEIGHT: 68 IN | TEMPERATURE: 98.6 F | SYSTOLIC BLOOD PRESSURE: 135 MMHG | RESPIRATION RATE: 18 BRPM | WEIGHT: 167.6 LBS | DIASTOLIC BLOOD PRESSURE: 87 MMHG

## 2022-07-21 DIAGNOSIS — M17.11 PRIMARY OSTEOARTHRITIS OF RIGHT KNEE: ICD-10-CM

## 2022-07-21 DIAGNOSIS — G62.9 NEUROPATHY: ICD-10-CM

## 2022-07-21 DIAGNOSIS — K74.60 CIRRHOSIS OF LIVER WITHOUT ASCITES, UNSPECIFIED HEPATIC CIRRHOSIS TYPE (HCC): ICD-10-CM

## 2022-07-21 DIAGNOSIS — Z00.00 MEDICARE ANNUAL WELLNESS VISIT, SUBSEQUENT: Primary | ICD-10-CM

## 2022-07-21 DIAGNOSIS — Z13.39 SCREENING FOR ALCOHOLISM: ICD-10-CM

## 2022-07-21 DIAGNOSIS — D21.9 FIBROIDS: ICD-10-CM

## 2022-07-21 DIAGNOSIS — J96.01 ACUTE RESPIRATORY FAILURE WITH HYPOXIA (HCC): ICD-10-CM

## 2022-07-21 DIAGNOSIS — M17.12 PRIMARY LOCALIZED OSTEOARTHRITIS OF LEFT KNEE: ICD-10-CM

## 2022-07-21 DIAGNOSIS — G62.9 PERIPHERAL POLYNEUROPATHY: ICD-10-CM

## 2022-07-21 PROCEDURE — G0439 PPPS, SUBSEQ VISIT: HCPCS | Performed by: INTERNAL MEDICINE

## 2022-07-21 PROCEDURE — 1123F ACP DISCUSS/DSCN MKR DOCD: CPT | Performed by: INTERNAL MEDICINE

## 2022-07-21 PROCEDURE — G8536 NO DOC ELDER MAL SCRN: HCPCS | Performed by: INTERNAL MEDICINE

## 2022-07-21 PROCEDURE — 1090F PRES/ABSN URINE INCON ASSESS: CPT | Performed by: INTERNAL MEDICINE

## 2022-07-21 PROCEDURE — 3017F COLORECTAL CA SCREEN DOC REV: CPT | Performed by: INTERNAL MEDICINE

## 2022-07-21 PROCEDURE — G8399 PT W/DXA RESULTS DOCUMENT: HCPCS | Performed by: INTERNAL MEDICINE

## 2022-07-21 PROCEDURE — G8417 CALC BMI ABV UP PARAM F/U: HCPCS | Performed by: INTERNAL MEDICINE

## 2022-07-21 PROCEDURE — 1101F PT FALLS ASSESS-DOCD LE1/YR: CPT | Performed by: INTERNAL MEDICINE

## 2022-07-21 PROCEDURE — 99213 OFFICE O/P EST LOW 20 MIN: CPT | Performed by: INTERNAL MEDICINE

## 2022-07-21 PROCEDURE — G8427 DOCREV CUR MEDS BY ELIG CLIN: HCPCS | Performed by: INTERNAL MEDICINE

## 2022-07-21 PROCEDURE — G8432 DEP SCR NOT DOC, RNG: HCPCS | Performed by: INTERNAL MEDICINE

## 2022-07-21 RX ORDER — PREGABALIN 150 MG/1
CAPSULE ORAL
Qty: 60 CAPSULE | Refills: 4 | Status: SHIPPED | OUTPATIENT
Start: 2022-07-21

## 2022-07-21 NOTE — PROGRESS NOTES
[facility]  Dora Young MD, [address] [address]  Phone:  [phone]  Fax: [phone]      Chief Complaint   Patient presents with    Annual Wellness Visit         SUBECTIVE:    Edgardo Scruggs is a 66 y.o. female Patient is seen today on behalf of Dr. Sabi Kaufman, who is not available at this time. She has a history of peripheral polyneuropathy, osteoarthritis of both knees, neuropathy, cirrhosis, acute respiratory failure. She wants to know the results of her CAT scan that was done at [address] [place] [address] last week and reviewed the results. She wans to see an OB/GYN regarding the fibroids. We reviewed the results of the CT scan with her in detail, with the remarkable finding being the multi fibroid uterus, for which she would like to see a gynecologist, and mild multilevel degenerative changes in the LS spine. She complains of pelvic pain every morning and wants the fibroids removed. She states after she moves her bowels the pain goes away. Current Outpatient Medications   Medication Sig Dispense Refill    pregabalin (Lyrica) 150 mg capsule Take 1 capsule by mouth 2 times a day as directed by physician. max daily dose is 450mg 60 Capsule 4    dicyclomine (BENTYL) 20 mg tablet Take 1 Tablet by mouth every six (6) hours as needed for Abdominal Cramps. 20 Tablet 0    multivitamin (MULTI-DELYN, WELLESSE) liqd Take 5 mL by mouth daily. ascorbic acid/collagen hydr (COLLAGEN PLUS VITAMIN C PO) Take 1 Dose by mouth daily.        Past Medical History:   Diagnosis Date    Arthritis     OSTEO    Chronic pain     Cirrhosis ([address]     Hepatitis C     Liver disease 1980s    HEPATITIS C, TREATED AND NOW GONE    Neuropathy     Tuberculosis 1962    +ANNA MARIE TEST # 3; TREATED AND NOW LUNGS HAVE ALWAYS BEEN CLEAR     Past Surgical History:   Procedure Laterality Date    HX COLONOSCOPY      HISTORY OF POLYP    HX ENDOSCOPY      HX HEENT      WISDOM TEETH    HX KNEE REPLACEMENT Right 2019    RIGHT TOTAL KNEE REPLACEMENT    HX ORTHOPAEDIC Left 2018    BROKEN WRIST, HAS A PLATE     No Known Allergies    REVIEW OF SYSTEMS:   No chest pain, no shortness of breath. Social History     Socioeconomic History    Marital status: SINGLE   Tobacco Use    Smoking status: Former     Packs/day: 1.00     Types: Cigarettes     Quit date: 1970     Years since quittin.5    Smokeless tobacco: Never   Vaping Use    Vaping Use: Never used   Substance and Sexual Activity    Alcohol use: Not Currently    Drug use: Not Currently    Sexual activity: Not Currently   Social History Narrative    ** Merged History Encounter **        r  Family History   Problem Relation Age of Onset    Other Mother         UNKNOWN HEALTH HX    Other Father         UNKNOWN HEALTH HX    Anesth Problems Neg Hx     Breast Cancer Maternal Grandmother        OBJECTIVE:  Visit Vitals  /87 (BP 1 Location: Left upper arm, BP Patient Position: Sitting)   Pulse 75   Temp 98.6 °F (37 °C) (Oral)   Resp 18   Ht 5' 8\" (1.727 m)   Wt 167 lb 9.6 oz (76 kg)   SpO2 97%   BMI 25.48 kg/m²     ENT: perrla,  eom intact  NECK: supple.  Thyroid normal  CHEST: clear to ascultation and percussion   HEART: regular rate and rhythm  ABD: soft, bowel sounds active  EXTREMITIES: no edema, pulse 1+     Admission on 2022, Discharged on 2022   Component Date Value Ref Range Status    WBC 2022 9.4  3.6 - 11.0 K/uL Final    RBC 2022 4.44  3.80 - 5.20 M/uL Final    HGB 2022 13.7  11.5 - 16.0 g/dL Final    HCT 2022 41.8  35.0 - 47.0 % Final    MCV 2022 94.1  80.0 - 99.0 FL Final    MCH 2022 30.9  26.0 - 34.0 PG Final    MCHC 2022 32.8  30.0 - 36.5 g/dL Final    RDW 2022 13.4  11.5 - 14.5 % Final    PLATELET  729  150 - 400 K/uL Final    MPV 2022 10.9  8.9 - 12.9 FL Final    NRBC 2022 0.0  0  WBC Final    ABSOLUTE NRBC 2022 0.00  0.00 - 0.01 K/uL Final    NEUTROPHILS 06/01/2022 92 (A) 32 - 75 % Final    LYMPHOCYTES 06/01/2022 5 (A) 12 - 49 % Final    MONOCYTES 06/01/2022 3 (A) 5 - 13 % Final    EOSINOPHILS 06/01/2022 0  0 - 7 % Final    BASOPHILS 06/01/2022 0  0 - 1 % Final    IMMATURE GRANULOCYTES 06/01/2022 0  0.0 - 0.5 % Final    ABS. NEUTROPHILS 06/01/2022 8.6 (A) 1.8 - 8.0 K/UL Final    ABS. LYMPHOCYTES 06/01/2022 0.5 (A) 0.8 - 3.5 K/UL Final    ABS. MONOCYTES 06/01/2022 0.3  0.0 - 1.0 K/UL Final    ABS. EOSINOPHILS 06/01/2022 0.0  0.0 - 0.4 K/UL Final    ABS. BASOPHILS 06/01/2022 0.0  0.0 - 0.1 K/UL Final    ABS. IMM. GRANS. 06/01/2022 0.0  0.00 - 0.04 K/UL Final    DF 06/01/2022 SMEAR SCANNED    Final    RBC COMMENTS 06/01/2022 NORMOCYTIC, NORMOCHROMIC    Final    Sodium 06/01/2022 141  136 - 145 mmol/L Final    Potassium 06/01/2022 3.7  3.5 - 5.1 mmol/L Final    Chloride 06/01/2022 107  97 - 108 mmol/L Final    CO2 06/01/2022 29  21 - 32 mmol/L Final    Anion gap 06/01/2022 5  5 - 15 mmol/L Final    Glucose 06/01/2022 131 (A) 65 - 100 mg/dL Final    BUN 06/01/2022 17  6 - 20 MG/DL Final    Creatinine 06/01/2022 0.85  0.55 - 1.02 MG/DL Final    BUN/Creatinine ratio 06/01/2022 20  12 - 20   Final    GFR est AA 06/01/2022 >60  >60 ml/min/1.73m2 Final    GFR est non-AA 06/01/2022 >60  >60 ml/min/1.73m2 Final    Estimated GFR is calculated using the IDMS-traceable Modification of Diet in Renal Disease (MDRD) Study equation, reported for both  Americans (GFRAA) and non- Americans (GFRNA), and normalized to 1.73m2 body surface area. The physician must decide which value applies to the patient. Calcium 06/01/2022 8.9  8.5 - 10.1 MG/DL Final    Bilirubin, total 06/01/2022 0.4  0.2 - 1.0 MG/DL Final    ALT (SGPT) 06/01/2022 54  12 - 78 U/L Final    AST (SGOT) 06/01/2022 52 (A) 15 - 37 U/L Final    Alk.  phosphatase 06/01/2022 122 (A) 45 - 117 U/L Final    Protein, total 06/01/2022 7.9  6.4 - 8.2 g/dL Final    Albumin 06/01/2022 3.6  3.5 - 5.0 g/dL Final    Globulin 06/01/2022 4.3 (A) 2.0 - 4.0 g/dL Final    A-G Ratio 06/01/2022 0.8 (A) 1.1 - 2.2   Final    SAMPLES BEING HELD 06/01/2022 1blu,1red   Final    COMMENT 06/01/2022 Add-on orders for these samples will be processed based on acceptable specimen integrity and analyte stability, which may vary by analyte. Final    Lipase 06/01/2022 86  73 - 393 U/L Final          ASSESSMENT:  1. Medicare annual wellness visit, subsequent    2. Peripheral polyneuropathy    3. Screening for alcoholism    4. Primary localized osteoarthritis of left knee    5. Primary osteoarthritis of right knee    6. Neuropathy    7. Cirrhosis of liver without ascites, unspecified hepatic cirrhosis type ([facility]    8. Acute respiratory failure with hypoxia (HCC)    9. Fibroids      We have a detailed discussion with her regarding the pelvic pain she has and the fibroids. We raised the question if the fibroid is great enough she may need major surgery. She states she will contemplate that, but wants to see a gynecologist.    No complaints related to the osteoarthritis of her knees. For the neuropathy we renew the Lyrica. There is a history of cirrhosis of liver without ascites, which is being followed by primary care physician. No evidence of respiratory failure. O2 sat is 97%. She will be back to see us in three months, sooner if any problems. I have discussed the diagnosis with the patient and the intended plan as seen in the  orders above. The patient understands and agees with the plan. The patient has   received an after visit summary and questions were answered concerning  future plans  Patient labs and/or xrays were reviewed  Past records were reviewed. PLAN:  .  Orders Placed This Encounter    [facility]    pregabalin (Lyrica) 150 mg capsule       Follow-up and Dispositions    Return in about 3 months (around 10/21/2022).                    ATTENTION:   This medical record was transcribed using an electronic medical records system. Although proofread, it may and can contain electronic and spelling errors. Other human spelling and other errors may be present. Corrections may be executed at a later time. Please feel free to contact us for any clarifications as needed.

## 2022-07-21 NOTE — PATIENT INSTRUCTIONS
Medicare Wellness Visit, Female     The best way to live healthy is to have a lifestyle where you eat a well-balanced diet, exercise regularly, limit alcohol use, and quit all forms of tobacco/nicotine, if applicable. Regular preventive services are another way to keep healthy. Preventive services (vaccines, screening tests, monitoring & exams) can help personalize your care plan, which helps you manage your own care. Screening tests can find health problems at the earliest stages, when they are easiest to treat. [facility] follows the current, evidence-based guidelines published by the [facility] ([facility]) when recommending preventive services for our patients. Because we follow these guidelines, sometimes recommendations change over time as research supports it. (For example, mammograms used to be recommended annually. Even though Medicare will still pay for an annual mammogram, the newer guidelines recommend a mammogram every two years for women of average risk). Of course, you and your doctor may decide to screen more often for some diseases, based on your risk and your co-morbidities (chronic disease you are already diagnosed with). Preventive services for you include:  - Medicare offers their members a free annual wellness visit, which is time for you and your primary care provider to discuss and plan for your preventive service needs. Take advantage of this benefit every year!  -All adults over the age of 72 should receive the recommended pneumonia vaccines. Current USPSTF guidelines recommend a series of two vaccines for the best pneumonia protection.   -All adults should have a flu vaccine yearly and a tetanus vaccine every 10 years.   -All adults age 48 and older should receive the shingles vaccines (series of two vaccines).       -All adults age 38-68 who are overweight should have a diabetes screening test once every three years.   -All adults born between [birth year] and 1965 should be screened once for Hepatitis C.  -Other screening tests and preventive services for persons with diabetes include: an eye exam to screen for diabetic retinopathy, a kidney function test, a foot exam, and stricter control over your cholesterol.   -Cardiovascular screening for adults with routine risk involves an electrocardiogram (ECG) at intervals determined by your doctor.   -Colorectal cancer screenings should be done for adults age 54-65 with no increased risk factors for colorectal cancer. There are a number of acceptable methods of screening for this type of cancer. Each test has its own benefits and drawbacks. Discuss with your doctor what is most appropriate for you during your annual wellness visit. The different tests include: colonoscopy (considered the best screening method), a fecal occult blood test, a fecal DNA test, and sigmoidoscopy.    -A bone mass density test is recommended when a woman turns 65 to screen for osteoporosis. This test is only recommended one time, as a screening. Some providers will use this same test as a disease monitoring tool if you already have osteoporosis. -Breast cancer screenings are recommended every other year for women of normal risk, age 54-69.  -Cervical cancer screenings for women over age 72 are only recommended with certain risk factors.      Here is a list of your current Health Maintenance items (your personalized list of preventive services) with a due date:  Health Maintenance Due   Topic Date Due    Hepatitis C Test  Never done    COVID-19 Vaccine (1) Never done    Pneumococcal Vaccine (1 - PCV) Never done    DTaP/Tdap/Td  (1 - Tdap) Never done    Colorectal Screening  Never done    Mammogram  06/12/2021

## 2022-07-21 NOTE — PROGRESS NOTES
Janneth Luevano is a 66 y.o. female    Chief Complaint   Patient presents with    Annual Wellness Visit     1. Have you been to the ER, urgent care clinic since your last visit? Hospitalized since your last visit? Yes When: June 01 2022 Where: [facility] Reason for visit: abdominal pain      2. Have you seen or consulted any other health care providers outside of the [address] [place] [address] since your last visit? Include any pap smears or colon screening. No  This is the Subsequent Medicare Annual Wellness Exam, performed 12 months or more after the Initial AWV or the last Subsequent AWV    I have reviewed the patient's medical history in detail and updated the computerized patient record. Assessment/Plan   Education and counseling provided:  Are appropriate based on today's review and evaluation    1. Screening for alcoholism  2. Peripheral polyneuropathy  The following orders have not been finalized:  -     pregabalin (Lyrica) 150 mg capsule  3.  Medicare annual wellness visit, subsequent       Depression Risk Factor Screening     3 most recent PHQ Screens 7/21/2022   Little interest or pleasure in doing things Nearly every day   Feeling down, depressed, irritable, or hopeless Nearly every day   Total Score PHQ 2 6   Trouble falling or staying asleep, or sleeping too much Not at all   Feeling tired or having little energy Nearly every day   Poor appetite, weight loss, or overeating Nearly every day   Feeling bad about yourself - or that you are a failure or have let yourself or your family down Nearly every day   Trouble concentrating on things such as school, work, reading, or watching TV Not at all   Moving or speaking so slowly that other people could have noticed; or the opposite being so fidgety that others notice Not at all   Thoughts of being better off dead, or hurting yourself in some way Not at all   PHQ 9 Score 15   How difficult have these problems made it for you to do your work, take care of your home and get along with others Somewhat difficult       Alcohol & Drug Abuse Risk Screen    Do you average more than 1 drink per night or more than 7 drinks a week:  No    On any one occasion in the past three months have you have had more than 3 drinks containing alcohol:  No          Functional Ability and Level of Safety    Hearing: Hearing is good. Activities of Daily Living: The home contains: no safety equipment. Patient does total self care      Ambulation: with no difficulty     Fall Risk:  Fall Risk Assessment, last 12 mths 7/21/2022   Able to walk? Yes   Fall in past 12 months? 0   Do you feel unsteady?  0   Are you worried about falling 0      Abuse Screen:  Patient is not abused       Cognitive Screening    Has your family/caregiver stated any concerns about your memory: no     Cognitive Screening: Normal - Verbal Fluency Test    Health Maintenance Due     Health Maintenance Due   Topic Date Due    Hepatitis C Screening  Never done    COVID-19 Vaccine (1) Never done    Pneumococcal 65+ years (1 - PCV) Never done    DTaP/Tdap/Td series (1 - Tdap) Never done    Colorectal Cancer Screening Combo  Never done    Breast Cancer Screen Mammogram  06/12/2021       Patient Care Team   Patient Care Team:  Leander Robles MD as PCP - General (Internal Medicine Physician)  Leander Robles MD as PCP - [facility] Empaneled Provider    History     Patient Active Problem List   Diagnosis Code    Primary osteoarthritis of right knee M17.11    Primary localized osteoarthritis of left knee M17.12    Neuropathy G62.9    Acute respiratory failure with hypoxia (HCC) J96.01    Cirrhosis ([facility] K74.60    Pneumonia due to COVID-19 virus U07.1, J12.82     Past Medical History:   Diagnosis Date    Arthritis     OSTEO    Chronic pain     Cirrhosis ([facility] [address]     Hepatitis C     Liver disease 1980s    HEPATITIS C, TREATED AND NOW GONE    Neuropathy     Tuberculosis 1962    +ANNA MARIE TEST # 3; TREATED AND NOW LUNGS HAVE ALWAYS BEEN CLEAR Past Surgical History:   Procedure Laterality Date    HX COLONOSCOPY      HISTORY OF POLYP    HX ENDOSCOPY      HX HEENT      WISDOM TEETH    HX KNEE REPLACEMENT Right 2019    RIGHT TOTAL KNEE REPLACEMENT    HX ORTHOPAEDIC Left 2018    BROKEN WRIST, HAS A PLATE     Current Outpatient Medications   Medication Sig Dispense Refill    dicyclomine (BENTYL) 20 mg tablet Take 1 Tablet by mouth every six (6) hours as needed for Abdominal Cramps. 20 Tablet 0    pregabalin (Lyrica) 150 mg capsule Take 1 capsule by mouth 2 times a day as directed by physician. max daily dose is 450mg 60 Capsule 4    multivitamin (MULTI-DELYN, WELLESSE) liqd Take 5 mL by mouth daily. ascorbic acid/collagen hydr (COLLAGEN PLUS VITAMIN C PO) Take 1 Dose by mouth daily.        No Known Allergies    Family History   Problem Relation Age of Onset    Other Mother         UNKNOWN HEALTH HX    Other Father         UNKNOWN HEALTH HX    Anesth Problems Neg Hx     Breast Cancer Maternal Grandmother      Social History     Tobacco Use    Smoking status: Former     Packs/day: 1.00     Types: Cigarettes     Quit date: 1970     Years since quittin.5    Smokeless tobacco: Never   Substance Use Topics    Alcohol use: Not Currently         Sancho [place]

## 2022-08-25 ENCOUNTER — HOSPITAL ENCOUNTER (OUTPATIENT)
Age: 72
Setting detail: OUTPATIENT SURGERY
Discharge: HOME | End: 2022-08-25
Attending: INTERNAL MEDICINE | Admitting: INTERNAL MEDICINE
Payer: MEDICARE

## 2022-08-25 ENCOUNTER — ANESTHESIA EVENT (OUTPATIENT)
Dept: ENDOSCOPY | Age: 72
End: 2022-08-25
Payer: MEDICARE

## 2022-08-25 ENCOUNTER — APPOINTMENT (OUTPATIENT)
Dept: GENERAL RADIOLOGY | Age: 72
End: 2022-08-25
Attending: INTERNAL MEDICINE
Payer: MEDICARE

## 2022-08-25 ENCOUNTER — ANESTHESIA (OUTPATIENT)
Dept: ENDOSCOPY | Age: 72
End: 2022-08-25
Payer: MEDICARE

## 2022-08-25 VITALS
SYSTOLIC BLOOD PRESSURE: 159 MMHG | BODY MASS INDEX: 29.98 KG/M2 | WEIGHT: 162.92 LBS | DIASTOLIC BLOOD PRESSURE: 84 MMHG | TEMPERATURE: 97.8 F | RESPIRATION RATE: 14 BRPM | HEART RATE: 71 BPM | HEIGHT: 62 IN | OXYGEN SATURATION: 98 %

## 2022-08-25 PROCEDURE — 74011000250 HC RX REV CODE- 250: Performed by: NURSE ANESTHETIST, CERTIFIED REGISTERED

## 2022-08-25 PROCEDURE — 74220 X-RAY XM ESOPHAGUS 1CNTRST: CPT

## 2022-08-25 PROCEDURE — 2709999900 HC NON-CHARGEABLE SUPPLY: Performed by: INTERNAL MEDICINE

## 2022-08-25 PROCEDURE — 74011250636 HC RX REV CODE- 250/636: Performed by: NURSE ANESTHETIST, CERTIFIED REGISTERED

## 2022-08-25 PROCEDURE — 88305 TISSUE EXAM BY PATHOLOGIST: CPT

## 2022-08-25 PROCEDURE — 76060000031 HC ANESTHESIA FIRST 0.5 HR: Performed by: INTERNAL MEDICINE

## 2022-08-25 PROCEDURE — 76040000019: Performed by: INTERNAL MEDICINE

## 2022-08-25 PROCEDURE — 77030021593 HC FCPS BIOP ENDOSC BSC -A: Performed by: INTERNAL MEDICINE

## 2022-08-25 RX ORDER — FLUMAZENIL 0.1 MG/ML
0.2 INJECTION INTRAVENOUS
Status: DISCONTINUED | OUTPATIENT
Start: 2022-08-25 | End: 2022-08-25 | Stop reason: HOSPADM

## 2022-08-25 RX ORDER — MIDAZOLAM HYDROCHLORIDE 1 MG/ML
.25-5 INJECTION, SOLUTION INTRAMUSCULAR; INTRAVENOUS
Status: DISCONTINUED | OUTPATIENT
Start: 2022-08-25 | End: 2022-08-25 | Stop reason: HOSPADM

## 2022-08-25 RX ORDER — NALOXONE HYDROCHLORIDE 0.4 MG/ML
0.4 INJECTION, SOLUTION INTRAMUSCULAR; INTRAVENOUS; SUBCUTANEOUS
Status: DISCONTINUED | OUTPATIENT
Start: 2022-08-25 | End: 2022-08-25 | Stop reason: HOSPADM

## 2022-08-25 RX ORDER — SODIUM CHLORIDE 9 MG/ML
INJECTION, SOLUTION INTRAVENOUS
Status: DISCONTINUED | OUTPATIENT
Start: 2022-08-25 | End: 2022-08-25 | Stop reason: HOSPADM

## 2022-08-25 RX ORDER — SODIUM CHLORIDE 9 MG/ML
50 INJECTION, SOLUTION INTRAVENOUS CONTINUOUS
Status: DISCONTINUED | OUTPATIENT
Start: 2022-08-25 | End: 2022-08-25 | Stop reason: HOSPADM

## 2022-08-25 RX ORDER — LIDOCAINE HYDROCHLORIDE 20 MG/ML
INJECTION, SOLUTION EPIDURAL; INFILTRATION; INTRACAUDAL; PERINEURAL AS NEEDED
Status: DISCONTINUED | OUTPATIENT
Start: 2022-08-25 | End: 2022-08-25 | Stop reason: HOSPADM

## 2022-08-25 RX ORDER — FENTANYL CITRATE 50 UG/ML
100 INJECTION, SOLUTION INTRAMUSCULAR; INTRAVENOUS
Status: DISCONTINUED | OUTPATIENT
Start: 2022-08-25 | End: 2022-08-25 | Stop reason: HOSPADM

## 2022-08-25 RX ORDER — EPINEPHRINE 0.1 MG/ML
1 INJECTION INTRACARDIAC; INTRAVENOUS
Status: DISCONTINUED | OUTPATIENT
Start: 2022-08-25 | End: 2022-08-25 | Stop reason: HOSPADM

## 2022-08-25 RX ORDER — PROPOFOL 10 MG/ML
INJECTION, EMULSION INTRAVENOUS AS NEEDED
Status: DISCONTINUED | OUTPATIENT
Start: 2022-08-25 | End: 2022-08-25 | Stop reason: HOSPADM

## 2022-08-25 RX ORDER — ATROPINE SULFATE 0.1 MG/ML
0.5 INJECTION INTRAVENOUS
Status: DISCONTINUED | OUTPATIENT
Start: 2022-08-25 | End: 2022-08-25 | Stop reason: HOSPADM

## 2022-08-25 RX ORDER — DEXTROMETHORPHAN/PSEUDOEPHED 2.5-7.5/.8
1.2 DROPS ORAL
Status: DISCONTINUED | OUTPATIENT
Start: 2022-08-25 | End: 2022-08-25 | Stop reason: HOSPADM

## 2022-08-25 RX ADMIN — PROPOFOL 80 MG: 10 INJECTION, EMULSION INTRAVENOUS at 08:58

## 2022-08-25 RX ADMIN — PROPOFOL 40 MG: 10 INJECTION, EMULSION INTRAVENOUS at 09:06

## 2022-08-25 RX ADMIN — LIDOCAINE HYDROCHLORIDE 100 MG: 20 INJECTION, SOLUTION EPIDURAL; INFILTRATION; INTRACAUDAL; PERINEURAL at 08:58

## 2022-08-25 RX ADMIN — PROPOFOL 50 MG: 10 INJECTION, EMULSION INTRAVENOUS at 09:14

## 2022-08-25 RX ADMIN — PROPOFOL 50 MG: 10 INJECTION, EMULSION INTRAVENOUS at 09:12

## 2022-08-25 RX ADMIN — PROPOFOL 40 MG: 10 INJECTION, EMULSION INTRAVENOUS at 09:04

## 2022-08-25 RX ADMIN — PROPOFOL 40 MG: 10 INJECTION, EMULSION INTRAVENOUS at 09:09

## 2022-08-25 RX ADMIN — SODIUM CHLORIDE: 900 INJECTION, SOLUTION INTRAVENOUS at 08:48

## 2022-08-25 RX ADMIN — PROPOFOL 40 MG: 10 INJECTION, EMULSION INTRAVENOUS at 09:01

## 2022-08-25 NOTE — ANESTHESIA PREPROCEDURE EVALUATION
Relevant Problems   RESPIRATORY SYSTEM   (+) Pneumonia due to COVID-19 virus      GASTROINTESTINAL   (+) Cirrhosis ([facility] [address]       Anesthetic History   No history of anesthetic complications            Review of Systems / Medical History  Patient summary reviewed, nursing notes reviewed and pertinent labs reviewed    Pulmonary  Within defined limits                 Neuro/Psych   Within defined limits           Cardiovascular  Within defined limits                     GI/Hepatic/Renal  Within defined limits              Endo/Other  Within defined limits           Other Findings              Physical Exam    Airway  Mallampati: II  TM Distance: > 6 cm  Neck ROM: normal range of motion   Mouth opening: Normal     Cardiovascular  Regular rate and rhythm,  S1 and S2 normal,  no murmur, click, rub, or gallop             Dental  No notable dental hx       Pulmonary  Breath sounds clear to auscultation               Abdominal  GI exam deferred       Other Findings            Anesthetic Plan    ASA: 2  Anesthesia type: MAC          Induction: Intravenous  Anesthetic plan and risks discussed with: Patient

## 2022-08-25 NOTE — ANESTHESIA POSTPROCEDURE EVALUATION
Procedure(s):  COLONOSCOPY  ESOPHAGOGASTRODUODENOSCOPY (EGD)  ESOPHAGOGASTRODUODENAL (EGD) BIOPSY. MAC    Anesthesia Post Evaluation      Multimodal analgesia: multimodal analgesia not used between 6 hours prior to anesthesia start to PACU discharge  Patient location during evaluation: bedside  Patient participation: complete - patient participated  Level of consciousness: awake  Pain score: 0  Pain management: adequate  Airway patency: patent  Anesthetic complications: no  Cardiovascular status: acceptable  Respiratory status: acceptable  Hydration status: acceptable  Post anesthesia nausea and vomiting:  none  Final Post Anesthesia Temperature Assessment:  Normothermia (36.0-37.5 degrees C)      INITIAL Post-op Vital signs:   Vitals Value Taken Time   /91 08/25/22 0939   Temp 36.6 °C (97.8 °F) 08/25/22 0925   Pulse 72 08/25/22 0939   Resp 15 08/25/22 0939   SpO2 99 % 08/25/22 0939   Vitals shown include unvalidated device data.

## 2022-08-25 NOTE — PROGRESS NOTES
Edgardo Scruggs  1950  [id number]    Situation:  Verbal report received from:   Berkley Haji RN   Procedure: Procedure(s):  COLONOSCOPY  ESOPHAGOGASTRODUODENOSCOPY (EGD)  ESOPHAGOGASTRODUODENAL (EGD) BIOPSY    Background:    Preoperative diagnosis: SCREENING, WEIGHT LOSS, EPIGASTRIC PAIN  Postoperative diagnosis: Hiatal hernia,   normal colon, inadequate prep. :  Dr. Alma Epstein  Assistant(s): Endoscopy Technician-1: Dominique Rodgers  Endoscopy RN-1: Marclea Grayson RN    Specimens:   ID Type Source Tests Collected by Time Destination   1 : eg junction biopsy Preservative   Martha Coffman MD 8/25/2022 0902 Pathology     H. Pylori  no    Assessment:  Intra-procedure medications     Anesthesia gave intra-procedure sedation and medications, see anesthesia flow sheet yes    Intravenous fluids: NS@ KVO     Vital signs stable   yes    Abdominal assessment: round and soft   yes    Recommendation:  Discharge patient per MD order  yes.   Return to floor  outpatient   Family or Friend   friend  Permission to share finding with family or friend no

## 2022-08-25 NOTE — PROGRESS NOTES
Patient taken to radiology for scheduled barium swallow test.  Patient will be discharged home from radiology.

## 2022-08-25 NOTE — DISCHARGE INSTRUCTIONS
[facility]  [id number]  1950    DISCHARGE INSTRUCTIONS    Results:  Normal colonoscopy to the cecum, with no evidence of neoplasia, diverticular disease, or mucosal abnormality. , preparation not adequate  Hiatal hernia  Recommendations:      - barium swallow  Annual stool hemoccults; repeat colonoscopy is abnormal  Discomfort:  Redness at IV site- apply warm compress to area; if redness or soreness persist- contact your physician. There may be a slight amount of blood passed from the rectum. Gaseous discomfort - walking, belching will help relieve any discomfort. You may not operate a vehicle for 12 hours. You may not engage in an occupation involving machinery or appliances for rest of today. You may not drink alcoholic beverages for at least 12 hours. Avoid making any critical decisions for at least 24 hours. DIET:   High fiber diet. Medications:                Resume usual medications today   ACTIVITY:  You may resume your normal daily activities it is recommended that you spend the remainder of the day resting -  avoid any strenuous activity. CALL M.D.   ANY SIGN OF:   Increasing pain, nausea, vomiting  Abdominal distension (swelling)  New increased bleeding (oral or rectal)  Fever (chills)  Pain in chest area  Bloody discharge from nose or mouth  Shortness of breath     Follow-up Instructions:  Call Dr. Rajendra Skelton next week for test results        DISCHARGE SUMMARY from Nurse    The following personal items collected during your admission are returned to you:   Dental Appliance: Dental Appliances: Uppers, Lowers, Partials  Vision: Visual Aid: Glasses, At home  Hearing Aid:    Jewelry:    Clothing:    Other Valuables:    Valuables sent to safe:

## 2022-08-25 NOTE — H&P
Patient Name: Tamie Us  Gender: Female   (age): 1950 (66)    Referring Physician:    Daniel Plummer. [address] [facility], [address]  [phone] (phone)  [phone] (fax)     Chief Complaint:    abdominal pain after eating     History of Present Illness: The patient complains of abdominal pain. Symptoms began 3 - 5 months ago. It is localized as epigastric, right lower quadrant and periumbilical pain. It is detailed as moderate in nature. Pain quality is described as crampy and dull. Discomfort typically lasts 10 - 15 minutes. It usually starts intermittently. Symptom triggers include nothing specific. Alleviating factors include defecation. Symptoms have been non-progressive/stable since onset. Alarm features noted: weight loss. The patient also reports none,. Symptoms have caused the patient to visit the ER. Past Medical History  Medical Conditions:   COVID-19  Liver disease  Surgical Procedures:   Knee Replacement (hernán)  Dx Studies:   Colonoscopy  CTA Chest W OR WO CONT, 2021  Endoscopy  Medications:   Collagen Plus Vitamin C 022-55 Take 1 applicatorful dissolved in water once a day  Lyrica 150 mg Take 1 capsule by mouth twice a day  Multi-Delyn with Iron 10 mg iron/5 mL Take 5 ml by mouth once a day  Allergies:   seasonal  Immunizations:   zoster, 10/8/2020  COVID Vaccine, 2019  Influenza virus vaccine (refused)  Social History  Alcohol:   None  Tobacco:   Former smoker  Drugs:   None  Exercise:   Exercise 3 or more times a week. Caffeine:   Daily. Family History   No history of Colon Cancer, Colon Polyps, Esophogeal Cancer, GI Cancers, IBD (Crohn's or UC), Liver disease  Review of Systems:  Cardiovascular: Denies chest pain, irregular heart beat, palpitations, peripheral edema, syncope, Sweats. Constitutional: Denies fatigue, fever, loss of appetite, weight gain, weight loss.   ENMT: Denies nose bleeds, sore throat, hearing loss.  Endocrine: Denies excessive thirst, heat intolerance. Eyes: Denies loss of vision. Gastrointestinal: Presents suffers from abdominal pain, nausea, vomiting. Denies abdominal swelling, change in bowel habits, constipation, diarrhea, Bloating/gas, heartburn, jaundice, rectal bleeding, stomach cramps, dysphagia, rectal pain, Stool incontinence, hematemesis. Genitourinary: Denies dark urine, dysuria, frequent urination, hematuria, incontinence. Hematologic/Lymphatic: Denies easy bruising, prolonged bleeding. Integumentary: Denies itching, rashes, sun sensitivity. Musculoskeletal: Denies arthritis, back pain, gout, joint pain, muscle weakness, stiffness. Neurological: Denies dizziness, fainting, frequent headaches, memory loss. Psychiatric: Denies anxiety, depression, difficulty sleeping, hallucinations, nervousness, panic attacks, paranoia. Respiratory: Denies cough, dyspnea, wheezing. Vital Signs: See RN notes    Physical Exam:  Constitutional:  Appearance:   Communication: Understands/receives spoken information. ENMT:  External: Normal.  Hearing: Normal.  Lungs Clear P&A  Cardiac RRR  Abd no distention  Psychiatric:  Judgment/insight: Normal,normal judgement, normal insight. Orientation: oriented to time, space and person. Memory: normal short term memory, normal long term memory, no memory loss. Mood and affect: Normal mood, affect full,no evidence of depression, anxiety or agitation. Impressions:   Epigastric pain  Weight Loss      Plan:   EI've discussed EGD, colonoscopy possible biopsy, polypectomy, cautery, injection, alternatives, complications including but not limited to pain, cardiopulmonary event, bleeding, perforation requiring additional blood transfusion or operative repair; all questions answered.

## 2022-08-25 NOTE — PROGRESS NOTES
Endoscopy discharge instructions have been reviewed and given to patient. The patient verbalized understanding and acceptance of instructions. Dr. Vaishali Arreola discussed with patient procedure findings and next steps.

## 2022-08-25 NOTE — PROCEDURES
Ian Mendoza M.D. 2022    Esophagogastroduodenoscopy (EGD) Colonoscopy Procedure Note  Rodney Puga  : 1950  [facility] Medical Record Number: [id number]      Indications:    Abdominal pain, epigastric, Abdominal pain, RUQ, weight loss  Referring Physician:  Stefany Heath MD  Anesthesia/Sedation: see nursing notes  Endoscopist:  Dr. Roopa Ponce  Assistants: None  Permit:  The indications, risks, benefits and alternatives were reviewed with the patient or their decision maker who was provided an opportunity to ask questions and all questions were answered. The specific risks of esophagogastroduodenoscopy with conscious sedation were reviewed, including but not limited to anesthetic complication, bleeding, adverse drug reaction, missed lesion, infection, IV site reactions, and intestinal perforation which would lead to the need for surgical repair. Alternatives to EGD including radiographic imaging, observation without testing, or laboratory testing were reviewed as well as the limitations of those alternatives discussed. After considering the options and having all their questions answered, the patient or their decision maker provided both verbal and written consent to proceed. Procedure in Detail:  After obtaining informed consent, positioning of the patient in the left lateral decubitus position, and conduction of a pre-procedure pause or \"time out\" the endoscope was introduced into the mouth and advanced to the duodenum. A careful inspection was made, and findings or interventions are described below. Findings:   Esophagus:oral secretions present; no peristalsis seen.   No mucosal abn  Stomach: no mucosal lesion appreciated and hiatal hernia  Duodenum/jejunum: normal    Complications/estimated blood loss: none    Therapies:  biopsy of esophagus    Specimens:  esophageal biopsies    Implants:none           Endoscopist:  Dr. Juany Luna  Assistants: None  Complications:  None  Estimate Blood Loss:  None    Colonoscopy is to follow    Permit:  The indications, risks, benefits and alternatives were reviewed with the patient or their decision maker who was provided an opportunity to ask questions and all questions were answered. The specific risks of colonoscopy with conscious sedation were reviewed, including but not limited to anesthetic complication, bleeding, adverse drug reaction, missed lesion, infection, IV site reactions, and intestinal perforation which would lead to the need for surgical repair. Alternatives to colonoscopy including radiographic imaging, observation without testing, or laboratory testing were reviewed including the limitations of those alternatives. After considering the options and having all their questions answered, the patient or their decision maker provided both verbal and written consent to proceed. Procedure in Detail:  After obtaining informed consent, positioning of the patient in the left lateral decubitus position, and conduction of a pre-procedure pause or \"time out\" the endoscope was introduced into the anus and advanced to the cecum, which was identified by the ileocecal valve and appendiceal orifice. The quality of the colonic preparation was inadequate. A careful inspection was made as the colonoscope was withdrawn, findings and interventions are described below. Appendiceal orifice photographed    Findings:   normal  no mucosal lesion appreciated    Specimens:    none    Implants: none    Complications:   None; patient tolerated the procedure well. Estimated blood loss: none    Impression:  Normal colonoscopy to the cecum, with no evidence of neoplasia, diverticular disease, or mucosal abnormality. , preparation not adequate  Hiatal hernia  Recommendations:      - barium swallow  Annual stool hemoccults; repeat colonoscopy is abnormal    Thank you for entrusting me with this patient's care. Please do not hesitate to contact me with any questions or if I can be of assistance with any of your other patients' GI needs.     Signed By: Paddy Avery MD                        August 25, 2022

## 2022-08-25 NOTE — PERIOP NOTES
[id number]  Timeout performed. Anesthesia staff at patient's bedside administering anesthesia and monitoring patients vital signs throughout procedure. See anesthesia note. Post procedure, report received from CRNA, Sharyle Batter. [id number]  Endoscope was pre-cleaned at bedside immediately following procedure by endo Lawrence green [address]  Patient tolerated procedure. Abdomen soft and patient arousable and voices no complaints. Patient transported to endoscopy recovery area. Report given to post procedure RNMaria Luz.

## 2022-09-24 ENCOUNTER — HOSPITAL ENCOUNTER (EMERGENCY)
Age: 72
Discharge: HOME | End: 2022-09-24
Attending: STUDENT IN AN ORGANIZED HEALTH CARE EDUCATION/TRAINING PROGRAM
Payer: MEDICARE

## 2022-09-24 VITALS
TEMPERATURE: 97.1 F | OXYGEN SATURATION: 96 % | DIASTOLIC BLOOD PRESSURE: 95 MMHG | HEART RATE: 56 BPM | SYSTOLIC BLOOD PRESSURE: 164 MMHG | RESPIRATION RATE: 18 BRPM

## 2022-09-24 DIAGNOSIS — N30.00 ACUTE CYSTITIS WITHOUT HEMATURIA: Primary | ICD-10-CM

## 2022-09-24 LAB
APPEARANCE UR: CLEAR
BACTERIA URNS QL MICRO: ABNORMAL /HPF
BILIRUB UR QL: NEGATIVE
COLOR UR: ABNORMAL
EPITH CASTS URNS QL MICRO: ABNORMAL /LPF
GLUCOSE UR STRIP.AUTO-MCNC: NEGATIVE MG/DL
HGB UR QL STRIP: ABNORMAL
HYALINE CASTS URNS QL MICRO: ABNORMAL /LPF (ref 0–5)
KETONES UR QL STRIP.AUTO: NEGATIVE MG/DL
LEUKOCYTE ESTERASE UR QL STRIP.AUTO: ABNORMAL
NITRITE UR QL STRIP.AUTO: NEGATIVE
PH UR STRIP: 6 [PH] (ref 5–8)
PROT UR STRIP-MCNC: NEGATIVE MG/DL
RBC #/AREA URNS HPF: ABNORMAL /HPF (ref 0–5)
SP GR UR REFRACTOMETRY: 1.01 (ref 1–1.03)
UR CULT HOLD, URHOLD: NORMAL
UROBILINOGEN UR QL STRIP.AUTO: 0.2 EU/DL (ref 0.2–1)
WBC URNS QL MICRO: ABNORMAL /HPF (ref 0–4)

## 2022-09-24 PROCEDURE — 99283 EMERGENCY DEPT VISIT LOW MDM: CPT

## 2022-09-24 PROCEDURE — 87077 CULTURE AEROBIC IDENTIFY: CPT

## 2022-09-24 PROCEDURE — 87086 URINE CULTURE/COLONY COUNT: CPT

## 2022-09-24 PROCEDURE — 81001 URINALYSIS AUTO W/SCOPE: CPT

## 2022-09-24 PROCEDURE — 87186 SC STD MICRODIL/AGAR DIL: CPT

## 2022-09-24 RX ORDER — PHENAZOPYRIDINE HYDROCHLORIDE 200 MG/1
200 TABLET, FILM COATED ORAL 3 TIMES DAILY
Qty: 6 TABLET | Refills: 0 | Status: SHIPPED | OUTPATIENT
Start: 2022-09-24 | End: 2022-09-26

## 2022-09-24 RX ORDER — CEFPODOXIME PROXETIL 200 MG/1
200 TABLET, FILM COATED ORAL 2 TIMES DAILY
Qty: 14 TABLET | Refills: 0 | Status: SHIPPED | OUTPATIENT
Start: 2022-09-24 | End: 2022-10-01

## 2022-09-24 NOTE — ED PROVIDER NOTES
Patient is a 65-year-old female with a history of arthritis, chronic pain, hepatitis C, cirrhosis, tuberculosis who presents to ED complaining of dysuria and urinary frequency since yesterday. Patient reports she thinks she has UTI. Patient reports she otherwise feels well. She denies any fever, chills, hematuria, flank pain, abdominal pain, nausea, vomiting, vaginal bleeding, vaginal discharge.   She has not taken any medications prior to arrival.       Past Medical History:   Diagnosis Date    Arthritis     OSTEO    Chronic pain     Cirrhosis ([address]     Hepatitis C     Liver disease     HEPATITIS C, TREATED AND NOW GONE    Neuropathy     Tuberculosis     +ANNA MARIE TEST # 3; TREATED AND NOW LUNGS HAVE ALWAYS BEEN CLEAR       Past Surgical History:   Procedure Laterality Date    COLONOSCOPY N/A 2022    COLONOSCOPY performed by Lou Robert MD at [facility] ENDOSCOPY    HX COLONOSCOPY      HISTORY OF POLYP    HX ENDOSCOPY      HX HEENT      WISDOM TEETH    HX KNEE REPLACEMENT Right 2019    RIGHT TOTAL KNEE REPLACEMENT    HX KNEE REPLACEMENT Left     HX ORTHOPAEDIC Left 2018    BROKEN WRIST, HAS A PLATE         Family History:   Problem Relation Age of Onset    Other Mother         UNKNOWN HEALTH HX    Other Father         UNKNOWN HEALTH HX    Anesth Problems Neg Hx     Breast Cancer Maternal Grandmother        Social History     Socioeconomic History    Marital status: SINGLE     Spouse name: Not on file    Number of children: Not on file    Years of education: Not on file    Highest education level: Not on file   Occupational History    Not on file   Tobacco Use    Smoking status: Former     Packs/day: 1.00     Types: Cigarettes     Quit date: 1970     Years since quittin.7    Smokeless tobacco: Never   Vaping Use    Vaping Use: Never used   Substance and Sexual Activity    Alcohol use: Not Currently    Drug use: Not Currently    Sexual activity: Not Currently   Other Topics Concern    Not on file Social History Narrative    ** Merged History Encounter **          Social Determinants of Health     Financial Resource Strain: Not on file   Food Insecurity: Not on file   Transportation Needs: Not on file   Physical Activity: Not on file   Stress: Not on file   Social Connections: Not on file   Intimate Partner Violence: Not on file   Housing Stability: Not on file         ALLERGIES: Patient has no known allergies. Review of Systems   Constitutional:  Negative for activity change, appetite change, chills and fever. HENT:  Negative for congestion and sore throat. Eyes:  Negative for pain and visual disturbance. Respiratory:  Negative for cough and shortness of breath. Cardiovascular:  Negative for chest pain, palpitations and leg swelling. Gastrointestinal:  Negative for abdominal distention, abdominal pain, constipation, diarrhea, nausea and vomiting. Genitourinary:  Positive for dysuria and frequency. Negative for decreased urine volume, difficulty urinating, flank pain, hematuria, urgency, vaginal bleeding and vaginal discharge. Musculoskeletal:  Negative for back pain and neck pain. Skin:  Negative for rash and wound. Allergic/Immunologic: Negative for immunocompromised state. Neurological:  Negative for dizziness, syncope, weakness, light-headedness, numbness and headaches. Psychiatric/Behavioral:  Negative for confusion. All other systems reviewed and are negative. Vitals:    09/24/22 1212   BP: (!) 164/95   Pulse: (!) 56   Resp: 18   Temp: 97.1 °F (36.2 °C)   SpO2: 96%            Physical Exam  Vitals and nursing note reviewed. Constitutional:       General: She is not in acute distress. Appearance: Normal appearance. She is well-developed. She is not toxic-appearing. HENT:      Head: Normocephalic and atraumatic.       Nose: Nose normal.      Mouth/Throat:      Mouth: Mucous membranes are moist.   Eyes:      General: Lids are normal.      Extraocular Movements: Extraocular movements intact. Conjunctiva/sclera: Conjunctivae normal.   Cardiovascular:      Rate and Rhythm: Normal rate and regular rhythm. Pulses: Normal pulses. Heart sounds: Normal heart sounds, S1 normal and S2 normal.   Pulmonary:      Effort: Pulmonary effort is normal. No accessory muscle usage. Breath sounds: Normal breath sounds. Abdominal:      General: Bowel sounds are normal.      Palpations: Abdomen is soft. Tenderness: There is no abdominal tenderness. There is no right CVA tenderness, left CVA tenderness, guarding or rebound. Musculoskeletal:         General: Normal range of motion. Cervical back: Normal range of motion and neck supple. Skin:     General: Skin is warm and dry. Capillary Refill: Capillary refill takes less than 2 seconds. Neurological:      General: No focal deficit present. Mental Status: She is alert and oriented to person, place, and time. Mental status is at baseline. Psychiatric:         Attention and Perception: Attention normal.         Mood and Affect: Mood and affect normal.         Speech: Speech normal.         Behavior: Behavior is cooperative. Thought Content: Thought content normal.         Cognition and Memory: Cognition normal.         Judgment: Judgment normal.        MDM  Number of Diagnoses or Management Options  Acute cystitis without hematuria  Diagnosis management comments: Sent with dysuria and urinary frequency since yesterday. There is no flank pain and she is afebrile. Vital signs stable. UA is negative for leuks or nitrites. 5-10 white blood cells noted. 4+ bacteria. Urine culture sent and will treat with cefpodoxime and give Pyridium to help with symptom relief. Advise follow-up with PCP and return to ER warnings discussed in detail with patient. All questions addressed and answered.        Amount and/or Complexity of Data Reviewed  Clinical lab tests: reviewed  Discuss the patient with other providers: yes (Dr. Mark Atkinson, ED attending )      ED Course as of 09/24/22 1301   Sat Sep 24, 2022   1300 URINALYSIS W/MICROSCOPIC(!):    Color YELLOW/STRAW   Appearance CLEAR   Specific gravity 1.007   pH (UA) 6.0   Protein Negative   Glucose Negative   Ketone Negative   Bilirubin Negative   Blood SMALL(!)   Urobilinogen 0.2   Nitrites Negative   Leukocyte Esterase SMALL(!)   WBC 5-10   RBC 0-5   Epithelial cells FEW   Bacteria 4+(!)   Hyaline cast 0-2 [KG]      ED Course User Index  [KG] NAIDA Jenkins       Procedures

## 2022-09-24 NOTE — DISCHARGE INSTRUCTIONS
Take antibiotics as prescribed. Take pyridium to help with symptom relief. Please follow-up with your primary care physician for re-evaluation. If you develop new or worsening symptoms, please return to ER.

## 2022-09-26 LAB
BACTERIA SPEC CULT: ABNORMAL
CC UR VC: ABNORMAL
SERVICE CMNT-IMP: ABNORMAL

## 2022-12-01 DIAGNOSIS — G62.9 PERIPHERAL POLYNEUROPATHY: ICD-10-CM

## 2022-12-01 RX ORDER — PREGABALIN 150 MG/1
CAPSULE ORAL
Qty: 60 CAPSULE | Refills: 4 | Status: SHIPPED | OUTPATIENT
Start: 2022-12-01

## 2023-01-05 ENCOUNTER — OFFICE VISIT (OUTPATIENT)
Dept: INTERNAL MEDICINE CLINIC | Age: 73
End: 2023-01-05
Payer: MEDICARE

## 2023-01-05 VITALS
SYSTOLIC BLOOD PRESSURE: 170 MMHG | HEART RATE: 86 BPM | RESPIRATION RATE: 18 BRPM | DIASTOLIC BLOOD PRESSURE: 91 MMHG | HEIGHT: 62 IN | TEMPERATURE: 99.3 F | OXYGEN SATURATION: 97 % | WEIGHT: 162.6 LBS | BODY MASS INDEX: 29.92 KG/M2

## 2023-01-05 DIAGNOSIS — R76.11 POSITIVE PPD: Primary | ICD-10-CM

## 2023-01-05 DIAGNOSIS — R03.0 WHITE COAT SYNDROME WITH HIGH BLOOD PRESSURE BUT WITHOUT HYPERTENSION: ICD-10-CM

## 2023-01-05 DIAGNOSIS — G62.9 NEUROPATHY: ICD-10-CM

## 2023-01-05 DIAGNOSIS — K74.60 CIRRHOSIS OF LIVER WITHOUT ASCITES, UNSPECIFIED HEPATIC CIRRHOSIS TYPE (HCC): ICD-10-CM

## 2023-01-05 DIAGNOSIS — E66.3 OVERWEIGHT (BMI 25.0-29.9): ICD-10-CM

## 2023-01-06 NOTE — PROGRESS NOTES
[address] [place] [address] and Primary Care  [place] [address]  [address] [facility] [address]  Phone:  [phone]  Fax: [phone]       Chief Complaint   Patient presents with    Follow-up     Patient here for follow up to be cleared for College. .      SUBJECTIVE:    Charlotte Renee is a 67 y.o. female comes in for return visit needing a chest x-ray for school at [employer]. She is attempting to get her degree finally after many years. She has a positive PPD diagnosed at age 5 at which time she had a year of INH. She now needs a chest x-rays for evaluation of this entity. She does have a history of cirrhosis related to chronic alcohol abuse which she stopped several years ago. She has been abstinent now for at least a year. As a direct result of her chronic alcohol abuse, she developed rather severe peripheral neuropathy and takes Lyrica. She is actively followed by Dr. Dalia Lu of Hepatology. Finally she continues to being overweight and fortunately this has not worsened. I encouraged her to minimize further weight gain. Current Outpatient Medications   Medication Sig Dispense Refill    pregabalin (Lyrica) 150 mg capsule Take 1 capsule by mouth 2 times a day as directed by physician. max daily dose is 450mg 60 Capsule 4    multivitamin (MULTI-DELYN, WELLESSE) liqd Take 5 mL by mouth daily. ascorbic acid/collagen hydr (COLLAGEN PLUS VITAMIN C PO) Take 1 Dose by mouth daily. dicyclomine (BENTYL) 20 mg tablet Take 1 Tablet by mouth every six (6) hours as needed for Abdominal Cramps.  (Patient not taking: No sig reported) 20 Tablet 0     Past Medical History:   Diagnosis Date    Arthritis     OSTEO    Chronic pain     Cirrhosis ([address]     Hepatitis C     Liver disease 1980s    HEPATITIS C, TREATED AND NOW GONE    Neuropathy     Tuberculosis 1962    +ANNA MARIE TEST # 3; TREATED AND NOW LUNGS HAVE ALWAYS BEEN CLEAR     Past Surgical History:   Procedure Laterality Date    COLONOSCOPY N/A 2022    COLONOSCOPY performed by Cesia Boyd MD at [facility] ENDOSCOPY    HX COLONOSCOPY      HISTORY OF POLYP    HX ENDOSCOPY      HX HEENT      WISDOM TEETH    HX KNEE REPLACEMENT Right 2019    RIGHT TOTAL KNEE REPLACEMENT    HX KNEE REPLACEMENT Left     HX ORTHOPAEDIC Left 2018    BROKEN WRIST, HAS A PLATE     No Known Allergies      REVIEW OF SYSTEMS:  General: negative for - chills or fever  ENT: negative for - headaches, nasal congestion or tinnitus  Respiratory: negative for - cough, hemoptysis, shortness of breath or wheezing  Cardiovascular : negative for - chest pain, edema, palpitations or shortness of breath  Gastrointestinal: negative for - abdominal pain, blood in stools, heartburn or nausea/vomiting  Genito-Urinary: no dysuria, trouble voiding, or hematuria  Musculoskeletal: negative for - gait disturbance, joint pain, joint stiffness or joint swelling  Neurological: no TIA or stroke symptoms  Hematologic: no bruises, no bleeding, no swollen glands  Integument: no lumps, mole changes, nail changes or rash  Endocrine: no malaise/lethargy or unexpected weight changes      Social History     Socioeconomic History    Marital status: SINGLE   Tobacco Use    Smoking status: Former     Packs/day: 1.00     Types: Cigarettes     Quit date:      Years since quittin.0    Smokeless tobacco: Never   Vaping Use    Vaping Use: Never used   Substance and Sexual Activity    Alcohol use: Not Currently    Drug use: Not Currently    Sexual activity: Not Currently   Social History Narrative    ** Merged History Encounter **          Family History   Problem Relation Age of Onset    Other Mother         UNKNOWN HEALTH HX    Other Father         UNKNOWN HEALTH HX    Anesth Problems Neg Hx     Breast Cancer Maternal Grandmother        OBJECTIVE:    Visit Vitals  BP (!) 170/91   Pulse 86   Temp 99.3 °F (37.4 °C) (Oral)   Resp 18   Ht 5' 2\" (1.575 m)   Wt 162 lb 9.6 oz (73.8 kg)   SpO2 97%   BMI 29.74 kg/m² CONSTITUTIONAL: well , well nourished, appears age appropriate  EYES: perrla, eom intact  ENMT:moist mucous membranes, pharynx clear  NECK: supple. Thyroid normal  RESPIRATORY: Chest: clear to ascultation and percussion   CARDIOVASCULAR: Heart: regular rate and rhythm  GASTROINTESTINAL: Abdomen: soft, bowel sounds active  HEMATOLOGIC: no pathological lymph nodes palpated  MUSCULOSKELETAL: Extremities: no edema, pulse 1+   INTEGUMENT: No unusual rashes or suspicious skin lesions noted. Nails appear normal.  NEUROLOGIC: non-focal exam   MENTAL STATUS: alert and oriented, appropriate affect      ASSESSMENT:  1. Positive PPD    2. Neuropathy    3. Cirrhosis of liver without ascites, unspecified hepatic cirrhosis type ([address]    4. White coat syndrome with high blood pressure but without hypertension    5. Overweight (BMI 25.0-29. 9)        PLAN:  1. The patient has a positive PPD. Chest x-ray is negative. She is given the documentation of this. 2. Her neuropathy is reasonably stable. She continues use of the Lyrica. 3. She appears to be doing quite well from the cirrhosis standpoint. There is no clinical evidence of hepatomegaly or ascites currently. This strongly suggests that her abstinence from alcohol persists. 4. Blood pressure is slightly elevated. I will continue to monitor this. If this trend continues she will have to start antihypertensive medication. In the intervening time, I suggest weight reduction and reducing salt intake. 5. She does need to lose a few pounds. This can be accomplished by eating meals, eliminating snacks, and avoiding the consumption of processed carbohydrates. 6. I also congratulated her on wanting to go back to school at her age of 67. Orders Placed This Encounter    XR CHEST PA LAT    HEPATIC FUNCTION PANEL         Follow-up and Dispositions    Return in about 6 months (around 7/5/2023).            Tera Dickerson MD

## 2023-01-07 LAB
ALBUMIN SERPL-MCNC: 4.2 G/DL (ref 3.7–4.7)
ALP SERPL-CCNC: 107 IU/L (ref 44–121)
ALT SERPL-CCNC: 41 IU/L (ref 0–32)
AST SERPL-CCNC: 40 IU/L (ref 0–40)
BILIRUB DIRECT SERPL-MCNC: 0.15 MG/DL (ref 0–0.4)
BILIRUB SERPL-MCNC: 0.5 MG/DL (ref 0–1.2)
PROT SERPL-MCNC: 7.4 G/DL (ref 6–8.5)

## 2023-03-20 DIAGNOSIS — G62.9 PERIPHERAL POLYNEUROPATHY: ICD-10-CM

## 2023-03-20 RX ORDER — PREGABALIN 150 MG/1
CAPSULE ORAL
Qty: 60 CAPSULE | Refills: 4 | Status: SHIPPED | OUTPATIENT
Start: 2023-03-20

## 2023-06-05 ENCOUNTER — APPOINTMENT (OUTPATIENT)
Facility: HOSPITAL | Age: 73
DRG: 443 | End: 2023-06-05
Payer: MEDICARE

## 2023-06-05 ENCOUNTER — HOSPITAL ENCOUNTER (INPATIENT)
Facility: HOSPITAL | Age: 73
LOS: 2 days | Discharge: HOME | DRG: 443 | End: 2023-06-07
Attending: EMERGENCY MEDICINE | Admitting: FAMILY MEDICINE
Payer: MEDICARE

## 2023-06-05 DIAGNOSIS — K83.8 COMMON BILE DUCT DILATION: Primary | ICD-10-CM

## 2023-06-05 DIAGNOSIS — R10.11 ABDOMINAL PAIN, RIGHT UPPER QUADRANT: ICD-10-CM

## 2023-06-05 LAB
ALBUMIN SERPL-MCNC: 3.4 G/DL (ref 3.5–5)
ALBUMIN/GLOB SERPL: 0.8 (ref 1.1–2.2)
ALP SERPL-CCNC: 124 U/L (ref 45–117)
ALT SERPL-CCNC: 51 U/L (ref 12–78)
ANION GAP SERPL CALC-SCNC: 6 MMOL/L (ref 5–15)
APPEARANCE UR: CLEAR
AST SERPL-CCNC: 42 U/L (ref 15–37)
BACTERIA URNS QL MICRO: NEGATIVE /HPF
BASOPHILS # BLD: 0 K/UL (ref 0–0.1)
BASOPHILS NFR BLD: 0 % (ref 0–1)
BILIRUB SERPL-MCNC: 0.8 MG/DL (ref 0.2–1)
BILIRUB UR QL: NEGATIVE
BUN SERPL-MCNC: 16 MG/DL (ref 6–20)
BUN/CREAT SERPL: 22 (ref 12–20)
CALCIUM SERPL-MCNC: 9.3 MG/DL (ref 8.5–10.1)
CHLORIDE SERPL-SCNC: 108 MMOL/L (ref 97–108)
CO2 SERPL-SCNC: 28 MMOL/L (ref 21–32)
COLOR UR: ABNORMAL
COMMENT:: NORMAL
CREAT SERPL-MCNC: 0.74 MG/DL (ref 0.55–1.02)
DIFFERENTIAL METHOD BLD: NORMAL
EOSINOPHIL # BLD: 0.1 K/UL (ref 0–0.4)
EOSINOPHIL NFR BLD: 2 % (ref 0–7)
EPITH CASTS URNS QL MICRO: ABNORMAL /LPF
ERYTHROCYTE [DISTWIDTH] IN BLOOD BY AUTOMATED COUNT: 12.4 % (ref 11.5–14.5)
GLOBULIN SER CALC-MCNC: 4.2 G/DL (ref 2–4)
GLUCOSE SERPL-MCNC: 110 MG/DL (ref 65–100)
GLUCOSE UR STRIP.AUTO-MCNC: NEGATIVE MG/DL
HCT VFR BLD AUTO: 37.7 % (ref 35–47)
HGB BLD-MCNC: 12.2 G/DL (ref 11.5–16)
HGB UR QL STRIP: ABNORMAL
HYALINE CASTS URNS QL MICRO: ABNORMAL /LPF (ref 0–5)
IMM GRANULOCYTES # BLD AUTO: 0 K/UL (ref 0–0.04)
IMM GRANULOCYTES NFR BLD AUTO: 0 % (ref 0–0.5)
KETONES UR QL STRIP.AUTO: NEGATIVE MG/DL
LEUKOCYTE ESTERASE UR QL STRIP.AUTO: NEGATIVE
LIPASE SERPL-CCNC: 82 U/L (ref 73–393)
LYMPHOCYTES # BLD: 1.5 K/UL (ref 0.8–3.5)
LYMPHOCYTES NFR BLD: 21 % (ref 12–49)
MCH RBC QN AUTO: 31 PG (ref 26–34)
MCHC RBC AUTO-ENTMCNC: 32.4 G/DL (ref 30–36.5)
MCV RBC AUTO: 95.9 FL (ref 80–99)
MONOCYTES # BLD: 0.7 K/UL (ref 0–1)
MONOCYTES NFR BLD: 11 % (ref 5–13)
NEUTS SEG # BLD: 4.7 K/UL (ref 1.8–8)
NEUTS SEG NFR BLD: 66 % (ref 32–75)
NITRITE UR QL STRIP.AUTO: NEGATIVE
NRBC # BLD: 0 K/UL (ref 0–0.01)
NRBC BLD-RTO: 0 PER 100 WBC
PH UR STRIP: 6.5 (ref 5–8)
PLATELET # BLD AUTO: 158 K/UL (ref 150–400)
PMV BLD AUTO: 11.3 FL (ref 8.9–12.9)
POTASSIUM SERPL-SCNC: 3.6 MMOL/L (ref 3.5–5.1)
PROT SERPL-MCNC: 7.6 G/DL (ref 6.4–8.2)
PROT UR STRIP-MCNC: ABNORMAL MG/DL
RBC # BLD AUTO: 3.93 M/UL (ref 3.8–5.2)
RBC #/AREA URNS HPF: ABNORMAL /HPF (ref 0–5)
SODIUM SERPL-SCNC: 142 MMOL/L (ref 136–145)
SP GR UR REFRACTOMETRY: 1.02 (ref 1–1.03)
SPECIMEN HOLD: NORMAL
SPECIMEN HOLD: NORMAL
UROBILINOGEN UR QL STRIP.AUTO: 1 EU/DL (ref 0.2–1)
WBC # BLD AUTO: 7.1 K/UL (ref 3.6–11)
WBC URNS QL MICRO: ABNORMAL /HPF (ref 0–4)

## 2023-06-05 PROCEDURE — 2580000003 HC RX 258

## 2023-06-05 PROCEDURE — 74177 CT ABD & PELVIS W/CONTRAST: CPT

## 2023-06-05 PROCEDURE — 81001 URINALYSIS AUTO W/SCOPE: CPT

## 2023-06-05 PROCEDURE — 80053 COMPREHEN METABOLIC PANEL: CPT

## 2023-06-05 PROCEDURE — 1100000000 HC RM PRIVATE

## 2023-06-05 PROCEDURE — 6360000004 HC RX CONTRAST MEDICATION

## 2023-06-05 PROCEDURE — 99285 EMERGENCY DEPT VISIT HI MDM: CPT

## 2023-06-05 PROCEDURE — 36415 COLL VENOUS BLD VENIPUNCTURE: CPT

## 2023-06-05 PROCEDURE — 96374 THER/PROPH/DIAG INJ IV PUSH: CPT

## 2023-06-05 PROCEDURE — 6360000002 HC RX W HCPCS

## 2023-06-05 PROCEDURE — 85025 COMPLETE CBC W/AUTO DIFF WBC: CPT

## 2023-06-05 PROCEDURE — 83690 ASSAY OF LIPASE: CPT

## 2023-06-05 PROCEDURE — 76705 ECHO EXAM OF ABDOMEN: CPT

## 2023-06-05 RX ORDER — ACETAMINOPHEN 650 MG/1
650 SUPPOSITORY RECTAL EVERY 6 HOURS PRN
Status: DISCONTINUED | OUTPATIENT
Start: 2023-06-05 | End: 2023-06-07 | Stop reason: HOSPADM

## 2023-06-05 RX ORDER — 0.9 % SODIUM CHLORIDE 0.9 %
500 INTRAVENOUS SOLUTION INTRAVENOUS ONCE
Status: COMPLETED | OUTPATIENT
Start: 2023-06-05 | End: 2023-06-06

## 2023-06-05 RX ORDER — SODIUM CHLORIDE 0.9 % (FLUSH) 0.9 %
5-40 SYRINGE (ML) INJECTION PRN
Status: DISCONTINUED | OUTPATIENT
Start: 2023-06-05 | End: 2023-06-07 | Stop reason: HOSPADM

## 2023-06-05 RX ORDER — SODIUM CHLORIDE 9 MG/ML
INJECTION, SOLUTION INTRAVENOUS PRN
Status: DISCONTINUED | OUTPATIENT
Start: 2023-06-05 | End: 2023-06-07 | Stop reason: HOSPADM

## 2023-06-05 RX ORDER — NALOXONE HYDROCHLORIDE 0.4 MG/ML
0.4 INJECTION, SOLUTION INTRAMUSCULAR; INTRAVENOUS; SUBCUTANEOUS PRN
Status: DISCONTINUED | OUTPATIENT
Start: 2023-06-05 | End: 2023-06-07 | Stop reason: HOSPADM

## 2023-06-05 RX ORDER — ONDANSETRON 4 MG/1
4 TABLET, ORALLY DISINTEGRATING ORAL EVERY 8 HOURS PRN
Status: DISCONTINUED | OUTPATIENT
Start: 2023-06-05 | End: 2023-06-07 | Stop reason: HOSPADM

## 2023-06-05 RX ORDER — ONDANSETRON 2 MG/ML
4 INJECTION INTRAMUSCULAR; INTRAVENOUS EVERY 6 HOURS PRN
Status: DISCONTINUED | OUTPATIENT
Start: 2023-06-05 | End: 2023-06-07 | Stop reason: HOSPADM

## 2023-06-05 RX ORDER — POLYETHYLENE GLYCOL 3350 17 G/17G
17 POWDER, FOR SOLUTION ORAL DAILY PRN
Status: DISCONTINUED | OUTPATIENT
Start: 2023-06-05 | End: 2023-06-07 | Stop reason: HOSPADM

## 2023-06-05 RX ORDER — SODIUM CHLORIDE 0.9 % (FLUSH) 0.9 %
5-40 SYRINGE (ML) INJECTION EVERY 12 HOURS SCHEDULED
Status: DISCONTINUED | OUTPATIENT
Start: 2023-06-05 | End: 2023-06-07 | Stop reason: HOSPADM

## 2023-06-05 RX ORDER — ACETAMINOPHEN 325 MG/1
650 TABLET ORAL EVERY 6 HOURS PRN
Status: DISCONTINUED | OUTPATIENT
Start: 2023-06-05 | End: 2023-06-07 | Stop reason: HOSPADM

## 2023-06-05 RX ORDER — MORPHINE SULFATE 2 MG/ML
2 INJECTION, SOLUTION INTRAMUSCULAR; INTRAVENOUS ONCE
Status: COMPLETED | OUTPATIENT
Start: 2023-06-05 | End: 2023-06-05

## 2023-06-05 RX ORDER — MORPHINE SULFATE 2 MG/ML
2 INJECTION, SOLUTION INTRAMUSCULAR; INTRAVENOUS EVERY 4 HOURS PRN
Status: DISCONTINUED | OUTPATIENT
Start: 2023-06-05 | End: 2023-06-07 | Stop reason: HOSPADM

## 2023-06-05 RX ADMIN — SODIUM CHLORIDE 500 ML: 9 INJECTION, SOLUTION INTRAVENOUS at 21:28

## 2023-06-05 RX ADMIN — IOPAMIDOL 100 ML: 755 INJECTION, SOLUTION INTRAVENOUS at 19:09

## 2023-06-05 RX ADMIN — MORPHINE SULFATE 2 MG: 2 INJECTION, SOLUTION INTRAMUSCULAR; INTRAVENOUS at 21:28

## 2023-06-05 ASSESSMENT — ENCOUNTER SYMPTOMS
ABDOMINAL PAIN: 1
TROUBLE SWALLOWING: 0
CONSTIPATION: 0
NAUSEA: 0
VOMITING: 0
SHORTNESS OF BREATH: 0
DIARRHEA: 0
ABDOMINAL DISTENTION: 0
COLOR CHANGE: 0

## 2023-06-05 ASSESSMENT — PAIN - FUNCTIONAL ASSESSMENT: PAIN_FUNCTIONAL_ASSESSMENT: NONE - DENIES PAIN

## 2023-06-05 NOTE — ED PROVIDER NOTES
[facility] EMERGENCY DEP  EMERGENCY DEPARTMENT ENCOUNTER      Pt Name: Faye Landau  MRN: [id number]  [place] 1950  Date of evaluation: 6/5/2023  Provider: Marianna Coates PA-C    CHIEF COMPLAINT       Chief Complaint   Patient presents with    Urticaria    Urinary Frequency         HISTORY OF PRESENT ILLNESS   (Location/Symptom, Timing/Onset, Context/Setting, Quality, Duration, Modifying Factors, Severity)  Note limiting factors. 66 yo female presents with right sided pain for about 1 week. The pain begins in her back and wraps around to her RUQ. She states the pain occurs randomly and does not correlate with meals. She notes the pain increases with movement and hurts worse at night. She also reports she has been getting hives at night. She notes her neuropathy pain has been getting progressively worse over the past week and has not been controlled with her regular dose of lyrica. Denies any nausea, vomiting, diarrhea, constipation, dysuria, frequency or urgency. Medical history significant for a history of  hep C s/p completion of antivirals and non-diabetic neuropathy. Denies any history of previous abdominal surgery. The history is provided by the patient. No  was used. Review of External Medical Records:     Nursing Notes were reviewed. REVIEW OF SYSTEMS    (2-9 systems for level 4, 10 or more for level 5)     Review of Systems   Constitutional:  Negative for fever. HENT:  Negative for trouble swallowing. Eyes:  Negative for visual disturbance. Respiratory:  Negative for shortness of breath. Cardiovascular:  Negative for chest pain. Gastrointestinal:  Positive for abdominal pain. Negative for abdominal distention, constipation, diarrhea, nausea and vomiting. Genitourinary:  Negative for difficulty urinating. Musculoskeletal:  Negative for gait problem. Skin:  Negative for color change. Neurological:  Negative for speech difficulty.      Except as noted

## 2023-06-05 NOTE — ED TRIAGE NOTES
Pt comes to ED from home with reports of getting hives while sleeping and thinks she has a UTI. Pt also reports she is not getting relief with lyrica for her neuropathy.

## 2023-06-06 ENCOUNTER — APPOINTMENT (OUTPATIENT)
Facility: HOSPITAL | Age: 73
DRG: 443 | End: 2023-06-06
Payer: MEDICARE

## 2023-06-06 LAB
ALBUMIN SERPL-MCNC: 3.1 G/DL (ref 3.5–5)
ALBUMIN/GLOB SERPL: 0.8 (ref 1.1–2.2)
ALP SERPL-CCNC: 106 U/L (ref 45–117)
ALT SERPL-CCNC: 44 U/L (ref 12–78)
ANION GAP SERPL CALC-SCNC: 9 MMOL/L (ref 5–15)
AST SERPL-CCNC: 31 U/L (ref 15–37)
BASOPHILS # BLD: 0 K/UL (ref 0–0.1)
BASOPHILS NFR BLD: 1 % (ref 0–1)
BILIRUB SERPL-MCNC: 1 MG/DL (ref 0.2–1)
BUN SERPL-MCNC: 11 MG/DL (ref 6–20)
BUN/CREAT SERPL: 19 (ref 12–20)
CALCIUM SERPL-MCNC: 8.7 MG/DL (ref 8.5–10.1)
CHLORIDE SERPL-SCNC: 108 MMOL/L (ref 97–108)
CO2 SERPL-SCNC: 25 MMOL/L (ref 21–32)
CREAT SERPL-MCNC: 0.59 MG/DL (ref 0.55–1.02)
DIFFERENTIAL METHOD BLD: ABNORMAL
EOSINOPHIL # BLD: 0.1 K/UL (ref 0–0.4)
EOSINOPHIL NFR BLD: 2 % (ref 0–7)
ERYTHROCYTE [DISTWIDTH] IN BLOOD BY AUTOMATED COUNT: 12.4 % (ref 11.5–14.5)
GLOBULIN SER CALC-MCNC: 3.8 G/DL (ref 2–4)
GLUCOSE SERPL-MCNC: 97 MG/DL (ref 65–100)
HCT VFR BLD AUTO: 37.6 % (ref 35–47)
HGB BLD-MCNC: 11.8 G/DL (ref 11.5–16)
IMM GRANULOCYTES # BLD AUTO: 0 K/UL (ref 0–0.04)
IMM GRANULOCYTES NFR BLD AUTO: 0 % (ref 0–0.5)
LYMPHOCYTES # BLD: 1.5 K/UL (ref 0.8–3.5)
LYMPHOCYTES NFR BLD: 22 % (ref 12–49)
MAGNESIUM SERPL-MCNC: 2.2 MG/DL (ref 1.6–2.4)
MCH RBC QN AUTO: 30.3 PG (ref 26–34)
MCHC RBC AUTO-ENTMCNC: 31.4 G/DL (ref 30–36.5)
MCV RBC AUTO: 96.4 FL (ref 80–99)
MONOCYTES # BLD: 0.8 K/UL (ref 0–1)
MONOCYTES NFR BLD: 12 % (ref 5–13)
NEUTS SEG # BLD: 4.3 K/UL (ref 1.8–8)
NEUTS SEG NFR BLD: 63 % (ref 32–75)
NRBC # BLD: 0 K/UL (ref 0–0.01)
NRBC BLD-RTO: 0 PER 100 WBC
PLATELET # BLD AUTO: 146 K/UL (ref 150–400)
PMV BLD AUTO: 11.2 FL (ref 8.9–12.9)
POTASSIUM SERPL-SCNC: 3.4 MMOL/L (ref 3.5–5.1)
PROT SERPL-MCNC: 6.9 G/DL (ref 6.4–8.2)
RBC # BLD AUTO: 3.9 M/UL (ref 3.8–5.2)
SODIUM SERPL-SCNC: 142 MMOL/L (ref 136–145)
WBC # BLD AUTO: 6.8 K/UL (ref 3.6–11)

## 2023-06-06 PROCEDURE — 2580000003 HC RX 258: Performed by: INTERNAL MEDICINE

## 2023-06-06 PROCEDURE — 6360000004 HC RX CONTRAST MEDICATION

## 2023-06-06 PROCEDURE — 83735 ASSAY OF MAGNESIUM: CPT

## 2023-06-06 PROCEDURE — 36415 COLL VENOUS BLD VENIPUNCTURE: CPT

## 2023-06-06 PROCEDURE — 80053 COMPREHEN METABOLIC PANEL: CPT

## 2023-06-06 PROCEDURE — 6370000000 HC RX 637 (ALT 250 FOR IP): Performed by: FAMILY MEDICINE

## 2023-06-06 PROCEDURE — 1200000000 HC SEMI PRIVATE

## 2023-06-06 PROCEDURE — 6360000002 HC RX W HCPCS: Performed by: FAMILY MEDICINE

## 2023-06-06 PROCEDURE — A9579 GAD-BASE MR CONTRAST NOS,1ML: HCPCS

## 2023-06-06 PROCEDURE — 2709999900 MRI ABDOMEN W WO CONTRAST MRCP

## 2023-06-06 PROCEDURE — 99231 SBSQ HOSP IP/OBS SF/LOW 25: CPT | Performed by: INTERNAL MEDICINE

## 2023-06-06 PROCEDURE — 2580000003 HC RX 258: Performed by: FAMILY MEDICINE

## 2023-06-06 PROCEDURE — 85025 COMPLETE CBC W/AUTO DIFF WBC: CPT

## 2023-06-06 RX ORDER — 0.9 % SODIUM CHLORIDE 0.9 %
100 INTRAVENOUS SOLUTION INTRAVENOUS ONCE
Status: COMPLETED | OUTPATIENT
Start: 2023-06-06 | End: 2023-06-06

## 2023-06-06 RX ORDER — PREGABALIN 75 MG/1
150 CAPSULE ORAL 2 TIMES DAILY
Status: DISCONTINUED | OUTPATIENT
Start: 2023-06-06 | End: 2023-06-06

## 2023-06-06 RX ORDER — PREGABALIN 75 MG/1
150 CAPSULE ORAL 3 TIMES DAILY
Status: DISCONTINUED | OUTPATIENT
Start: 2023-06-06 | End: 2023-06-07 | Stop reason: HOSPADM

## 2023-06-06 RX ADMIN — PREGABALIN 150 MG: 75 CAPSULE ORAL at 20:17

## 2023-06-06 RX ADMIN — PREGABALIN 150 MG: 75 CAPSULE ORAL at 14:29

## 2023-06-06 RX ADMIN — POLYETHYLENE GLYCOL 3350 17 G: 17 POWDER, FOR SOLUTION ORAL at 17:26

## 2023-06-06 RX ADMIN — SODIUM CHLORIDE, PRESERVATIVE FREE 10 ML: 5 INJECTION INTRAVENOUS at 20:17

## 2023-06-06 RX ADMIN — SODIUM CHLORIDE, PRESERVATIVE FREE 10 ML: 5 INJECTION INTRAVENOUS at 01:16

## 2023-06-06 RX ADMIN — MORPHINE SULFATE 2 MG: 2 INJECTION, SOLUTION INTRAMUSCULAR; INTRAVENOUS at 05:17

## 2023-06-06 RX ADMIN — MORPHINE SULFATE 2 MG: 2 INJECTION, SOLUTION INTRAMUSCULAR; INTRAVENOUS at 12:21

## 2023-06-06 RX ADMIN — SODIUM CHLORIDE, PRESERVATIVE FREE 10 ML: 5 INJECTION INTRAVENOUS at 09:57

## 2023-06-06 RX ADMIN — GADOTERIDOL 16 ML: 279.3 INJECTION, SOLUTION INTRAVENOUS at 07:38

## 2023-06-06 RX ADMIN — MORPHINE SULFATE 2 MG: 2 INJECTION, SOLUTION INTRAMUSCULAR; INTRAVENOUS at 20:17

## 2023-06-06 RX ADMIN — SODIUM CHLORIDE 100 ML: 900 INJECTION, SOLUTION INTRAVENOUS at 07:39

## 2023-06-06 ASSESSMENT — PAIN SCALES - GENERAL
PAINLEVEL_OUTOF10: 7

## 2023-06-06 ASSESSMENT — PAIN DESCRIPTION - LOCATION
LOCATION: ABDOMEN

## 2023-06-06 ASSESSMENT — PAIN DESCRIPTION - DESCRIPTORS
DESCRIPTORS: ACHING
DESCRIPTORS: ACHING

## 2023-06-06 ASSESSMENT — PAIN DESCRIPTION - ORIENTATION
ORIENTATION: RIGHT;POSTERIOR;LOWER
ORIENTATION: RIGHT

## 2023-06-06 NOTE — CARE COORDINATION
Care Management Initial Assessment       RUR:6%  Readmission? No  1st IM letter given? Yes - 6/6/23  1st  letter given: No    Transition of Care Plan:    Prior Level of Functioning: independent  Disposition: return to independent functioning  IFollow up appointments:   DME needed: None  Transportation at discharge: Self via car  IM/IMM Medicare/ letter given: Yes  Is patient a  and connected with [place]? No  Caregiver Contact: friend Alicja Backers  Discharge Caregiver contacted prior to discharge? N/A  Care Conference needed? No  Barriers to discharge: None    Consults: GI, general surgery    Reason for Admission:  Dilated CBD                   Cirrhosis       Neuropathy                          Plan for utilizing home health:  None       PCP: First and Last name:  Maday Shannon MD   Name of Practice:    Are you a current patient: Yes    Approximate date of last visit: within the last month                     Current Advanced Directive/Advance Care Plan: Full Code     Healthcare Decision Maker:   Click here to complete [address] including selection of the [address] Relationship (ie \"Primary\")             Primary Decision Maker: Gemma Levi - Other - [phone]    Primary Decision Maker: Rubio Suárez - [phone]                  CM met with patient. Patient lives alone. Patient lives in an apartment on 12 th floor with elevator access. No family support ;local or otherwise reported. Patient has good friends and [employer] family support. Patient was ambulatory prior to admission. Patient confirmed PCP, health insurance, and prescription coverage.    Previous home health :None  Previous IPR/ SNF rehab :None  DME : None           06/06/23 1535   Service Assessment   Patient Orientation Alert and Oriented   Cognition Alert   History Provided By Patient   Primary Caregiver Self   Support Systems [employer];Friends/Neighbors   PCP Verified by CM Yes

## 2023-06-06 NOTE — H&P
clinical/nonclinical/ nursing providers based on care coordination needs. Assessment:   Given the patient's current clinical presentation, there is a high level of concern for decompensation if discharged from the emergency department. Complex decision making was performed, which includes reviewing the patient's available past medical records, laboratory results, and imaging studies. Active Problems:    * No active hospital problems. *  Resolved Problems:    * No resolved hospital problems. *      Plan:     #dilated CBD  #RUQ pain  -LFT's with only alk phos to 124, otherwise not c/w obstruction  -MRCP pending  -trend LFT's  -GI consulted    #neuropathy  -continue home lyrica    DIET: Diet NPO   ISOLATION PRECAUTIONS: No active isolations  CODE STATUS: full   DVT PROPHYLAXIS: SCD's or Sequential Compression Device  ANTICIPATED DISCHARGE: 2-3 days  ANTICIPATED DISPOSITION: Home      Signed By: Sinai Regan MD     June 5, 2023         Please note that this dictation may have been completed with Anna Zelaya, the computer voice recognition software. Quite often unanticipated grammatical, syntax, homophones, and other interpretive errors are inadvertently transcribed by the computer software. Please disregard these errors. Please excuse any errors that have escaped final proofreading.

## 2023-06-06 NOTE — ED NOTES
TRANSFER - OUT REPORT:    Verbal report given to Nancyjl on Matygulshan Villarreal  being transferred to  [address] for routine progression of patient care       Report consisted of patient's Situation, Background, Assessment and   Recommendations(SBAR). Information from the following report(s) Nurse Handoff Report was reviewed with the receiving nurse. [place] Assessment: Presents to emergency department  because of falls (Syncope, seizure, or loss of consciousness): No, Age > 79: No, Altered Mental Status, Intoxication with alcohol or substance confusion (Disorientation, impaired judgment, poor safety awaremess, or inability to follow instructions): No, Impaired Mobility: Ambulates or transfers with assistive devices or assistance; Unable to ambulate or transer.: No, Nursing Judgement: No  Lines:   Peripheral IV 06/05/23 Left Antecubital (Active)   Site Assessment Clean, dry & intact 06/05/23 1709   Line Status Blood return noted;Specimen collected; Flushed 06/05/23 1709   Phlebitis Assessment No symptoms 06/05/23 1709   Infiltration Assessment 0 06/05/23 1709        Opportunity for questions and clarification was provided.       Patient transported with:  Nai Beck RN  06/06/23 0040

## 2023-06-06 NOTE — CONSULTS
regarding Maty Villarreal. I agree with the findings and plan as documented in the note above and have edited it to reflect my findings and plan. Patient Active Hospital Problem List:   Principal Problem:    Dilated cbd, acquired  Active Problems:    Cirrhosis ([address]    Neuropathy  Resolved Problems:    * No resolved hospital problems.  NE Carbone - NP

## 2023-06-07 ENCOUNTER — ANESTHESIA (OUTPATIENT)
Facility: HOSPITAL | Age: 73
DRG: 443 | End: 2023-06-07
Payer: MEDICARE

## 2023-06-07 ENCOUNTER — ANESTHESIA EVENT (OUTPATIENT)
Facility: HOSPITAL | Age: 73
DRG: 443 | End: 2023-06-07
Payer: MEDICARE

## 2023-06-07 VITALS
DIASTOLIC BLOOD PRESSURE: 75 MMHG | BODY MASS INDEX: 29.65 KG/M2 | RESPIRATION RATE: 17 BRPM | TEMPERATURE: 97.7 F | OXYGEN SATURATION: 93 % | WEIGHT: 167.33 LBS | HEIGHT: 63 IN | SYSTOLIC BLOOD PRESSURE: 128 MMHG | HEART RATE: 83 BPM

## 2023-06-07 LAB
ANION GAP SERPL CALC-SCNC: 5 MMOL/L (ref 5–15)
BASOPHILS # BLD: 0 K/UL (ref 0–0.1)
BASOPHILS NFR BLD: 1 % (ref 0–1)
BUN SERPL-MCNC: 6 MG/DL (ref 6–20)
BUN/CREAT SERPL: 9 (ref 12–20)
CALCIUM SERPL-MCNC: 8.6 MG/DL (ref 8.5–10.1)
CHLORIDE SERPL-SCNC: 109 MMOL/L (ref 97–108)
CO2 SERPL-SCNC: 28 MMOL/L (ref 21–32)
CREAT SERPL-MCNC: 0.68 MG/DL (ref 0.55–1.02)
DIFFERENTIAL METHOD BLD: ABNORMAL
EOSINOPHIL # BLD: 0.1 K/UL (ref 0–0.4)
EOSINOPHIL NFR BLD: 3 % (ref 0–7)
ERYTHROCYTE [DISTWIDTH] IN BLOOD BY AUTOMATED COUNT: 12.1 % (ref 11.5–14.5)
GLUCOSE SERPL-MCNC: 92 MG/DL (ref 65–100)
HCT VFR BLD AUTO: 35.3 % (ref 35–47)
HGB BLD-MCNC: 11.4 G/DL (ref 11.5–16)
IMM GRANULOCYTES # BLD AUTO: 0 K/UL
IMM GRANULOCYTES NFR BLD AUTO: 0 %
LYMPHOCYTES # BLD: 1.1 K/UL (ref 0.8–3.5)
LYMPHOCYTES NFR BLD: 25 % (ref 12–49)
MCH RBC QN AUTO: 30.8 PG (ref 26–34)
MCHC RBC AUTO-ENTMCNC: 32.3 G/DL (ref 30–36.5)
MCV RBC AUTO: 95.4 FL (ref 80–99)
MONOCYTES # BLD: 0.5 K/UL (ref 0–1)
MONOCYTES NFR BLD: 11 % (ref 5–13)
NEUTS SEG # BLD: 2.7 K/UL (ref 1.8–8)
NEUTS SEG NFR BLD: 60 % (ref 32–75)
NRBC # BLD: 0 K/UL (ref 0–0.01)
NRBC BLD-RTO: 0 PER 100 WBC
PLATELET # BLD AUTO: 146 K/UL (ref 150–400)
PMV BLD AUTO: 11 FL (ref 8.9–12.9)
POTASSIUM SERPL-SCNC: 3.6 MMOL/L (ref 3.5–5.1)
RBC # BLD AUTO: 3.7 M/UL (ref 3.8–5.2)
RBC MORPH BLD: ABNORMAL
SODIUM SERPL-SCNC: 142 MMOL/L (ref 136–145)
WBC # BLD AUTO: 4.4 K/UL (ref 3.6–11)
WBC MORPH BLD: ABNORMAL

## 2023-06-07 PROCEDURE — 7100000010 HC PHASE II RECOVERY - FIRST 15 MIN: Performed by: INTERNAL MEDICINE

## 2023-06-07 PROCEDURE — 2500000003 HC RX 250 WO HCPCS

## 2023-06-07 PROCEDURE — 3700000001 HC ADD 15 MINUTES (ANESTHESIA): Performed by: INTERNAL MEDICINE

## 2023-06-07 PROCEDURE — 6360000002 HC RX W HCPCS: Performed by: FAMILY MEDICINE

## 2023-06-07 PROCEDURE — 6360000004 HC RX CONTRAST MEDICATION: Performed by: INTERNAL MEDICINE

## 2023-06-07 PROCEDURE — 99238 HOSP IP/OBS DSCHRG MGMT 30/<: CPT | Performed by: INTERNAL MEDICINE

## 2023-06-07 PROCEDURE — C1769 GUIDE WIRE: HCPCS | Performed by: INTERNAL MEDICINE

## 2023-06-07 PROCEDURE — 2580000003 HC RX 258: Performed by: FAMILY MEDICINE

## 2023-06-07 PROCEDURE — 2709999900 HC NON-CHARGEABLE SUPPLY: Performed by: INTERNAL MEDICINE

## 2023-06-07 PROCEDURE — 6360000002 HC RX W HCPCS

## 2023-06-07 PROCEDURE — 0F998ZZ DRAINAGE OF COMMON BILE DUCT, VIA NATURAL OR ARTIFICIAL OPENING ENDOSCOPIC: ICD-10-PCS | Performed by: INTERNAL MEDICINE

## 2023-06-07 PROCEDURE — 3600007513: Performed by: INTERNAL MEDICINE

## 2023-06-07 PROCEDURE — 85025 COMPLETE CBC W/AUTO DIFF WBC: CPT

## 2023-06-07 PROCEDURE — 2580000003 HC RX 258

## 2023-06-07 PROCEDURE — 6370000000 HC RX 637 (ALT 250 FOR IP): Performed by: FAMILY MEDICINE

## 2023-06-07 PROCEDURE — 7100000011 HC PHASE II RECOVERY - ADDTL 15 MIN: Performed by: INTERNAL MEDICINE

## 2023-06-07 PROCEDURE — 0F798ZZ DILATION OF COMMON BILE DUCT, VIA NATURAL OR ARTIFICIAL OPENING ENDOSCOPIC: ICD-10-PCS | Performed by: INTERNAL MEDICINE

## 2023-06-07 PROCEDURE — 3600007503: Performed by: INTERNAL MEDICINE

## 2023-06-07 PROCEDURE — 36415 COLL VENOUS BLD VENIPUNCTURE: CPT

## 2023-06-07 PROCEDURE — 2720000010 HC SURG SUPPLY STERILE: Performed by: INTERNAL MEDICINE

## 2023-06-07 PROCEDURE — 80048 BASIC METABOLIC PNL TOTAL CA: CPT

## 2023-06-07 PROCEDURE — 3700000000 HC ANESTHESIA ATTENDED CARE: Performed by: INTERNAL MEDICINE

## 2023-06-07 RX ORDER — SODIUM CHLORIDE 0.9 % (FLUSH) 0.9 %
5-40 SYRINGE (ML) INJECTION PRN
Status: DISCONTINUED | OUTPATIENT
Start: 2023-06-07 | End: 2023-06-07 | Stop reason: HOSPADM

## 2023-06-07 RX ORDER — ONDANSETRON 2 MG/ML
INJECTION INTRAMUSCULAR; INTRAVENOUS PRN
Status: DISCONTINUED | OUTPATIENT
Start: 2023-06-07 | End: 2023-06-07 | Stop reason: SDUPTHER

## 2023-06-07 RX ORDER — SODIUM CHLORIDE 9 MG/ML
INJECTION, SOLUTION INTRAVENOUS CONTINUOUS PRN
Status: DISCONTINUED | OUTPATIENT
Start: 2023-06-07 | End: 2023-06-07 | Stop reason: SDUPTHER

## 2023-06-07 RX ORDER — PHENYLEPHRINE HCL IN 0.9% NACL 0.4MG/10ML
SYRINGE (ML) INTRAVENOUS PRN
Status: DISCONTINUED | OUTPATIENT
Start: 2023-06-07 | End: 2023-06-07 | Stop reason: SDUPTHER

## 2023-06-07 RX ORDER — SODIUM CHLORIDE 0.9 % (FLUSH) 0.9 %
5-40 SYRINGE (ML) INJECTION EVERY 12 HOURS SCHEDULED
Status: DISCONTINUED | OUTPATIENT
Start: 2023-06-07 | End: 2023-06-07 | Stop reason: HOSPADM

## 2023-06-07 RX ORDER — SODIUM CHLORIDE 9 MG/ML
INJECTION, SOLUTION INTRAVENOUS CONTINUOUS
Status: DISCONTINUED | OUTPATIENT
Start: 2023-06-07 | End: 2023-06-07 | Stop reason: HOSPADM

## 2023-06-07 RX ORDER — SODIUM CHLORIDE 9 MG/ML
25 INJECTION, SOLUTION INTRAVENOUS PRN
Status: DISCONTINUED | OUTPATIENT
Start: 2023-06-07 | End: 2023-06-07 | Stop reason: HOSPADM

## 2023-06-07 RX ORDER — SUCCINYLCHOLINE CHLORIDE 20 MG/ML
INJECTION INTRAMUSCULAR; INTRAVENOUS PRN
Status: DISCONTINUED | OUTPATIENT
Start: 2023-06-07 | End: 2023-06-07 | Stop reason: SDUPTHER

## 2023-06-07 RX ORDER — LIDOCAINE HYDROCHLORIDE 20 MG/ML
INJECTION, SOLUTION EPIDURAL; INFILTRATION; INTRACAUDAL; PERINEURAL PRN
Status: DISCONTINUED | OUTPATIENT
Start: 2023-06-07 | End: 2023-06-07 | Stop reason: SDUPTHER

## 2023-06-07 RX ORDER — DEXAMETHASONE SODIUM PHOSPHATE 4 MG/ML
INJECTION, SOLUTION INTRA-ARTICULAR; INTRALESIONAL; INTRAMUSCULAR; INTRAVENOUS; SOFT TISSUE PRN
Status: DISCONTINUED | OUTPATIENT
Start: 2023-06-07 | End: 2023-06-07 | Stop reason: SDUPTHER

## 2023-06-07 RX ADMIN — LIDOCAINE HYDROCHLORIDE 80 MG: 20 INJECTION, SOLUTION EPIDURAL; INFILTRATION; INTRACAUDAL; PERINEURAL at 07:41

## 2023-06-07 RX ADMIN — Medication 80 MCG: at 07:54

## 2023-06-07 RX ADMIN — PREGABALIN 150 MG: 75 CAPSULE ORAL at 14:08

## 2023-06-07 RX ADMIN — MORPHINE SULFATE 2 MG: 2 INJECTION, SOLUTION INTRAMUSCULAR; INTRAVENOUS at 09:36

## 2023-06-07 RX ADMIN — PREGABALIN 150 MG: 75 CAPSULE ORAL at 08:59

## 2023-06-07 RX ADMIN — SODIUM CHLORIDE, PRESERVATIVE FREE 10 ML: 5 INJECTION INTRAVENOUS at 09:22

## 2023-06-07 RX ADMIN — MORPHINE SULFATE 2 MG: 2 INJECTION, SOLUTION INTRAMUSCULAR; INTRAVENOUS at 12:41

## 2023-06-07 RX ADMIN — ONDANSETRON HYDROCHLORIDE 4 MG: 2 INJECTION, SOLUTION INTRAMUSCULAR; INTRAVENOUS at 07:49

## 2023-06-07 RX ADMIN — SODIUM CHLORIDE: 9 INJECTION, SOLUTION INTRAVENOUS at 07:30

## 2023-06-07 RX ADMIN — DEXAMETHASONE SODIUM PHOSPHATE 4 MG: 4 INJECTION, SOLUTION INTRAMUSCULAR; INTRAVENOUS at 07:49

## 2023-06-07 RX ADMIN — PROPOFOL 150 MG: 10 INJECTION, EMULSION INTRAVENOUS at 07:41

## 2023-06-07 RX ADMIN — SUCCINYLCHOLINE CHLORIDE 140 MG: 20 INJECTION, SOLUTION INTRAMUSCULAR; INTRAVENOUS at 07:41

## 2023-06-07 ASSESSMENT — PAIN SCALES - GENERAL
PAINLEVEL_OUTOF10: 0
PAINLEVEL_OUTOF10: 8
PAINLEVEL_OUTOF10: 0
PAINLEVEL_OUTOF10: 7

## 2023-06-07 ASSESSMENT — PAIN DESCRIPTION - ORIENTATION
ORIENTATION: ANTERIOR
ORIENTATION: LEFT

## 2023-06-07 ASSESSMENT — PAIN DESCRIPTION - LOCATION
LOCATION: FLANK
LOCATION: ABDOMEN

## 2023-06-07 ASSESSMENT — PAIN DESCRIPTION - DESCRIPTORS
DESCRIPTORS: ACHING
DESCRIPTORS: ACHING

## 2023-06-07 ASSESSMENT — PAIN - FUNCTIONAL ASSESSMENT: PAIN_FUNCTIONAL_ASSESSMENT: 0-10

## 2023-06-07 NOTE — ANESTHESIA PRE PROCEDURE
Department of Anesthesiology  Preprocedure Note       Name:  Olga Ervin   Age:  67 y.o.  :  1950                                          MRN:  [id number]         Date:  2023      Surgeon: Immanuel Anderson):  Valdene Hatchet, MD    Procedure: Procedure(s):  ERCP ENDOSCOPIC RETROGRADE CHOLANGIOPANCREATOGRAPHY    Medications prior to admission:   Prior to Admission medications    Medication Sig Start Date End Date Taking?  Authorizing Provider   dicyclomine (BENTYL) 20 MG tablet Take 20 mg by mouth every 6 hours as needed 22   Ar Automatic Reconciliation   pregabalin (LYRICA) 150 MG capsule Take 1 capsule by mouth 2 times a day as directed by physician. max daily dose is 450mg 3/20/23   Ar Automatic Reconciliation       Current medications:    Current Facility-Administered Medications   Medication Dose Route Frequency Provider Last Rate Last Admin    0.9 % sodium chloride infusion   IntraVENous Continuous David Rojas MD        sodium chloride flush 0.9 % injection 5-40 mL  5-40 mL IntraVENous 2 times per day Valdene Hatchet, MD        sodium chloride flush 0.9 % injection 5-40 mL  5-40 mL IntraVENous PRN David Rojas MD        0.9 % sodium chloride infusion  25 mL IntraVENous PRN David Rojas MD        pregabalin (LYRICA) capsule 150 mg  150 mg Oral TID Mikel Moya MD   150 mg at 23    sodium chloride flush 0.9 % injection 5-40 mL  5-40 mL IntraVENous 2 times per day Mikel Moya MD   10 mL at 23    sodium chloride flush 0.9 % injection 5-40 mL  5-40 mL IntraVENous PRN Mikel Moya MD        0.9 % sodium chloride infusion   IntraVENous PRN Mikel Moya MD        ondansetron (ZOFRAN-ODT) disintegrating tablet 4 mg  4 mg Oral Q8H PRN Mikel Moya MD        Or    ondansetron [facility]) injection 4 mg  4 mg IntraVENous Q6H PRN Mikel Moya MD        polyethylene glycol (GLYCOLAX) packet 17 g  17 g Oral Daily PRN Mikel Moya MD   17 g at 23

## 2023-06-07 NOTE — PERIOP NOTE
TRANSFER - OUT REPORT:    Verbal report given to TIERRA Reaves on [address]  being transferred to [address] [place] [address] for ordered procedure       Report consisted of patient's Situation, Background, Assessment and   Recommendations(SBAR). Information from the following report(s) Index and Cardiac Rhythm SR  was reviewed with the receiving nurse. [place] Assessment: Presents to emergency department  because of falls (Syncope, seizure, or loss of consciousness): No, Age > 79: No, Altered Mental Status, Intoxication with alcohol or substance confusion (Disorientation, impaired judgment, poor safety awaremess, or inability to follow instructions): No, Impaired Mobility: Ambulates or transfers with assistive devices or assistance; Unable to ambulate or transer.: No, Nursing Judgement: No  Lines:   Peripheral IV 06/07/23 Right; Anterior Forearm (Active)   Site Assessment Clean, dry & intact 06/07/23 0743   Line Status Blood return noted 06/07/23 0743   Phlebitis Assessment No symptoms 06/07/23 0743   Infiltration Assessment 0 06/07/23 0743   Alcohol Cap Used Yes 06/07/23 0743   Dressing Status Clean, dry & intact 06/07/23 0743   Dressing Type Transparent 06/07/23 0743   Dressing Intervention New 06/07/23 0743        Opportunity for questions and clarification was provided.       Patient transported with:  Power Sena IV

## 2023-06-07 NOTE — PROGRESS NOTES
06;:45 Pt went down for MRI.
07:50 - patient came back from MRI
14 [address], [address] [place] [address] Ave  [phone]        Thank you for requesting this consultation but this patient has been seen by [facility] in the past.  We will inform them of this request.    Sincerely,  NE Phillips NP
Bedside shift change report given to Jose Gallegos RN (oncoming nurse) by [facility] [employer] [facility], RN (offgoing nurse). Report included the following information Nurse Handoff Report, Index, ED Encounter Summary, ED SBAR, Adult Overview, Surgery Report, Intake/Output, MAR, Recent Results, and Med Rec Status.
Discharge care instructions reviewed with patient. Patient fully understand and agreed.
Spiritual Care Assessment/Progress Note  [address]    Name: Darrel Pruitt MRN: [id number]    Age: 67 y.o. Sex: female   Language: English     Date: 6/7/2023            Total Time Calculated: 7 min              Spiritual Assessment begun in [facility]  Service Provided For[de-identified] Patient  Referral/Consult From[de-identified] Rounding  Encounter Overview/Reason : Initial Encounter    Spiritual beliefs:      [x] Involved in a genevieve tradition/spiritual practice: Bobo Linda     [] Supported by a genevieve community:      [] Claims no spiritual orientation:      [] Seeking spiritual identity:           [] Adheres to an individual form of spirituality:      [] Not able to assess:                Identified resources for coping and support system:   Support System: Unknown       [x] Prayer                  [] Devotional reading               [] Music                  [] Guided Imagery     [] Pet visits                                        [] Other: (COMMENT)     Specific area/focus of visit   Encounter: Type: Initial Screen/Assessment  Crisis:    Spiritual/Emotional needs: Type: Spiritual Support  Ritual, Rites and Sacraments:    Grief, Loss, and Adjustments:    Ethics/Mediation:    Behavioral Health:    Palliative Care: Advance Care Planning:           Narrative:  Rounding on 6th Oncology. Patient was present during the visit. Patient shared about her pending discharge, and gratitude to be going home. I had walked by the room earlier and had heard healthy and robust conversation. I had thought she had a guest visiting. Now, I realize it was probably roommate conversation. It seems they were a support for one another. Provided blessings upon leaving. Advised of  availability. Advised nurse to contact [facility] for any further referrals. Myrna Bosworth, MS.  Intern.
TRANSFER - IN REPORT:    Verbal report received from [address] on [facility]  being received from Emergency room for routine progression of patient care      Report consisted of patient's Situation, Background, Assessment and   Recommendations(SBAR). Information from the following report(s) Nurse Handoff Report, Index, ED Encounter Summary, ED SBAR, Adult Overview, Surgery Report, Intake/Output, MAR, Recent Results, and Med Rec Status was reviewed with the receiving nurse. Opportunity for questions and clarification was provided. Assessment completed upon patient's arrival to unit and care assumed.
TRANSFER IN    Verbal report received from [facility] on [employer] returning to room 622-02. Report included SBAR, ED summary, MAR, and recent results. Time for questions given.
Verified patient name and date of birth, scheduled procedure, and informed consent. Reviewed general discharge instructions and  information. Assessed patient. Awake, alert, and oriented per baseline. Vital signs stable (see vital sign flowsheet). Respiratory status within defined limits, abdomen soft and non tender. Skin with in defined limits.
chloride infusion   IntraVENous PRN    ondansetron (ZOFRAN-ODT) disintegrating tablet 4 mg  4 mg Oral Q8H PRN    Or    ondansetron (ZOFRAN) injection 4 mg  4 mg IntraVENous Q6H PRN    polyethylene glycol (GLYCOLAX) packet 17 g  17 g Oral Daily PRN    acetaminophen (TYLENOL) tablet 650 mg  650 mg Oral Q6H PRN    Or    acetaminophen (TYLENOL) suppository 650 mg  650 mg Rectal Q6H PRN    morphine (PF) injection 2 mg  2 mg IntraVENous Q4H PRN    naloxone (NARCAN) injection 0.4 mg  0.4 mg IntraVENous PRN        Objective:   Vitals:  BP (!) 159/80   Pulse 61   Temp 98.1 °F (36.7 °C) (Oral)   Resp 16   Ht 5' 3\" (1.6 m)   Wt 167 lb 5.3 oz (75.9 kg)   SpO2 96%   BMI 29.64 kg/m²   Temp (24hrs), Av °F (36.7 °C), Min:97.8 °F (36.6 °C), Max:98.1 °F (36.7 °C)           Last 24hr Input/Output:  No intake or output data in the 24 hours ending 23 0810     PHYSICAL EXAM:  General:    Alert, cooperative, no distress, appears stated age. Head:   Normocephalic, without obvious abnormality, atraumatic. Eyes:   Conjunctivae/corneas clear. PERRLA  Nose:  Nares normal. No drainage or sinus tenderness. Throat:    Lips, mucosa, and tongue normal.  No Thrush  Neck:  Supple, symmetrical,  no adenopathy, thyroid: non tender    no carotid bruit and no JVD. Back:    Symmetric,  No CVA tenderness. Lungs:   Clear to auscultation bilaterally. No Wheezing or Rhonchi. No rales. Chest wall:  No tenderness or deformity. No Accessory muscle use. Heart:   Regular rate and rhythm,  no murmur, rub or gallop. Abdomen:   Soft, non-tender. Not distended. Bowel sounds normal. No masses  Extremities: Extremities normal, atraumatic, No cyanosis. No edema. No clubbing  Skin:     Texture, turgor normal. No rashes or lesions. Not Jaundiced  Lymph nodes: Cervical, supraclavicular normal.  Psych:  Good insight. Not depressed. Not anxious or agitated. Neurologic: Normal strength, Alert and oriented X 3.        Lab Data
ondansetron (ZOFRAN) injection 4 mg  4 mg IntraVENous Q6H PRN    polyethylene glycol (GLYCOLAX) packet 17 g  17 g Oral Daily PRN    acetaminophen (TYLENOL) tablet 650 mg  650 mg Oral Q6H PRN    Or    acetaminophen (TYLENOL) suppository 650 mg  650 mg Rectal Q6H PRN    morphine (PF) injection 2 mg  2 mg IntraVENous Q4H PRN    naloxone (NARCAN) injection 0.4 mg  0.4 mg IntraVENous PRN        Objective:   Vitals:  /72   Pulse 56   Temp 97.7 °F (36.5 °C) (Oral)   Resp 18   Ht 5' 3\" (1.6 m)   Wt 167 lb 5.3 oz (75.9 kg)   SpO2 95%   BMI 29.64 kg/m²   Temp (24hrs), Av.2 °F (36.8 °C), Min:97.7 °F (36.5 °C), Max:98.6 °F (37 °C)           Last 24hr Input/Output:  No intake or output data in the 24 hours ending 23 0548     PHYSICAL EXAM:  General:    Alert, cooperative, no distress, appears stated age. Head:   Normocephalic, without obvious abnormality, atraumatic. Eyes:   Conjunctivae/corneas clear. PERRLA  Nose:  Nares normal. No drainage or sinus tenderness. Throat:    Lips, mucosa, and tongue normal.  No Thrush  Neck:  Supple, symmetrical,  no adenopathy, thyroid: non tender    no carotid bruit and no JVD. Back:    Symmetric,  No CVA tenderness. Lungs:   Clear to auscultation bilaterally. No Wheezing or Rhonchi. No rales. Chest wall:  No tenderness or deformity. No Accessory muscle use. Heart:   Regular rate and rhythm,  no murmur, rub or gallop. Abdomen:   Soft, non-tender. Not distended. Bowel sounds normal. No masses        Lab Data Reviewed:    Recent Labs     23  17023  0133   WBC 7.1 6.8   HGB 12.2 11.8   HCT 37.7 37.6    146*       Recent Labs     23  17023    142   K 3.6 3.4*    108   CO2 28 25   BUN 16 11   MG  --  2.2   ALT 51 44       No results found for: GLUCPOC  No results for input(s): PH, PCO2, PO2, HCO3, FIO2 in the last 72 hours.   No results for input(s): INR in the last 72

## 2023-06-07 NOTE — DISCHARGE INSTRUCTIONS
Patient Discharge Instructions    Kunal Sandoval / [id number] : 1950    Admitted 2023 Discharged: 2023         Take Home Medications       It is important that you take the medication exactly as they are prescribed. Keep your medication in the bottles provided by the pharmacist and keep a list of the medication names, dosages, and times to be taken in your wallet. Do not take other medications without consulting your doctor. NOTIFY YOUR PHYSICIAN FOR ANY OF THE FOLLOWING:   Fever over 101 degrees for 24 hours. Chest pain, shortness of breath, fever, chills, nausea, vomiting, diarrhea, change in mentation, falling, weakness, bleeding. Severe pain or pain not relieved by medications. Or, any other signs or symptoms that you may have questions about. What to do at Home    Recommended diet: regular diet,   Recommended activity: activity as tolerated,     Follow-up with MUKUL VILLALTA MD  in 3 day    I personally saw the patient today and spent less than or equal to 30 minutes  on counseling and coordination of care in discharging this patient. Information obtained by :  I understand that if any problems occur once I am at home I am to contact my physician. I understand and acknowledge receipt of the instructions indicated above.                                                                                                                                            Physician's or R.N.'s Signature                                                                  Date/Time                                                                                                                                              Patient or Representative Signature                                                          Date/Time

## 2023-06-07 NOTE — ANESTHESIA POSTPROCEDURE EVALUATION
Post-Anesthesia Evaluation and Assessment    Patient: Yasmeen Sung MRN: [id number]  SSN: [id number]    YOB: 1950  Age: 67 y.o. Sex: female      I have evaluated the patient and they are stable and ready for discharge from the PACU. Cardiovascular Function/Vital Signs  Visit Vitals  BP (!) 146/80   Pulse 70   Temp 98.5 °F (36.9 °C) (Oral)   Resp 15   Ht 1.6 m (5' 3\")   Wt 75.9 kg (167 lb 5.3 oz)   SpO2 96%   BMI 29.64 kg/m²       Patient is status post General anesthesia for Procedure(s):  ERCP ENDOSCOPIC RETROGRADE CHOLANGIOPANCREATOGRAPHY  SPHINCTEROTOMY SPHINCTEROPLASTY  ERCP BALLOON SWEEP. Nausea/Vomiting: None    Postoperative hydration reviewed and adequate. Pain:      Managed    Neurological Status: At baseline    Mental Status, Level of Consciousness: Alert and  oriented to person, place, and time    Pulmonary Status:       Adequate oxygenation and airway patent    Complications related to anesthesia: None    Post-anesthesia assessment completed.  No concerns    Signed By: Florian Cisneros MD     June 7, 2023            Department of Anesthesiology  Postprocedure Note    Patient: Yasmeen Sung  MRN: [id number]  YOB: 1950  Date of evaluation: 6/7/2023      Procedure Summary     Date: 06/07/23 Room / Location: [place] [address] / [facility] ENDOSCOPY    Anesthesia Start: 0730 Anesthesia Stop: 0428    Procedures:       ERCP ENDOSCOPIC RETROGRADE CHOLANGIOPANCREATOGRAPHY (Upper GI Region)      SPHINCTEROTOMY SPHINCTEROPLASTY (Upper GI Region)      ERCP BALLOON SWEEP (Upper GI Region) Diagnosis:       Common bile duct (CBD) obstruction      (Common bile duct (CBD) obstruction [K83.1])    Surgeons: Sam Coughlin MD Responsible Provider: Syed Guevara MD    Anesthesia Type: General ASA Status: 3          Anesthesia Type: General    Daniel Phase I: Daniel Score: 10    Daniel Phase II: Daniel Score: 10      Anesthesia Post Evaluation

## 2023-06-07 NOTE — DISCHARGE SUMMARY
Discharge Summary       PATIENT ID: Cassie Stein  MRN: [id number]   YOB: 1950    DATE OF ADMISSION: 6/5/2023  8:56 PM    DATE OF DISCHARGE: 06/07/2023   PRIMARY CARE PROVIDER: Raysa Matos MD     ATTENDING PHYSICIAN: Nayely Echeverria MD    DISCHARGING PROVIDER: Nayely Echeverria MD      Admitting Note: 67 y.o. female with a pmhx hep C cirrhosis, and treated TB who presents with chief complaint of hives, and dysuria with right abdominal pain, and is being admitted for choledocholithiasis. In the ED, VSS, labs showed negative UA. CT abdomen/pelvis with CBD with dilatation up to 11mm with no intrahepatic ductal dilatation. LFT's show only alk phos 124. RUQ US also showed dilated CBD, and MRCP is pending. In the ED, she received morphine, and 500cc bolus. Vitals:    06/07/23 1503   BP: 128/75   Pulse: 83   Resp: 17   Temp: 97.7 °F (36.5 °C)   SpO2: 93%        CONSULTATIONS: IP CONSULT TO AC - Carl Hernandez MD  Ms. Villarreal presented to [facility] ER with dysuria and right sided abdominal pain. CT scan showed CBD dilated 11 mm, concern for choledocholithiasis. Abd US also showed dilated CBD 15mm. LFTs WNL. MRI/MRCP 6/6/23  IMPRESSION:  There is common bile duct dilatation up to 21 mm, without intrahepatic or pancreatic ductal dilatation. There are no stones in the common bile duct. The findings are compatible with a choledochocyst especially if there are no biochemical signs of biliary obstruction.     - Plan for ERCP tomorrow  - CLD - NPO past midnight  - Supportive care per hospitalist      ATTENDING ADDENDUM OF ANGELINE NOTE:     I discussed care with the nurse practitioner who has made the above exam and assessment regarding Cassie Stein. I agree with the findings and plan as documented in the note above and have edited it to reflect my findings and plan.        PROCEDURES/SURGERIES: Procedure(s):David Sanderson MD  ERCP ENDOSCOPIC RETROGRADE

## 2023-06-07 NOTE — OP NOTE
118 [facility].  217 [facility] 140 [facility], [address]  [phone]                   ERCP NOTE    NAME:  Olga Ervin   :   1950   MRN:   [id number]     Date/Time:  2023 8:12 AM    Procedure Type:   ERCPwith biliary sphincterotomy, biliary sludge removal     Indications: abnormal CT/MRCP  Pre-operative Diagnosis: see indication above  Post-operative Diagnosis:  See findings below  : Valdene Hatchet, MD    Staff: Perioperative Nurse: Rocio Treadwell RN  Endoscopy Technician: Epifanio Diego     Implants: none    Referring Provider:     Sheila Moore MD    Sedation:  General anesthesia    Procedure Details:  After informed consent was obtained with all risks and benefits of procedure explained, the patient was taken to the fluoroscopy suite and placed in the prone position. Upon sequential sedation as per above, the Olympus duodenoscope [id number]   was inserted via the mouthpeice and carefully advanced to the second portion of the duodenum. The quality of visualization was good. The duodenoscope was withdrawn into a short position. Findings:   Esophagus:indirect visualization  Stomach: indirect visualization  Duodenum/jejunum: indirect visualization  Ampulla:-normal   Cholangiogram: -Common bile duct was selectively cannulated using dream wire. Cannulation was easy and achieved in first attempt. Contrast was injected. Common bile duct was diffusely dilated and measured about 18 mm in diameter. Dilation was noted immediately proximal to the ampulla and going to the biliary bifurcation. Right and left hepatic ducts were not dilated. Rest of the intrahepatic ducts were also normal.  Smooth tapering into the ampulla was noted. No stricture was noted. Few irregular filling defects were noted in the distal common bile duct. Biliary sphincterotomy was performed using E RBE.   Balloon sweep using extraction balloon 12 mm to 15 mm injection below were performed after

## 2023-06-22 DIAGNOSIS — G62.9 NEUROPATHY: Primary | ICD-10-CM

## 2023-06-22 RX ORDER — PREGABALIN 150 MG/1
CAPSULE ORAL
Qty: 60 CAPSULE | Refills: 4 | Status: SHIPPED | OUTPATIENT
Start: 2023-06-22 | End: 2023-07-06 | Stop reason: SDUPTHER

## 2023-07-06 ENCOUNTER — OFFICE VISIT (OUTPATIENT)
Facility: CLINIC | Age: 73
End: 2023-07-06
Payer: MEDICARE

## 2023-07-06 VITALS
DIASTOLIC BLOOD PRESSURE: 90 MMHG | OXYGEN SATURATION: 94 % | HEIGHT: 60 IN | RESPIRATION RATE: 16 BRPM | HEART RATE: 75 BPM | WEIGHT: 163 LBS | BODY MASS INDEX: 32 KG/M2 | TEMPERATURE: 98.5 F | SYSTOLIC BLOOD PRESSURE: 153 MMHG

## 2023-07-06 DIAGNOSIS — G62.9 NEUROPATHY: ICD-10-CM

## 2023-07-06 DIAGNOSIS — E66.9 OBESITY (BMI 30.0-34.9): ICD-10-CM

## 2023-07-06 DIAGNOSIS — K74.60 CIRRHOSIS OF LIVER WITHOUT ASCITES, UNSPECIFIED HEPATIC CIRRHOSIS TYPE (HCC): Primary | ICD-10-CM

## 2023-07-06 DIAGNOSIS — K83.8 DILATED CBD, ACQUIRED: ICD-10-CM

## 2023-07-06 PROCEDURE — 99214 OFFICE O/P EST MOD 30 MIN: CPT | Performed by: INTERNAL MEDICINE

## 2023-07-06 PROCEDURE — 1123F ACP DISCUSS/DSCN MKR DOCD: CPT | Performed by: INTERNAL MEDICINE

## 2023-07-06 RX ORDER — PREGABALIN 150 MG/1
CAPSULE ORAL
Qty: 90 CAPSULE | Refills: 4 | Status: SHIPPED | OUTPATIENT
Start: 2023-07-06 | End: 2023-08-08

## 2023-07-06 SDOH — HEALTH STABILITY: PHYSICAL HEALTH: ON AVERAGE, HOW MANY DAYS PER WEEK DO YOU ENGAGE IN MODERATE TO STRENUOUS EXERCISE (LIKE A BRISK WALK)?: 0 DAYS

## 2023-07-06 SDOH — ECONOMIC STABILITY: HOUSING INSECURITY: IN THE LAST 12 MONTHS, HOW MANY PLACES HAVE YOU LIVED?: 1

## 2023-07-06 SDOH — ECONOMIC STABILITY: INCOME INSECURITY: IN THE LAST 12 MONTHS, WAS THERE A TIME WHEN YOU WERE NOT ABLE TO PAY THE MORTGAGE OR RENT ON TIME?: NO

## 2023-07-06 SDOH — ECONOMIC STABILITY: FOOD INSECURITY: WITHIN THE PAST 12 MONTHS, THE FOOD YOU BOUGHT JUST DIDN'T LAST AND YOU DIDN'T HAVE MONEY TO GET MORE.: NEVER TRUE

## 2023-07-06 SDOH — ECONOMIC STABILITY: TRANSPORTATION INSECURITY
IN THE PAST 12 MONTHS, HAS LACK OF TRANSPORTATION KEPT YOU FROM MEETINGS, WORK, OR FROM GETTING THINGS NEEDED FOR DAILY LIVING?: NO

## 2023-07-06 SDOH — ECONOMIC STABILITY: HOUSING INSECURITY
IN THE LAST 12 MONTHS, WAS THERE A TIME WHEN YOU DID NOT HAVE A STEADY PLACE TO SLEEP OR SLEPT IN A SHELTER (INCLUDING NOW)?: NO

## 2023-07-06 SDOH — ECONOMIC STABILITY: TRANSPORTATION INSECURITY
IN THE PAST 12 MONTHS, HAS THE LACK OF TRANSPORTATION KEPT YOU FROM MEDICAL APPOINTMENTS OR FROM GETTING MEDICATIONS?: NO

## 2023-07-06 SDOH — HEALTH STABILITY: PHYSICAL HEALTH: ON AVERAGE, HOW MANY MINUTES DO YOU ENGAGE IN EXERCISE AT THIS LEVEL?: 0 MIN

## 2023-07-06 SDOH — ECONOMIC STABILITY: FOOD INSECURITY: WITHIN THE PAST 12 MONTHS, YOU WORRIED THAT YOUR FOOD WOULD RUN OUT BEFORE YOU GOT MONEY TO BUY MORE.: NEVER TRUE

## 2023-07-06 ASSESSMENT — LIFESTYLE VARIABLES
HOW MANY STANDARD DRINKS CONTAINING ALCOHOL DO YOU HAVE ON A TYPICAL DAY: PATIENT DOES NOT DRINK
HOW OFTEN DO YOU HAVE A DRINK CONTAINING ALCOHOL: NEVER

## 2023-07-06 ASSESSMENT — PATIENT HEALTH QUESTIONNAIRE - PHQ9
1. LITTLE INTEREST OR PLEASURE IN DOING THINGS: 0
SUM OF ALL RESPONSES TO PHQ QUESTIONS 1-9: 0
SUM OF ALL RESPONSES TO PHQ9 QUESTIONS 1 & 2: 0
SUM OF ALL RESPONSES TO PHQ QUESTIONS 1-9: 0
2. FEELING DOWN, DEPRESSED OR HOPELESS: 0

## 2023-07-06 ASSESSMENT — ANXIETY QUESTIONNAIRES
5. BEING SO RESTLESS THAT IT IS HARD TO SIT STILL: 0
6. BECOMING EASILY ANNOYED OR IRRITABLE: 0
GAD7 TOTAL SCORE: 0
2. NOT BEING ABLE TO STOP OR CONTROL WORRYING: 0
1. FEELING NERVOUS, ANXIOUS, OR ON EDGE: 0
4. TROUBLE RELAXING: 0
7. FEELING AFRAID AS IF SOMETHING AWFUL MIGHT HAPPEN: 0
IF YOU CHECKED OFF ANY PROBLEMS ON THIS QUESTIONNAIRE, HOW DIFFICULT HAVE THESE PROBLEMS MADE IT FOR YOU TO DO YOUR WORK, TAKE CARE OF THINGS AT HOME, OR GET ALONG WITH OTHER PEOPLE: NOT DIFFICULT AT ALL
3. WORRYING TOO MUCH ABOUT DIFFERENT THINGS: 0

## 2023-07-06 ASSESSMENT — SOCIAL DETERMINANTS OF HEALTH (SDOH)
HOW OFTEN DO YOU ATTEND CHURCH OR RELIGIOUS SERVICES?: MORE THAN 4 TIMES PER YEAR
DO YOU BELONG TO ANY CLUBS OR ORGANIZATIONS SUCH AS CHURCH GROUPS UNIONS, FRATERNAL OR ATHLETIC GROUPS, OR SCHOOL GROUPS?: NO
WITHIN THE LAST YEAR, HAVE YOU BEEN AFRAID OF YOUR PARTNER OR EX-PARTNER?: NO
HOW OFTEN DO YOU ATTENT MEETINGS OF THE CLUB OR ORGANIZATION YOU BELONG TO?: NEVER
HOW HARD IS IT FOR YOU TO PAY FOR THE VERY BASICS LIKE FOOD, HOUSING, MEDICAL CARE, AND HEATING?: SOMEWHAT HARD
WITHIN THE LAST YEAR, HAVE YOU BEEN HUMILIATED OR EMOTIONALLY ABUSED IN OTHER WAYS BY YOUR PARTNER OR EX-PARTNER?: NO
WITHIN THE LAST YEAR, HAVE YOU BEEN KICKED, HIT, SLAPPED, OR OTHERWISE PHYSICALLY HURT BY YOUR PARTNER OR EX-PARTNER?: NO
IN A TYPICAL WEEK, HOW MANY TIMES DO YOU TALK ON THE PHONE WITH FAMILY, FRIENDS, OR NEIGHBORS?: MORE THAN THREE TIMES A WEEK
WITHIN THE LAST YEAR, HAVE TO BEEN RAPED OR FORCED TO HAVE ANY KIND OF SEXUAL ACTIVITY BY YOUR PARTNER OR EX-PARTNER?: NO
HOW OFTEN DO YOU GET TOGETHER WITH FRIENDS OR RELATIVES?: MORE THAN THREE TIMES A WEEK

## 2023-11-19 ENCOUNTER — HOSPITAL ENCOUNTER (EMERGENCY)
Facility: HOSPITAL | Age: 73
Discharge: HOME | End: 2023-11-19
Attending: EMERGENCY MEDICINE
Payer: MEDICARE

## 2023-11-19 ENCOUNTER — APPOINTMENT (OUTPATIENT)
Facility: HOSPITAL | Age: 73
End: 2023-11-19
Payer: MEDICARE

## 2023-11-19 VITALS
SYSTOLIC BLOOD PRESSURE: 152 MMHG | TEMPERATURE: 98.4 F | DIASTOLIC BLOOD PRESSURE: 76 MMHG | HEART RATE: 84 BPM | RESPIRATION RATE: 18 BRPM | OXYGEN SATURATION: 92 %

## 2023-11-19 DIAGNOSIS — R10.84 GENERALIZED ABDOMINAL PAIN: Primary | ICD-10-CM

## 2023-11-19 LAB
ALBUMIN SERPL-MCNC: 3.7 G/DL (ref 3.5–5)
ALBUMIN/GLOB SERPL: 0.8 (ref 1.1–2.2)
ALP SERPL-CCNC: 155 U/L (ref 45–117)
ALT SERPL-CCNC: 112 U/L (ref 12–78)
ANION GAP SERPL CALC-SCNC: 5 MMOL/L (ref 5–15)
APPEARANCE UR: CLEAR
AST SERPL-CCNC: 166 U/L (ref 15–37)
BACTERIA URNS QL MICRO: NEGATIVE /HPF
BASOPHILS # BLD: 0 K/UL (ref 0–0.1)
BASOPHILS NFR BLD: 0 % (ref 0–1)
BILIRUB SERPL-MCNC: 0.6 MG/DL (ref 0.2–1)
BILIRUB UR QL: NEGATIVE
BUN SERPL-MCNC: 16 MG/DL (ref 6–20)
BUN/CREAT SERPL: 19 (ref 12–20)
CALCIUM SERPL-MCNC: 8.4 MG/DL (ref 8.5–10.1)
CHLORIDE SERPL-SCNC: 106 MMOL/L (ref 97–108)
CO2 SERPL-SCNC: 28 MMOL/L (ref 21–32)
COLOR UR: ABNORMAL
CREAT SERPL-MCNC: 0.86 MG/DL (ref 0.55–1.02)
DIFFERENTIAL METHOD BLD: NORMAL
EOSINOPHIL # BLD: 0.1 K/UL (ref 0–0.4)
EOSINOPHIL NFR BLD: 1 % (ref 0–7)
EPITH CASTS URNS QL MICRO: ABNORMAL /LPF
ERYTHROCYTE [DISTWIDTH] IN BLOOD BY AUTOMATED COUNT: 12.4 % (ref 11.5–14.5)
ETHANOL SERPL-MCNC: 277 MG/DL (ref 0–0.08)
GLOBULIN SER CALC-MCNC: 4.5 G/DL (ref 2–4)
GLUCOSE SERPL-MCNC: 102 MG/DL (ref 65–100)
GLUCOSE UR STRIP.AUTO-MCNC: NEGATIVE MG/DL
HCT VFR BLD AUTO: 40.1 % (ref 35–47)
HGB BLD-MCNC: 13.4 G/DL (ref 11.5–16)
HGB UR QL STRIP: ABNORMAL
HYALINE CASTS URNS QL MICRO: ABNORMAL /LPF (ref 0–5)
IMM GRANULOCYTES # BLD AUTO: 0 K/UL (ref 0–0.04)
IMM GRANULOCYTES NFR BLD AUTO: 0 % (ref 0–0.5)
INR PPP: 1 (ref 0.9–1.1)
KETONES UR QL STRIP.AUTO: NEGATIVE MG/DL
LEUKOCYTE ESTERASE UR QL STRIP.AUTO: ABNORMAL
LIPASE SERPL-CCNC: 40 U/L (ref 13–75)
LYMPHOCYTES # BLD: 1.9 K/UL (ref 0.8–3.5)
LYMPHOCYTES NFR BLD: 25 % (ref 12–49)
MCH RBC QN AUTO: 31.9 PG (ref 26–34)
MCHC RBC AUTO-ENTMCNC: 33.4 G/DL (ref 30–36.5)
MCV RBC AUTO: 95.5 FL (ref 80–99)
MONOCYTES # BLD: 0.8 K/UL (ref 0–1)
MONOCYTES NFR BLD: 11 % (ref 5–13)
NEUTS SEG # BLD: 4.7 K/UL (ref 1.8–8)
NEUTS SEG NFR BLD: 63 % (ref 32–75)
NITRITE UR QL STRIP.AUTO: NEGATIVE
NRBC # BLD: 0 K/UL (ref 0–0.01)
NRBC BLD-RTO: 0 PER 100 WBC
PH UR STRIP: 5.5 (ref 5–8)
PLATELET # BLD AUTO: 163 K/UL (ref 150–400)
PMV BLD AUTO: 10.7 FL (ref 8.9–12.9)
POTASSIUM SERPL-SCNC: 3.6 MMOL/L (ref 3.5–5.1)
PROT SERPL-MCNC: 8.2 G/DL (ref 6.4–8.2)
PROT UR STRIP-MCNC: ABNORMAL MG/DL
PROTHROMBIN TIME: 10.8 SEC (ref 9–11.1)
RBC # BLD AUTO: 4.2 M/UL (ref 3.8–5.2)
RBC #/AREA URNS HPF: ABNORMAL /HPF (ref 0–5)
SODIUM SERPL-SCNC: 139 MMOL/L (ref 136–145)
SP GR UR REFRACTOMETRY: 1.02 (ref 1–1.03)
TROPONIN I SERPL HS-MCNC: 7 NG/L (ref 0–51)
URINE CULTURE IF INDICATED: ABNORMAL
UROBILINOGEN UR QL STRIP.AUTO: 0.2 EU/DL (ref 0.2–1)
WBC # BLD AUTO: 7.4 K/UL (ref 3.6–11)
WBC URNS QL MICRO: ABNORMAL /HPF (ref 0–4)

## 2023-11-19 PROCEDURE — 6360000004 HC RX CONTRAST MEDICATION

## 2023-11-19 PROCEDURE — 85610 PROTHROMBIN TIME: CPT

## 2023-11-19 PROCEDURE — 83690 ASSAY OF LIPASE: CPT

## 2023-11-19 PROCEDURE — 74177 CT ABD & PELVIS W/CONTRAST: CPT

## 2023-11-19 PROCEDURE — 82077 ASSAY SPEC XCP UR&BREATH IA: CPT

## 2023-11-19 PROCEDURE — 81001 URINALYSIS AUTO W/SCOPE: CPT

## 2023-11-19 PROCEDURE — 80053 COMPREHEN METABOLIC PANEL: CPT

## 2023-11-19 PROCEDURE — 99285 EMERGENCY DEPT VISIT HI MDM: CPT

## 2023-11-19 PROCEDURE — 85025 COMPLETE CBC W/AUTO DIFF WBC: CPT

## 2023-11-19 PROCEDURE — 36415 COLL VENOUS BLD VENIPUNCTURE: CPT

## 2023-11-19 PROCEDURE — 84484 ASSAY OF TROPONIN QUANT: CPT

## 2023-11-19 PROCEDURE — 71046 X-RAY EXAM CHEST 2 VIEWS: CPT

## 2023-11-19 RX ADMIN — IOPAMIDOL 100 ML: 755 INJECTION, SOLUTION INTRAVENOUS at 18:21

## 2023-11-19 ASSESSMENT — PAIN - FUNCTIONAL ASSESSMENT: PAIN_FUNCTIONAL_ASSESSMENT: 0-10

## 2023-11-19 ASSESSMENT — PAIN SCALES - GENERAL: PAINLEVEL_OUTOF10: 9

## 2023-11-19 ASSESSMENT — PAIN DESCRIPTION - LOCATION: LOCATION: ABDOMEN

## 2023-11-19 NOTE — ED TRIAGE NOTES
She arrives saying she has liver pain. She says the pain \"grabs\" her. She says she did drink ETOH today. She says she drinks about a pint of Vodka a day.

## 2023-11-19 NOTE — ED PROVIDER NOTES
[facility] EMERGENCY DEP  EMERGENCY DEPARTMENT ENCOUNTER      Pt Name: Tatiana Arora  MRN: [id number]  [address] [facility] [place] 1950  Date of evaluation: 11/19/2023  Provider: Francisca Mccullough       Chief Complaint   Patient presents with    Abdominal Pain         HISTORY OF PRESENT ILLNESS   (Location/Symptom, Timing/Onset, Context/Setting, Quality, Duration, Modifying Factors, Severity)  Note limiting factors. 76 y/o female who comes in for right sided pain. Pain started this morning. Right upper quadrant pain and right lower chest wall wall pain. Pain is worse with certain movement, no change in breathing. Patient says the pain comes and goes as well. She is concerned about her liver. ETOH use daily. No change in bowel or bladder. No back pain, no flank pain, no hematuria or the like. No history of the like. Review of External Medical Records:     Nursing Notes were reviewed. REVIEW OF SYSTEMS    (2-9 systems for level 4, 10 or more for level 5)     Review of Systems    Except as noted above the remainder of the review of systems was reviewed and negative.        PAST MEDICAL HISTORY     Past Medical History:   Diagnosis Date    Arthritis     OSTEO    Chronic pain     Cirrhosis ([address])     Hepatitis C     Liver disease 1980s    HEPATITIS C, TREATED AND NOW GONE    Neuropathy     Tuberculosis 1962    +HAI TEST # 3; TREATED AND NOW LUNGS HAVE ALWAYS BEEN CLEAR         SURGICAL HISTORY       Past Surgical History:   Procedure Laterality Date    ANUS SURGERY  6/7/2023    SPHINCTEROTOMY SPHINCTEROPLASTY performed by Aria Manley MD at [facility] ENDOSCOPY    COLONOSCOPY N/A 8/25/2022    COLONOSCOPY performed by Daisy Lui MD at [facility]      HISTORY OF POLYP    ERCP N/A 6/7/2023    ERCP ENDOSCOPIC RETROGRADE CHOLANGIOPANCREATOGRAPHY performed by Aria Manley MD at [facility] ENDOSCOPY    ERCP N/A 6/7/2023    ERCP BALLOON SWEEP performed by Aria Manley MD at [facility] ENDOSCOPY

## 2023-11-20 NOTE — ED NOTES
Pt was asking for her lyrica, wanted to go out to car to get it but was informed we could not let her leave with her IV. Spoke with Dr. Keesha Daily who verbally ordered it. Less than 5 minutes after going to find Dr. Keesha Daily pt was pacing around nurses station saying take it out I'm leaving. Was informed we were working it on but she still wanted it out so this RN removed it and pt left.      Katy Mejia RN  11/19/23 [id number]

## 2023-11-24 ENCOUNTER — APPOINTMENT (OUTPATIENT)
Facility: HOSPITAL | Age: 73
End: 2023-11-24
Payer: MEDICARE

## 2023-11-24 ENCOUNTER — HOSPITAL ENCOUNTER (EMERGENCY)
Facility: HOSPITAL | Age: 73
Discharge: HOME | End: 2023-11-24
Attending: STUDENT IN AN ORGANIZED HEALTH CARE EDUCATION/TRAINING PROGRAM
Payer: MEDICARE

## 2023-11-24 VITALS
BODY MASS INDEX: 27.58 KG/M2 | HEIGHT: 63 IN | RESPIRATION RATE: 18 BRPM | DIASTOLIC BLOOD PRESSURE: 91 MMHG | TEMPERATURE: 99.2 F | WEIGHT: 155.65 LBS | HEART RATE: 92 BPM | OXYGEN SATURATION: 95 % | SYSTOLIC BLOOD PRESSURE: 148 MMHG

## 2023-11-24 DIAGNOSIS — R10.11 ABDOMINAL PAIN, RIGHT UPPER QUADRANT: Primary | ICD-10-CM

## 2023-11-24 LAB
ALBUMIN SERPL-MCNC: 3.6 G/DL (ref 3.5–5)
ALBUMIN/GLOB SERPL: 0.9 (ref 1.1–2.2)
ALP SERPL-CCNC: 119 U/L (ref 45–117)
ALT SERPL-CCNC: 76 U/L (ref 12–78)
ANION GAP SERPL CALC-SCNC: 5 MMOL/L (ref 5–15)
APPEARANCE UR: CLEAR
AST SERPL-CCNC: 98 U/L (ref 15–37)
BACTERIA URNS QL MICRO: NEGATIVE /HPF
BASOPHILS # BLD: 0 K/UL (ref 0–0.1)
BASOPHILS NFR BLD: 1 % (ref 0–1)
BILIRUB SERPL-MCNC: 0.8 MG/DL (ref 0.2–1)
BILIRUB UR QL: NEGATIVE
BUN SERPL-MCNC: 16 MG/DL (ref 6–20)
BUN/CREAT SERPL: 24 (ref 12–20)
CALCIUM SERPL-MCNC: 9 MG/DL (ref 8.5–10.1)
CHLORIDE SERPL-SCNC: 107 MMOL/L (ref 97–108)
CO2 SERPL-SCNC: 28 MMOL/L (ref 21–32)
COLOR UR: YELLOW
COMMENT:: NORMAL
CREAT SERPL-MCNC: 0.68 MG/DL (ref 0.55–1.02)
DIFFERENTIAL METHOD BLD: ABNORMAL
EOSINOPHIL # BLD: 0.1 K/UL (ref 0–0.4)
EOSINOPHIL NFR BLD: 2 % (ref 0–7)
EPITH CASTS URNS QL MICRO: ABNORMAL /LPF
ERYTHROCYTE [DISTWIDTH] IN BLOOD BY AUTOMATED COUNT: 12.6 % (ref 11.5–14.5)
ETHANOL SERPL-MCNC: 130 MG/DL (ref 0–0.08)
GLOBULIN SER CALC-MCNC: 4 G/DL (ref 2–4)
GLUCOSE SERPL-MCNC: 113 MG/DL (ref 65–100)
GLUCOSE UR STRIP.AUTO-MCNC: NEGATIVE MG/DL
HCT VFR BLD AUTO: 39.5 % (ref 35–47)
HGB BLD-MCNC: 13.3 G/DL (ref 11.5–16)
HGB UR QL STRIP: ABNORMAL
HYALINE CASTS URNS QL MICRO: ABNORMAL /LPF (ref 0–5)
IMM GRANULOCYTES # BLD AUTO: 0 K/UL (ref 0–0.04)
IMM GRANULOCYTES NFR BLD AUTO: 1 % (ref 0–0.5)
KETONES UR QL STRIP.AUTO: NEGATIVE MG/DL
LEUKOCYTE ESTERASE UR QL STRIP.AUTO: ABNORMAL
LIPASE SERPL-CCNC: 32 U/L (ref 13–75)
LYMPHOCYTES # BLD: 1.2 K/UL (ref 0.8–3.5)
LYMPHOCYTES NFR BLD: 30 % (ref 12–49)
MCH RBC QN AUTO: 32.1 PG (ref 26–34)
MCHC RBC AUTO-ENTMCNC: 33.7 G/DL (ref 30–36.5)
MCV RBC AUTO: 95.4 FL (ref 80–99)
MONOCYTES # BLD: 0.6 K/UL (ref 0–1)
MONOCYTES NFR BLD: 13 % (ref 5–13)
NEUTS SEG # BLD: 2.2 K/UL (ref 1.8–8)
NEUTS SEG NFR BLD: 53 % (ref 32–75)
NITRITE UR QL STRIP.AUTO: NEGATIVE
NRBC # BLD: 0 K/UL (ref 0–0.01)
NRBC BLD-RTO: 0 PER 100 WBC
PH UR STRIP: 6 (ref 5–8)
PLATELET # BLD AUTO: 162 K/UL (ref 150–400)
PMV BLD AUTO: 10.8 FL (ref 8.9–12.9)
POTASSIUM SERPL-SCNC: 3.9 MMOL/L (ref 3.5–5.1)
PROT SERPL-MCNC: 7.6 G/DL (ref 6.4–8.2)
PROT UR STRIP-MCNC: NEGATIVE MG/DL
RBC # BLD AUTO: 4.14 M/UL (ref 3.8–5.2)
RBC #/AREA URNS HPF: ABNORMAL /HPF (ref 0–5)
SODIUM SERPL-SCNC: 140 MMOL/L (ref 136–145)
SP GR UR REFRACTOMETRY: 1.01 (ref 1–1.03)
SPECIMEN HOLD: NORMAL
SPECIMEN HOLD: NORMAL
UROBILINOGEN UR QL STRIP.AUTO: 0.2 EU/DL (ref 0.2–1)
WBC # BLD AUTO: 4.1 K/UL (ref 3.6–11)
WBC URNS QL MICRO: ABNORMAL /HPF (ref 0–4)

## 2023-11-24 PROCEDURE — 36415 COLL VENOUS BLD VENIPUNCTURE: CPT

## 2023-11-24 PROCEDURE — 80053 COMPREHEN METABOLIC PANEL: CPT

## 2023-11-24 PROCEDURE — 76705 ECHO EXAM OF ABDOMEN: CPT

## 2023-11-24 PROCEDURE — 82077 ASSAY SPEC XCP UR&BREATH IA: CPT

## 2023-11-24 PROCEDURE — 83690 ASSAY OF LIPASE: CPT

## 2023-11-24 PROCEDURE — 99284 EMERGENCY DEPT VISIT MOD MDM: CPT

## 2023-11-24 PROCEDURE — 85025 COMPLETE CBC W/AUTO DIFF WBC: CPT

## 2023-11-24 PROCEDURE — 81001 URINALYSIS AUTO W/SCOPE: CPT

## 2023-11-24 RX ORDER — FAMOTIDINE 20 MG/1
20 TABLET, FILM COATED ORAL 2 TIMES DAILY
Qty: 60 TABLET | Refills: 0 | Status: SHIPPED | OUTPATIENT
Start: 2023-11-24

## 2023-11-24 ASSESSMENT — PAIN SCALES - GENERAL: PAINLEVEL_OUTOF10: 8

## 2023-11-24 ASSESSMENT — PAIN DESCRIPTION - DESCRIPTORS: DESCRIPTORS: ACHING

## 2023-11-24 ASSESSMENT — PAIN DESCRIPTION - FREQUENCY: FREQUENCY: CONTINUOUS

## 2023-11-24 ASSESSMENT — PAIN DESCRIPTION - ONSET: ONSET: PROGRESSIVE

## 2023-11-24 ASSESSMENT — PAIN DESCRIPTION - LOCATION: LOCATION: FLANK

## 2023-11-24 ASSESSMENT — PAIN DESCRIPTION - PAIN TYPE: TYPE: ACUTE PAIN

## 2023-11-24 ASSESSMENT — PAIN DESCRIPTION - ORIENTATION: ORIENTATION: RIGHT

## 2023-11-24 ASSESSMENT — PAIN - FUNCTIONAL ASSESSMENT: PAIN_FUNCTIONAL_ASSESSMENT: 0-10

## 2023-11-24 NOTE — DISCHARGE INSTRUCTIONS
You were seen today for abdominal pain. Your labs were improving and your ultrasound showed fatty liver. Please follow up with your doctor for follow up and repeat labs. Please take pepsid as instructed for possible gastritis. Please return for severe abdominal pain, vomiting,fever.     Thank you for letting us take care of you, hope you feel better soon,  Nilam Angeles MD

## 2023-11-24 NOTE — ED PROVIDER NOTES
[facility] EMERGENCY DEP  EMERGENCY DEPARTMENT ENCOUNTER      Pt Name: Ese Dickinson  MRN: [id number]  [address] [facility] [place] 1950  Date of evaluation: 11/24/2023  Provider: Louis Plummer MD    CHIEF COMPLAINT       Chief Complaint   Patient presents with    Flank Pain         HISTORY OF PRESENT ILLNESS   (Location/Symptom, Timing/Onset, Context/Setting, Quality, Duration, Modifying Factors, Severity)  Note limiting factors. HPI      76 yo female with hx of cirrhosis  Pt reports hx of cirrhosis, having more drinks lately, feels like she irritated her liver. Patient wants reevaluation to make sure she has not made her liver worse. Reports she was seen here earlier in the week and was drinking but has stopped drinking and is feeling better but the pain returns intermittently prompting her concern. Patient denies fevers or chills, reports no nausea vomiting or diarrhea. Reports she is eating and drinking well. States she has a healthy diet and exercises regularly playing pickle ball. Patient states she is very committed to stopping her alcohol use and getting healthy. Review of External Medical Records:         Nursing Notes were reviewed. REVIEW OF SYSTEMS    (2-9 systems for level 4, 10 or more for level 5)     Except as noted above the remainder of the review of systems was reviewed and negative.        PAST MEDICAL HISTORY     Past Medical History:   Diagnosis Date    Arthritis     OSTEO    Chronic pain     Cirrhosis ([address])     Hepatitis C     Liver disease 1980s    HEPATITIS C, TREATED AND NOW GONE    Neuropathy     Tuberculosis 1962    +HAI TEST # 3; TREATED AND NOW LUNGS HAVE ALWAYS BEEN CLEAR         SURGICAL HISTORY       Past Surgical History:   Procedure Laterality Date    ANUS SURGERY  6/7/2023    SPHINCTEROTOMY SPHINCTEROPLASTY performed by Krysta London MD at [address] [place] [address] 75 N/A 8/25/2022    COLONOSCOPY performed by Cresencio Carmona MD at [address] [place] [address]

## 2023-11-24 NOTE — ED TRIAGE NOTES
Patient arrives to ER with c/o liver pain. Says it \"grabs her\". Was seen here on Sunday. Had CT but left before seeing MD and getting results.

## 2024-02-16 DIAGNOSIS — G62.9 NEUROPATHY: ICD-10-CM

## 2024-02-16 RX ORDER — PREGABALIN 150 MG/1
CAPSULE ORAL
Qty: 60 CAPSULE | Refills: 4 | Status: SHIPPED | OUTPATIENT
Start: 2024-02-16 | End: 2024-05-16

## 2024-06-04 DIAGNOSIS — G62.9 NEUROPATHY: ICD-10-CM

## 2024-06-05 RX ORDER — PREGABALIN 150 MG/1
CAPSULE ORAL
Qty: 90 CAPSULE | Refills: 3 | Status: SHIPPED | OUTPATIENT
Start: 2024-06-05 | End: 2024-09-02

## 2024-07-05 DIAGNOSIS — G62.9 NEUROPATHY: ICD-10-CM

## 2024-07-05 RX ORDER — PREGABALIN 150 MG/1
CAPSULE ORAL
Qty: 90 CAPSULE | Refills: 0 | Status: SHIPPED | OUTPATIENT
Start: 2024-07-05 | End: 2024-10-02

## 2024-07-05 NOTE — TELEPHONE ENCOUNTER
Good morning,    Ms. Villarreal came in the office today 7/5/2024 requesting a refill. She states she only has one pill left and needs a refill for Lyrica. Patient next appointment on 7/11/2024.     Rashmi

## 2024-08-31 ENCOUNTER — APPOINTMENT (OUTPATIENT)
Facility: HOSPITAL | Age: 74
DRG: 439 | End: 2024-08-31
Payer: MEDICARE

## 2024-08-31 ENCOUNTER — HOSPITAL ENCOUNTER (INPATIENT)
Facility: HOSPITAL | Age: 74
LOS: 3 days | Discharge: LEFT BEFORE DISCHARGE | DRG: 439 | End: 2024-09-03
Attending: EMERGENCY MEDICINE | Admitting: HOSPITALIST
Payer: MEDICARE

## 2024-08-31 DIAGNOSIS — R60.0 BILATERAL LEG EDEMA: ICD-10-CM

## 2024-08-31 DIAGNOSIS — F10.10 ALCOHOL ABUSE: Primary | ICD-10-CM

## 2024-08-31 DIAGNOSIS — K85.90 ACUTE PANCREATITIS, UNSPECIFIED COMPLICATION STATUS, UNSPECIFIED PANCREATITIS TYPE: ICD-10-CM

## 2024-08-31 DIAGNOSIS — E83.42 HYPOMAGNESEMIA: ICD-10-CM

## 2024-08-31 DIAGNOSIS — E87.6 HYPOKALEMIA: ICD-10-CM

## 2024-08-31 LAB
-: ABNORMAL
ALBUMIN SERPL-MCNC: 3.4 G/DL (ref 3.5–5)
ALBUMIN/GLOB SERPL: 0.8 (ref 1.1–2.2)
ALP SERPL-CCNC: 108 U/L (ref 45–117)
ALT SERPL-CCNC: 166 U/L (ref 12–78)
ANION GAP SERPL CALC-SCNC: 11 MMOL/L (ref 5–15)
AST SERPL-CCNC: 588 U/L (ref 15–37)
BASOPHILS # BLD: 0.1 K/UL (ref 0–0.1)
BASOPHILS NFR BLD: 1 % (ref 0–1)
BILIRUB SERPL-MCNC: 4.7 MG/DL (ref 0.2–1)
BUN SERPL-MCNC: 8 MG/DL (ref 6–20)
BUN/CREAT SERPL: 12 (ref 12–20)
CALCIUM SERPL-MCNC: 8.5 MG/DL (ref 8.5–10.1)
CHLORIDE SERPL-SCNC: 97 MMOL/L (ref 97–108)
CO2 SERPL-SCNC: 31 MMOL/L (ref 21–32)
COMMENT:: NORMAL
CREAT SERPL-MCNC: 0.67 MG/DL (ref 0.55–1.02)
DIFFERENTIAL METHOD BLD: ABNORMAL
EKG ATRIAL RATE: 93 BPM
EKG DIAGNOSIS: NORMAL
EKG P AXIS: 62 DEGREES
EKG P-R INTERVAL: 132 MS
EKG Q-T INTERVAL: 390 MS
EKG QRS DURATION: 84 MS
EKG QTC CALCULATION (BAZETT): 484 MS
EKG R AXIS: -9 DEGREES
EKG T AXIS: 65 DEGREES
EKG VENTRICULAR RATE: 93 BPM
EOSINOPHIL # BLD: 0.1 K/UL (ref 0–0.4)
EOSINOPHIL NFR BLD: 1 % (ref 0–7)
ERYTHROCYTE [DISTWIDTH] IN BLOOD BY AUTOMATED COUNT: 12.9 % (ref 11.5–14.5)
ETHANOL SERPL-MCNC: 312 MG/DL (ref 0–0.08)
GLOBULIN SER CALC-MCNC: 4.4 G/DL (ref 2–4)
GLUCOSE SERPL-MCNC: 115 MG/DL (ref 65–100)
HAV IGM SER QL: NONREACTIVE
HBV CORE IGM SER QL: NONREACTIVE
HBV SURFACE AG SER QL: <0.1 INDEX
HBV SURFACE AG SER QL: NEGATIVE
HCT VFR BLD AUTO: 38.8 % (ref 35–47)
HCV AB SER IA-ACNC: >11 INDEX
HCV AB SERPL QL IA: REACTIVE
HGB BLD-MCNC: 13.1 G/DL (ref 11.5–16)
IMM GRANULOCYTES # BLD AUTO: 0.1 K/UL (ref 0–0.04)
IMM GRANULOCYTES NFR BLD AUTO: 1 % (ref 0–0.5)
INR PPP: 1.3 (ref 0.9–1.1)
LIPASE SERPL-CCNC: 317 U/L (ref 13–75)
LYMPHOCYTES # BLD: 0.9 K/UL (ref 0.8–3.5)
LYMPHOCYTES NFR BLD: 17 % (ref 12–49)
MAGNESIUM SERPL-MCNC: 1.4 MG/DL (ref 1.6–2.4)
MCH RBC QN AUTO: 32.9 PG (ref 26–34)
MCHC RBC AUTO-ENTMCNC: 33.8 G/DL (ref 30–36.5)
MCV RBC AUTO: 97.5 FL (ref 80–99)
MONOCYTES # BLD: 0.7 K/UL (ref 0–1)
MONOCYTES NFR BLD: 13 % (ref 5–13)
NEUTS SEG # BLD: 3.1 K/UL (ref 1.8–8)
NEUTS SEG NFR BLD: 67 % (ref 32–75)
NRBC # BLD: 0.02 K/UL (ref 0–0.01)
NRBC BLD-RTO: 0.4 PER 100 WBC
NT PRO BNP: 201 PG/ML
PLATELET # BLD AUTO: 89 K/UL (ref 150–400)
PMV BLD AUTO: 10.7 FL (ref 8.9–12.9)
POTASSIUM SERPL-SCNC: 2.5 MMOL/L (ref 3.5–5.1)
PROT SERPL-MCNC: 7.8 G/DL (ref 6.4–8.2)
PROTHROMBIN TIME: 13.1 SEC (ref 9–11.1)
RBC # BLD AUTO: 3.98 M/UL (ref 3.8–5.2)
RBC MORPH BLD: ABNORMAL
SODIUM SERPL-SCNC: 139 MMOL/L (ref 136–145)
SPECIMEN HOLD: NORMAL
SPECIMEN HOLD: NORMAL
TRIGL SERPL-MCNC: 153 MG/DL
TROPONIN I SERPL HS-MCNC: 14 NG/L (ref 0–51)
WBC # BLD AUTO: 5 K/UL (ref 3.6–11)

## 2024-08-31 PROCEDURE — 6360000002 HC RX W HCPCS: Performed by: NURSE PRACTITIONER

## 2024-08-31 PROCEDURE — 6370000000 HC RX 637 (ALT 250 FOR IP): Performed by: PHYSICIAN ASSISTANT

## 2024-08-31 PROCEDURE — 80053 COMPREHEN METABOLIC PANEL: CPT

## 2024-08-31 PROCEDURE — 83880 ASSAY OF NATRIURETIC PEPTIDE: CPT

## 2024-08-31 PROCEDURE — 2060000000 HC ICU INTERMEDIATE R&B

## 2024-08-31 PROCEDURE — 83735 ASSAY OF MAGNESIUM: CPT

## 2024-08-31 PROCEDURE — 2580000003 HC RX 258: Performed by: EMERGENCY MEDICINE

## 2024-08-31 PROCEDURE — 85025 COMPLETE CBC W/AUTO DIFF WBC: CPT

## 2024-08-31 PROCEDURE — 82077 ASSAY SPEC XCP UR&BREATH IA: CPT

## 2024-08-31 PROCEDURE — 6370000000 HC RX 637 (ALT 250 FOR IP): Performed by: EMERGENCY MEDICINE

## 2024-08-31 PROCEDURE — 80074 ACUTE HEPATITIS PANEL: CPT

## 2024-08-31 PROCEDURE — 2580000003 HC RX 258: Performed by: PHYSICIAN ASSISTANT

## 2024-08-31 PROCEDURE — 83690 ASSAY OF LIPASE: CPT

## 2024-08-31 PROCEDURE — 84478 ASSAY OF TRIGLYCERIDES: CPT

## 2024-08-31 PROCEDURE — 84484 ASSAY OF TROPONIN QUANT: CPT

## 2024-08-31 PROCEDURE — 71046 X-RAY EXAM CHEST 2 VIEWS: CPT

## 2024-08-31 PROCEDURE — 99285 EMERGENCY DEPT VISIT HI MDM: CPT

## 2024-08-31 PROCEDURE — 93010 ELECTROCARDIOGRAM REPORT: CPT | Performed by: INTERNAL MEDICINE

## 2024-08-31 PROCEDURE — 93005 ELECTROCARDIOGRAM TRACING: CPT | Performed by: EMERGENCY MEDICINE

## 2024-08-31 PROCEDURE — 6360000004 HC RX CONTRAST MEDICATION: Performed by: RADIOLOGY

## 2024-08-31 PROCEDURE — 36415 COLL VENOUS BLD VENIPUNCTURE: CPT

## 2024-08-31 PROCEDURE — 74177 CT ABD & PELVIS W/CONTRAST: CPT

## 2024-08-31 PROCEDURE — 6360000002 HC RX W HCPCS: Performed by: PHYSICIAN ASSISTANT

## 2024-08-31 PROCEDURE — 85610 PROTHROMBIN TIME: CPT

## 2024-08-31 RX ORDER — POTASSIUM CHLORIDE 7.45 MG/ML
10 INJECTION INTRAVENOUS
Status: DISPENSED | OUTPATIENT
Start: 2024-08-31 | End: 2024-08-31

## 2024-08-31 RX ORDER — PHENOBARBITAL 32.4 MG/1
16.2 TABLET ORAL 2 TIMES DAILY
Status: DISCONTINUED | OUTPATIENT
Start: 2024-09-02 | End: 2024-09-03 | Stop reason: HOSPADM

## 2024-08-31 RX ORDER — PHENOBARBITAL 32.4 MG/1
32.4 TABLET ORAL 2 TIMES DAILY
Status: COMPLETED | OUTPATIENT
Start: 2024-09-01 | End: 2024-09-01

## 2024-08-31 RX ORDER — POTASSIUM CHLORIDE 750 MG/1
40 TABLET, EXTENDED RELEASE ORAL
Status: DISCONTINUED | OUTPATIENT
Start: 2024-08-31 | End: 2024-08-31

## 2024-08-31 RX ORDER — PHENOBARBITAL 32.4 MG/1
32.4 TABLET ORAL EVERY 6 HOURS PRN
Status: ACTIVE | OUTPATIENT
Start: 2024-08-31 | End: 2024-09-02

## 2024-08-31 RX ORDER — IOPAMIDOL 755 MG/ML
100 INJECTION, SOLUTION INTRAVASCULAR
Status: COMPLETED | OUTPATIENT
Start: 2024-08-31 | End: 2024-08-31

## 2024-08-31 RX ORDER — ROCURONIUM BROMIDE 10 MG/ML
INJECTION, SOLUTION INTRAVENOUS
Status: DISCONTINUED
Start: 2024-08-31 | End: 2024-08-31 | Stop reason: WASHOUT

## 2024-08-31 RX ORDER — PHENOBARBITAL 32.4 MG/1
32.4 TABLET ORAL 4 TIMES DAILY
Status: DISPENSED | OUTPATIENT
Start: 2024-08-31 | End: 2024-09-01

## 2024-08-31 RX ORDER — POTASSIUM CHLORIDE 29.8 MG/ML
20 INJECTION INTRAVENOUS PRN
Status: DISCONTINUED | OUTPATIENT
Start: 2024-08-31 | End: 2024-09-03 | Stop reason: HOSPADM

## 2024-08-31 RX ORDER — MULTIVITAMIN WITH IRON
1 TABLET ORAL DAILY
Status: DISCONTINUED | OUTPATIENT
Start: 2024-08-31 | End: 2024-09-03 | Stop reason: HOSPADM

## 2024-08-31 RX ORDER — ONDANSETRON 4 MG/1
4 TABLET, ORALLY DISINTEGRATING ORAL EVERY 8 HOURS PRN
Status: DISCONTINUED | OUTPATIENT
Start: 2024-08-31 | End: 2024-09-03 | Stop reason: HOSPADM

## 2024-08-31 RX ORDER — SODIUM CHLORIDE 9 MG/ML
INJECTION, SOLUTION INTRAVENOUS PRN
Status: DISCONTINUED | OUTPATIENT
Start: 2024-08-31 | End: 2024-09-03 | Stop reason: HOSPADM

## 2024-08-31 RX ORDER — ETOMIDATE 2 MG/ML
INJECTION INTRAVENOUS
Status: DISPENSED
Start: 2024-08-31 | End: 2024-09-01

## 2024-08-31 RX ORDER — MAGNESIUM SULFATE IN WATER 40 MG/ML
2000 INJECTION, SOLUTION INTRAVENOUS PRN
Status: DISCONTINUED | OUTPATIENT
Start: 2024-08-31 | End: 2024-09-03 | Stop reason: HOSPADM

## 2024-08-31 RX ORDER — SODIUM CHLORIDE, SODIUM LACTATE, POTASSIUM CHLORIDE, CALCIUM CHLORIDE 600; 310; 30; 20 MG/100ML; MG/100ML; MG/100ML; MG/100ML
INJECTION, SOLUTION INTRAVENOUS CONTINUOUS
Status: DISPENSED | OUTPATIENT
Start: 2024-08-31 | End: 2024-09-01

## 2024-08-31 RX ORDER — MAGNESIUM SULFATE IN WATER 40 MG/ML
2000 INJECTION, SOLUTION INTRAVENOUS ONCE
Status: COMPLETED | OUTPATIENT
Start: 2024-08-31 | End: 2024-08-31

## 2024-08-31 RX ORDER — LANOLIN ALCOHOL/MO/W.PET/CERES
100 CREAM (GRAM) TOPICAL DAILY
Status: DISCONTINUED | OUTPATIENT
Start: 2024-08-31 | End: 2024-09-03 | Stop reason: HOSPADM

## 2024-08-31 RX ORDER — POTASSIUM CHLORIDE 7.45 MG/ML
10 INJECTION INTRAVENOUS PRN
Status: DISCONTINUED | OUTPATIENT
Start: 2024-08-31 | End: 2024-09-03 | Stop reason: HOSPADM

## 2024-08-31 RX ORDER — SODIUM CHLORIDE 0.9 % (FLUSH) 0.9 %
5-40 SYRINGE (ML) INJECTION EVERY 12 HOURS SCHEDULED
Status: DISCONTINUED | OUTPATIENT
Start: 2024-08-31 | End: 2024-09-03 | Stop reason: HOSPADM

## 2024-08-31 RX ORDER — POTASSIUM CHLORIDE 7.45 MG/ML
10 INJECTION INTRAVENOUS ONCE
Status: DISCONTINUED | OUTPATIENT
Start: 2024-08-31 | End: 2024-08-31

## 2024-08-31 RX ORDER — SODIUM CHLORIDE, SODIUM LACTATE, POTASSIUM CHLORIDE, CALCIUM CHLORIDE 600; 310; 30; 20 MG/100ML; MG/100ML; MG/100ML; MG/100ML
INJECTION, SOLUTION INTRAVENOUS CONTINUOUS
Status: DISCONTINUED | OUTPATIENT
Start: 2024-09-01 | End: 2024-09-03 | Stop reason: HOSPADM

## 2024-08-31 RX ORDER — POTASSIUM CHLORIDE 7.45 MG/ML
10 INJECTION INTRAVENOUS
Status: COMPLETED | OUTPATIENT
Start: 2024-08-31 | End: 2024-08-31

## 2024-08-31 RX ORDER — SODIUM CHLORIDE 0.9 % (FLUSH) 0.9 %
5-40 SYRINGE (ML) INJECTION PRN
Status: DISCONTINUED | OUTPATIENT
Start: 2024-08-31 | End: 2024-09-03 | Stop reason: HOSPADM

## 2024-08-31 RX ORDER — PHENOBARBITAL 32.4 MG/1
16.2 TABLET ORAL EVERY 6 HOURS PRN
Status: DISCONTINUED | OUTPATIENT
Start: 2024-09-02 | End: 2024-09-03 | Stop reason: HOSPADM

## 2024-08-31 RX ORDER — MAGNESIUM SULFATE 1 G/100ML
1000 INJECTION INTRAVENOUS ONCE
Status: DISCONTINUED | OUTPATIENT
Start: 2024-08-31 | End: 2024-08-31

## 2024-08-31 RX ORDER — POTASSIUM CHLORIDE 750 MG/1
40 TABLET, EXTENDED RELEASE ORAL ONCE
Status: COMPLETED | OUTPATIENT
Start: 2024-08-31 | End: 2024-08-31

## 2024-08-31 RX ORDER — ONDANSETRON 2 MG/ML
4 INJECTION INTRAMUSCULAR; INTRAVENOUS EVERY 6 HOURS PRN
Status: DISCONTINUED | OUTPATIENT
Start: 2024-08-31 | End: 2024-09-03 | Stop reason: HOSPADM

## 2024-08-31 RX ORDER — FOLIC ACID 1 MG/1
1 TABLET ORAL DAILY
Status: DISCONTINUED | OUTPATIENT
Start: 2024-08-31 | End: 2024-09-03 | Stop reason: HOSPADM

## 2024-08-31 RX ADMIN — PHENOBARBITAL 32.4 MG: 32.4 TABLET ORAL at 14:50

## 2024-08-31 RX ADMIN — MAGNESIUM SULFATE HEPTAHYDRATE 2000 MG: 40 INJECTION, SOLUTION INTRAVENOUS at 14:56

## 2024-08-31 RX ADMIN — POTASSIUM CHLORIDE 10 MEQ: 7.46 INJECTION, SOLUTION INTRAVENOUS at 20:50

## 2024-08-31 RX ADMIN — SODIUM CHLORIDE, POTASSIUM CHLORIDE, SODIUM LACTATE AND CALCIUM CHLORIDE: 600; 310; 30; 20 INJECTION, SOLUTION INTRAVENOUS at 15:00

## 2024-08-31 RX ADMIN — Medication 10 ML: at 14:39

## 2024-08-31 RX ADMIN — PANTOPRAZOLE SODIUM 40 MG: 40 INJECTION, POWDER, FOR SOLUTION INTRAVENOUS at 14:53

## 2024-08-31 RX ADMIN — POTASSIUM CHLORIDE 40 MEQ: 750 TABLET, EXTENDED RELEASE ORAL at 14:50

## 2024-08-31 RX ADMIN — PHENOBARBITAL 32.4 MG: 32.4 TABLET ORAL at 18:58

## 2024-08-31 RX ADMIN — Medication 10 ML: at 20:55

## 2024-08-31 RX ADMIN — PHENOBARBITAL 32.4 MG: 32.4 TABLET ORAL at 21:57

## 2024-08-31 RX ADMIN — POTASSIUM CHLORIDE 10 MEQ: 7.46 INJECTION, SOLUTION INTRAVENOUS at 21:57

## 2024-08-31 RX ADMIN — Medication 1 MG: at 14:50

## 2024-08-31 RX ADMIN — PANTOPRAZOLE SODIUM 40 MG: 40 INJECTION, POWDER, FOR SOLUTION INTRAVENOUS at 20:50

## 2024-08-31 RX ADMIN — Medication 100 MG: at 14:50

## 2024-08-31 RX ADMIN — THERA TABS 1 TABLET: TAB at 14:50

## 2024-08-31 RX ADMIN — IOPAMIDOL 100 ML: 755 INJECTION, SOLUTION INTRAVENOUS at 13:01

## 2024-08-31 RX ADMIN — POTASSIUM CHLORIDE 10 MEQ: 7.46 INJECTION, SOLUTION INTRAVENOUS at 14:59

## 2024-08-31 RX ADMIN — POTASSIUM CHLORIDE 10 MEQ: 7.46 INJECTION, SOLUTION INTRAVENOUS at 18:56

## 2024-08-31 ASSESSMENT — PAIN - FUNCTIONAL ASSESSMENT
PAIN_FUNCTIONAL_ASSESSMENT: NONE - DENIES PAIN
PAIN_FUNCTIONAL_ASSESSMENT: 0-10
PAIN_FUNCTIONAL_ASSESSMENT: NONE - DENIES PAIN

## 2024-08-31 ASSESSMENT — PAIN SCALES - GENERAL: PAINLEVEL_OUTOF10: 0

## 2024-08-31 NOTE — ED NOTES
132/81  (!) 119/52 131/84   Pulse: (!) 111 94 76    Resp: 16  10    Temp: 98 °F (36.7 °C)      TempSrc: Temporal      SpO2: 96%  (!) 89%    Weight: 68.9 kg (151 lb 14.4 oz)        DI:   Predictive Model Details          34 (Caution)  Factor Value    Calculated 8/31/2024 17:31 42% Respiratory rate 10    Deterioration Index Model 35% Age 74 years old     12% Pulse oximetry 89 %     5% Potassium abnormal (2.5 mmol/L)     3% Systolic 131     1% Platelet count abnormal (89 K/uL)     1% Sodium 139 mmol/L     0% WBC count 5.0 K/uL     0% Temperature 98 °F (36.7 °C)     0% Pulse 76     0% Hematocrit 38.8 %         FiO2 (%): bradycardia when sleeping--placed on 2L  O2 Flow Rate: O2 Device: None (Room air)    Cardiac Rhythm:      Pain Scale: Pain Assessment  Pain Assessment: 0-10  Pain Level: 0  Last documented pain score: (0-10 scale) Pain Level: 0  Last documented pain medication administered: none     Mental Status: oriented  Orientation Level:    NIH Score:    C-SSRS: Risk of Suicide: No Risk    PO Status: Regular  Bedside swallow:    Alcove Coma Scale (GCS): Alcove Coma Scale  Eye Opening: Spontaneous  Best Verbal Response: Oriented  Best Motor Response: Obeys commands  Eugenia Coma Scale Score: 15    Active LDA's:   Peripheral IV 08/31/24 Right Antecubital (Active)   Site Assessment Clean, dry & intact 08/31/24 1127   Line Status Blood return noted;Normal saline locked;Flushed;Specimen collected 08/31/24 1127   Phlebitis Assessment No symptoms 08/31/24 1127   Infiltration Assessment 0 08/31/24 1127   Alcohol Cap Used Yes 08/31/24 1127   Dressing Status Clean, dry & intact 08/31/24 1127   Dressing Type Transparent 08/31/24 1127   Dressing Intervention New 08/31/24 1127     Pertinent or High Risk Medications/Drips: no   If Yes, please provide details:   Titratable drips: no   Pending Blood Product Administration: no     Sepsis: Sepsis Risk Score   Predictive Model Details          25 (Low)  Factor Value    Calculated

## 2024-08-31 NOTE — H&P
History and Physical    Date of Service:  2024  Primary Care Provider: Holland Moses MD  Source of information: Patient, Chart Review    Chief Complaint: Melena, Anorexia, and Leg Swelling      History of Presenting Illness:   Maty Villarreal is a 74 y.o. female with a PMHx of cirrhosis, hepatitis C, neuropathy and EtOH abuse.    She presents to the ED with complaints of abdominal pain, loss of appetite and tarry stools. Symptoms began about 3 days ago with the patient noting sharp abdominal pain that radiated like a band around her umbilicus, the pain is sharp in nature and is alleviated with having a bowel movement. The pain is associated with poor appetite and decreased PO intake. She is unable to quantify the amount of tarry stools she has had over the past 3 days. She denies any N/V/D.     She does report drinking 1 pint of vodka per day for the last 3 years. She does report feeling shaky in the morning if she does not have a drink, but is unsure if she has ever experienced DT. She expressed to this PA that she wishes to stop drinking. Hospitalist to admit.     ED Workup  - K+: 2.5  - Ma.4  - AST: 588  - ALT: 166  - lipase: 317  - T.bili: 4.7  - Ethanol: 312  - CT abd/pelvis awaiting read    ED Rx  - magnesium 1 gram x1   - potasium 10meq x1     REVIEW OF SYSTEMS:  A comprehensive review of systems was negative except for that written in the History of Present Illness.     Past Medical History:   Diagnosis Date    Arthritis     OSTEO    Chronic pain     Cirrhosis (HCC)     Hepatitis C     Liver disease 1980s    HEPATITIS C, TREATED AND NOW GONE    Neuropathy     Tuberculosis     +HAI TEST # 3; TREATED AND NOW LUNGS HAVE ALWAYS BEEN CLEAR      Past Surgical History:   Procedure Laterality Date    ANUS SURGERY  2023    SPHINCTEROTOMY SPHINCTEROPLASTY performed by David Rojas MD at [facility] ENDOSCOPY    COLONOSCOPY N/A 2022    COLONOSCOPY performed by Pal Alan MD at [facility]    Final Result   No acute cardiopulmonary disease.         Electronically signed by Esau Suarez MD      CT ABDOMEN PELVIS W IV CONTRAST Additional Contrast? None    (Results Pending)        ECG/ECHO:  [unfilled]       Notes reviewed from all clinical/nonclinical/nursing services involved in patient's clinical care. Care coordination discussions were held with appropriate clinical/nonclinical/ nursing providers based on care coordination needs.     Assessment:   Given the patient's current clinical presentation, there is a high level of concern for decompensation if discharged from the emergency department. Complex decision making was performed, which includes reviewing the patient's available past medical records, laboratory results, and imaging studies.    Principal Problem:    Acute pancreatitis without infection or necrosis  Resolved Problems:    * No resolved hospital problems. *      Plan:   Acute pancreatitis  ? Alcoholic hepatitis  - Admit to remote telemetry  - Likely 2/2 EtOH abuse  - Lipase 317, T.bili 4.7, AST: 588, ALT: 166  - check hepatitis panel  - Follow CMP  - Maddrey's discriminant: 5.2, good prognosis  - IVF: LR @ 150  - CLD for now  - CXR: no acute process  - CT abd/pelvis pending, consider further imaging pending this result  - Consider GI consult  - Consider General surgery consult    Tarry Stools  - Follow H&H, transfuse for Hgb > 7  - Start BID PPI  - If brisk bleeding noted will consult IR    EtOH abuse  - Serum ethanol 312 on presentation  - Start phenobarbital taper  - MVI, folate, Thiamine  - Will have CM see patient to provide with resources for cessation    Hypokalemia  Hypomagnesemia  - ISO EtOH abuse and poor PO intake  - Replete PRN  - Follow lytes    Hx of hepatitis C  - Per chart review patient was treated for this and it resolved decades ago     DIET: ADULT DIET; Clear Liquid   ISOLATION PRECAUTIONS: No active isolations  CODE STATUS: [unfilled]   DVT PROPHYLAXIS:

## 2024-08-31 NOTE — ED TRIAGE NOTES
Triage: Pt arrives ambulatory from home with CC of dark tarry stools, BLE swelling, and loss of appetite. Pt reports hx of alcoholism. She reports drinking a pint of vodka a day.

## 2024-08-31 NOTE — ED PROVIDER NOTES
[facility] EMERGENCY DEP  EMERGENCY DEPARTMENT ENCOUNTER      Pt Name: Maty Villarreal  MRN: [id number]  Birthdate 1950  Date of evaluation: 8/31/2024  Provider: Columba Duncan MD    CHIEF COMPLAINT       Chief Complaint   Patient presents with    Melena    Anorexia    Leg Swelling         HISTORY OF PRESENT ILLNESS    Maty Villarreal is a 73 yo F with h/o alcohol abuse who drinks a pint of vodka a day who has had a couple of weeks of decreased appetite with epigastric pain and black tarry stools.  She denies chest pain or shortness of breath or vomiting but she has had swelling in her legs.            Additional history from independent historians:     Review of External Medical Records:     Nursing Notes were reviewed.    REVIEW OF SYSTEMS       Review of Systems    Except as noted above the remainder of the review of systems was reviewed and negative.       PAST MEDICAL HISTORY     Past Medical History:   Diagnosis Date    Arthritis     OSTEO    Chronic pain     Cirrhosis (HCC)     Hepatitis C     Liver disease 1980s    HEPATITIS C, TREATED AND NOW GONE    Neuropathy     Tuberculosis 1962    +HAI TEST # 3; TREATED AND NOW LUNGS HAVE ALWAYS BEEN CLEAR         SURGICAL HISTORY       Past Surgical History:   Procedure Laterality Date    ANUS SURGERY  6/7/2023    SPHINCTEROTOMY SPHINCTEROPLASTY performed by David Rojas MD at [facility] ENDOSCOPY    COLONOSCOPY N/A 8/25/2022    COLONOSCOPY performed by Pal Alan MD at [facility] ENDOSCOPY    COLONOSCOPY      HISTORY OF POLYP    ERCP N/A 6/7/2023    ERCP ENDOSCOPIC RETROGRADE CHOLANGIOPANCREATOGRAPHY performed by David Rojas MD at [facility] ENDOSCOPY    ERCP N/A 6/7/2023    ERCP BALLOON SWEEP performed by David Rojas MD at [facility] ENDOSCOPY    HEENT      WISDOM TEETH    ORTHOPEDIC SURGERY Left 2018    BROKEN WRIST, HAS A PLATE    TOTAL KNEE ARTHROPLASTY Right 11/2019    RIGHT TOTAL KNEE REPLACEMENT    TOTAL KNEE ARTHROPLASTY Left 2020    UPPER GASTROINTESTINAL ENDOSCOPY    General: No focal deficit present.      Mental Status: She is alert.         DIAGNOSTIC RESULTS     EKG: All EKG's are interpreted by the Emergency Department Physician listed in the interpretation in the absence of a cardiologist and may also be found below under Reassessment/ED Course.           RADIOLOGY:   Non-plain film images such as CT, Ultrasound and MRI are read by the radiologist. Plain radiographic images are visualized and preliminarily interpreted by the emergency physician with the below findings:    Interpretation per the Radiologist below, if available at the time of this note:    CT ABDOMEN PELVIS W IV CONTRAST Additional Contrast? None   Final Result   1. Thickening of the right colon, consistent with colitis. Equivocal thickening   of the transverse and descending colon. Repeat CT with oral contrast can be   performed for confirmation and further evaluation, as indicated.   2. Gallbladder distention and 1.1 cm dilated common bile duct. Recommend   correlation with LFTs. ERCP\MRCP could be performed for further evaluation, as   indicated.      Electronically signed by Esau Suarez MD      XR CHEST (2 VW)   Final Result   No acute cardiopulmonary disease.         Electronically signed by Esau Suarez MD           LABS:  Labs Reviewed   CBC WITH AUTO DIFFERENTIAL - Abnormal; Notable for the following components:       Result Value    Platelets 89 (*)     Nucleated RBCs 0.4 (*)     nRBC 0.02 (*)     Immature Granulocytes % 1 (*)     Immature Granulocytes Absolute 0.1 (*)     All other components within normal limits   ETHANOL - Abnormal; Notable for the following components:    Ethanol Lvl 312 (*)     All other components within normal limits   COMPREHENSIVE METABOLIC PANEL - Abnormal; Notable for the following components:    Potassium 2.5 (*)     Glucose 115 (*)     Total Bilirubin 4.7 (*)      (*)      (*)     Albumin 3.4 (*)     Globulin 4.4 (*)     Albumin/Globulin Ratio 0.8 (*)

## 2024-08-31 NOTE — PROGRESS NOTES
Admission Medication Reconciliation:        In progress:    Unable to speak with patient at this time (patient is off the floor). Chart notes and RX Query were used to update medication list thus far. Will check back later.  whether patient uses other people's medications of any kind    Notes:  ETOH: per chart notes consumes 1 pint of vodka daily  an not confirm above noted medications at this time    Thank you for allowing me to participate in the care of your patient.    Aaliyah Gonzalez PharmGRANT, RN # [phone]     ¹RxQuery pharmacy benefit data reflects medications filled and processed through the patient's insurance, however   this data does NOT capture whether the medication was picked up or is currently being taken by the patient.    Allergies:  Patient has no known allergies.    Significant PMH/Disease States:   Past Medical History:   Diagnosis Date    Arthritis     OSTEO    Chronic pain     Cirrhosis (HCC)     Hepatitis C     Liver disease 1980s    HEPATITIS C, TREATED AND NOW GONE    Neuropathy     Tuberculosis 1962    +HAI TEST # 3; TREATED AND NOW LUNGS HAVE ALWAYS BEEN CLEAR     Chief Complaint for this Admission:    Chief Complaint   Patient presents with    Melena    Anorexia    Leg Swelling     Prior to Admission Medications:   Prior to Admission Medications   Prescriptions Last Dose Informant   pregabalin (LYRICA) 150 MG capsule     Sig: TAKE 1 CAPSULE BY MOUTH THREE TIMES A DAY A DAY      Facility-Administered Medications: None     Please contact the main inpatient pharmacy with any questions or concerns at [phone] and we will direct you to the clinical pharmacist covering this patient's care while in-house.   Laurel Gonzalez [facility]

## 2024-09-01 ENCOUNTER — APPOINTMENT (OUTPATIENT)
Facility: HOSPITAL | Age: 74
DRG: 439 | End: 2024-09-01
Attending: INTERNAL MEDICINE
Payer: MEDICARE

## 2024-09-01 PROBLEM — F10.10 ALCOHOL ABUSE: Status: ACTIVE | Noted: 2024-09-01

## 2024-09-01 PROBLEM — K92.2 GIB (GASTROINTESTINAL BLEEDING): Status: ACTIVE | Noted: 2024-09-01

## 2024-09-01 PROBLEM — R93.3 ABNORMAL CT SCAN, GASTROINTESTINAL TRACT: Status: ACTIVE | Noted: 2024-09-01

## 2024-09-01 PROBLEM — K83.1 OBSTRUCTIVE JAUNDICE: Status: ACTIVE | Noted: 2024-09-01

## 2024-09-01 PROBLEM — K70.10 ACUTE ALCOHOLIC HEPATITIS: Status: ACTIVE | Noted: 2024-09-01

## 2024-09-01 LAB
ALBUMIN SERPL-MCNC: 2.8 G/DL (ref 3.5–5)
ALBUMIN/GLOB SERPL: 0.8 (ref 1.1–2.2)
ALP SERPL-CCNC: 91 U/L (ref 45–117)
ALT SERPL-CCNC: 148 U/L (ref 12–78)
ANION GAP SERPL CALC-SCNC: 12 MMOL/L (ref 5–15)
AST SERPL-CCNC: 490 U/L (ref 15–37)
BASOPHILS # BLD: 0 K/UL (ref 0–0.1)
BASOPHILS NFR BLD: 1 % (ref 0–1)
BILIRUB SERPL-MCNC: 5.7 MG/DL (ref 0.2–1)
BUN SERPL-MCNC: 4 MG/DL (ref 6–20)
BUN/CREAT SERPL: 8 (ref 12–20)
CALCIUM SERPL-MCNC: 8.2 MG/DL (ref 8.5–10.1)
CHLORIDE SERPL-SCNC: 96 MMOL/L (ref 97–108)
CO2 SERPL-SCNC: 30 MMOL/L (ref 21–32)
CREAT SERPL-MCNC: 0.49 MG/DL (ref 0.55–1.02)
DIFFERENTIAL METHOD BLD: ABNORMAL
EOSINOPHIL # BLD: 0 K/UL (ref 0–0.4)
EOSINOPHIL NFR BLD: 1 % (ref 0–7)
ERYTHROCYTE [DISTWIDTH] IN BLOOD BY AUTOMATED COUNT: 13 % (ref 11.5–14.5)
GLOBULIN SER CALC-MCNC: 3.7 G/DL (ref 2–4)
GLUCOSE BLD STRIP.AUTO-MCNC: 104 MG/DL (ref 65–117)
GLUCOSE SERPL-MCNC: 100 MG/DL (ref 65–100)
HCT VFR BLD AUTO: 34.4 % (ref 35–47)
HGB BLD-MCNC: 11.7 G/DL (ref 11.5–16)
IMM GRANULOCYTES # BLD AUTO: 0.1 K/UL (ref 0–0.04)
IMM GRANULOCYTES NFR BLD AUTO: 2 % (ref 0–0.5)
LYMPHOCYTES # BLD: 0.6 K/UL (ref 0.8–3.5)
LYMPHOCYTES NFR BLD: 14 % (ref 12–49)
MCH RBC QN AUTO: 33.4 PG (ref 26–34)
MCHC RBC AUTO-ENTMCNC: 34 G/DL (ref 30–36.5)
MCV RBC AUTO: 98.3 FL (ref 80–99)
MONOCYTES # BLD: 0.4 K/UL (ref 0–1)
MONOCYTES NFR BLD: 9 % (ref 5–13)
NEUTS SEG # BLD: 3.3 K/UL (ref 1.8–8)
NEUTS SEG NFR BLD: 73 % (ref 32–75)
NRBC # BLD: 0 K/UL (ref 0–0.01)
NRBC BLD-RTO: 0 PER 100 WBC
PLATELET # BLD AUTO: 68 K/UL (ref 150–400)
PMV BLD AUTO: 11.1 FL (ref 8.9–12.9)
POTASSIUM SERPL-SCNC: 3 MMOL/L (ref 3.5–5.1)
PROT SERPL-MCNC: 6.5 G/DL (ref 6.4–8.2)
RBC # BLD AUTO: 3.5 M/UL (ref 3.8–5.2)
RBC MORPH BLD: ABNORMAL
SERVICE CMNT-IMP: NORMAL
SODIUM SERPL-SCNC: 138 MMOL/L (ref 136–145)
WBC # BLD AUTO: 4.4 K/UL (ref 3.6–11)

## 2024-09-01 PROCEDURE — 80053 COMPREHEN METABOLIC PANEL: CPT

## 2024-09-01 PROCEDURE — 99222 1ST HOSP IP/OBS MODERATE 55: CPT | Performed by: INTERNAL MEDICINE

## 2024-09-01 PROCEDURE — 6370000000 HC RX 637 (ALT 250 FOR IP): Performed by: EMERGENCY MEDICINE

## 2024-09-01 PROCEDURE — 82962 GLUCOSE BLOOD TEST: CPT

## 2024-09-01 PROCEDURE — 2580000003 HC RX 258: Performed by: EMERGENCY MEDICINE

## 2024-09-01 PROCEDURE — 2580000003 HC RX 258: Performed by: INTERNAL MEDICINE

## 2024-09-01 PROCEDURE — 85025 COMPLETE CBC W/AUTO DIFF WBC: CPT

## 2024-09-01 PROCEDURE — 2580000003 HC RX 258: Performed by: PHYSICIAN ASSISTANT

## 2024-09-01 PROCEDURE — 99232 SBSQ HOSP IP/OBS MODERATE 35: CPT | Performed by: INTERNAL MEDICINE

## 2024-09-01 PROCEDURE — 36415 COLL VENOUS BLD VENIPUNCTURE: CPT

## 2024-09-01 PROCEDURE — 6360000002 HC RX W HCPCS: Performed by: PHYSICIAN ASSISTANT

## 2024-09-01 PROCEDURE — 93970 EXTREMITY STUDY: CPT

## 2024-09-01 PROCEDURE — 2060000000 HC ICU INTERMEDIATE R&B

## 2024-09-01 RX ORDER — SODIUM CHLORIDE 9 MG/ML
INJECTION, SOLUTION INTRAVENOUS CONTINUOUS
Status: CANCELLED | OUTPATIENT
Start: 2024-09-01

## 2024-09-01 RX ORDER — INDOMETHACIN 100 MG
100 SUPPOSITORY, RECTAL RECTAL ONCE
Status: CANCELLED | OUTPATIENT
Start: 2024-09-02

## 2024-09-01 RX ADMIN — POTASSIUM CHLORIDE 10 MEQ: 7.46 INJECTION, SOLUTION INTRAVENOUS at 11:16

## 2024-09-01 RX ADMIN — PANTOPRAZOLE SODIUM 40 MG: 40 INJECTION, POWDER, FOR SOLUTION INTRAVENOUS at 09:02

## 2024-09-01 RX ADMIN — POTASSIUM CHLORIDE 10 MEQ: 7.46 INJECTION, SOLUTION INTRAVENOUS at 12:21

## 2024-09-01 RX ADMIN — Medication 100 MG: at 08:58

## 2024-09-01 RX ADMIN — Medication 10 ML: at 09:12

## 2024-09-01 RX ADMIN — THERA TABS 1 TABLET: TAB at 08:59

## 2024-09-01 RX ADMIN — POTASSIUM CHLORIDE 10 MEQ: 7.46 INJECTION, SOLUTION INTRAVENOUS at 10:06

## 2024-09-01 RX ADMIN — SODIUM CHLORIDE, POTASSIUM CHLORIDE, SODIUM LACTATE AND CALCIUM CHLORIDE: 600; 310; 30; 20 INJECTION, SOLUTION INTRAVENOUS at 19:03

## 2024-09-01 RX ADMIN — Medication 1 MG: at 08:59

## 2024-09-01 RX ADMIN — SODIUM CHLORIDE, POTASSIUM CHLORIDE, SODIUM LACTATE AND CALCIUM CHLORIDE: 600; 310; 30; 20 INJECTION, SOLUTION INTRAVENOUS at 01:13

## 2024-09-01 RX ADMIN — PHENOBARBITAL 32.4 MG: 32.4 TABLET ORAL at 11:52

## 2024-09-01 RX ADMIN — PHENOBARBITAL 32.4 MG: 32.4 TABLET ORAL at 20:25

## 2024-09-01 RX ADMIN — PANTOPRAZOLE SODIUM 40 MG: 40 INJECTION, POWDER, FOR SOLUTION INTRAVENOUS at 20:25

## 2024-09-01 ASSESSMENT — LIFESTYLE VARIABLES
HOW MANY STANDARD DRINKS CONTAINING ALCOHOL DO YOU HAVE ON A TYPICAL DAY: 10 OR MORE
HOW OFTEN DO YOU HAVE A DRINK CONTAINING ALCOHOL: 4 OR MORE TIMES A WEEK

## 2024-09-01 ASSESSMENT — PAIN SCALES - GENERAL
PAINLEVEL_OUTOF10: 0
PAINLEVEL_OUTOF10: 0

## 2024-09-01 NOTE — H&P (VIEW-ONLY)
History     Tobacco Use    Smoking status: Former     Current packs/day: 0.00     Types: Cigarettes     Quit date: 1970     Years since quittin.7    Smokeless tobacco: Never   Substance Use Topics    Alcohol use: Not Currently       Family History:  Family History   Problem Relation Age of Onset    Anesth Problems Neg Hx     Other Father         UNKNOWN HEALTH HX    Other Mother         UNKNOWN HEALTH HX    Breast Cancer Maternal Grandmother        Review of Systems:    Constitutional: negative fever, negative chills, negative weight loss  Eyes:   negative visual changes  ENT:   negative sore throat, tongue or lip swelling  Respiratory:  negative cough, negative dyspnea  Cards:  negative for chest pain, palpitations, lower extremity edema  GI:   See HPI  :  negative for frequency, dysuria  Integument:  negative for rash and pruritus  Heme:  negative for easy bruising and gum/nose bleeding  Musculoskel: negative for myalgias,  back pain and muscle weakness  Neuro: negative for headaches, dizziness, vertigo  Psych:  negative for feelings of anxiety, depression      Objective:   Patient Vitals for the past 8 hrs:   BP Temp Temp src Pulse Resp SpO2   24 1800 -- -- -- 96 -- --   24 1537 131/73 98.5 °F (36.9 °C) Oral 83 -- --   24 1215 -- -- -- 85 13 92 %   24 1200 130/82 98.8 °F (37.1 °C) Oral 93 16 92 %     No intake/output data recorded.   0701 -  1900  In: 110 [I.V.:10]  Out: -         PHYSICAL EXAM:  General: WD, thin appearing female. Alert, cooperative, no acute distress    HEENT: NC, Atraumatic.  PERRLA, EOMI. icteric sclerae is noted.  Lungs:  CTA Bilaterally. No Wheezing/Rhonchi/Rales.  Heart:  Regular  rhythm,  No murmur (), No Rubs, No Gallops  Abdomen: Soft, Non distended, with normal bowel sounds in moderate epigastric and right upper quadrant tenderness on palpation.  No ascites and all hepatosplenomegaly appreciated   extremities: No c/c/e  Neurologic:  CN 2-12  and 1.1 cm dilated common bile duct. Recommend  correlation with LFTs. ERCP\MRCP could be performed for further evaluation, as  indicated.             Previous Endoscopic Procedures:      Colonoscopy: Patient reports last colonoscopy evaluation 2 years ago.          Errol Petit MD

## 2024-09-01 NOTE — PROGRESS NOTES
Hospital Progress Note  Jose Cali MD   Answering service: [phone] OR    [employer]      NAME:  Maty Villarreal   :   1950   MRN:  [id number]     Date/Time:  2024 7:53 AM    Plan:     CIWA  GI consult  Follow H/H  Risk of Deterioration: Low  []           Moderate  []           High  []                 Assessment:     Principal Problem:    Acute pancreatitis without infection or necrosis    - Admit to remote telemetry  - Likely 2/2 EtOH abuse  - Lipase 317, T.bili 4.7, AST: 588, ALT: 166  - check hepatitis panel  - Follow CMP  - Maddrey's discriminant: 5.2, good prognosis  - IVF: LR @ 150  - CLD for now  - CXR: no acute process  - CT abd/pelvis reviewed  - GI consult    Active Problems:    Cirrhosis (HCC)     Followed by hep         Dilated cbd, acquired     Previously evaluated      GI opinion      Alcohol abuse     Encourage cessation       GIB (gastrointestinal bleeding)     By his     Gi opinion  Resolved Problems:    * No resolved hospital problems. *          Admitting notes:74 y.o. female with a PMHx of cirrhosis, hepatitis C, neuropathy and EtOH abuse.     She presents to the ED with complaints of abdominal pain, loss of appetite and tarry stools. Symptoms began about 3 days ago with the patient noting sharp abdominal pain that radiated like a band around her umbilicus, the pain is sharp in nature and is alleviated with having a bowel movement. The pain is associated with poor appetite and decreased PO intake. She is unable to quantify the amount of tarry stools she has had over the past 3 days. She denies any N/V/D.      She does report drinking 1 pint of vodka per day for the last 3 years. She does report feeling shaky in the morning if she does not have a drink, but is unsure if she has ever experienced DT. She expressed to this PA that she wishes to stop drinking. Hospitalist to admit.     Subjective:     C/o ankle swelling and feeling nervous  11 Point Review of Systems:

## 2024-09-01 NOTE — ED NOTES
Verbal shift change report given to Nikki (oncoming nurse) by Alex (offgoing nurse). Report included the following information Nurse Handoff Report, ED Encounter Summary, ED SBAR, and MAR.

## 2024-09-01 NOTE — ED NOTES
Patient ambulated to bedside commode with assistance from this RN. Underpad changed on bed. Patient back in bed and on monitor. Bedside commode cleaned.

## 2024-09-01 NOTE — ED NOTES
Assumed care of pt @ this time. Pt appears asleep with eyes closed. Pt in NAD with even & unlabored respirations. Connected pt to monitoring equipment & VS set to cycle q 1 hr. VSS. Stretcher in lowest position, brakes secured, & B/L rails raised. Call bell within pt's reach. No requests @ this time.

## 2024-09-01 NOTE — ED NOTES
ED TO INPATIENT SBAR HANDOFF    Patient Name: Maty Villarreal   :  1950  74 y.o.   MRN:  [id number]  ED Room #:  ER14/14     Situation  Code Status: Full Code   Allergies: Patient has no known allergies.  Weight: Patient Vitals for the past 96 hrs (Last 3 readings):   Weight   24 1050 68.9 kg (151 lb 14.4 oz)       Arrived from: home    Chief Complaint:   Chief Complaint   Patient presents with    Melena    Anorexia    Leg Swelling       Hospital Problem/Diagnosis:  Principal Problem:    Acute pancreatitis without infection or necrosis  Active Problems:    Cirrhosis (HCC)    Dilated cbd, acquired    Alcohol abuse    GIB (gastrointestinal bleeding)  Resolved Problems:    * No resolved hospital problems. *      Mobility: no current mobility problem   ED Fall Risk: Presents to emergency department  because of falls (Syncope, seizure, or loss of consciousness): No, Age > 70: No, Altered Mental Status, Intoxication with alcohol or substance confusion (Disorientation, impaired judgment, poor safety awaremess, or inability to follow instructions): No, Impaired Mobility: Ambulates or transfers with assistive devices or assistance; Unable to ambulate or transer.: No, Nursing Judgement: No   Fell in ED or prior to admission: no   Restraints: no     Sitter: no   Family/Caregiver Present: no    Neet to know social/safety information: Generalized weakness, x1 assist/ standby    Background  History:   Past Medical History:   Diagnosis Date    Arthritis     OSTEO    Chronic pain     Cirrhosis (HCC)     Hepatitis C     Liver disease 1980s    HEPATITIS C, TREATED AND NOW GONE    Neuropathy     Tuberculosis     +HAI TEST # 3; TREATED AND NOW LUNGS HAVE ALWAYS BEEN CLEAR       Assessment    Abnormal Assessment Findings: BLE edema, dark tarry stool, hypokalemia  Imaging:   CT ABDOMEN PELVIS W IV CONTRAST Additional Contrast? None   Final Result   1. Thickening of the right colon, consistent with colitis. Equivocal  alert, coherent, logical, thought processes intact, and able to concentrate and follow conversation  Orientation Level: Orientation Level: Oriented X4  NIH Score:    C-SSRS: Risk of Suicide: No Risk    PO Status: Clear Liquid  Bedside swallow: 3 oz Water Swallow Screen: Pass  [place] Coma Scale (GCS): [place] Coma Scale  Eye Opening: Spontaneous  Best Verbal Response: Oriented  Best Motor Response: Obeys commands  [place] Coma Scale Score: 15    Active LDA's:   Peripheral IV 08/31/24 Right Antecubital (Active)   Site Assessment Clean, dry & intact 09/01/24 0800   Line Status Infusing 09/01/24 0800   Line Care Connections checked and tightened 09/01/24 0800   Phlebitis Assessment No symptoms 09/01/24 0800   Infiltration Assessment 0 09/01/24 0800   Alcohol Cap Used Yes 08/31/24 1127   Dressing Status Clean, dry & intact 09/01/24 0800   Dressing Type Transparent 09/01/24 0800   Dressing Intervention New 08/31/24 1127       Peripheral IV 09/01/24 Right Forearm (Active)   Site Assessment Clean, dry & intact 09/01/24 0800   Line Status Normal saline locked 09/01/24 0800   Line Care Connections checked and tightened 09/01/24 0800   Phlebitis Assessment No symptoms 09/01/24 0800   Infiltration Assessment 0 09/01/24 0800   Alcohol Cap Used No 09/01/24 0629   Dressing Status Clean, dry & intact 09/01/24 0800   Dressing Type Transparent 09/01/24 0800   Dressing Intervention New 09/01/24 0629     Pertinent or High Risk Medications/Drips: Potassium Chloride  If Yes, please provide details:   Titratable drips: no   Pending Blood Product Administration: no     Sepsis: Sepsis Risk Score   Predictive Model Details          7 (Low)  Factor Value    Calculated 9/1/2024 12:18 8% Age 74 years old    [facility] EARLY DETECTION OF SEPSIS VERSION 2 Model 7% Total Active Inpatient Meds 20     7% O2 Delivery Method NASAL CANNULA     -7% Duration of Encounter 1 days     6% Bilirubin 5.7 MG/DL     5% Albumin 2.8 g/dL     -4% Total Admins Last 24

## 2024-09-01 NOTE — CONSULTS
.  GI CONSULTATION NOTE      NAME:  Maty Villarreal  :   1950   MRN:   [id number]       Referring Physician: ED physician    Consult Date: 2024     Reason for GI Consult: Acute pancreatitis with melena    Impression:   In summary patient is a 74-year-old female with history of chronic hepatitis C, alcoholic liver disease, history of arthritis, and alcohol use disorder (consuming in excess of 1 pint of vodka per day for the past 3 years with drinking since age 16, presenting with lower extremity edema, black stool on toilet tissue, unintentional 20 pounds weight loss, and abdominal pain, associated with abnormal liver enzymes with ALT of 148 units/L,  units/L, total bilirubin of 5.7 mg/dL, and alkaline phosphatase of 91 units/L, in the setting of abnormal CT scan findings with dilated biliary tree to 1.1 cm.  Differential diagnosis includes possible choledocholithiasis versus pancreatic head neoplasm resulting in obstruction of the distal common bile duct.  Chronic alcoholic liver disease with fibrotic changes involving the head of the pancreas may also result in distal common bile duct obstruction.  There is also evidence of acute alcoholic hepatitis with calculated Madrey discriminant function of 11; hence there is no role for prednisolone therapy.    The one-time documented symptoms of melena suggest upper GI bleeding source.  Peptic disease and or erosive esophagitis need to be excluded.    Recommendation:   1.  IV hydration  2.  Serial hemoglobin hematocrit monitoring with transfusion threshold for hemoglobin less than 7 g/dL  3.  EUS/ERCP in a.m. with careful examination of the gastroduodenal segment at the time of biliary endoscopy.  4.  Additional recommendations to be based upon findings at time of endoscopy.    History of Present Illness:  Patient is a 74 y.o. who is seen in consultation at the request of ED physician for further evaluation of acute pancreatitis and suspected GI hemorrhage.   and 1.1 cm dilated common bile duct. Recommend  correlation with LFTs. ERCP\MRCP could be performed for further evaluation, as  indicated.             Previous Endoscopic Procedures:      Colonoscopy: Patient reports last colonoscopy evaluation 2 years ago.          Errol Petit MD

## 2024-09-02 ENCOUNTER — ANESTHESIA EVENT (OUTPATIENT)
Facility: HOSPITAL | Age: 74
DRG: 439 | End: 2024-09-02
Payer: MEDICARE

## 2024-09-02 ENCOUNTER — APPOINTMENT (OUTPATIENT)
Facility: HOSPITAL | Age: 74
DRG: 439 | End: 2024-09-02
Payer: MEDICARE

## 2024-09-02 ENCOUNTER — ANESTHESIA (OUTPATIENT)
Facility: HOSPITAL | Age: 74
DRG: 439 | End: 2024-09-02
Payer: MEDICARE

## 2024-09-02 LAB
ALBUMIN SERPL-MCNC: 3 G/DL (ref 3.5–5)
ALBUMIN/GLOB SERPL: 0.9 (ref 1.1–2.2)
ALP SERPL-CCNC: 87 U/L (ref 45–117)
ALT SERPL-CCNC: 143 U/L (ref 12–78)
ANION GAP SERPL CALC-SCNC: 10 MMOL/L (ref 5–15)
AST SERPL-CCNC: 351 U/L (ref 15–37)
BASOPHILS # BLD: 0.1 K/UL (ref 0–0.1)
BASOPHILS NFR BLD: 1 % (ref 0–1)
BILIRUB SERPL-MCNC: 8.8 MG/DL (ref 0.2–1)
BUN SERPL-MCNC: 3 MG/DL (ref 6–20)
BUN/CREAT SERPL: 4 (ref 12–20)
CALCIUM SERPL-MCNC: 8.5 MG/DL (ref 8.5–10.1)
CHLORIDE SERPL-SCNC: 97 MMOL/L (ref 97–108)
CO2 SERPL-SCNC: 32 MMOL/L (ref 21–32)
CREAT SERPL-MCNC: 0.69 MG/DL (ref 0.55–1.02)
DIFFERENTIAL METHOD BLD: ABNORMAL
EOSINOPHIL # BLD: 0.1 K/UL (ref 0–0.4)
EOSINOPHIL NFR BLD: 1 % (ref 0–7)
ERYTHROCYTE [DISTWIDTH] IN BLOOD BY AUTOMATED COUNT: 13.2 % (ref 11.5–14.5)
GLOBULIN SER CALC-MCNC: 3.2 G/DL (ref 2–4)
GLUCOSE SERPL-MCNC: 110 MG/DL (ref 65–100)
HCT VFR BLD AUTO: 36.7 % (ref 35–47)
HGB BLD-MCNC: 12.4 G/DL (ref 11.5–16)
IMM GRANULOCYTES # BLD AUTO: 0.1 K/UL (ref 0–0.04)
IMM GRANULOCYTES NFR BLD AUTO: 1 % (ref 0–0.5)
LIPASE SERPL-CCNC: 380 U/L (ref 13–75)
LYMPHOCYTES # BLD: 0.7 K/UL (ref 0.8–3.5)
LYMPHOCYTES NFR BLD: 13 % (ref 12–49)
MCH RBC QN AUTO: 33.2 PG (ref 26–34)
MCHC RBC AUTO-ENTMCNC: 33.8 G/DL (ref 30–36.5)
MCV RBC AUTO: 98.1 FL (ref 80–99)
MONOCYTES # BLD: 0.6 K/UL (ref 0–1)
MONOCYTES NFR BLD: 11 % (ref 5–13)
NEUTS SEG # BLD: 3.9 K/UL (ref 1.8–8)
NEUTS SEG NFR BLD: 73 % (ref 32–75)
NRBC # BLD: 0.04 K/UL (ref 0–0.01)
NRBC BLD-RTO: 0.7 PER 100 WBC
PLATELET # BLD AUTO: 74 K/UL (ref 150–400)
PMV BLD AUTO: 12 FL (ref 8.9–12.9)
POTASSIUM SERPL-SCNC: 3 MMOL/L (ref 3.5–5.1)
PROT SERPL-MCNC: 6.2 G/DL (ref 6.4–8.2)
RBC # BLD AUTO: 3.74 M/UL (ref 3.8–5.2)
RBC MORPH BLD: ABNORMAL
SODIUM SERPL-SCNC: 139 MMOL/L (ref 136–145)
WBC # BLD AUTO: 5.5 K/UL (ref 3.6–11)

## 2024-09-02 PROCEDURE — 70450 CT HEAD/BRAIN W/O DYE: CPT

## 2024-09-02 PROCEDURE — 6370000000 HC RX 637 (ALT 250 FOR IP): Performed by: EMERGENCY MEDICINE

## 2024-09-02 PROCEDURE — 99232 SBSQ HOSP IP/OBS MODERATE 35: CPT | Performed by: INTERNAL MEDICINE

## 2024-09-02 PROCEDURE — 1110000000 HC RM PRIVATE GYN

## 2024-09-02 PROCEDURE — 36415 COLL VENOUS BLD VENIPUNCTURE: CPT

## 2024-09-02 PROCEDURE — 83690 ASSAY OF LIPASE: CPT

## 2024-09-02 PROCEDURE — 2580000003 HC RX 258: Performed by: PHYSICIAN ASSISTANT

## 2024-09-02 PROCEDURE — 6370000000 HC RX 637 (ALT 250 FOR IP): Performed by: INTERNAL MEDICINE

## 2024-09-02 PROCEDURE — 2709999900 HC NON-CHARGEABLE SUPPLY: Performed by: INTERNAL MEDICINE

## 2024-09-02 PROCEDURE — 3700000001 HC ADD 15 MINUTES (ANESTHESIA): Performed by: INTERNAL MEDICINE

## 2024-09-02 PROCEDURE — 3600000013 HC SURGERY LEVEL 3 ADDTL 15MIN: Performed by: INTERNAL MEDICINE

## 2024-09-02 PROCEDURE — 6360000002 HC RX W HCPCS: Performed by: PHYSICIAN ASSISTANT

## 2024-09-02 PROCEDURE — 0FBG8ZX EXCISION OF PANCREAS, VIA NATURAL OR ARTIFICIAL OPENING ENDOSCOPIC, DIAGNOSTIC: ICD-10-PCS | Performed by: INTERNAL MEDICINE

## 2024-09-02 PROCEDURE — 3600000003 HC SURGERY LEVEL 3 BASE: Performed by: INTERNAL MEDICINE

## 2024-09-02 PROCEDURE — 2580000003 HC RX 258: Performed by: INTERNAL MEDICINE

## 2024-09-02 PROCEDURE — 2500000003 HC RX 250 WO HCPCS: Performed by: NURSE ANESTHETIST, CERTIFIED REGISTERED

## 2024-09-02 PROCEDURE — 2720000010 HC SURG SUPPLY STERILE: Performed by: INTERNAL MEDICINE

## 2024-09-02 PROCEDURE — 6360000004 HC RX CONTRAST MEDICATION: Performed by: INTERNAL MEDICINE

## 2024-09-02 PROCEDURE — 88307 TISSUE EXAM BY PATHOLOGIST: CPT

## 2024-09-02 PROCEDURE — 0F798ZZ DILATION OF COMMON BILE DUCT, VIA NATURAL OR ARTIFICIAL OPENING ENDOSCOPIC: ICD-10-PCS | Performed by: INTERNAL MEDICINE

## 2024-09-02 PROCEDURE — 3700000000 HC ANESTHESIA ATTENDED CARE: Performed by: INTERNAL MEDICINE

## 2024-09-02 PROCEDURE — 7100000001 HC PACU RECOVERY - ADDTL 15 MIN: Performed by: INTERNAL MEDICINE

## 2024-09-02 PROCEDURE — 6360000002 HC RX W HCPCS: Performed by: NURSE ANESTHETIST, CERTIFIED REGISTERED

## 2024-09-02 PROCEDURE — 2580000003 HC RX 258: Performed by: EMERGENCY MEDICINE

## 2024-09-02 PROCEDURE — 80053 COMPREHEN METABOLIC PANEL: CPT

## 2024-09-02 PROCEDURE — 85025 COMPLETE CBC W/AUTO DIFF WBC: CPT

## 2024-09-02 PROCEDURE — 7100000000 HC PACU RECOVERY - FIRST 15 MIN: Performed by: INTERNAL MEDICINE

## 2024-09-02 RX ORDER — FENTANYL CITRATE 50 UG/ML
INJECTION, SOLUTION INTRAMUSCULAR; INTRAVENOUS PRN
Status: DISCONTINUED | OUTPATIENT
Start: 2024-09-02 | End: 2024-09-02 | Stop reason: SDUPTHER

## 2024-09-02 RX ORDER — ROCURONIUM BROMIDE 10 MG/ML
INJECTION, SOLUTION INTRAVENOUS PRN
Status: DISCONTINUED | OUTPATIENT
Start: 2024-09-02 | End: 2024-09-02 | Stop reason: SDUPTHER

## 2024-09-02 RX ORDER — PROPOFOL 10 MG/ML
INJECTION, EMULSION INTRAVENOUS PRN
Status: DISCONTINUED | OUTPATIENT
Start: 2024-09-02 | End: 2024-09-02 | Stop reason: SDUPTHER

## 2024-09-02 RX ORDER — PHENYLEPHRINE HCL IN 0.9% NACL 0.4MG/10ML
SYRINGE (ML) INTRAVENOUS PRN
Status: DISCONTINUED | OUTPATIENT
Start: 2024-09-02 | End: 2024-09-02 | Stop reason: SDUPTHER

## 2024-09-02 RX ORDER — SUCCINYLCHOLINE/SOD CL,ISO/PF 200MG/10ML
SYRINGE (ML) INTRAVENOUS PRN
Status: DISCONTINUED | OUTPATIENT
Start: 2024-09-02 | End: 2024-09-02 | Stop reason: SDUPTHER

## 2024-09-02 RX ORDER — IOPAMIDOL 612 MG/ML
INJECTION, SOLUTION INTRAVASCULAR PRN
Status: DISCONTINUED | OUTPATIENT
Start: 2024-09-02 | End: 2024-09-02 | Stop reason: HOSPADM

## 2024-09-02 RX ORDER — LIDOCAINE HYDROCHLORIDE 20 MG/ML
INJECTION, SOLUTION EPIDURAL; INFILTRATION; INTRACAUDAL; PERINEURAL PRN
Status: DISCONTINUED | OUTPATIENT
Start: 2024-09-02 | End: 2024-09-02 | Stop reason: SDUPTHER

## 2024-09-02 RX ORDER — ONDANSETRON 2 MG/ML
INJECTION INTRAMUSCULAR; INTRAVENOUS PRN
Status: DISCONTINUED | OUTPATIENT
Start: 2024-09-02 | End: 2024-09-02 | Stop reason: SDUPTHER

## 2024-09-02 RX ORDER — DEXAMETHASONE SODIUM PHOSPHATE 4 MG/ML
INJECTION, SOLUTION INTRA-ARTICULAR; INTRALESIONAL; INTRAMUSCULAR; INTRAVENOUS; SOFT TISSUE PRN
Status: DISCONTINUED | OUTPATIENT
Start: 2024-09-02 | End: 2024-09-02 | Stop reason: SDUPTHER

## 2024-09-02 RX ADMIN — POTASSIUM BICARBONATE 40 MEQ: 782 TABLET, EFFERVESCENT ORAL at 21:17

## 2024-09-02 RX ADMIN — SODIUM CHLORIDE, POTASSIUM CHLORIDE, SODIUM LACTATE AND CALCIUM CHLORIDE: 600; 310; 30; 20 INJECTION, SOLUTION INTRAVENOUS at 19:27

## 2024-09-02 RX ADMIN — Medication 120 MCG: at 10:45

## 2024-09-02 RX ADMIN — PROPOFOL 50 MG: 10 INJECTION, EMULSION INTRAVENOUS at 09:57

## 2024-09-02 RX ADMIN — POTASSIUM BICARBONATE 40 MEQ: 782 TABLET, EFFERVESCENT ORAL at 13:50

## 2024-09-02 RX ADMIN — PANTOPRAZOLE SODIUM 40 MG: 40 INJECTION, POWDER, FOR SOLUTION INTRAVENOUS at 21:17

## 2024-09-02 RX ADMIN — LIDOCAINE HYDROCHLORIDE 50 MG: 20 INJECTION, SOLUTION EPIDURAL; INFILTRATION; INTRACAUDAL; PERINEURAL at 09:54

## 2024-09-02 RX ADMIN — PROPOFOL 100 MG: 10 INJECTION, EMULSION INTRAVENOUS at 09:54

## 2024-09-02 RX ADMIN — PHENOBARBITAL 16.2 MG: 32.4 TABLET ORAL at 21:17

## 2024-09-02 RX ADMIN — THERA TABS 1 TABLET: TAB at 13:49

## 2024-09-02 RX ADMIN — Medication 1 MG: at 13:49

## 2024-09-02 RX ADMIN — FENTANYL CITRATE 100 MCG: 50 INJECTION, SOLUTION INTRAMUSCULAR; INTRAVENOUS at 11:00

## 2024-09-02 RX ADMIN — Medication 10 ML: at 13:51

## 2024-09-02 RX ADMIN — Medication 100 MG: at 13:49

## 2024-09-02 RX ADMIN — DEXAMETHASONE SODIUM PHOSPHATE 4 MG: 4 INJECTION INTRA-ARTICULAR; INTRALESIONAL; INTRAMUSCULAR; INTRAVENOUS; SOFT TISSUE at 10:00

## 2024-09-02 RX ADMIN — Medication 120 MCG: at 10:33

## 2024-09-02 RX ADMIN — ROCURONIUM BROMIDE 10 MG: 10 INJECTION, SOLUTION INTRAVENOUS at 09:54

## 2024-09-02 RX ADMIN — Medication 100 MG: at 09:55

## 2024-09-02 RX ADMIN — ONDANSETRON 4 MG: 2 INJECTION INTRAMUSCULAR; INTRAVENOUS at 10:00

## 2024-09-02 RX ADMIN — PANTOPRAZOLE SODIUM 40 MG: 40 INJECTION, POWDER, FOR SOLUTION INTRAVENOUS at 13:50

## 2024-09-02 NOTE — PERIOP NOTE
TRANSFER - OUT REPORT:    Verbal report given to Rima (name) on Maty Hagers  being transferred to [facility](unit) for routine post-op       Report consisted of patient’s Situation, Background, Assessment and   Recommendations(SBAR).     Time Pre op antibiotic given:N/A  Anesthesia Stop time: 1125    Information from the following report(s) SBAR, OR Summary, Procedure Summary, Intake/Output, MAR, and Cardiac Rhythm SR/ST  was reviewed with the receiving nurse.    Opportunity for questions and clarification was provided.     Is the patient on 02? No       L/Min RA       Other N/A    Is the patient on a monitor? No    Is the nurse transporting with the patient? No    Surgical Waiting Area notified of patient's transfer from PACU? No Family Waiting.

## 2024-09-02 NOTE — PROGRESS NOTES
Initial RN admission and assessment performed and documented in Endoscopy navigator.     Patient evaluated by anesthesia in pre-procedure holding.     All procedural vital signs, airway assessment, and level of consciousness information monitored and recorded by anesthesia staff on the anesthesia record.     Report received from CRNA post procedure.  Patient transported to recovery area by RN.    Endoscopy post procedure time out was performed and specimens were verified with physician.    Endoscope was pre-cleaned at bedside immediately following procedure by Kyle.

## 2024-09-02 NOTE — PROGRESS NOTES
Hospital Progress Note  Jose Cali MD   Answering service: [phone] OR    [employer]      NAME:  Maty Villarreal   :   1950   MRN:  [id number]     Date/Time:  2024 8:37 AM    Plan:     EUS/ERCP today in OR  Ivf  H&H and transfuse for Hb<7  CIWA  Risk of Deterioration: Low  []           Moderate  [x]           High  []                 Assessment:     Principal Problem:    Acute pancreatitis     Monitor lipase     Clears      GI following     Active Problems:    Cirrhosis (HCC)     Chronic alcoholic liver disease      CT - extensive diffuse fatty infiltration of the liver          Dilated cbd, acquired     EUS/ERCP          Alcohol abuse     consuming in excess of 1 pint of vodka per day for the past 3 years with drinking since age 16      CM see patient to provide with resources for cessation       GIB (gastrointestinal bleeding)     Suspected UGI origin     Eval pending     Continue PPI      Obstructive jaundice     Jamar increasing     ERCP      Acute alcoholic hepatitis     calculated Raulrey discriminant function of 11      Monitor      Abnormal CT scan, gastrointestinal tract  Resolved Problems:    * No resolved hospital problems. *          Admitting notes:74 y.o. female with a PMHx of cirrhosis, hepatitis C, neuropathy and EtOH abuse.     She presents to the ED with complaints of abdominal pain, loss of appetite and tarry stools. Symptoms began about 3 days ago with the patient noting sharp abdominal pain that radiated like a band around her umbilicus, the pain is sharp in nature and is alleviated with having a bowel movement. The pain is associated with poor appetite and decreased PO intake. She is unable to quantify the amount of tarry stools she has had over the past 3 days. She denies any N/V/D.      She does report drinking 1 pint of vodka per day for the last 3 years. She does report feeling shaky in the morning if she does not have a drink, but is unsure if she has ever experienced

## 2024-09-02 NOTE — ANESTHESIA POSTPROCEDURE EVALUATION
ready to be discharged from PACU .    Signed By: Italo Whitfield MD    9/2/2024    Multimodal analgesia pain management approach  Pain management: satisfactory to patient    No notable events documented.

## 2024-09-02 NOTE — PROCEDURES
PROCEDURE NOTE  Date: 2024   Name: Maty Villarreal  YOB: 1950    .    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  Upon sequential sedation as per above, the Olympus linear echoendoscope was introduced orally and advanced to the duodenal bulb region and scanning was initiated.      Upon completion of the EUS , the Olympus duodenoscope [id number]   was inserted via the mouthpeice and carefully advanced to the second portion of the duodenum.   The quality of visualization was good.  The duodenoscope was withdrawn into a short position.      Findings:   EUS findings:   Major vascular anatomy including aorta, celiac axis, portal vein, SMA, SMV all appeared normal   The biliary tree showed dilated common bile duct to 10 mm with no calculus seen   The major organs including left lobe of the liver, spleen, left adrenal gland and left kidney appeared normal  There was no signs of lymphadenopathy involving the Celiac axis, subcarinal space and APW  The pancreatic head region was slightly hypoechoic in the region of the pancreatic head; the PD was

## 2024-09-02 NOTE — ANESTHESIA PRE PROCEDURE
Department of Anesthesiology  Preprocedure Note       Name:  Maty Villarreal   Age:  74 y.o.  :  1950                                          MRN:  [id number]         Date:  2024      Surgeon: Surgeon(s):  Errol Petit MD    Procedure: Procedure(s):  ENDOSCOPIC RETROGRADE CHOLANGIOPANCREATOGRAPHY.Endoscopic Ultrasonography    Medications prior to admission:   Prior to Admission medications    Medication Sig Start Date End Date Taking? Authorizing Provider   pregabalin (LYRICA) 150 MG capsule TAKE 1 CAPSULE BY MOUTH THREE TIMES A DAY A DAY 7/5/24 10/2/24  Holland Moses MD       Current medications:    Current Facility-Administered Medications   Medication Dose Route Frequency Provider Last Rate Last Admin   • sodium chloride flush 0.9 % injection 5-40 mL  5-40 mL IntraVENous 2 times per day Columba Duncan MD   10 mL at 24 0912   • sodium chloride flush 0.9 % injection 5-40 mL  5-40 mL IntraVENous PRN Columba Duncan MD       • 0.9 % sodium chloride infusion   IntraVENous PRN Columba Duncan MD       • thiamine tablet 100 mg  100 mg Oral Daily Columba Duncan MD   100 mg at 24 0858   • multivitamin 1 tablet  1 tablet Oral Daily Columba Duncan MD   1 tablet at 24 0859   • folic acid (FOLVITE) tablet 1 mg  1 mg Oral Daily Columba Duncan MD   1 mg at 24 0859   • PHENobarbital tablet 16.2 mg  16.2 mg Oral BID Columba Duncan MD       • PHENobarbital tablet 32.4 mg  32.4 mg Oral Q6H PRN Columba Duncan MD        Followed by   • PHENobarbital tablet 16.2 mg  16.2 mg Oral Q6H PRN Columba Duncan MD       • lactated ringers IV soln infusion   IntraVENous Continuous Jose Cali  mL/hr at 24 1903 New Bag at 24 1903   • sodium chloride flush 0.9 % injection 5-40 mL  5-40 mL IntraVENous 2 times per day Milligram, David M, PA-C   10 mL at 24 09   • sodium chloride flush 0.9 % injection 5-40 mL  5-40 mL IntraVENous PRN

## 2024-09-02 NOTE — PROGRESS NOTES
Pt experienced fall after returning from procedure, code fall initiated and MD notified. Fall precautions in place, pt vitals obtained, and pt returned safety to bed.

## 2024-09-02 NOTE — INTERVAL H&P NOTE
Update History & Physical    The patient's History and Physical of September 1,  was reviewed with the patient and I examined the patient. There was no change. The surgical site was confirmed by the patient and me.     Plan: The risks, benefits, expected outcome, and alternative to the recommended procedure have been discussed with the patient. Patient understands and wants to proceed with the procedure.     Electronically signed by Errol Petit MD on 9/2/2024 at 9:40 AM

## 2024-09-03 VITALS
RESPIRATION RATE: 11 BRPM | BODY MASS INDEX: 27.15 KG/M2 | WEIGHT: 153.22 LBS | SYSTOLIC BLOOD PRESSURE: 96 MMHG | HEIGHT: 63 IN | DIASTOLIC BLOOD PRESSURE: 73 MMHG | OXYGEN SATURATION: 95 % | TEMPERATURE: 98.9 F | HEART RATE: 84 BPM

## 2024-09-03 DIAGNOSIS — G62.9 NEUROPATHY: ICD-10-CM

## 2024-09-03 LAB
ALBUMIN SERPL-MCNC: 2.6 G/DL (ref 3.5–5)
ALBUMIN/GLOB SERPL: 0.8 (ref 1.1–2.2)
ALP SERPL-CCNC: 77 U/L (ref 45–117)
ALT SERPL-CCNC: 113 U/L (ref 12–78)
ANION GAP SERPL CALC-SCNC: 6 MMOL/L (ref 5–15)
AST SERPL-CCNC: 203 U/L (ref 15–37)
BASOPHILS # BLD: 0 K/UL (ref 0–0.1)
BASOPHILS NFR BLD: 0 % (ref 0–1)
BILIRUB SERPL-MCNC: 5.7 MG/DL (ref 0.2–1)
BUN SERPL-MCNC: 8 MG/DL (ref 6–20)
BUN/CREAT SERPL: 10 (ref 12–20)
CALCIUM SERPL-MCNC: 8.5 MG/DL (ref 8.5–10.1)
CHLORIDE SERPL-SCNC: 99 MMOL/L (ref 97–108)
CO2 SERPL-SCNC: 35 MMOL/L (ref 21–32)
CREAT SERPL-MCNC: 0.78 MG/DL (ref 0.55–1.02)
DIFFERENTIAL METHOD BLD: ABNORMAL
EOSINOPHIL # BLD: 0.1 K/UL (ref 0–0.4)
EOSINOPHIL NFR BLD: 1 % (ref 0–7)
ERYTHROCYTE [DISTWIDTH] IN BLOOD BY AUTOMATED COUNT: 13.2 % (ref 11.5–14.5)
GLOBULIN SER CALC-MCNC: 3.4 G/DL (ref 2–4)
GLUCOSE SERPL-MCNC: 114 MG/DL (ref 65–100)
HCT VFR BLD AUTO: 30.1 % (ref 35–47)
HGB BLD-MCNC: 10 G/DL (ref 11.5–16)
IMM GRANULOCYTES # BLD AUTO: 0.1 K/UL (ref 0–0.04)
IMM GRANULOCYTES NFR BLD AUTO: 1 % (ref 0–0.5)
LIPASE SERPL-CCNC: 381 U/L (ref 13–75)
LYMPHOCYTES # BLD: 1.1 K/UL (ref 0.8–3.5)
LYMPHOCYTES NFR BLD: 16 % (ref 12–49)
MCH RBC QN AUTO: 33.3 PG (ref 26–34)
MCHC RBC AUTO-ENTMCNC: 33.2 G/DL (ref 30–36.5)
MCV RBC AUTO: 100.3 FL (ref 80–99)
MONOCYTES # BLD: 0.8 K/UL (ref 0–1)
MONOCYTES NFR BLD: 11 % (ref 5–13)
NEUTS SEG # BLD: 4.9 K/UL (ref 1.8–8)
NEUTS SEG NFR BLD: 71 % (ref 32–75)
NRBC # BLD: 0 K/UL (ref 0–0.01)
NRBC BLD-RTO: 0 PER 100 WBC
PLATELET # BLD AUTO: 77 K/UL (ref 150–400)
PMV BLD AUTO: 12.6 FL (ref 8.9–12.9)
POTASSIUM SERPL-SCNC: 3.4 MMOL/L (ref 3.5–5.1)
PROT SERPL-MCNC: 6 G/DL (ref 6.4–8.2)
RBC # BLD AUTO: 3 M/UL (ref 3.8–5.2)
RBC MORPH BLD: ABNORMAL
SODIUM SERPL-SCNC: 140 MMOL/L (ref 136–145)
WBC # BLD AUTO: 7 K/UL (ref 3.6–11)

## 2024-09-03 PROCEDURE — 83690 ASSAY OF LIPASE: CPT

## 2024-09-03 PROCEDURE — 2580000003 HC RX 258: Performed by: INTERNAL MEDICINE

## 2024-09-03 PROCEDURE — 99238 HOSP IP/OBS DSCHRG MGMT 30/<: CPT | Performed by: INTERNAL MEDICINE

## 2024-09-03 PROCEDURE — 85025 COMPLETE CBC W/AUTO DIFF WBC: CPT

## 2024-09-03 PROCEDURE — 80053 COMPREHEN METABOLIC PANEL: CPT

## 2024-09-03 RX ORDER — LANOLIN ALCOHOL/MO/W.PET/CERES
100 CREAM (GRAM) TOPICAL DAILY
Qty: 30 TABLET | Refills: 3 | Status: SHIPPED | OUTPATIENT
Start: 2024-09-03

## 2024-09-03 RX ADMIN — SODIUM CHLORIDE, POTASSIUM CHLORIDE, SODIUM LACTATE AND CALCIUM CHLORIDE: 600; 310; 30; 20 INJECTION, SOLUTION INTRAVENOUS at 06:14

## 2024-09-03 NOTE — PLAN OF CARE
Problem: Safety - Adult  Goal: Free from fall injury  Outcome: Progressing  Flowsheets (Taken 9/3/2024 0202)  Free From Fall Injury:   Instruct family/caregiver on patient safety   Based on caregiver fall risk screen, instruct family/caregiver to ask for assistance with transferring infant if caregiver noted to have fall risk factors     Problem: Discharge Planning  Goal: Discharge to home or other facility with appropriate resources  Outcome: Progressing  Flowsheets (Taken 9/3/2024 0202)  Discharge to home or other facility with appropriate resources:   Identify barriers to discharge with patient and caregiver   Identify discharge learning needs (meds, wound care, etc)   Refer to discharge planning if patient needs post-hospital services based on physician order or complex needs related to functional status, cognitive ability or social support system   Arrange for interpreters to assist at discharge as needed   Arrange for needed discharge resources and transportation as appropriate     Problem: Pain  Goal: Verbalizes/displays adequate comfort level or baseline comfort level  Outcome: Progressing  Flowsheets (Taken 9/3/2024 0202)  Verbalizes/displays adequate comfort level or baseline comfort level:   Encourage patient to monitor pain and request assistance   Administer analgesics based on type and severity of pain and evaluate response   Consider cultural and social influences on pain and pain management   Notify Licensed Independent Practitioner if interventions unsuccessful or patient reports new pain   Implement non-pharmacological measures as appropriate and evaluate response   Assess pain using appropriate pain scale

## 2024-09-03 NOTE — DISCHARGE SUMMARY
Discharge Summary       PATIENT ID: Maty Villarreal  MRN: [id number]   YOB: 1950    DATE OF ADMISSION: 2024 12:42 PM    DATE OF AMA: 9/3/24  PRIMARY CARE PROVIDER: Holland Moses MD     ATTENDING PHYSICIAN: Jose Cali MD    DISCHARGING PROVIDER: Jose Cali MD      Admitting Note:   74 y.o. female with a PMHx of cirrhosis, hepatitis C, neuropathy and EtOH abuse.     She presents to the ED with complaints of abdominal pain, loss of appetite and tarry stools. Symptoms began about 3 days ago with the patient noting sharp abdominal pain that radiated like a band around her umbilicus, the pain is sharp in nature and is alleviated with having a bowel movement. The pain is associated with poor appetite and decreased PO intake. She is unable to quantify the amount of tarry stools she has had over the past 3 days. She denies any N/V/D.      She does report drinking 1 pint of vodka per day for the last 3 years. She does report feeling shaky in the morning if she does not have a drink, but is unsure if she has ever experienced DT. She expressed to this PA that she wishes to stop drinking. Hospitalist to admit.      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  She was placed on clear liquids which she tolerated without difficulty.  Obstructive jaundice with elevated bilirubin as high as 8.8 was evaluated by ERCP with findings discussed above.  Acute  alcoholic hepatitis with elevation of transaminases persisted throughout the hospital stay.  Rectal bleeding continued on the morning she left AMA she was continued complaint of rectal bleeding.    Patient was advised of the consequences of her decision encouraged to remain in the hospital for evaluation.  She states she had a personal issue and had to leave.      DISCHARGE DIAGNOSES / PLAN:      Principal Problem:    Acute pancreatitis  Active Problems:    Cirrhosis (HCC)    Dilated cbd, acquired    Alcohol abuse    GIB (gastrointestinal bleeding)    Obstructive jaundice    Acute alcoholic hepatitis    Abnormal CT scan, gastrointestinal tract  Resolved Problems:    * No resolved hospital problems. *         FOLLOW UP APPOINTMENTS:    Holland Moses MD 3 to 5 days      ADDITIONAL CARE RECOMMENDATIONS:     DIET: clear liquids, advance as tolerated  Oral Nutritional Supplements:     ACTIVITY: activity as tolerated    DISCHARGE MEDICATIONS:     Medication List        START taking these medications      thiamine 100 MG tablet  Take 1 tablet by mouth daily            CONTINUE taking these medications      pregabalin 150 MG capsule  Commonly known as: LYRICA  TAKE 1 CAPSULE BY MOUTH THREE TIMES A DAY A DAY            STOP taking these medications      dicyclomine 20 MG tablet  Commonly known as: BENTYL     famotidine 20 MG tablet  Commonly known as: Pepcid               Where to Get Your Medications        These medications were sent to [facility] [id number] - [place], [place] - [address] - P [phone] F [phone]  [address], [place] [address]      Phone: [phone]   thiamine 100 MG tablet           DISPOSITION:  Home or Self Care     PATIENT

## 2024-09-04 ENCOUNTER — APPOINTMENT (OUTPATIENT)
Facility: HOSPITAL | Age: 74
DRG: 920 | End: 2024-09-04
Payer: MEDICARE

## 2024-09-04 ENCOUNTER — HOSPITAL ENCOUNTER (INPATIENT)
Facility: HOSPITAL | Age: 74
LOS: 1 days | Discharge: HOME | DRG: 920 | End: 2024-09-06
Attending: EMERGENCY MEDICINE | Admitting: FAMILY MEDICINE
Payer: MEDICARE

## 2024-09-04 DIAGNOSIS — R79.89 LFTS ABNORMAL: ICD-10-CM

## 2024-09-04 DIAGNOSIS — E87.6 HYPOKALEMIA: Primary | ICD-10-CM

## 2024-09-04 DIAGNOSIS — K92.2 GASTROINTESTINAL HEMORRHAGE, UNSPECIFIED GASTROINTESTINAL HEMORRHAGE TYPE: ICD-10-CM

## 2024-09-04 DIAGNOSIS — R00.0 TACHYCARDIA: ICD-10-CM

## 2024-09-04 LAB
ALBUMIN SERPL-MCNC: 3.1 G/DL (ref 3.5–5)
ALBUMIN/GLOB SERPL: 0.8 (ref 1.1–2.2)
ALP SERPL-CCNC: 103 U/L (ref 45–117)
ALT SERPL-CCNC: 118 U/L (ref 12–78)
ANION GAP SERPL CALC-SCNC: 5 MMOL/L (ref 5–15)
APPEARANCE UR: CLEAR
AST SERPL-CCNC: 205 U/L (ref 15–37)
BACTERIA URNS QL MICRO: ABNORMAL /HPF
BASOPHILS # BLD: 0 K/UL (ref 0–0.1)
BASOPHILS NFR BLD: 1 % (ref 0–1)
BILIRUB SERPL-MCNC: 4.1 MG/DL (ref 0.2–1)
BILIRUB UR QL: NEGATIVE
BUN SERPL-MCNC: 12 MG/DL (ref 6–20)
BUN/CREAT SERPL: 18 (ref 12–20)
CALCIUM SERPL-MCNC: 9 MG/DL (ref 8.5–10.1)
CHLORIDE SERPL-SCNC: 97 MMOL/L (ref 97–108)
CO2 SERPL-SCNC: 36 MMOL/L (ref 21–32)
COLOR UR: ABNORMAL
COMMENT:: NORMAL
CREAT SERPL-MCNC: 0.68 MG/DL (ref 0.55–1.02)
DIFFERENTIAL METHOD BLD: ABNORMAL
EOSINOPHIL # BLD: 0 K/UL (ref 0–0.4)
EOSINOPHIL NFR BLD: 1 % (ref 0–7)
EPITH CASTS URNS QL MICRO: ABNORMAL /LPF
ERYTHROCYTE [DISTWIDTH] IN BLOOD BY AUTOMATED COUNT: 13.9 % (ref 11.5–14.5)
ETHANOL SERPL-MCNC: <10 MG/DL (ref 0–0.08)
GLOBULIN SER CALC-MCNC: 4.1 G/DL (ref 2–4)
GLUCOSE SERPL-MCNC: 86 MG/DL (ref 65–100)
GLUCOSE UR STRIP.AUTO-MCNC: NEGATIVE MG/DL
HCT VFR BLD AUTO: 30.8 % (ref 35–47)
HEMOCCULT STL QL: POSITIVE
HGB BLD-MCNC: 10 G/DL (ref 11.5–16)
HGB UR QL STRIP: NEGATIVE
HYALINE CASTS URNS QL MICRO: ABNORMAL /LPF (ref 0–5)
IMM GRANULOCYTES # BLD AUTO: 0 K/UL (ref 0–0.04)
IMM GRANULOCYTES NFR BLD AUTO: 1 % (ref 0–0.5)
KETONES UR QL STRIP.AUTO: 15 MG/DL
LEUKOCYTE ESTERASE UR QL STRIP.AUTO: ABNORMAL
LYMPHOCYTES # BLD: 0.6 K/UL (ref 0.8–3.5)
LYMPHOCYTES NFR BLD: 13 % (ref 12–49)
MCH RBC QN AUTO: 33.6 PG (ref 26–34)
MCHC RBC AUTO-ENTMCNC: 32.5 G/DL (ref 30–36.5)
MCV RBC AUTO: 103.4 FL (ref 80–99)
MONOCYTES # BLD: 0.7 K/UL (ref 0–1)
MONOCYTES NFR BLD: 14 % (ref 5–13)
NEUTS SEG # BLD: 3.5 K/UL (ref 1.8–8)
NEUTS SEG NFR BLD: 70 % (ref 32–75)
NITRITE UR QL STRIP.AUTO: POSITIVE
NRBC # BLD: 0 K/UL (ref 0–0.01)
NRBC BLD-RTO: 0 PER 100 WBC
PH UR STRIP: 7.5 (ref 5–8)
PLATELET # BLD AUTO: 78 K/UL (ref 150–400)
PMV BLD AUTO: 12.3 FL (ref 8.9–12.9)
POTASSIUM SERPL-SCNC: 2.7 MMOL/L (ref 3.5–5.1)
PROT SERPL-MCNC: 7.2 G/DL (ref 6.4–8.2)
PROT UR STRIP-MCNC: NEGATIVE MG/DL
RBC # BLD AUTO: 2.98 M/UL (ref 3.8–5.2)
RBC #/AREA URNS HPF: ABNORMAL /HPF (ref 0–5)
RBC MORPH BLD: ABNORMAL
RBC MORPH BLD: ABNORMAL
SODIUM SERPL-SCNC: 138 MMOL/L (ref 136–145)
SP GR UR REFRACTOMETRY: >1.03
SPECIMEN HOLD: NORMAL
SPECIMEN HOLD: NORMAL
UROBILINOGEN UR QL STRIP.AUTO: 4 EU/DL (ref 0.2–1)
WBC # BLD AUTO: 4.8 K/UL (ref 3.6–11)
WBC URNS QL MICRO: ABNORMAL /HPF (ref 0–4)

## 2024-09-04 PROCEDURE — 36415 COLL VENOUS BLD VENIPUNCTURE: CPT

## 2024-09-04 PROCEDURE — 6360000002 HC RX W HCPCS: Performed by: EMERGENCY MEDICINE

## 2024-09-04 PROCEDURE — G0378 HOSPITAL OBSERVATION PER HR: HCPCS

## 2024-09-04 PROCEDURE — 96361 HYDRATE IV INFUSION ADD-ON: CPT

## 2024-09-04 PROCEDURE — 6370000000 HC RX 637 (ALT 250 FOR IP): Performed by: INTERNAL MEDICINE

## 2024-09-04 PROCEDURE — 96365 THER/PROPH/DIAG IV INF INIT: CPT

## 2024-09-04 PROCEDURE — 82077 ASSAY SPEC XCP UR&BREATH IA: CPT

## 2024-09-04 PROCEDURE — 81001 URINALYSIS AUTO W/SCOPE: CPT

## 2024-09-04 PROCEDURE — 70450 CT HEAD/BRAIN W/O DYE: CPT

## 2024-09-04 PROCEDURE — 85025 COMPLETE CBC W/AUTO DIFF WBC: CPT

## 2024-09-04 PROCEDURE — 96367 TX/PROPH/DG ADDL SEQ IV INF: CPT

## 2024-09-04 PROCEDURE — 82272 OCCULT BLD FECES 1-3 TESTS: CPT

## 2024-09-04 PROCEDURE — 6360000004 HC RX CONTRAST MEDICATION: Performed by: RADIOLOGY

## 2024-09-04 PROCEDURE — 6360000002 HC RX W HCPCS: Performed by: STUDENT IN AN ORGANIZED HEALTH CARE EDUCATION/TRAINING PROGRAM

## 2024-09-04 PROCEDURE — 80053 COMPREHEN METABOLIC PANEL: CPT

## 2024-09-04 PROCEDURE — 6370000000 HC RX 637 (ALT 250 FOR IP): Performed by: STUDENT IN AN ORGANIZED HEALTH CARE EDUCATION/TRAINING PROGRAM

## 2024-09-04 PROCEDURE — 99285 EMERGENCY DEPT VISIT HI MDM: CPT

## 2024-09-04 PROCEDURE — 96366 THER/PROPH/DIAG IV INF ADDON: CPT

## 2024-09-04 PROCEDURE — 2580000003 HC RX 258: Performed by: STUDENT IN AN ORGANIZED HEALTH CARE EDUCATION/TRAINING PROGRAM

## 2024-09-04 PROCEDURE — 74178 CT ABD&PLV WO CNTR FLWD CNTR: CPT

## 2024-09-04 PROCEDURE — 96375 TX/PRO/DX INJ NEW DRUG ADDON: CPT

## 2024-09-04 RX ORDER — LORAZEPAM 2 MG/1
2 TABLET ORAL
Status: DISCONTINUED | OUTPATIENT
Start: 2024-09-04 | End: 2024-09-06 | Stop reason: HOSPADM

## 2024-09-04 RX ORDER — PREGABALIN 150 MG/1
CAPSULE ORAL
Qty: 90 CAPSULE | Refills: 1 | Status: SHIPPED | OUTPATIENT
Start: 2024-09-04 | End: 2024-12-01

## 2024-09-04 RX ORDER — LORAZEPAM 2 MG/ML
2 INJECTION INTRAMUSCULAR
Status: DISCONTINUED | OUTPATIENT
Start: 2024-09-04 | End: 2024-09-06 | Stop reason: HOSPADM

## 2024-09-04 RX ORDER — IOPAMIDOL 755 MG/ML
100 INJECTION, SOLUTION INTRAVASCULAR
Status: COMPLETED | OUTPATIENT
Start: 2024-09-04 | End: 2024-09-04

## 2024-09-04 RX ORDER — POTASSIUM CHLORIDE 7.45 MG/ML
10 INJECTION INTRAVENOUS ONCE
Status: COMPLETED | OUTPATIENT
Start: 2024-09-04 | End: 2024-09-04

## 2024-09-04 RX ORDER — IBUPROFEN 200 MG
200 TABLET ORAL EVERY 6 HOURS PRN
Status: ON HOLD | COMMUNITY
End: 2024-09-06 | Stop reason: HOSPADM

## 2024-09-04 RX ORDER — MAGNESIUM SULFATE 1 G/100ML
1000 INJECTION INTRAVENOUS ONCE
Status: COMPLETED | OUTPATIENT
Start: 2024-09-04 | End: 2024-09-04

## 2024-09-04 RX ORDER — SODIUM CHLORIDE 0.9 % (FLUSH) 0.9 %
5-40 SYRINGE (ML) INJECTION PRN
Status: DISCONTINUED | OUTPATIENT
Start: 2024-09-04 | End: 2024-09-06 | Stop reason: HOSPADM

## 2024-09-04 RX ORDER — PROCHLORPERAZINE EDISYLATE 5 MG/ML
10 INJECTION INTRAMUSCULAR; INTRAVENOUS
Status: COMPLETED | OUTPATIENT
Start: 2024-09-04 | End: 2024-09-04

## 2024-09-04 RX ORDER — DIPHENHYDRAMINE HYDROCHLORIDE 50 MG/ML
25 INJECTION INTRAMUSCULAR; INTRAVENOUS
Status: COMPLETED | OUTPATIENT
Start: 2024-09-04 | End: 2024-09-04

## 2024-09-04 RX ORDER — ONDANSETRON 4 MG/1
4 TABLET, ORALLY DISINTEGRATING ORAL EVERY 8 HOURS PRN
Status: DISCONTINUED | OUTPATIENT
Start: 2024-09-04 | End: 2024-09-06 | Stop reason: HOSPADM

## 2024-09-04 RX ORDER — SODIUM CHLORIDE 0.9 % (FLUSH) 0.9 %
5-40 SYRINGE (ML) INJECTION EVERY 12 HOURS SCHEDULED
Status: DISCONTINUED | OUTPATIENT
Start: 2024-09-04 | End: 2024-09-06 | Stop reason: HOSPADM

## 2024-09-04 RX ORDER — SODIUM CHLORIDE 9 MG/ML
INJECTION, SOLUTION INTRAVENOUS PRN
Status: DISCONTINUED | OUTPATIENT
Start: 2024-09-04 | End: 2024-09-06 | Stop reason: HOSPADM

## 2024-09-04 RX ORDER — LORAZEPAM 1 MG/1
1 TABLET ORAL
Status: DISCONTINUED | OUTPATIENT
Start: 2024-09-04 | End: 2024-09-06 | Stop reason: HOSPADM

## 2024-09-04 RX ORDER — LORAZEPAM 2 MG/ML
1 INJECTION INTRAMUSCULAR
Status: DISCONTINUED | OUTPATIENT
Start: 2024-09-04 | End: 2024-09-06 | Stop reason: HOSPADM

## 2024-09-04 RX ORDER — POTASSIUM CHLORIDE 7.45 MG/ML
10 INJECTION INTRAVENOUS
Status: COMPLETED | OUTPATIENT
Start: 2024-09-04 | End: 2024-09-05

## 2024-09-04 RX ORDER — POLYETHYLENE GLYCOL 3350 17 G/17G
17 POWDER, FOR SOLUTION ORAL DAILY PRN
Status: DISCONTINUED | OUTPATIENT
Start: 2024-09-04 | End: 2024-09-06 | Stop reason: HOSPADM

## 2024-09-04 RX ORDER — LANOLIN ALCOHOL/MO/W.PET/CERES
100 CREAM (GRAM) TOPICAL DAILY
Status: DISCONTINUED | OUTPATIENT
Start: 2024-09-04 | End: 2024-09-06 | Stop reason: HOSPADM

## 2024-09-04 RX ORDER — LORAZEPAM 2 MG/ML
4 INJECTION INTRAMUSCULAR
Status: DISCONTINUED | OUTPATIENT
Start: 2024-09-04 | End: 2024-09-06 | Stop reason: HOSPADM

## 2024-09-04 RX ORDER — SODIUM CHLORIDE 9 MG/ML
INJECTION, SOLUTION INTRAVENOUS CONTINUOUS
Status: DISCONTINUED | OUTPATIENT
Start: 2024-09-04 | End: 2024-09-06 | Stop reason: HOSPADM

## 2024-09-04 RX ORDER — LORAZEPAM 2 MG/ML
3 INJECTION INTRAMUSCULAR
Status: DISCONTINUED | OUTPATIENT
Start: 2024-09-04 | End: 2024-09-06 | Stop reason: HOSPADM

## 2024-09-04 RX ORDER — LORAZEPAM 2 MG/1
4 TABLET ORAL
Status: DISCONTINUED | OUTPATIENT
Start: 2024-09-04 | End: 2024-09-06 | Stop reason: HOSPADM

## 2024-09-04 RX ORDER — ONDANSETRON 2 MG/ML
4 INJECTION INTRAMUSCULAR; INTRAVENOUS EVERY 6 HOURS PRN
Status: DISCONTINUED | OUTPATIENT
Start: 2024-09-04 | End: 2024-09-06 | Stop reason: HOSPADM

## 2024-09-04 RX ADMIN — POLYETHYLENE GLYCOL-3350 AND ELECTROLYTES 4000 ML: 236; 6.74; 5.86; 2.97; 22.74 POWDER, FOR SOLUTION ORAL at 19:32

## 2024-09-04 RX ADMIN — PREGABALIN 150 MG: 100 CAPSULE ORAL at 21:18

## 2024-09-04 RX ADMIN — PROCHLORPERAZINE EDISYLATE 10 MG: 5 INJECTION INTRAMUSCULAR; INTRAVENOUS at 12:29

## 2024-09-04 RX ADMIN — POTASSIUM CHLORIDE 10 MEQ: 10 INJECTION, SOLUTION INTRAVENOUS at 19:37

## 2024-09-04 RX ADMIN — SODIUM CHLORIDE: 9 INJECTION, SOLUTION INTRAVENOUS at 21:24

## 2024-09-04 RX ADMIN — POTASSIUM CHLORIDE 10 MEQ: 10 INJECTION, SOLUTION INTRAVENOUS at 22:32

## 2024-09-04 RX ADMIN — DIPHENHYDRAMINE HYDROCHLORIDE 25 MG: 50 INJECTION INTRAMUSCULAR; INTRAVENOUS at 12:29

## 2024-09-04 RX ADMIN — POTASSIUM CHLORIDE 10 MEQ: 7.46 INJECTION, SOLUTION INTRAVENOUS at 16:22

## 2024-09-04 RX ADMIN — SODIUM CHLORIDE, PRESERVATIVE FREE 10 ML: 5 INJECTION INTRAVENOUS at 19:37

## 2024-09-04 RX ADMIN — POTASSIUM CHLORIDE 10 MEQ: 10 INJECTION, SOLUTION INTRAVENOUS at 21:28

## 2024-09-04 RX ADMIN — MAGNESIUM SULFATE HEPTAHYDRATE 1000 MG: 1 INJECTION, SOLUTION INTRAVENOUS at 12:31

## 2024-09-04 RX ADMIN — SODIUM CHLORIDE: 9 INJECTION, SOLUTION INTRAVENOUS at 19:37

## 2024-09-04 RX ADMIN — Medication 100 MG: at 19:30

## 2024-09-04 RX ADMIN — IOPAMIDOL 100 ML: 755 INJECTION, SOLUTION INTRAVENOUS at 18:37

## 2024-09-04 ASSESSMENT — PAIN SCALES - GENERAL
PAINLEVEL_OUTOF10: 7
PAINLEVEL_OUTOF10: 0

## 2024-09-04 ASSESSMENT — PAIN - FUNCTIONAL ASSESSMENT
PAIN_FUNCTIONAL_ASSESSMENT: 0-10
PAIN_FUNCTIONAL_ASSESSMENT: ACTIVITIES ARE NOT PREVENTED

## 2024-09-04 ASSESSMENT — PAIN DESCRIPTION - LOCATION: LOCATION: HEAD

## 2024-09-04 ASSESSMENT — PAIN DESCRIPTION - PAIN TYPE: TYPE: ACUTE PAIN

## 2024-09-04 ASSESSMENT — ENCOUNTER SYMPTOMS: BLOOD IN STOOL: 1

## 2024-09-04 ASSESSMENT — PAIN DESCRIPTION - FREQUENCY: FREQUENCY: CONTINUOUS

## 2024-09-04 ASSESSMENT — PAIN DESCRIPTION - DESCRIPTORS: DESCRIPTORS: ACHING

## 2024-09-04 ASSESSMENT — PAIN DESCRIPTION - ONSET: ONSET: ON-GOING

## 2024-09-04 NOTE — CONSULTS
[facility] [address] [address] [phone]        Thank you for requesting this consultation, but this patient has been seen by [facility] in the past.  We have informed them of this request.    Sincerely,  ALEJANDRA Palmer

## 2024-09-04 NOTE — H&P
History and Physical    Date of Service:  9/4/2024  Primary Care Provider: Holland Moses MD  Source of information: The patient and Chart review    Chief Complaint: Rectal Bleeding      History of Presenting Illness:   Maty Villarreal is a 74 y.o. female who presents with blood per rectum.     This is a 74-year-old -American female with past medical history of arthritis, chronic pain, hepatitis C, cirrhosis, who comes in for the above.  Patient was admitted here on 8/31 and left AMA yesterday 9/3.  Patient was admitted here for acute pancreatitis and melena.  Patient apparently left AMA because she had to take care of her cat.  Patient comes back to the hospital today reporting bleeding per rectum as she reports that she came back to continue to get it worked up.  Patient had an ERCP with biliary sphincterotomy and EUS with FNB done day before yesterday due to obstructive jaundice, dilated CBD, melena.  She reports that since getting home she has not had any bowel movements because she has not really been eating but will feel like she has to go to the bathroom as if she began to have diarrhea when she goes to the bathroom it is dark red blood with clots.  In the ER, her rectal exam was grossly bloody.    The patient denies any headache, blurry vision, sore throat, trouble swallowing, trouble with speech, chest pain, SOB, cough, fever, chills, N/V/D, abd pain, urinary symptoms, constipation, recent travels, sick contacts, focal or generalized neurological symptoms, falls, injuries, rashes, contact with COVID-19 diagnosed patients, hematemesis, melena, hemoptysis, hematuria, rashes, denies starting any new medications and denies any other concerns or problems besides as mentioned above.         REVIEW OF SYSTEMS:  A comprehensive review of systems was negative except for that written in the History of Present Illness.     Past Medical History:   Diagnosis Date    Arthritis     OSTEO    Chronic pain   the Last Year: No   Interpersonal Safety: Not At Risk (9/4/2024)    Interpersonal Safety Domain Source: IP Abuse Screening     Physical abuse: Denies     Verbal abuse: Denies     Emotional abuse: Denies     Financial abuse: Denies     Sexual abuse: Denies   Utilities: Not on file        Medications were reconciled to the best of my ability given all available resources at the time of admission. Route is PO if not otherwise noted.     Family and social history were personally reviewed, all pertinent and relevant details are outlined as above.    Objective:   /68   Pulse 80   Temp 98.1 °F (36.7 °C) (Oral)   Resp 15   Ht 1.6 m (5' 3\")   Wt 69.8 kg (153 lb 14.1 oz)   SpO2 92%   BMI 27.26 kg/m²         PHYSICAL EXAM:   General: Alert x oriented x 3, awake, no acute distress,   HEENT: PEERL, EOMI, moist mucus membranes  Neck: Supple, no JVD, no meningeal signs  Chest: Clear to auscultation bilaterally   CVS: RRR, S1 S2 heard, no murmurs/rubs/gallops  Abd: Soft, non-tender, non-distended, +bowel sounds   Ext: No clubbing, no cyanosis, no edema  Neuro/Psych: Pleasant mood and affect, CN 2-12 grossly intact, sensory grossly within normal limit, Strength 5/5 in all extremities  Cap refill: Brisk, less than 3 seconds  Pulses: 2+, symmetric in all extremities  Skin: Warm, dry, without rashes or lesions    Data Review:   I have independently reviewed and interpreted patient's lab and all other diagnostic data    Abnormal Labs Reviewed   CBC WITH AUTO DIFFERENTIAL - Abnormal; Notable for the following components:       Result Value    RBC 2.98 (*)     Hemoglobin 10.0 (*)     Hematocrit 30.8 (*)     .4 (*)     Platelets 78 (*)     Monocytes % 14 (*)     Immature Granulocytes % 1 (*)     Lymphocytes Absolute 0.6 (*)     All other components within normal limits   COMPREHENSIVE METABOLIC PANEL - Abnormal; Notable for the following components:    Potassium 2.7 (*)     CO2 36 (*)     Total Bilirubin 4.1 (*)     ALT

## 2024-09-04 NOTE — ED TRIAGE NOTES
Patient arrived via EMS from home with Bed Bugs, she was taken to shower upon arrival, belongings double bagged. Patient states she was here yesterday for rectal bleeding and signed herself out AMA because she was concerned for her cat. She endorses rectal bleeding continues. Denies chest pain and shortness of breath. No complaints of pain. States someone is now taking care of her cat and she would like to be admitted again to see what is wrong.

## 2024-09-04 NOTE — CONSULTS
Gastroenterology Consultation Note      Admit Date: 9/4/2024  Consult Date: 9/4/2024   I greatly appreciate your asking me to see Maty Villarreal, thank you very much for the opportunity to participate in her care.    Narrative Assessment and Plan   Hematochezia-normal blood pressure, no significant ongoing overt bleeding, stable hemoglobin.   Alcohol related liver disease with labs consistent with acute alcohol hepatitis but low Madrey discriminant function  Fatty liver related to alcohol abuse  Hepatitis C positive antibody  Chronic dilated CBD status post ERCP 2023, EUS and ERCP 2024.  No stone or stricture.  Has had sphincterotomy.  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  Currently no clinical symptoms of pancreatitis.  Recent FNB of the pancreas via EUS showing fibrosis and inflammation but no malignancy    -Clear liquid diet  -Replete potassium  -Check INR in the morning  -Follow hemoglobin  -Check hepatitis C PCR  -Will prep for EGD and colonoscopy tomorrow.  Upper endoscopy will serve to ensure no evidence of bleeding from recent sphincterotomy in the setting of mild coagulopathy from chronic liver disease.  Suspicion for upper GI tract source of bleeding is very low.  Given ongoing red blood per rectum we will offer colonoscopy at the same time given no recent well-prepped examination.  -Needs complete alcohol cessation for the health of her pancreas and liver as I suspect her abnormal liver enzymes are more likely related to alcoholic liver disease as opposed to biliary pathology  -Will ultimately need outpatient follow-up given the concern for type I choledochocyst raised in the past    GI following    Subjective:     Chief Complaint: Rectal bleeding    History of Present Illness: 74-year-old female who presents to the hospital for concern of rectal bleeding.  She was recently admitted from August 31 to September 3.  She left AMA     thiamine 100 MG tablet Take 1 tablet by mouth daily       Review of Systems: Admission ROS by Gene Edwards MD from 9/4/2024 were reviewed with the patient and changes (other than per HPI) include: Negative except per HPI      Objective:     Physical Exam:  Vitals:    09/04/24 1630   BP: 123/68   Pulse: 80   Resp: 15   Temp:    SpO2: 92%     SpO2 Readings from Last 6 Encounters:   09/04/24 92%   09/03/24 95%   11/24/23 95%   11/19/23 92%   07/06/23 94%   06/07/23 93%        No intake or output data in the 24 hours ending 09/04/24 1819     General: no distress, comfortable  Skin:  No rash or palpable dermatologic mass lesions  HEENT: Pupils equal, sclera icteric, oropharynx with no gross lesions  Respiratory:  Normal respiratory effort, no throacic deformity  GI: Soft, Abdomen nondistended, nontender, no mass, no free fluid, no rebound or guarding.  Musculoskeletal:  No skeletal deformity nor acute arthritis noted.  Neurological:  Motor and sensory function intact in upper extremeties  Psychiatric:  Normal affect, memory intact, appears to have insight into current illness    Laboratory:    Recent Results (from the past 24 hour(s))   CBC with Auto Differential    Collection Time: 09/04/24 11:59 AM   Result Value Ref Range    WBC 4.8 3.6 - 11.0 K/uL    RBC 2.98 (L) 3.80 - 5.20 M/uL    Hemoglobin 10.0 (L) 11.5 - 16.0 g/dL    Hematocrit 30.8 (L) 35.0 - 47.0 %    .4 (H) 80.0 - 99.0 FL    MCH 33.6 26.0 - 34.0 PG    MCHC 32.5 30.0 - 36.5 g/dL    RDW 13.9 11.5 - 14.5 %    Platelets 78 (L) 150 - 400 K/uL    MPV 12.3 8.9 - 12.9 FL    Nucleated RBCs 0.0 0  WBC    nRBC 0.00 0.00 - 0.01 K/uL    Neutrophils % 70 32 - 75 %    Lymphocytes % 13 12 - 49 %    Monocytes % 14 (H) 5 - 13 %    Eosinophils % 1 0 - 7 %    Basophils % 1 0 - 1 %    Immature Granulocytes % 1 (H) 0.0 - 0.5 %    Neutrophils Absolute 3.5 1.8 - 8.0 K/UL    Lymphocytes Absolute 0.6 (L) 0.8 - 3.5 K/UL    Monocytes Absolute 0.7 0.0 - 1.0

## 2024-09-04 NOTE — ED PROVIDER NOTES
[facility] MED SURG  EMERGENCY DEPARTMENT ENCOUNTER      Pt Name: Maty Villarreal  MRN: [id number]  Birthdate 1950  Date of evaluation: 9/4/2024  Provider: ELMIRA GREEN MD    CHIEF COMPLAINT       Chief Complaint   Patient presents with    Rectal Bleeding         HISTORY OF PRESENT ILLNESS   (Location/Symptom, Timing/Onset, Context/Setting, Quality, Duration, Modifying Factors, Severity)  Note limiting factors.   Patient is a pleasant 74-year-old female who presents emergency department for evaluation of headache after a fall yesterday while in the hospital and continued GI bleeding.  Apparently she had recent scope and has history of alcoholic cirrhosis.  She states that she had a lead to have someone take care of her cat.  She states that she is back because she was able to get this straightened out and also because she was having ongoing bleeding from below.  She states she has bright red blood with clots.  She states a CT scan was performed prior to discharge yesterday however did not hear with the result was.  She denies any recurrent fall    The history is provided by the patient.         Review of External Medical Records:     Nursing Notes were reviewed.    REVIEW OF SYSTEMS    (2-9 systems for level 4, 10 or more for level 5)     Review of Systems   Gastrointestinal:  Positive for blood in stool.   Neurological:  Positive for headaches.       Except as noted above the remainder of the review of systems was reviewed and negative.       PAST MEDICAL HISTORY     Past Medical History:   Diagnosis Date    Arthritis     OSTEO    Chronic pain     Cirrhosis (HCC)     Hepatitis C     treated and gone 1980's    Neuropathy     Tuberculosis 1962    +HAI TEST # 3; TREATED AND NOW LUNGS HAVE ALWAYS BEEN CLEAR         SURGICAL HISTORY       Past Surgical History:   Procedure Laterality Date    ANUS SURGERY  6/7/2023    SPHINCTEROTOMY SPHINCTEROPLASTY performed by David Rojas MD at [facility] ENDOSCOPY

## 2024-09-04 NOTE — ED NOTES
ED TO INPATIENT SBAR HANDOFF    Patient Name: Maty Villarreal   :  1950  74 y.o.   MRN:  [id number]  ED Room #:  ER18/18     Situation  Code Status: Full Code   Allergies: Patient has no known allergies.  Weight: Patient Vitals for the past 96 hrs (Last 3 readings):   Weight   24 1119 69.8 kg (153 lb 14.1 oz)       Arrived from: home    Chief Complaint:   Chief Complaint   Patient presents with    Rectal Bleeding       Hospital Problem/Diagnosis:  Principal Problem:    GIB (gastrointestinal bleeding)  Resolved Problems:    * No resolved hospital problems. *      Mobility: no current mobility problem   ED Fall Risk: Presents to emergency department  because of falls (Syncope, seizure, or loss of consciousness): No, Age > 70: Yes, Altered Mental Status, Intoxication with alcohol or substance confusion (Disorientation, impaired judgment, poor safety awaremess, or inability to follow instructions): No, Impaired Mobility: Ambulates or transfers with assistive devices or assistance; Unable to ambulate or transer.: No, Nursing Judgement: No   Fell in ED or prior to admission: no   Restraints: no     Sitter: no   Family/Caregiver Present: no    Neet to know social/safety information:    Background  History:   Past Medical History:   Diagnosis Date    Arthritis     OSTEO    Chronic pain     Cirrhosis (HCC)     Hepatitis C     Liver disease 1980s    HEPATITIS C, TREATED AND NOW GONE    Neuropathy     Tuberculosis     +HAI TEST # 3; TREATED AND NOW LUNGS HAVE ALWAYS BEEN CLEAR       Assessment    Abnormal Assessment Findings: Pt c/o bloody stool   Imaging:   CT HEAD WO CONTRAST   Final Result   No acute intracranial hemorrhage, mass or infarct.          Electronically signed by Esau Suarez MD      CT ABDOMEN PELVIS W WO CONTRAST Additional Contrast? Radiologist Recommendation    (Results Pending)     Abnormal labs:   Abnormal Labs Reviewed   CBC WITH AUTO DIFFERENTIAL - Abnormal; Notable for the following  components:       Result Value    RBC 2.98 (*)     Hemoglobin 10.0 (*)     Hematocrit 30.8 (*)     .4 (*)     Platelets 78 (*)     Monocytes % 14 (*)     Immature Granulocytes % 1 (*)     Lymphocytes Absolute 0.6 (*)     All other components within normal limits   COMPREHENSIVE METABOLIC PANEL - Abnormal; Notable for the following components:    Potassium 2.7 (*)     CO2 36 (*)     Total Bilirubin 4.1 (*)      (*)      (*)     Albumin 3.1 (*)     Globulin 4.1 (*)     Albumin/Globulin Ratio 0.8 (*)     All other components within normal limits   OCCULT BLOOD, FECAL - Abnormal; Notable for the following components:    POC Occult Blood, Fecal Positive (*)     All other components within normal limits       Vitals/MEWS: MEWS Score: 2  Level of Consciousness: Alert (0)   Vitals:    09/04/24 1530 09/04/24 1630 09/04/24 1925 09/04/24 1940   BP: (!) 103/58 123/68 126/73    Pulse: 74 80 76 (!) 103   Resp: 13 15 10    Temp:       TempSrc:       SpO2: 94% 92% 91%    Weight:       Height:         DI:   Predictive Model Details          36 (Caution)  Factor Value    Calculated 9/4/2024 19:48 40% Respiratory rate 10    Deterioration Index Model 33% Age 74 years old     8% Pulse 103     7% Pulse oximetry 91 %     5% Potassium abnormal (2.7 mmol/L)     3% Systolic 126     2% Sodium 138 mmol/L     2% Hematocrit abnormal (30.8 %)     1% Platelet count abnormal (78 K/uL)     0% WBC count 4.8 K/uL     0% Temperature 98.1 °F (36.7 °C)         FiO2 (%):   O2 Flow Rate: O2 Device: None (Room air)    Cardiac Rhythm:      Pain Scale: Pain Assessment  Pain Assessment: 0-10  Pain Level: 0  Last documented pain score: (0-10 scale) Pain Level: 0  Last documented pain medication administered:      Mental Status: oriented, alert, coherent, logical, thought processes intact, and able to concentrate and follow conversation  Orientation Level: Orientation Level: Oriented X4  NIH Score:    C-SSRS: Risk of Suicide: No Risk    PO  Status: Clear Liquid  Bedside swallow: 3 oz Water Swallow Screen: Pass  [place] Coma Scale (GCS): Eugenia Coma Scale  Eye Opening: Spontaneous  Best Verbal Response: Oriented  Best Motor Response: Obeys commands  [place] Coma Scale Score: 15    Active LDA's:   Peripheral IV 09/01/24 Right Forearm (Active)       Peripheral IV 09/04/24 Right Antecubital (Active)   Site Assessment Clean, dry & intact 09/04/24 1158   Line Status Blood return noted;Capped;Flushed;Normal saline locked;Specimen collected 09/04/24 1158   Line Care Connections checked and tightened 09/04/24 1158   Phlebitis Assessment No symptoms 09/04/24 1158   Infiltration Assessment 0 09/04/24 1158   Alcohol Cap Used Yes 09/04/24 1158   Dressing Status New dressing applied 09/04/24 1158   Dressing Type Transparent 09/04/24 1158   Dressing Intervention New 09/04/24 1158     Pertinent or High Risk Medications/Drips: yes   If Yes, please provide details: potassium  Titratable drips: no   Pending Blood Product Administration: no     Sepsis: Sepsis Risk Score   Predictive Model Details   Calculating. Refresh or re-add the SmartLink.     Recent Labs     09/02/24  0611 09/03/24  0700 09/04/24  1159   WBC 5.5 7.0 4.8     Blood Cultures Drawn: No   Repeat LA: Time Due   Antibiotic Given: No  Fluid Resuscitation: Total needed , Status    VS x 2 post-fluid resuscitation:     Recommendation    Plan for next 24 hours: replete k+ recheck at 2000, check INR in AM, watch h/h, hep C PCR, EGD and colonoscopy tomorrow, CIWAS  Additional Comments:      Pending orders   Consults ordered: IP CONSULT TO GI  IP CONSULT TO SOCIAL WORK    Consulted provider:      If any further questions, please call Sending RN at [phone]  Electronically signed by: Electronically signed by Aelx Bruno RN on 9/4/2024 at 7:48 PM

## 2024-09-05 ENCOUNTER — ANESTHESIA EVENT (OUTPATIENT)
Facility: HOSPITAL | Age: 74
End: 2024-09-05
Payer: MEDICARE

## 2024-09-05 ENCOUNTER — ANESTHESIA (OUTPATIENT)
Facility: HOSPITAL | Age: 74
End: 2024-09-05
Payer: MEDICARE

## 2024-09-05 LAB
ALBUMIN SERPL-MCNC: 3 G/DL (ref 3.5–5)
ALBUMIN/GLOB SERPL: 0.9 (ref 1.1–2.2)
ALP SERPL-CCNC: 93 U/L (ref 45–117)
ALT SERPL-CCNC: 106 U/L (ref 12–78)
ANION GAP SERPL CALC-SCNC: 9 MMOL/L (ref 2–12)
AST SERPL-CCNC: 191 U/L (ref 15–37)
BASOPHILS # BLD: 0 K/UL (ref 0–0.1)
BASOPHILS NFR BLD: 0 % (ref 0–1)
BILIRUB DIRECT SERPL-MCNC: 3 MG/DL (ref 0–0.2)
BILIRUB SERPL-MCNC: 3.9 MG/DL (ref 0.2–1)
BUN SERPL-MCNC: 10 MG/DL (ref 6–20)
BUN/CREAT SERPL: 18 (ref 12–20)
CALCIUM SERPL-MCNC: 8.4 MG/DL (ref 8.5–10.1)
CHLORIDE SERPL-SCNC: 101 MMOL/L (ref 97–108)
CO2 SERPL-SCNC: 28 MMOL/L (ref 21–32)
COMMENT:: NORMAL
CREAT SERPL-MCNC: 0.57 MG/DL (ref 0.55–1.02)
DIFFERENTIAL METHOD BLD: ABNORMAL
EOSINOPHIL # BLD: 0.1 K/UL (ref 0–0.4)
EOSINOPHIL NFR BLD: 2 % (ref 0–7)
ERYTHROCYTE [DISTWIDTH] IN BLOOD BY AUTOMATED COUNT: 14.4 % (ref 11.5–14.5)
GLOBULIN SER CALC-MCNC: 3.3 G/DL (ref 2–4)
GLUCOSE BLD STRIP.AUTO-MCNC: 176 MG/DL (ref 65–117)
GLUCOSE SERPL-MCNC: 82 MG/DL (ref 65–100)
HCT VFR BLD AUTO: 25 % (ref 35–47)
HCT VFR BLD AUTO: 31 % (ref 35–47)
HGB BLD-MCNC: 10 G/DL (ref 11.5–16)
HGB BLD-MCNC: 8.6 G/DL (ref 11.5–16)
IMM GRANULOCYTES # BLD AUTO: 0 K/UL (ref 0–0.04)
IMM GRANULOCYTES NFR BLD AUTO: 0 % (ref 0–0.5)
INR PPP: 1.3 (ref 0.9–1.1)
LYMPHOCYTES # BLD: 0.5 K/UL (ref 0.8–3.5)
LYMPHOCYTES NFR BLD: 11 % (ref 12–49)
MAGNESIUM SERPL-MCNC: 1.4 MG/DL (ref 1.6–2.4)
MCH RBC QN AUTO: 33.9 PG (ref 26–34)
MCHC RBC AUTO-ENTMCNC: 34.4 G/DL (ref 30–36.5)
MCV RBC AUTO: 98.4 FL (ref 80–99)
MONOCYTES # BLD: 0.9 K/UL (ref 0–1)
MONOCYTES NFR BLD: 19 % (ref 5–13)
NEUTS SEG # BLD: 3.2 K/UL (ref 1.8–8)
NEUTS SEG NFR BLD: 68 % (ref 32–75)
NRBC # BLD: 0.02 K/UL (ref 0–0.01)
NRBC BLD-RTO: 0.4 PER 100 WBC
PLATELET # BLD AUTO: 77 K/UL (ref 150–400)
PLATELET COMMENT: ABNORMAL
PMV BLD AUTO: 12.1 FL (ref 8.9–12.9)
POTASSIUM SERPL-SCNC: 3.2 MMOL/L (ref 3.5–5.1)
PROT SERPL-MCNC: 6.3 G/DL (ref 6.4–8.2)
PROTHROMBIN TIME: 13.2 SEC (ref 9–11.1)
RBC # BLD AUTO: 2.54 M/UL (ref 3.8–5.2)
RBC MORPH BLD: ABNORMAL
SERVICE CMNT-IMP: ABNORMAL
SODIUM SERPL-SCNC: 138 MMOL/L (ref 136–145)
SPECIMEN HOLD: NORMAL
WBC # BLD AUTO: 4.7 K/UL (ref 3.6–11)

## 2024-09-05 PROCEDURE — 6370000000 HC RX 637 (ALT 250 FOR IP)

## 2024-09-05 PROCEDURE — 2720000010 HC SURG SUPPLY STERILE: Performed by: INTERNAL MEDICINE

## 2024-09-05 PROCEDURE — 7100000010 HC PHASE II RECOVERY - FIRST 15 MIN: Performed by: INTERNAL MEDICINE

## 2024-09-05 PROCEDURE — 0DJ08ZZ INSPECTION OF UPPER INTESTINAL TRACT, VIA NATURAL OR ARTIFICIAL OPENING ENDOSCOPIC: ICD-10-PCS | Performed by: INTERNAL MEDICINE

## 2024-09-05 PROCEDURE — 6360000002 HC RX W HCPCS

## 2024-09-05 PROCEDURE — 3700000000 HC ANESTHESIA ATTENDED CARE: Performed by: INTERNAL MEDICINE

## 2024-09-05 PROCEDURE — 96366 THER/PROPH/DIAG IV INF ADDON: CPT

## 2024-09-05 PROCEDURE — 80076 HEPATIC FUNCTION PANEL: CPT

## 2024-09-05 PROCEDURE — 85014 HEMATOCRIT: CPT

## 2024-09-05 PROCEDURE — 82962 GLUCOSE BLOOD TEST: CPT

## 2024-09-05 PROCEDURE — 3600007502: Performed by: INTERNAL MEDICINE

## 2024-09-05 PROCEDURE — 6360000002 HC RX W HCPCS: Performed by: STUDENT IN AN ORGANIZED HEALTH CARE EDUCATION/TRAINING PROGRAM

## 2024-09-05 PROCEDURE — 3700000001 HC ADD 15 MINUTES (ANESTHESIA): Performed by: INTERNAL MEDICINE

## 2024-09-05 PROCEDURE — G0378 HOSPITAL OBSERVATION PER HR: HCPCS

## 2024-09-05 PROCEDURE — 83735 ASSAY OF MAGNESIUM: CPT

## 2024-09-05 PROCEDURE — 85610 PROTHROMBIN TIME: CPT

## 2024-09-05 PROCEDURE — 0DBN8ZX EXCISION OF SIGMOID COLON, VIA NATURAL OR ARTIFICIAL OPENING ENDOSCOPIC, DIAGNOSTIC: ICD-10-PCS | Performed by: INTERNAL MEDICINE

## 2024-09-05 PROCEDURE — 88305 TISSUE EXAM BY PATHOLOGIST: CPT

## 2024-09-05 PROCEDURE — 6370000000 HC RX 637 (ALT 250 FOR IP): Performed by: STUDENT IN AN ORGANIZED HEALTH CARE EDUCATION/TRAINING PROGRAM

## 2024-09-05 PROCEDURE — 2709999900 HC NON-CHARGEABLE SUPPLY: Performed by: INTERNAL MEDICINE

## 2024-09-05 PROCEDURE — 2580000003 HC RX 258: Performed by: NURSE ANESTHETIST, CERTIFIED REGISTERED

## 2024-09-05 PROCEDURE — 80048 BASIC METABOLIC PNL TOTAL CA: CPT

## 2024-09-05 PROCEDURE — 1200000000 HC SEMI PRIVATE

## 2024-09-05 PROCEDURE — 85018 HEMOGLOBIN: CPT

## 2024-09-05 PROCEDURE — 85025 COMPLETE CBC W/AUTO DIFF WBC: CPT

## 2024-09-05 PROCEDURE — 36415 COLL VENOUS BLD VENIPUNCTURE: CPT

## 2024-09-05 PROCEDURE — 3600007512: Performed by: INTERNAL MEDICINE

## 2024-09-05 PROCEDURE — 2500000003 HC RX 250 WO HCPCS: Performed by: NURSE ANESTHETIST, CERTIFIED REGISTERED

## 2024-09-05 PROCEDURE — 6370000000 HC RX 637 (ALT 250 FOR IP): Performed by: INTERNAL MEDICINE

## 2024-09-05 PROCEDURE — 96376 TX/PRO/DX INJ SAME DRUG ADON: CPT

## 2024-09-05 PROCEDURE — 2580000003 HC RX 258: Performed by: STUDENT IN AN ORGANIZED HEALTH CARE EDUCATION/TRAINING PROGRAM

## 2024-09-05 PROCEDURE — 87522 HEPATITIS C REVRS TRNSCRPJ: CPT

## 2024-09-05 PROCEDURE — 6360000002 HC RX W HCPCS: Performed by: NURSE ANESTHETIST, CERTIFIED REGISTERED

## 2024-09-05 PROCEDURE — 7100000011 HC PHASE II RECOVERY - ADDTL 15 MIN: Performed by: INTERNAL MEDICINE

## 2024-09-05 RX ORDER — DEXTROSE MONOHYDRATE 100 MG/ML
INJECTION, SOLUTION INTRAVENOUS CONTINUOUS PRN
Status: DISCONTINUED | OUTPATIENT
Start: 2024-09-05 | End: 2024-09-06 | Stop reason: HOSPADM

## 2024-09-05 RX ORDER — SODIUM CHLORIDE 9 MG/ML
INJECTION, SOLUTION INTRAVENOUS CONTINUOUS
Status: DISCONTINUED | OUTPATIENT
Start: 2024-09-05 | End: 2024-09-05 | Stop reason: HOSPADM

## 2024-09-05 RX ORDER — PHENYLEPHRINE HCL IN 0.9% NACL 0.4MG/10ML
SYRINGE (ML) INTRAVENOUS PRN
Status: DISCONTINUED | OUTPATIENT
Start: 2024-09-05 | End: 2024-09-05 | Stop reason: SDUPTHER

## 2024-09-05 RX ORDER — SODIUM CHLORIDE 9 MG/ML
INJECTION, SOLUTION INTRAVENOUS CONTINUOUS PRN
Status: DISCONTINUED | OUTPATIENT
Start: 2024-09-05 | End: 2024-09-05 | Stop reason: SDUPTHER

## 2024-09-05 RX ORDER — SODIUM CHLORIDE 0.9 % (FLUSH) 0.9 %
5-40 SYRINGE (ML) INJECTION EVERY 12 HOURS SCHEDULED
Status: DISCONTINUED | OUTPATIENT
Start: 2024-09-05 | End: 2024-09-05 | Stop reason: HOSPADM

## 2024-09-05 RX ORDER — LIDOCAINE HYDROCHLORIDE 20 MG/ML
INJECTION, SOLUTION EPIDURAL; INFILTRATION; INTRACAUDAL; PERINEURAL PRN
Status: DISCONTINUED | OUTPATIENT
Start: 2024-09-05 | End: 2024-09-05 | Stop reason: SDUPTHER

## 2024-09-05 RX ORDER — SIMETHICONE 40MG/0.6ML
SUSPENSION, DROPS(FINAL DOSAGE FORM)(ML) ORAL PRN
Status: DISCONTINUED | OUTPATIENT
Start: 2024-09-05 | End: 2024-09-05 | Stop reason: ALTCHOICE

## 2024-09-05 RX ORDER — SODIUM CHLORIDE 0.9 % (FLUSH) 0.9 %
5-40 SYRINGE (ML) INJECTION PRN
Status: DISCONTINUED | OUTPATIENT
Start: 2024-09-05 | End: 2024-09-05 | Stop reason: HOSPADM

## 2024-09-05 RX ORDER — POTASSIUM CHLORIDE 7.45 MG/ML
10 INJECTION INTRAVENOUS
Status: COMPLETED | OUTPATIENT
Start: 2024-09-05 | End: 2024-09-05

## 2024-09-05 RX ORDER — SODIUM CHLORIDE 9 MG/ML
25 INJECTION, SOLUTION INTRAVENOUS PRN
Status: DISCONTINUED | OUTPATIENT
Start: 2024-09-05 | End: 2024-09-05 | Stop reason: HOSPADM

## 2024-09-05 RX ORDER — MAGNESIUM SULFATE 1 G/100ML
1000 INJECTION INTRAVENOUS ONCE
Status: COMPLETED | OUTPATIENT
Start: 2024-09-05 | End: 2024-09-05

## 2024-09-05 RX ADMIN — POTASSIUM CHLORIDE 10 MEQ: 7.46 INJECTION, SOLUTION INTRAVENOUS at 06:34

## 2024-09-05 RX ADMIN — PROPOFOL 25 MG: 10 INJECTION, EMULSION INTRAVENOUS at 15:49

## 2024-09-05 RX ADMIN — POTASSIUM BICARBONATE 40 MEQ: 782 TABLET, EFFERVESCENT ORAL at 20:56

## 2024-09-05 RX ADMIN — PROPOFOL 75 MG: 10 INJECTION, EMULSION INTRAVENOUS at 15:36

## 2024-09-05 RX ADMIN — MAGNESIUM SULFATE HEPTAHYDRATE 1000 MG: 1 INJECTION, SOLUTION INTRAVENOUS at 12:45

## 2024-09-05 RX ADMIN — PROPOFOL 25 MG: 10 INJECTION, EMULSION INTRAVENOUS at 15:53

## 2024-09-05 RX ADMIN — Medication 10 ML: at 20:56

## 2024-09-05 RX ADMIN — ONDANSETRON 4 MG: 2 INJECTION INTRAMUSCULAR; INTRAVENOUS at 01:35

## 2024-09-05 RX ADMIN — SODIUM CHLORIDE, PRESERVATIVE FREE 10 ML: 5 INJECTION INTRAVENOUS at 20:56

## 2024-09-05 RX ADMIN — PROPOFOL 25 MG: 10 INJECTION, EMULSION INTRAVENOUS at 15:45

## 2024-09-05 RX ADMIN — SODIUM CHLORIDE: 9 INJECTION, SOLUTION INTRAVENOUS at 15:10

## 2024-09-05 RX ADMIN — PREGABALIN 150 MG: 100 CAPSULE ORAL at 10:55

## 2024-09-05 RX ADMIN — SODIUM CHLORIDE: 9 INJECTION, SOLUTION INTRAVENOUS at 12:42

## 2024-09-05 RX ADMIN — PROPOFOL 25 MG: 10 INJECTION, EMULSION INTRAVENOUS at 15:39

## 2024-09-05 RX ADMIN — PREGABALIN 150 MG: 100 CAPSULE ORAL at 20:56

## 2024-09-05 RX ADMIN — POTASSIUM CHLORIDE 10 MEQ: 7.46 INJECTION, SOLUTION INTRAVENOUS at 05:20

## 2024-09-05 RX ADMIN — PROPOFOL 25 MG: 10 INJECTION, EMULSION INTRAVENOUS at 15:42

## 2024-09-05 RX ADMIN — POTASSIUM CHLORIDE 10 MEQ: 7.46 INJECTION, SOLUTION INTRAVENOUS at 04:04

## 2024-09-05 RX ADMIN — LIDOCAINE HYDROCHLORIDE 70 MG: 20 INJECTION, SOLUTION EPIDURAL; INFILTRATION; INTRACAUDAL; PERINEURAL at 15:36

## 2024-09-05 RX ADMIN — Medication 80 MCG: at 15:44

## 2024-09-05 ASSESSMENT — PAIN SCALES - GENERAL
PAINLEVEL_OUTOF10: 2
PAINLEVEL_OUTOF10: 2
PAINLEVEL_OUTOF10: 4

## 2024-09-05 ASSESSMENT — PAIN DESCRIPTION - LOCATION
LOCATION: HEAD

## 2024-09-05 NOTE — PROGRESS NOTES
Pt difficult stick sent off H&H 1917. Educated on importance of getting H&H. Pt under impression there is no active bleeding post EDG/Colonoscopy. Will continue to monitor. Pt awake in bed. Nothing further at this time.

## 2024-09-05 NOTE — CARE COORDINATION
CM attempted to see patient to complete initial assessment, patient is off the floor for a procedure.

## 2024-09-05 NOTE — ED NOTES
Verbal shift change report given to Janel (oncoming nurse) by Alex (offgoing nurse). Report included the following information ED Encounter Summary, ED SBAR, Adult Overview, and MAR.

## 2024-09-05 NOTE — PROGRESS NOTES
TRANSFER - IN REPORT:    Verbal report received from Jacqueline on Maty K [place]  being received from [facility] [facility] [place]      Report consisted of patient's Situation, Background, Assessment and   Recommendations(SBAR).     Information from the following report(s) Adult Overview was reviewed with the receiving nurse.    Opportunity for questions and clarification was provided.      Assessment completed upon patient's arrival to unit and care assumed.

## 2024-09-05 NOTE — ANESTHESIA PRE PROCEDURE
Department of Anesthesiology  Preprocedure Note       Name:  Maty Villarreal   Age:  74 y.o.  :  1950                                          MRN:  [id number]         Date:  2024      Surgeon: Surgeon(s):  Adele Anderson MD    Procedure: Procedure(s):  ESOPHAGOGASTRODUODENOSCOPY  COLONOSCOPY DIAGNOSTIC    Medications prior to admission:   Prior to Admission medications    Medication Sig Start Date End Date Taking? Authorizing Provider   pregabalin (LYRICA) 150 MG capsule TAKE 1 CAPSULE BY MOUTH THREE TIMES A DAY A DAY 24 Yes Holland Moses MD   ibuprofen (ADVIL;MOTRIN) 200 MG tablet Take 1 tablet by mouth every 6 hours as needed for Pain   Yes Provider, MD [place]   thiamine 100 MG tablet Take 1 tablet by mouth daily  Patient not taking: Reported on 2024 9/3/24   Jose Cali MD       Current medications:    Current Facility-Administered Medications   Medication Dose Route Frequency Provider Last Rate Last Admin   • glucose chewable tablet 16 g  4 tablet Oral PRN Sara Liu APRN - NP       • dextrose bolus 10% 125 mL  125 mL IntraVENous PRN Sara Liu APRN - NP        Or   • dextrose bolus 10% 250 mL  250 mL IntraVENous PRN Sara Liu APRN - NP       • glucagon injection 1 mg  1 mg SubCUTAneous PRN Sara Liu APRN - NP       • dextrose 10 % infusion   IntraVENous Continuous PRN Sara Liu APRN - NP       • 0.9 % sodium chloride infusion   IntraVENous Continuous Adele Anderson MD       • sodium chloride flush 0.9 % injection 5-40 mL  5-40 mL IntraVENous 2 times per day Adele Anderson MD       • sodium chloride flush 0.9 % injection 5-40 mL  5-40 mL IntraVENous PRN Adele Anderson MD       • 0.9 % sodium chloride infusion  25 mL IntraVENous PRN Adele Anderson MD       • potassium bicarb-citric acid (EFFER-K) effervescent tablet 40 mEq  40 mEq Oral BID Yazmin George PA-C       • sodium chloride flush 0.9 % injection

## 2024-09-05 NOTE — PLAN OF CARE
Problem: Discharge Planning  Goal: Discharge to home or other facility with appropriate resources  Outcome: Progressing     Problem: Pain  Goal: Verbalizes/displays adequate comfort level or baseline comfort level  Outcome: Progressing     Problem: Safety - Adult  Goal: Free from fall injury  Outcome: Progressing     Problem: Risk for Elopement  Goal: Patient will not exit the unit/facility without proper excort  Outcome: Progressing  Flowsheets (Taken 9/4/2024 2111)  Nursing Interventions for Elopement Risk:   Assist with personal care needs such as toileting, eating, dressing, as needed to reduce the risk of wandering   Make sure patient has all necessary personal care items   Reduce environmental triggers

## 2024-09-05 NOTE — PROGRESS NOTES

## 2024-09-05 NOTE — PROGRESS NOTES
[facility] Adult  Hospitalist Group                                                                                          Hospitalist Progress Note  Yazmin George PA-C  Answering service: [phone] or [phone] from in house phone        Date of Service:  2024  NAME:  Maty Villarreal  :  1950  MRN:  [id number]       Admission Summary:   Maty Villarreal is a 74 y.o. female who presents with blood per rectum.      This is a 74-year-old -American female with past medical history of arthritis, chronic pain, hepatitis C, cirrhosis, who comes in for the above.  Patient was admitted here on  and left AMA yesterday 9/3.  Patient was admitted here for acute pancreatitis and melena.  Patient apparently left AMA because she had to take care of her cat.  Patient comes back to the hospital today reporting bleeding per rectum as she reports that she came back to continue to get it worked up.  Patient had an ERCP with biliary sphincterotomy and EUS with FNB done day before yesterday due to obstructive jaundice, dilated CBD, melena.  She reports that since getting home she has not had any bowel movements because she has not really been eating but will feel like she has to go to the bathroom as if she began to have diarrhea when she goes to the bathroom it is dark red blood with clots.  In the ER, her rectal exam was grossly bloody.     The patient denies any headache, blurry vision, sore throat, trouble swallowing, trouble with speech, chest pain, SOB, cough, fever, chills, N/V/D, abd pain, urinary symptoms, constipation, recent travels, sick contacts, focal or generalized neurological symptoms, falls, injuries, rashes, contact with COVID-19 diagnosed patients, hematemesis, melena, hemoptysis, hematuria, rashes, denies starting any new medications and denies any other concerns or problems besides as mentioned above.         Interval history / Subjective:     Patient seen and examined this am.  Sexual abuse: Denies   Utilities: Not At Risk (9/4/2024)    [facility] Utilities     Threatened with loss of utilities: No       Review of Systems:   Pertinent items are noted in HPI.       Vital Signs:    Last 24hrs VS reviewed since prior progress note. Most recent are:  Vitals:    09/05/24 1115   BP: 133/78   Pulse: 80   Resp: 18   Temp: 98.5 °F (36.9 °C)   SpO2: 95%         Intake/Output Summary (Last 24 hours) at 9/5/2024 1141  Last data filed at 9/5/2024 0540  Gross per 24 hour   Intake 1117.16 ml   Output 300 ml   Net 817.16 ml        Physical Examination:     I had a face to face encounter with this patient and independently examined them on 9/5/2024 as outlined below:          General : alert x 3, awake, no acute distress,   HEENT: PEERL, EOMI, moist mucus membrane  Neck: supple, no JVD, no meningeal signs  Chest: Clear to auscultation bilaterally   CVS: S1 S2 heard, Capillary refill less than 2 seconds  Abd: soft/ non tender, non distended, BS physiological,   Ext: no clubbing, no cyanosis, no edema, brisk 2+ DP pulses  Neuro/Psych: pleasant mood and affect, CN 2-12 grossly intact, sensory grossly within normal limit, Strength 5/5 in all extremities  Skin: warm     Data Review:    Review and/or order of clinical lab test      I have personally and independently reviewed all pertinent labs, diagnostic studies, imaging, and have provided independent interpretation of the same.     Labs:     Recent Labs     09/04/24  1159 09/05/24  0415   WBC 4.8 4.7   HGB 10.0* 8.6*   HCT 30.8* 25.0*   PLT 78* 77*     Recent Labs     09/03/24  0700 09/04/24  1159 09/05/24  0207    138 138   K 3.4* 2.7* 3.2*   CL 99 97 101   CO2 35* 36* 28   BUN 8 12 10   MG  --   --  1.4*     Recent Labs     09/03/24  0700 09/04/24  1159 09/05/24  0207   * 118* 106*   GLOB 3.4 4.1* PENDING     Recent Labs     09/05/24 0207   INR 1.3*      No results for input(s): \"TIBC\" in the last 72 hours.    Invalid input(s): \"FE\", \"PSAT\", \"FERR\"    No results found for: \"RBCF\"   No results for input(s): \"PH\", \"PCO2\", \"PO2\" in the last 72 hours.  No results for input(s): \"CPK\" in the last 72 hours.    Invalid input(s): \"CPKMB\", \"CKNDX\", \"TROIQ\"  Lab Results   Component Value Date/Time    CHOL 183 05/14/2021 12:00 AM    HDL 66 05/14/2021 12:00 AM    LDL 72 05/14/2021 12:00 AM     No results found for: \"GLUCPOC\"  [unfilled]    Notes reviewed from all clinical/nonclinical/nursing services involved in patient's clinical care. Care coordination discussions were held with appropriate clinical/nonclinical/ nursing providers based on care coordination needs.         Patients current active Medications were reviewed, considered, added and adjusted based on the clinical condition today.      Home Medications were reconciled to the best of my ability given all available resources at the time of admission. Route is PO if not otherwise noted.      Admission Status:[id number]:::1}      Medications Reviewed:     Current Facility-Administered Medications   Medication Dose Route Frequency    glucose chewable tablet 16 g  4 tablet Oral PRN    dextrose bolus 10% 125 mL  125 mL IntraVENous PRN    Or    dextrose bolus 10% 250 mL  250 mL IntraVENous PRN    glucagon injection 1 mg  1 mg SubCUTAneous PRN    dextrose 10 % infusion   IntraVENous Continuous PRN    sodium chloride flush 0.9 % injection 5-40 mL  5-40 mL IntraVENous 2 times per day    sodium chloride flush 0.9 % injection 5-40 mL  5-40 mL IntraVENous PRN    0.9 % sodium chloride infusion   IntraVENous PRN    ondansetron (ZOFRAN-ODT) disintegrating tablet 4 mg  4 mg Oral Q8H PRN    Or    ondansetron (ZOFRAN) injection 4 mg  4 mg IntraVENous Q6H PRN    polyethylene glycol (GLYCOLAX) packet 17 g  17 g Oral Daily PRN    pregabalin (LYRICA) capsule 150 mg  150 mg Oral TID    0.9 % sodium chloride infusion   IntraVENous Continuous    sodium chloride flush 0.9 % injection 5-40 mL  5-40 mL IntraVENous 2 times per day    sodium

## 2024-09-05 NOTE — OP NOTE
[facility]  [facility]  MARLI Anderson Jr, MD  [phone]      2024    Esophagogastroduodenoscopy & Colonoscopy Procedure Note  Maty Villarreal  : 1950  [facility] Medical Record Number: [id number]      Indications:   Hematochezia, anemia  Referring Physician:  Holland Moses MD  Anesthesia/Sedation: See Anesthesia Record  Endoscopist:  Dr. MARLI Anderson Jr  Complications:  None  Estimated Blood Loss:  None    Surgical assistant: Circulator: Madeleine Espinal RN  Endoscopy Technician: Oneil Kelly none unless otherwise specified.     Permit:  The indications, risks, benefits and alternatives were reviewed with the patient or their decision maker who was provided an opportunity to ask questions and all questions were answered.  The specific risks of esophagogastroduodenoscopy and colonoscopy with conscious sedation were reviewed, including but not limited to anesthetic complication, bleeding, adverse drug reaction, missed lesion, infection, IV site reactions, and intestinal perforation which would lead to the need for surgical repair.  Alternatives to EGD and colonoscopy including radiographic imaging, observation without testing, or laboratory testing were reviewed as well as the limitations of those alternatives discussed.  After considering the options and having all their questions answered, the patient or their decision maker provided both verbal and written consent to proceed.      -----------EGD------------   Procedure in Detail:  After obtaining informed consent, positioning of the patient in the left lateral decubitus position, and conduction of a pre-procedure pause or \"time out\" the endoscope was introduced into the mouth and advanced to the duodenum.  A careful inspection was made, and findings or interventions are described below.    Findings:   Esophagus: There is  hypertonicity in the lower esophagus concerning for possible motility disorder.  There is resistance passing the scope into the stomach without intrinsic stricture.  There is a healing linear mucosal tear in the distal esophagus that I suspect may be related to prior endoscopic procedure.  There is no bleeding or however stigmata.  This may be related to passing larger caliber scope through area of abnormal motility.  The proximal esophagus is slightly dilated with some retained secretions.  Stomach: Small hiatal hernia.  Moderate, nonbleeding portal hypertensive gastropathy.  Otherwise normal stomach.  Duodenum/jejunum: Prior sphincterotomy site seen at ampulla.  Expected changes seen without bleeding or visible vessel.  No other abnormalities noted in the duodenum.        ----------Colonoscopy-----------    Procedure in Detail:  After obtaining informed consent, positioning of the patient in the left lateral decubitus position, and conduction of a pre-procedure pause or \"time out\" the endoscope was introduced into the anus and advanced to the cecum, which was identified by the ileocecal valve and appendiceal orifice.  The quality of the colonic preparation was fair.  A careful inspection was made as the colonoscope was withdrawn, findings and interventions are described below.    Findings:   Fair prep.  Able to reach cecum.  Able to see underlying mucosa.  Small amounts of brown stool seen focally throughout the colon.  There was a 3 mm sessile polyp in the sigmoid removed with cold snare polypectomy retrieved.  The colon was otherwise notable only for small internal hemorrhoids.      ------------------------------  Specimens:   ID Type Source Tests Collected by Time Destination   1 : sigmoid colon polyp Tissue Sigmoid Colon SURGICAL PATHOLOGY Adele Anderson MD 9/5/2024 [id number]         Complications:   None; patient tolerated the procedure well.    Impressions:  EGD: Healing, linear mucosal tear in the distal  esophagus that I suspect may be related to passage of large caliber scope for EUS/ERCP through area of apparent hypertonicity and abnormal motility.  There are findings in the esophagus consistent with underlying motility disorder including dilated proximal esophagus with retained secretions and hypertonic LES with resistance to passage of scope into the stomach.  Small hiatal hernia.  Moderate portal hypertensive gastropathy.  Changes from prior sphincterotomy seen at ampulla (side-viewing scope not used) without active bleeding or higher stigmata of bleeding.  Otherwise normal duodenum.  Colonoscopy: Sigmoid colon polyp x 1, removed small internal hemorrhoids.  Otherwise normal colon to the cecum.  No blood or bleeding.      Recommendations:   -Regular diet  -Alcohol cessation  -Daily hemoglobin  -Daily oral PPI  -If dysphagia or chest pain, would recommend outpatient follow-up for consideration of motility evaluation    Will check on her tomorrow    Please do not hesitate to contact me with any questions or if I can be of assistance with any of your other patients' GI needs.    Signed By: Adele Anderson Jr, MD                        September 5, 2024

## 2024-09-06 VITALS
RESPIRATION RATE: 18 BRPM | HEIGHT: 63 IN | DIASTOLIC BLOOD PRESSURE: 77 MMHG | TEMPERATURE: 98.6 F | SYSTOLIC BLOOD PRESSURE: 117 MMHG | OXYGEN SATURATION: 95 % | WEIGHT: 153.88 LBS | HEART RATE: 106 BPM | BODY MASS INDEX: 27.27 KG/M2

## 2024-09-06 PROBLEM — K92.2 GIB (GASTROINTESTINAL BLEEDING): Status: RESOLVED | Noted: 2024-09-01 | Resolved: 2024-09-06

## 2024-09-06 LAB
ALBUMIN SERPL-MCNC: 2.4 G/DL (ref 3.5–5)
ALBUMIN/GLOB SERPL: 0.9 (ref 1.1–2.2)
ALP SERPL-CCNC: 92 U/L (ref 45–117)
ALT SERPL-CCNC: 80 U/L (ref 12–78)
ANION GAP SERPL CALC-SCNC: 1 MMOL/L (ref 2–12)
AST SERPL-CCNC: 140 U/L (ref 15–37)
BASOPHILS # BLD: 0 K/UL (ref 0–0.1)
BASOPHILS NFR BLD: 1 % (ref 0–1)
BILIRUB SERPL-MCNC: 3.1 MG/DL (ref 0.2–1)
BUN SERPL-MCNC: 5 MG/DL (ref 6–20)
BUN/CREAT SERPL: 8 (ref 12–20)
CALCIUM SERPL-MCNC: 7.4 MG/DL (ref 8.5–10.1)
CHLORIDE SERPL-SCNC: 105 MMOL/L (ref 97–108)
CO2 SERPL-SCNC: 32 MMOL/L (ref 21–32)
COMMENT:: NORMAL
CREAT SERPL-MCNC: 0.59 MG/DL (ref 0.55–1.02)
DIFFERENTIAL METHOD BLD: ABNORMAL
EOSINOPHIL # BLD: 0.1 K/UL (ref 0–0.4)
EOSINOPHIL NFR BLD: 3 % (ref 0–7)
ERYTHROCYTE [DISTWIDTH] IN BLOOD BY AUTOMATED COUNT: 14.3 % (ref 11.5–14.5)
GLOBULIN SER CALC-MCNC: 2.8 G/DL (ref 2–4)
GLUCOSE BLD STRIP.AUTO-MCNC: 142 MG/DL (ref 65–117)
GLUCOSE SERPL-MCNC: 115 MG/DL (ref 65–100)
HCT VFR BLD AUTO: 26.3 % (ref 35–47)
HCT VFR BLD AUTO: 27.3 % (ref 35–47)
HCV RNA SERPL NAA+PROBE-ACNC: NORMAL IU/ML
HGB BLD-MCNC: 8.7 G/DL (ref 11.5–16)
HGB BLD-MCNC: 9 G/DL (ref 11.5–16)
IMM GRANULOCYTES # BLD AUTO: 0 K/UL (ref 0–0.04)
IMM GRANULOCYTES NFR BLD AUTO: 1 % (ref 0–0.5)
LYMPHOCYTES # BLD: 0.7 K/UL (ref 0.8–3.5)
LYMPHOCYTES NFR BLD: 19 % (ref 12–49)
MAGNESIUM SERPL-MCNC: 1.6 MG/DL (ref 1.6–2.4)
MCH RBC QN AUTO: 33.8 PG (ref 26–34)
MCHC RBC AUTO-ENTMCNC: 33 G/DL (ref 30–36.5)
MCV RBC AUTO: 102.6 FL (ref 80–99)
MONOCYTES # BLD: 0.6 K/UL (ref 0–1)
MONOCYTES NFR BLD: 18 % (ref 5–13)
NEUTS SEG # BLD: 2.2 K/UL (ref 1.8–8)
NEUTS SEG NFR BLD: 58 % (ref 32–75)
NRBC # BLD: 0 K/UL (ref 0–0.01)
NRBC BLD-RTO: 0 PER 100 WBC
PLATELET # BLD AUTO: 82 K/UL (ref 150–400)
PMV BLD AUTO: 11.9 FL (ref 8.9–12.9)
POTASSIUM SERPL-SCNC: 3 MMOL/L (ref 3.5–5.1)
POTASSIUM SERPL-SCNC: 3.8 MMOL/L (ref 3.5–5.1)
PROT SERPL-MCNC: 5.2 G/DL (ref 6.4–8.2)
RBC # BLD AUTO: 2.66 M/UL (ref 3.8–5.2)
RBC MORPH BLD: ABNORMAL
SERVICE CMNT-IMP: ABNORMAL
SODIUM SERPL-SCNC: 138 MMOL/L (ref 136–145)
SPECIMEN HOLD: NORMAL
TEST INFORMATION: NORMAL
WBC # BLD AUTO: 3.6 K/UL (ref 3.6–11)

## 2024-09-06 PROCEDURE — 82962 GLUCOSE BLOOD TEST: CPT

## 2024-09-06 PROCEDURE — 84132 ASSAY OF SERUM POTASSIUM: CPT

## 2024-09-06 PROCEDURE — 6370000000 HC RX 637 (ALT 250 FOR IP)

## 2024-09-06 PROCEDURE — 83735 ASSAY OF MAGNESIUM: CPT

## 2024-09-06 PROCEDURE — 85025 COMPLETE CBC W/AUTO DIFF WBC: CPT

## 2024-09-06 PROCEDURE — 6370000000 HC RX 637 (ALT 250 FOR IP): Performed by: STUDENT IN AN ORGANIZED HEALTH CARE EDUCATION/TRAINING PROGRAM

## 2024-09-06 PROCEDURE — 85018 HEMOGLOBIN: CPT

## 2024-09-06 PROCEDURE — 85014 HEMATOCRIT: CPT

## 2024-09-06 PROCEDURE — 36415 COLL VENOUS BLD VENIPUNCTURE: CPT

## 2024-09-06 PROCEDURE — 80053 COMPREHEN METABOLIC PANEL: CPT

## 2024-09-06 PROCEDURE — G0378 HOSPITAL OBSERVATION PER HR: HCPCS

## 2024-09-06 RX ORDER — POTASSIUM CHLORIDE 1500 MG/1
20 TABLET, EXTENDED RELEASE ORAL 2 TIMES DAILY
Qty: 10 TABLET | Refills: 0 | Status: SHIPPED | OUTPATIENT
Start: 2024-09-06 | End: 2024-09-11

## 2024-09-06 RX ADMIN — Medication 100 MG: at 09:41

## 2024-09-06 RX ADMIN — POTASSIUM BICARBONATE 40 MEQ: 782 TABLET, EFFERVESCENT ORAL at 09:42

## 2024-09-06 RX ADMIN — PREGABALIN 150 MG: 100 CAPSULE ORAL at 14:39

## 2024-09-06 RX ADMIN — PREGABALIN 150 MG: 100 CAPSULE ORAL at 09:53

## 2024-09-06 NOTE — PROGRESS NOTES
[facility]  [address]  [place] [address] [phone]              GI PROGRESS NOTE    Patient Name: Maty Villarreal      : 1950      MRN: [id number]  Admit Date: 2024  Today's Date: 2024  CC: hematochezia    Subjective:     Feels better today, no further bleeding.  Complaining about sore area on her head.  Tolerating diet.  Has not had a BM today.  Denies abdominal pain.      Objective:     Blood pressure 117/77, pulse (!) 106, temperature 98.6 °F (37 °C), temperature source Oral, resp. rate 18, height 1.6 m (5' 3\"), weight 69.8 kg (153 lb 14.1 oz), SpO2 95%.    Physical Exam:  General appearance: cooperative, AAF, no distress, appears stated age  Skin: Extremities and face reveal no rashes.   HEENT: Sclerae anicteric.   Cardiovascular: tachycardiac, No murmurs, gallops, or rubs.   Respiratory: Comfortable breathing with no accessory muscle use. Clear breath sounds with no wheezes, rales, or rhonchi.   GI: Abdomen nondistended, soft, and nontender. Normal active bowel sounds.   Rectal: Deferred   Musculoskeletal: No pitting edema of the lower legs.  Mild upper extremity tremors   Neurological: Gross memory appears intact. Patient is alert and oriented.   Psychiatric: Mood appears appropriate with good judgement.  No anxiety or agitation.      Data Review:    Recent Results (from the past 24 hour(s))   Hemoglobin and Hematocrit    Collection Time: 24  6:15 PM   Result Value Ref Range    Hemoglobin 10.0 (L) 11.5 - 16.0 g/dL    Hematocrit 31.0 (L) 35.0 - 47.0 %   POCT Glucose    Collection Time: 24  8:00 PM   Result Value Ref Range    POC Glucose 176 (H) 65 - 117 mg/dL    Performed by: MACRINA Stoddard   PCT    CBC with Auto Differential    Collection Time: 24  2:03 AM   Result Value Ref Range    WBC 3.6 3.6 - 11.0 K/uL    RBC 2.66 (L) 3.80 - 5.20 M/uL    Hemoglobin 9.0 (L) 11.5 - 16.0 g/dL    Hematocrit 27.3 (L) 35.0 - 47.0 %    .6 (H) 80.0 -  99.0 FL    MCH 33.8 26.0 - 34.0 PG    MCHC 33.0 30.0 - 36.5 g/dL    RDW 14.3 11.5 - 14.5 %    Platelets 82 (L) 150 - 400 K/uL    MPV 11.9 8.9 - 12.9 FL    Nucleated RBCs 0.0 0  WBC    nRBC 0.00 0.00 - 0.01 K/uL    Neutrophils % 58 32 - 75 %    Lymphocytes % 19 12 - 49 %    Monocytes % 18 (H) 5 - 13 %    Eosinophils % 3 0 - 7 %    Basophils % 1 0 - 1 %    Immature Granulocytes % 1 (H) 0.0 - 0.5 %    Neutrophils Absolute 2.2 1.8 - 8.0 K/UL    Lymphocytes Absolute 0.7 (L) 0.8 - 3.5 K/UL    Monocytes Absolute 0.6 0.0 - 1.0 K/UL    Eosinophils Absolute 0.1 0.0 - 0.4 K/UL    Basophils Absolute 0.0 0.0 - 0.1 K/UL    Immature Granulocytes Absolute 0.0 0.00 - 0.04 K/UL    Differential Type SMEAR SCANNED      RBC Comment MACROCYTOSIS  1+       Comprehensive Metabolic Panel    Collection Time: 09/06/24  2:03 AM   Result Value Ref Range    Sodium 138 136 - 145 mmol/L    Potassium 3.0 (L) 3.5 - 5.1 mmol/L    Chloride 105 97 - 108 mmol/L    CO2 32 21 - 32 mmol/L    Anion Gap 1 (L) 2 - 12 mmol/L    Glucose 115 (H) 65 - 100 mg/dL    BUN 5 (L) 6 - 20 MG/DL    Creatinine 0.59 0.55 - 1.02 MG/DL    BUN/Creatinine Ratio 8 (L) 12 - 20      Est, Glom Filt Rate >90 >60 ml/min/1.73m2    Calcium 7.4 (L) 8.5 - 10.1 MG/DL    Total Bilirubin 3.1 (H) 0.2 - 1.0 MG/DL    ALT 80 (H) 12 - 78 U/L     (H) 15 - 37 U/L    Alk Phosphatase 92 45 - 117 U/L    Total Protein 5.2 (L) 6.4 - 8.2 g/dL    Albumin 2.4 (L) 3.5 - 5.0 g/dL    Globulin 2.8 2.0 - 4.0 g/dL    Albumin/Globulin Ratio 0.9 (L) 1.1 - 2.2     Magnesium    Collection Time: 09/06/24  2:03 AM   Result Value Ref Range    Magnesium 1.6 1.6 - 2.4 mg/dL   Hemoglobin and Hematocrit    Collection Time: 09/06/24  5:36 AM   Result Value Ref Range    Hemoglobin 8.7 (L) 11.5 - 16.0 g/dL    Hematocrit 26.3 (L) 35.0 - 47.0 %   Extra Tubes Hold    Collection Time: 09/06/24 11:25 AM   Result Value Ref Range    Specimen HOld 1PST     Comment:        Add-on orders for these samples will be  bleeding)  Active Problems:    * No active hospital problems. *      Time Spent with Patient: 20 mins  NE Broussard - NP

## 2024-09-06 NOTE — PROGRESS NOTES
Physician Progress Note      PATIENT:               IRVING WOOD  CSN #:                  [id number]  :                       1950  ADMIT DATE:       2024 10:42 AM  DISCH DATE:  RESPONDING  PROVIDER #:        Avila Wilson MD        QUERY TEXT:    Type of Anemia: Please provide further specificity, if known.    Clinical indicators include: hematochezia, anemia, blood loss, bleeding,   hemoglobin  Options provided:  -- Anemia due to acute blood loss  -- Anemia due to chronic blood loss  -- Anemia due to iron deficiency  -- Anemia due to postoperative blood loss  -- Anemia due to chronic disease  -- Other - I will add my own diagnosis  -- Disagree - Not applicable / Not valid  -- Disagree - Clinically Unable to determine / Unknown        PROVIDER RESPONSE TEXT:    The patient has anemia due to acute blood loss.      Electronically signed by:  Avila Wilson MD 2024 9:31 AM

## 2024-09-06 NOTE — PROGRESS NOTES
Hospital follow-up PCP transitional care appointment has been scheduled with Aileen Leon NP for Tuesday, September 24th, 2024  at 1:45 p.m..  Pending patient discharge.  Sherrell Ramirez, Care Management Assistant

## 2024-09-06 NOTE — CARE COORDINATION
09/06/24 0932   Readmission Assessment   Number of Days since last admission? 1-7 days   Previous Disposition Home Alone   Who is being Interviewed Patient   What was the patient's/caregiver's perception as to why they think they needed to return back to the hospital? AMA discharge on prior admission   Did you visit your Primary Care Physician after you left the hospital, before you returned this time? No   Why weren't you able to visit your PCP? Did not have an appointment   Did you see a specialist, such as Cardiac, Pulmonary, Orthopedic Physician, etc. after you left the hospital? No   Who advised the patient to return to the hospital? Self-referral   Does the patient report anything that got in the way of taking their medications? No   In our efforts to provide the best possible care to you and others like you, can you think of anything that we could have done to help you after you left the hospital the first time, so that you might not have needed to return so soon? Identify patient's health literacy needs;Improved written discharge instructions;Discharge instructions that are concise, clear, and non contradictory;Teach back instructions regarding management of illness     LEE ANN DiazN, RN, ONC, CMSRN  Nurse Care Manager, [phone]

## 2024-09-06 NOTE — DISCHARGE INSTRUCTIONS
Discharge Instructions       PATIENT ID: Maty Villarreal  MRN: [id number]   YOB: 1950    DATE OF ADMISSION: 9/4/2024   DATE OF DISCHARGE: 9/6/2024    PRIMARY CARE PROVIDER: Holland Moses     ATTENDING PHYSICIAN: Avila Wilson MD   DISCHARGING PROVIDER: Yazmin George PA-C    To contact this individual call [phone] and ask the  to page.   If unavailable ask to be transferred the Adult Hospitalist Department.    DISCHARGE DIAGNOSES     GI bleed  Blood per stool  -Repeat CT 9/4: Circumferential ascending colonic wall thickening and mild duodenal wall thickening with associated inflammatory change is stable to diminished consistent with a mild enterocolitis  -IV fluids  -Admit to telemetry  -GI following, cleared for dc today   -Patient had an ERCP and EUS done 9/2  -EGD - signs of underlying motility disorder , no signs of active bleeding or higher stigmata of bleeding , Healing, linear mucosal tear in the distal esophagus that I suspect may be related to passage of large caliber scope for EUS/ERCP through area of apparent hypertonicity and abnormal motility   -Colonoscopy 9/5 - Sigmoid colon polyp x 1, removed small internal hemorrhoids. Otherwise normal colon to the cecum. No blood or bleeding.   -H&H every 8 hours     Elevated T. Bili - improving  Elevated transaminases - improving   Liver cirrhosis  Was admitted for pancreatitis on previous admission  -Patient is status post ERCP with EUS and FNB 9/2   -T. bili is improved from yesterday  -LFTs are improved from yesterday  -Lipase is stable from yesterday  -Continue IV fluids  -GI consult     Hypokalemia - resolved   - Continue repletion   - Repeat labs   - Monitor on telemetry     Alcohol use disorder  -Apparently drinks a pint of vodka a day  -Reports last drink last Saturday  -Will keep her on CIWA protocol for now    CONSULTATIONS: [unfilled]    PROCEDURES/SURGERIES: Procedure(s):  ESOPHAGOGASTRODUODENOSCOPY  COLONOSCOPY

## 2024-09-06 NOTE — DISCHARGE SUMMARY
Discharge Summary       PATIENT ID: Maty Villarreal  MRN: [id number]   YOB: 1950    DATE OF ADMISSION: 9/4/2024 10:42 AM    DATE OF DISCHARGE: 9/6/24   PRIMARY CARE PROVIDER: Holland Moses MD     ATTENDING PHYSICIAN: BLAINE Wilson MD  DISCHARGING PROVIDER: Yazmin George PA-C    To contact this individual call [phone] and ask the  to page.  If unavailable ask to be transferred the Adult Hospitalist Department.    CONSULTATIONS: IP CONSULT TO GI  IP CONSULT TO SOCIAL WORK    PROCEDURES/SURGERIES: Procedure(s):  ESOPHAGOGASTRODUODENOSCOPY  COLONOSCOPY DIAGNOSTIC     ADMITTING DIAGNOSES & HOSPITAL COURSE:     Maty Villarreal is a 74 y.o. female who presents with blood per rectum.      This is a 74-year-old -American female with past medical history of arthritis, chronic pain, hepatitis C, cirrhosis, who comes in for the above.  Patient was admitted here on 8/31 and left AMA yesterday 9/3.  Patient was admitted here for acute pancreatitis and melena.  Patient apparently left AMA because she had to take care of her cat.  Patient comes back to the hospital today reporting bleeding per rectum as she reports that she came back to continue to get it worked up.  Patient had an ERCP with biliary sphincterotomy and EUS with FNB done day before yesterday due to obstructive jaundice, dilated CBD, melena.  She reports that since getting home she has not had any bowel movements because she has not really been eating but will feel like she has to go to the bathroom as if she began to have diarrhea when she goes to the bathroom it is dark red blood with clots.  In the ER, her rectal exam was grossly bloody.     The patient denies any headache, blurry vision, sore throat, trouble swallowing, trouble with speech, chest pain, SOB, cough, fever, chills, N/V/D, abd pain, urinary symptoms, constipation, recent travels, sick contacts, focal or generalized neurological symptoms, falls, injuries, rashes, contact  [address], [place] [address]      Phone: [phone]   potassium chloride 20 MEQ extended release tablet           NOTIFY YOUR PHYSICIAN FOR ANY OF THE FOLLOWING:   Fever over 101 degrees for 24 hours.   Chest pain, shortness of breath, fever, chills, nausea, vomiting, diarrhea, change in mentation, falling, weakness, bleeding. Severe pain or pain not relieved by medications.  Or, any other signs or symptoms that you may have questions about.    DISPOSITION:   x Home With:   OT  PT  HH  RN       Long term SNF/Inpatient Rehab    Independent/assisted living    Hospice    Other:       PATIENT CONDITION AT DISCHARGE:     Functional status    Poor     Deconditioned    x Independent      Cognition    x Lucid     Forgetful     Dementia      Catheters/lines (plus indication)    Gee     PICC     PEG    x None      Code status    x Full code     DNR      PHYSICAL EXAMINATION AT DISCHARGE:    General : alert x 3, awake, no acute distress,   HEENT: PEERL, EOMI, moist mucus membrane  Neck: supple, no JVD, no meningeal signs  Chest: Clear to auscultation bilaterally   CVS: S1 S2 heard, Capillary refill less than 2 seconds  Abd: soft/ Non tender, non distended, BS physiological,   Ext: no clubbing, no cyanosis, no edema, brisk 2+ DP pulses  Neuro/Psych: pleasant mood and affect, CN 2-12 grossly intact, sensory grossly within normal limit, Strength 5/5 in all extremities  Skin: warm     CHRONIC MEDICAL DIAGNOSES:  Principal Problem (Resolved):    GIB (gastrointestinal bleeding)  Active Problems:    * No active hospital problems. *        Greater than 31 minutes were spent with the patient on counseling and coordination of care    Signed:   Yazmin George PA-C  9/6/2024  4:05 PM

## 2024-09-06 NOTE — ANESTHESIA POSTPROCEDURE EVALUATION
Department of Anesthesiology  Postprocedure Note    Patient: Maty Villarreal  MRN: [id number]  YOB: 1950  Date of evaluation: 9/6/2024    Procedure Summary       Date: 09/05/24 Room / Location: [place] [address] / [facility] ENDOSCOPY    Anesthesia Start: 1534 Anesthesia Stop: 1557    Procedures:       ESOPHAGOGASTRODUODENOSCOPY (Upper GI Region)      COLONOSCOPY DIAGNOSTIC (Lower GI Region) Diagnosis:       Anemia, unspecified type      (Anemia, unspecified type [D64.9])    Surgeons: Adele Anderson MD Responsible Provider: Low Brooke MD    Anesthesia Type: MAC ASA Status: 3            Anesthesia Type: MAC    Daniel Phase I: Daniel Score: 10    Daniel Phase II: Daniel Score: 8    Anesthesia Post Evaluation    Patient location during evaluation: bedside  Nausea & Vomiting: no nausea  Cardiovascular status: blood pressure returned to baseline  Respiratory status: acceptable  Hydration status: euvolemic    No notable events documented.

## 2024-09-06 NOTE — CARE COORDINATION
Care Management Initial Assessment       RUR: 16%  Readmission? Yes - 8/31/24-9/3/24  1st IM letter given? Yes - 9/6/24 09/06/24 0911   Service Assessment   Patient Orientation Alert and Oriented   Cognition Alert   History Provided By Patient   Primary Caregiver Self   Support Systems Friends/Neighbors   Patient's Healthcare Decision Maker is: Legal Next of Kin   PCP Verified by CM Yes   Last Visit to PCP Within last two years  (Patient would like a new PCP)   Prior Functional Level Independent in ADLs/IADLs   Can patient return to prior living arrangement Yes   Ability to make needs known: Good   Family able to assist with home care needs: No   Financial Resources Medicare   Social/Functional History   Lives With Alone   Type of Home Senior housing apartment   Home Layout One level   Home Access Elevator   Bathroom Equipment Grab bars in shower;Grab bars around toilet   Active  Yes   Occupation Retired   Services At/After Discharge   Mode of Transport at Discharge Other (see comment)  (Patient's friend to transport)     CM met with patient at bedside to introduce self and role, and to confirm demographics. CM provided alcohol resources for patient, patient is not interested in pursuing rehab at this time. Patient plans to attend Alcoholics Anonymous meetings to help her through recovery. CM offered to print list of meetings close to her residence, but patient declined and stated she knows where the local meetings are.     ADLs: Independent  DME: Grab bars in shower and around toilet   PCP follow up: Patient requested assistance from CM in securing a new PCP, patient would like a new provider, CM specialist will work on securing new PCP appointment for patient  Previous Home Health: Yes, unable to recall agency name after her knee replacement  Previous Skilled Nursing Facility: None  Previous Inpatient Rehab: None  Insurance verified: [facility] Medicare  Pharmacy: [facility]: [address]   Emergency

## 2024-09-18 NOTE — PROGRESS NOTES
Physician Progress Note      PATIENT:               IRVING WOOD  CSN #:                  [id number]  :                       1950  ADMIT DATE:       2024 10:42 AM  DISCH DATE:        2024 5:27 PM  RESPONDING  PROVIDER #:        Yazmin George PA-C          QUERY TEXT:      Dear attending,    Pt admitted for bleeding per rectum. Per the discharge summary- EGD - signs of   underlying motility disorder , no signs of active bleeding or higher stigmata   of bleeding Healing, linear mucosal tear in the distal esophagus that I   suspect may be related to passage of large caliber scope for EUS/ERCP through   area of apparent hypertonicity and abnormal motility    If possible, please document in progress notes and discharge summary the   etiology of the rectal bleeding    The medical record reflects the following:  Risk Factors: hx. recent ERCP and EUS     Clinical Indicators: -CT abd-Circumferential ascending colonic wall   thickening and mild duodenal wall  thickening with associated inflammatory change is stable to diminished  consistent with a mild enterocolitis.  Uterine fibroids.  Hepatic steatosis is sever  Per H&P- GI bleed  Blood per stool  -per GI-Hematochezia-normal blood pressure, no significant ongoing overt   bleeding, stable hemoglobin  Per dc summary- GI bleed  Blood per stool  Repeat CT : Circumferential ascending colonic wall thickening and mild   duodenal wall thickening with associated inflammatory change is stable to   diminished consistent with a mild enterocolitis  Patient had an ERCP and EUS done   EGD - signs of underlying motility disorder , no signs of active bleeding or   higher stigmata of bleeding , Healing, linear mucosal tear in the distal   esophagus that I suspect may be related to passage of large caliber scope for   EUS/ERCP through area of apparent hypertonicity and abnormal motility  Colonoscopy  - Sigmoid colon polyp x 1, removed small internal

## 2024-09-27 ENCOUNTER — APPOINTMENT (OUTPATIENT)
Facility: HOSPITAL | Age: 74
End: 2024-09-27
Payer: MEDICARE

## 2024-09-27 ENCOUNTER — APPOINTMENT (OUTPATIENT)
Facility: HOSPITAL | Age: 74
End: 2024-09-27
Attending: EMERGENCY MEDICINE
Payer: MEDICARE

## 2024-09-27 ENCOUNTER — HOSPITAL ENCOUNTER (EMERGENCY)
Facility: HOSPITAL | Age: 74
Discharge: HOME | End: 2024-09-27
Attending: EMERGENCY MEDICINE
Payer: MEDICARE

## 2024-09-27 VITALS
RESPIRATION RATE: 18 BRPM | BODY MASS INDEX: 28.67 KG/M2 | SYSTOLIC BLOOD PRESSURE: 141 MMHG | WEIGHT: 161.82 LBS | DIASTOLIC BLOOD PRESSURE: 83 MMHG | OXYGEN SATURATION: 96 % | HEIGHT: 63 IN | HEART RATE: 88 BPM | TEMPERATURE: 98.8 F

## 2024-09-27 DIAGNOSIS — E87.6 HYPOKALEMIA: ICD-10-CM

## 2024-09-27 DIAGNOSIS — R07.9 CHEST PAIN, UNSPECIFIED TYPE: Primary | ICD-10-CM

## 2024-09-27 DIAGNOSIS — R60.0 PERIPHERAL EDEMA: ICD-10-CM

## 2024-09-27 LAB
ALBUMIN SERPL-MCNC: 2.8 G/DL (ref 3.5–5)
ALBUMIN/GLOB SERPL: 0.6 (ref 1.1–2.2)
ALP SERPL-CCNC: 134 U/L (ref 45–117)
ALT SERPL-CCNC: 46 U/L (ref 12–78)
ANION GAP SERPL CALC-SCNC: 5 MMOL/L (ref 2–12)
AST SERPL-CCNC: 85 U/L (ref 15–37)
BASOPHILS # BLD: 0 K/UL (ref 0–0.1)
BASOPHILS NFR BLD: 0 % (ref 0–1)
BILIRUB SERPL-MCNC: 1 MG/DL (ref 0.2–1)
BUN SERPL-MCNC: 7 MG/DL (ref 6–20)
BUN/CREAT SERPL: 11 (ref 12–20)
CALCIUM SERPL-MCNC: 8.7 MG/DL (ref 8.5–10.1)
CHLORIDE SERPL-SCNC: 108 MMOL/L (ref 97–108)
CO2 SERPL-SCNC: 28 MMOL/L (ref 21–32)
COMMENT:: NORMAL
CREAT SERPL-MCNC: 0.63 MG/DL (ref 0.55–1.02)
D DIMER PPP FEU-MCNC: 2.02 MG/L FEU (ref 0–0.65)
DIFFERENTIAL METHOD BLD: ABNORMAL
ECHO BSA: 1.81 M2
EKG ATRIAL RATE: 73 BPM
EKG DIAGNOSIS: NORMAL
EKG P AXIS: 55 DEGREES
EKG P-R INTERVAL: 138 MS
EKG Q-T INTERVAL: 424 MS
EKG QRS DURATION: 74 MS
EKG QTC CALCULATION (BAZETT): 467 MS
EKG R AXIS: 14 DEGREES
EKG T AXIS: 9 DEGREES
EKG VENTRICULAR RATE: 73 BPM
EOSINOPHIL # BLD: 0.2 K/UL (ref 0–0.4)
EOSINOPHIL NFR BLD: 4 % (ref 0–7)
ERYTHROCYTE [DISTWIDTH] IN BLOOD BY AUTOMATED COUNT: 13.1 % (ref 11.5–14.5)
ETHANOL SERPL-MCNC: 115 MG/DL (ref 0–0.08)
GLOBULIN SER CALC-MCNC: 4.5 G/DL (ref 2–4)
GLUCOSE SERPL-MCNC: 125 MG/DL (ref 65–100)
HCT VFR BLD AUTO: 31.5 % (ref 35–47)
HGB BLD-MCNC: 10.4 G/DL (ref 11.5–16)
IMM GRANULOCYTES # BLD AUTO: 0 K/UL (ref 0–0.04)
IMM GRANULOCYTES NFR BLD AUTO: 0 % (ref 0–0.5)
LIPASE SERPL-CCNC: 70 U/L (ref 13–75)
LYMPHOCYTES # BLD: 1.3 K/UL (ref 0.8–3.5)
LYMPHOCYTES NFR BLD: 22 % (ref 12–49)
MCH RBC QN AUTO: 33.1 PG (ref 26–34)
MCHC RBC AUTO-ENTMCNC: 33 G/DL (ref 30–36.5)
MCV RBC AUTO: 100.3 FL (ref 80–99)
MONOCYTES # BLD: 0.5 K/UL (ref 0–1)
MONOCYTES NFR BLD: 9 % (ref 5–13)
NEUTS SEG # BLD: 3.9 K/UL (ref 1.8–8)
NEUTS SEG NFR BLD: 65 % (ref 32–75)
NRBC # BLD: 0 K/UL (ref 0–0.01)
NRBC BLD-RTO: 0 PER 100 WBC
NT PRO BNP: 652 PG/ML
PLATELET # BLD AUTO: 163 K/UL (ref 150–400)
PMV BLD AUTO: 10.9 FL (ref 8.9–12.9)
POTASSIUM SERPL-SCNC: 3.2 MMOL/L (ref 3.5–5.1)
PROT SERPL-MCNC: 7.3 G/DL (ref 6.4–8.2)
RBC # BLD AUTO: 3.14 M/UL (ref 3.8–5.2)
SODIUM SERPL-SCNC: 141 MMOL/L (ref 136–145)
SPECIMEN HOLD: NORMAL
TROPONIN I SERPL HS-MCNC: 8 NG/L (ref 0–51)
TROPONIN I SERPL HS-MCNC: 8 NG/L (ref 0–51)
WBC # BLD AUTO: 6 K/UL (ref 3.6–11)

## 2024-09-27 PROCEDURE — 83880 ASSAY OF NATRIURETIC PEPTIDE: CPT

## 2024-09-27 PROCEDURE — 36415 COLL VENOUS BLD VENIPUNCTURE: CPT

## 2024-09-27 PROCEDURE — 80053 COMPREHEN METABOLIC PANEL: CPT

## 2024-09-27 PROCEDURE — 99285 EMERGENCY DEPT VISIT HI MDM: CPT

## 2024-09-27 PROCEDURE — 6360000004 HC RX CONTRAST MEDICATION: Performed by: STUDENT IN AN ORGANIZED HEALTH CARE EDUCATION/TRAINING PROGRAM

## 2024-09-27 PROCEDURE — 71275 CT ANGIOGRAPHY CHEST: CPT

## 2024-09-27 PROCEDURE — 93970 EXTREMITY STUDY: CPT

## 2024-09-27 PROCEDURE — 82077 ASSAY SPEC XCP UR&BREATH IA: CPT

## 2024-09-27 PROCEDURE — 84484 ASSAY OF TROPONIN QUANT: CPT

## 2024-09-27 PROCEDURE — 85379 FIBRIN DEGRADATION QUANT: CPT

## 2024-09-27 PROCEDURE — 71046 X-RAY EXAM CHEST 2 VIEWS: CPT

## 2024-09-27 PROCEDURE — 83690 ASSAY OF LIPASE: CPT

## 2024-09-27 PROCEDURE — 85025 COMPLETE CBC W/AUTO DIFF WBC: CPT

## 2024-09-27 RX ORDER — IOPAMIDOL 755 MG/ML
100 INJECTION, SOLUTION INTRAVASCULAR
Status: COMPLETED | OUTPATIENT
Start: 2024-09-27 | End: 2024-09-27

## 2024-09-27 RX ADMIN — IOPAMIDOL 70 ML: 755 INJECTION, SOLUTION INTRAVENOUS at 15:33

## 2024-09-27 ASSESSMENT — PAIN - FUNCTIONAL ASSESSMENT: PAIN_FUNCTIONAL_ASSESSMENT: PREVENTS OR INTERFERES SOME ACTIVE ACTIVITIES AND ADLS

## 2024-09-27 ASSESSMENT — PAIN DESCRIPTION - ONSET: ONSET: ON-GOING

## 2024-09-27 ASSESSMENT — PAIN DESCRIPTION - DESCRIPTORS: DESCRIPTORS: ACHING

## 2024-09-27 ASSESSMENT — PAIN DESCRIPTION - ORIENTATION: ORIENTATION: LEFT;RIGHT;LOWER

## 2024-09-27 ASSESSMENT — PAIN SCALES - GENERAL
PAINLEVEL_OUTOF10: 0
PAINLEVEL_OUTOF10: 8

## 2024-09-27 ASSESSMENT — ENCOUNTER SYMPTOMS
DIARRHEA: 0
VOMITING: 1
ANAL BLEEDING: 0
ABDOMINAL PAIN: 0
ABDOMINAL DISTENTION: 0
SHORTNESS OF BREATH: 0
COUGH: 0
BLOOD IN STOOL: 0
NAUSEA: 1

## 2024-09-27 ASSESSMENT — PAIN DESCRIPTION - FREQUENCY: FREQUENCY: CONTINUOUS

## 2024-09-27 ASSESSMENT — PAIN DESCRIPTION - PAIN TYPE: TYPE: ACUTE PAIN

## 2024-09-27 ASSESSMENT — LIFESTYLE VARIABLES
HOW OFTEN DO YOU HAVE A DRINK CONTAINING ALCOHOL: 4 OR MORE TIMES A WEEK
HOW MANY STANDARD DRINKS CONTAINING ALCOHOL DO YOU HAVE ON A TYPICAL DAY: 3 OR 4

## 2024-09-27 ASSESSMENT — PAIN DESCRIPTION - LOCATION: LOCATION: LEG

## 2024-09-30 LAB — ECHO BSA: 1.81 M2

## 2024-11-20 ENCOUNTER — TRANSCRIBE ORDERS (OUTPATIENT)
Facility: HOSPITAL | Age: 74
End: 2024-11-20

## 2024-11-20 DIAGNOSIS — F10.10 ALCOHOL ABUSE: ICD-10-CM

## 2024-11-20 DIAGNOSIS — K76.0 HEPATIC STEATOSIS: Primary | ICD-10-CM

## 2024-12-18 ENCOUNTER — CLINICAL DOCUMENTATION (OUTPATIENT)
Age: 74
End: 2024-12-18

## 2024-12-18 NOTE — PROGRESS NOTES
12/18/24 1155am: Records received from Jacy Perez NP at [facility]. Dx: Alcoholic liver disease. Needs appt within 6-8 weeks

## 2025-02-11 ENCOUNTER — HOSPITAL ENCOUNTER (INPATIENT)
Facility: HOSPITAL | Age: 75
LOS: 3 days | Discharge: HOME | End: 2025-02-14
Attending: EMERGENCY MEDICINE | Admitting: STUDENT IN AN ORGANIZED HEALTH CARE EDUCATION/TRAINING PROGRAM
Payer: MEDICARE

## 2025-02-11 ENCOUNTER — APPOINTMENT (OUTPATIENT)
Facility: HOSPITAL | Age: 75
End: 2025-02-11
Payer: MEDICARE

## 2025-02-11 DIAGNOSIS — E87.6 HYPOKALEMIA: ICD-10-CM

## 2025-02-11 DIAGNOSIS — R79.89 ABNORMAL LFTS: ICD-10-CM

## 2025-02-11 DIAGNOSIS — R60.9 EDEMA, UNSPECIFIED TYPE: ICD-10-CM

## 2025-02-11 DIAGNOSIS — F11.20: ICD-10-CM

## 2025-02-11 DIAGNOSIS — R09.02 HYPOXIA: Primary | ICD-10-CM

## 2025-02-11 DIAGNOSIS — F10.930 ALCOHOL WITHDRAWAL SYNDROME WITHOUT COMPLICATION (HCC): ICD-10-CM

## 2025-02-11 DIAGNOSIS — I50.9 ACUTE CONGESTIVE HEART FAILURE, UNSPECIFIED HEART FAILURE TYPE (HCC): ICD-10-CM

## 2025-02-11 DIAGNOSIS — R60.0 PERIPHERAL EDEMA: ICD-10-CM

## 2025-02-11 PROBLEM — E87.70 VOLUME OVERLOAD: Status: ACTIVE | Noted: 2025-02-11

## 2025-02-11 LAB
ALBUMIN SERPL-MCNC: 2.9 G/DL (ref 3.5–5)
ALBUMIN/GLOB SERPL: 0.5 (ref 1.1–2.2)
ALP SERPL-CCNC: 138 U/L (ref 45–117)
ALT SERPL-CCNC: 65 U/L (ref 12–78)
AMPHET UR QL SCN: NEGATIVE
ANION GAP SERPL CALC-SCNC: 8 MMOL/L (ref 2–12)
AST SERPL-CCNC: 188 U/L (ref 15–37)
BARBITURATES UR QL SCN: NEGATIVE
BASOPHILS # BLD: 0.03 K/UL (ref 0–0.1)
BASOPHILS NFR BLD: 0.6 % (ref 0–1)
BENZODIAZ UR QL: NEGATIVE
BILIRUB SERPL-MCNC: 1.5 MG/DL (ref 0.2–1)
BUN SERPL-MCNC: 11 MG/DL (ref 6–20)
BUN/CREAT SERPL: 19 (ref 12–20)
CALCIUM SERPL-MCNC: 9 MG/DL (ref 8.5–10.1)
CANNABINOIDS UR QL SCN: NEGATIVE
CHLORIDE SERPL-SCNC: 104 MMOL/L (ref 97–108)
CO2 SERPL-SCNC: 29 MMOL/L (ref 21–32)
COCAINE UR QL SCN: NEGATIVE
CREAT SERPL-MCNC: 0.59 MG/DL (ref 0.55–1.02)
DIFFERENTIAL METHOD BLD: ABNORMAL
EKG ATRIAL RATE: 117 BPM
EKG DIAGNOSIS: NORMAL
EKG P AXIS: 35 DEGREES
EKG P-R INTERVAL: 122 MS
EKG Q-T INTERVAL: 348 MS
EKG QRS DURATION: 70 MS
EKG QTC CALCULATION (BAZETT): 485 MS
EKG R AXIS: 20 DEGREES
EKG T AXIS: 24 DEGREES
EKG VENTRICULAR RATE: 117 BPM
EOSINOPHIL # BLD: 0.27 K/UL (ref 0–0.4)
EOSINOPHIL NFR BLD: 5.3 % (ref 0–7)
ERYTHROCYTE [DISTWIDTH] IN BLOOD BY AUTOMATED COUNT: 16.6 % (ref 11.5–14.5)
GLOBULIN SER CALC-MCNC: 5.4 G/DL (ref 2–4)
GLUCOSE SERPL-MCNC: 136 MG/DL (ref 65–100)
HCT VFR BLD AUTO: 32.7 % (ref 35–47)
HGB BLD-MCNC: 10.1 G/DL (ref 11.5–16)
IMM GRANULOCYTES # BLD AUTO: 0.06 K/UL (ref 0–0.04)
IMM GRANULOCYTES NFR BLD AUTO: 1.2 % (ref 0–0.5)
LYMPHOCYTES # BLD: 1.13 K/UL (ref 0.8–3.5)
LYMPHOCYTES NFR BLD: 22 % (ref 12–49)
Lab: NORMAL
MCH RBC QN AUTO: 26.2 PG (ref 26–34)
MCHC RBC AUTO-ENTMCNC: 30.9 G/DL (ref 30–36.5)
MCV RBC AUTO: 84.9 FL (ref 80–99)
METHADONE UR QL: NEGATIVE
MONOCYTES # BLD: 0.69 K/UL (ref 0–1)
MONOCYTES NFR BLD: 13.5 % (ref 5–13)
NEUTS SEG # BLD: 2.95 K/UL (ref 1.8–8)
NEUTS SEG NFR BLD: 57.4 % (ref 32–75)
NRBC # BLD: 0 K/UL (ref 0–0.01)
NRBC BLD-RTO: 0 PER 100 WBC
NT PRO BNP: 65 PG/ML
OPIATES UR QL: NEGATIVE
PCP UR QL: NEGATIVE
PLATELET # BLD AUTO: 126 K/UL (ref 150–400)
PMV BLD AUTO: 10.5 FL (ref 8.9–12.9)
POTASSIUM SERPL-SCNC: 2.9 MMOL/L (ref 3.5–5.1)
PROT SERPL-MCNC: 8.3 G/DL (ref 6.4–8.2)
RBC # BLD AUTO: 3.85 M/UL (ref 3.8–5.2)
SODIUM SERPL-SCNC: 141 MMOL/L (ref 136–145)
TROPONIN I SERPL HS-MCNC: 10 NG/L (ref 0–51)
WBC # BLD AUTO: 5.1 K/UL (ref 3.6–11)

## 2025-02-11 PROCEDURE — 6370000000 HC RX 637 (ALT 250 FOR IP): Performed by: EMERGENCY MEDICINE

## 2025-02-11 PROCEDURE — 96365 THER/PROPH/DIAG IV INF INIT: CPT

## 2025-02-11 PROCEDURE — 80053 COMPREHEN METABOLIC PANEL: CPT

## 2025-02-11 PROCEDURE — 93005 ELECTROCARDIOGRAM TRACING: CPT

## 2025-02-11 PROCEDURE — 85025 COMPLETE CBC W/AUTO DIFF WBC: CPT

## 2025-02-11 PROCEDURE — 84484 ASSAY OF TROPONIN QUANT: CPT

## 2025-02-11 PROCEDURE — 96375 TX/PRO/DX INJ NEW DRUG ADDON: CPT

## 2025-02-11 PROCEDURE — 36415 COLL VENOUS BLD VENIPUNCTURE: CPT

## 2025-02-11 PROCEDURE — 6360000002 HC RX W HCPCS: Performed by: EMERGENCY MEDICINE

## 2025-02-11 PROCEDURE — 83880 ASSAY OF NATRIURETIC PEPTIDE: CPT

## 2025-02-11 PROCEDURE — 2500000003 HC RX 250 WO HCPCS

## 2025-02-11 PROCEDURE — 71046 X-RAY EXAM CHEST 2 VIEWS: CPT

## 2025-02-11 PROCEDURE — 99285 EMERGENCY DEPT VISIT HI MDM: CPT

## 2025-02-11 PROCEDURE — 1100000000 HC RM PRIVATE

## 2025-02-11 PROCEDURE — 96368 THER/DIAG CONCURRENT INF: CPT

## 2025-02-11 PROCEDURE — 80307 DRUG TEST PRSMV CHEM ANLYZR: CPT

## 2025-02-11 PROCEDURE — 6360000004 HC RX CONTRAST MEDICATION: Performed by: EMERGENCY MEDICINE

## 2025-02-11 PROCEDURE — 6360000002 HC RX W HCPCS: Performed by: STUDENT IN AN ORGANIZED HEALTH CARE EDUCATION/TRAINING PROGRAM

## 2025-02-11 PROCEDURE — 71275 CT ANGIOGRAPHY CHEST: CPT

## 2025-02-11 PROCEDURE — 6370000000 HC RX 637 (ALT 250 FOR IP): Performed by: STUDENT IN AN ORGANIZED HEALTH CARE EDUCATION/TRAINING PROGRAM

## 2025-02-11 PROCEDURE — 96366 THER/PROPH/DIAG IV INF ADDON: CPT

## 2025-02-11 RX ORDER — THIAMINE HYDROCHLORIDE 100 MG/ML
200 INJECTION, SOLUTION INTRAMUSCULAR; INTRAVENOUS DAILY
Status: DISCONTINUED | OUTPATIENT
Start: 2025-02-11 | End: 2025-02-12

## 2025-02-11 RX ORDER — SODIUM CHLORIDE 0.9 % (FLUSH) 0.9 %
5-40 SYRINGE (ML) INJECTION EVERY 12 HOURS SCHEDULED
Status: DISCONTINUED | OUTPATIENT
Start: 2025-02-11 | End: 2025-02-14 | Stop reason: HOSPADM

## 2025-02-11 RX ORDER — LORAZEPAM 1 MG/1
1 TABLET ORAL
Status: DISCONTINUED | OUTPATIENT
Start: 2025-02-11 | End: 2025-02-11

## 2025-02-11 RX ORDER — HEPARIN SODIUM 5000 [USP'U]/ML
5000 INJECTION, SOLUTION INTRAVENOUS; SUBCUTANEOUS EVERY 8 HOURS SCHEDULED
Status: DISCONTINUED | OUTPATIENT
Start: 2025-02-11 | End: 2025-02-14 | Stop reason: HOSPADM

## 2025-02-11 RX ORDER — LORAZEPAM 1 MG/1
3 TABLET ORAL
Status: DISCONTINUED | OUTPATIENT
Start: 2025-02-11 | End: 2025-02-11

## 2025-02-11 RX ORDER — DIAZEPAM 10 MG/2ML
5 INJECTION, SOLUTION INTRAMUSCULAR; INTRAVENOUS EVERY 4 HOURS PRN
Status: DISCONTINUED | OUTPATIENT
Start: 2025-02-11 | End: 2025-02-14 | Stop reason: HOSPADM

## 2025-02-11 RX ORDER — POTASSIUM CHLORIDE 750 MG/1
20 TABLET, EXTENDED RELEASE ORAL ONCE
Status: DISCONTINUED | OUTPATIENT
Start: 2025-02-11 | End: 2025-02-11

## 2025-02-11 RX ORDER — FUROSEMIDE 10 MG/ML
40 INJECTION INTRAMUSCULAR; INTRAVENOUS ONCE
Status: COMPLETED | OUTPATIENT
Start: 2025-02-11 | End: 2025-02-11

## 2025-02-11 RX ORDER — LORAZEPAM 1 MG/1
0.5 TABLET ORAL EVERY 4 HOURS PRN
Status: DISCONTINUED | OUTPATIENT
Start: 2025-02-11 | End: 2025-02-11

## 2025-02-11 RX ORDER — ONDANSETRON 4 MG/1
4 TABLET, ORALLY DISINTEGRATING ORAL EVERY 8 HOURS PRN
Status: DISCONTINUED | OUTPATIENT
Start: 2025-02-11 | End: 2025-02-14 | Stop reason: HOSPADM

## 2025-02-11 RX ORDER — MAGNESIUM SULFATE IN WATER 40 MG/ML
2000 INJECTION, SOLUTION INTRAVENOUS ONCE
Status: COMPLETED | OUTPATIENT
Start: 2025-02-11 | End: 2025-02-11

## 2025-02-11 RX ORDER — POLYETHYLENE GLYCOL 3350 17 G/17G
17 POWDER, FOR SOLUTION ORAL DAILY PRN
Status: DISCONTINUED | OUTPATIENT
Start: 2025-02-11 | End: 2025-02-14 | Stop reason: HOSPADM

## 2025-02-11 RX ORDER — LANOLIN ALCOHOL/MO/W.PET/CERES
100 CREAM (GRAM) TOPICAL DAILY
Status: DISCONTINUED | OUTPATIENT
Start: 2025-02-12 | End: 2025-02-14 | Stop reason: HOSPADM

## 2025-02-11 RX ORDER — MAGNESIUM SULFATE IN WATER 40 MG/ML
2000 INJECTION, SOLUTION INTRAVENOUS PRN
Status: DISCONTINUED | OUTPATIENT
Start: 2025-02-11 | End: 2025-02-14 | Stop reason: HOSPADM

## 2025-02-11 RX ORDER — DIAZEPAM 5 MG/1
10 TABLET ORAL ONCE
Status: DISCONTINUED | OUTPATIENT
Start: 2025-02-11 | End: 2025-02-14 | Stop reason: HOSPADM

## 2025-02-11 RX ORDER — ONDANSETRON 2 MG/ML
4 INJECTION INTRAMUSCULAR; INTRAVENOUS EVERY 6 HOURS PRN
Status: DISCONTINUED | OUTPATIENT
Start: 2025-02-11 | End: 2025-02-14 | Stop reason: HOSPADM

## 2025-02-11 RX ORDER — IOPAMIDOL 755 MG/ML
100 INJECTION, SOLUTION INTRAVASCULAR
Status: COMPLETED | OUTPATIENT
Start: 2025-02-11 | End: 2025-02-11

## 2025-02-11 RX ORDER — PHENOBARBITAL SODIUM 65 MG/ML
130 INJECTION, SOLUTION INTRAMUSCULAR; INTRAVENOUS ONCE
Status: COMPLETED | OUTPATIENT
Start: 2025-02-11 | End: 2025-02-11

## 2025-02-11 RX ORDER — LANOLIN ALCOHOL/MO/W.PET/CERES
100 CREAM (GRAM) TOPICAL DAILY
Status: DISCONTINUED | OUTPATIENT
Start: 2025-02-11 | End: 2025-02-11

## 2025-02-11 RX ORDER — SODIUM CHLORIDE 0.9 % (FLUSH) 0.9 %
5-40 SYRINGE (ML) INJECTION PRN
Status: DISCONTINUED | OUTPATIENT
Start: 2025-02-11 | End: 2025-02-14 | Stop reason: HOSPADM

## 2025-02-11 RX ORDER — LORAZEPAM 1 MG/1
2 TABLET ORAL
Status: DISCONTINUED | OUTPATIENT
Start: 2025-02-11 | End: 2025-02-14 | Stop reason: HOSPADM

## 2025-02-11 RX ORDER — LORAZEPAM 1 MG/1
3 TABLET ORAL
Status: DISCONTINUED | OUTPATIENT
Start: 2025-02-11 | End: 2025-02-14 | Stop reason: HOSPADM

## 2025-02-11 RX ORDER — DIAZEPAM 5 MG/1
10 TABLET ORAL ONCE
Status: COMPLETED | OUTPATIENT
Start: 2025-02-11 | End: 2025-02-11

## 2025-02-11 RX ORDER — FUROSEMIDE 10 MG/ML
40 INJECTION INTRAMUSCULAR; INTRAVENOUS 2 TIMES DAILY
Status: DISCONTINUED | OUTPATIENT
Start: 2025-02-12 | End: 2025-02-12

## 2025-02-11 RX ORDER — SODIUM CHLORIDE 0.9 % (FLUSH) 0.9 %
5-40 SYRINGE (ML) INJECTION PRN
Status: DISCONTINUED | OUTPATIENT
Start: 2025-02-11 | End: 2025-02-11

## 2025-02-11 RX ORDER — POTASSIUM CHLORIDE 750 MG/1
40 TABLET, EXTENDED RELEASE ORAL PRN
Status: DISCONTINUED | OUTPATIENT
Start: 2025-02-11 | End: 2025-02-14 | Stop reason: HOSPADM

## 2025-02-11 RX ORDER — SODIUM CHLORIDE 9 MG/ML
INJECTION, SOLUTION INTRAVENOUS PRN
Status: DISCONTINUED | OUTPATIENT
Start: 2025-02-11 | End: 2025-02-14 | Stop reason: HOSPADM

## 2025-02-11 RX ORDER — POTASSIUM CHLORIDE 7.45 MG/ML
10 INJECTION INTRAVENOUS
Status: COMPLETED | OUTPATIENT
Start: 2025-02-11 | End: 2025-02-11

## 2025-02-11 RX ORDER — LORAZEPAM 1 MG/1
2 TABLET ORAL
Status: DISCONTINUED | OUTPATIENT
Start: 2025-02-11 | End: 2025-02-11

## 2025-02-11 RX ORDER — LORAZEPAM 1 MG/1
1 TABLET ORAL
Status: DISCONTINUED | OUTPATIENT
Start: 2025-02-11 | End: 2025-02-14 | Stop reason: HOSPADM

## 2025-02-11 RX ORDER — CHLORDIAZEPOXIDE HYDROCHLORIDE 25 MG/1
50 CAPSULE, GELATIN COATED ORAL EVERY 8 HOURS
Status: DISCONTINUED | OUTPATIENT
Start: 2025-02-11 | End: 2025-02-12

## 2025-02-11 RX ORDER — POTASSIUM CHLORIDE 750 MG/1
40 TABLET, EXTENDED RELEASE ORAL EVERY 4 HOURS
Status: DISPENSED | OUTPATIENT
Start: 2025-02-11 | End: 2025-02-12

## 2025-02-11 RX ORDER — SODIUM CHLORIDE 9 MG/ML
INJECTION, SOLUTION INTRAVENOUS PRN
Status: DISCONTINUED | OUTPATIENT
Start: 2025-02-11 | End: 2025-02-11

## 2025-02-11 RX ORDER — LORAZEPAM 1 MG/1
4 TABLET ORAL
Status: DISCONTINUED | OUTPATIENT
Start: 2025-02-11 | End: 2025-02-11

## 2025-02-11 RX ORDER — LORAZEPAM 1 MG/1
4 TABLET ORAL
Status: DISCONTINUED | OUTPATIENT
Start: 2025-02-11 | End: 2025-02-14 | Stop reason: HOSPADM

## 2025-02-11 RX ORDER — BUPRENORPHINE AND NALOXONE 2; .5 MG/1; MG/1
1 FILM, SOLUBLE BUCCAL; SUBLINGUAL DAILY
Status: DISCONTINUED | OUTPATIENT
Start: 2025-02-12 | End: 2025-02-14 | Stop reason: HOSPADM

## 2025-02-11 RX ORDER — POTASSIUM CHLORIDE 7.45 MG/ML
10 INJECTION INTRAVENOUS PRN
Status: DISCONTINUED | OUTPATIENT
Start: 2025-02-11 | End: 2025-02-14 | Stop reason: HOSPADM

## 2025-02-11 RX ADMIN — FUROSEMIDE 40 MG: 10 INJECTION, SOLUTION INTRAMUSCULAR; INTRAVENOUS at 23:34

## 2025-02-11 RX ADMIN — MAGNESIUM SULFATE HEPTAHYDRATE 2000 MG: 40 INJECTION, SOLUTION INTRAVENOUS at 18:21

## 2025-02-11 RX ADMIN — Medication 10 ML: at 18:16

## 2025-02-11 RX ADMIN — DIAZEPAM 10 MG: 5 TABLET ORAL at 17:54

## 2025-02-11 RX ADMIN — POTASSIUM CHLORIDE 10 MEQ: 10 INJECTION, SOLUTION INTRAVENOUS at 18:19

## 2025-02-11 RX ADMIN — CHLORDIAZEPOXIDE HYDROCHLORIDE 50 MG: 25 CAPSULE ORAL at 23:34

## 2025-02-11 RX ADMIN — PHENOBARBITAL SODIUM 130 MG: 65 INJECTION INTRAMUSCULAR at 23:34

## 2025-02-11 RX ADMIN — POTASSIUM CHLORIDE 40 MEQ: 750 TABLET, EXTENDED RELEASE ORAL at 23:34

## 2025-02-11 RX ADMIN — IOPAMIDOL 70 ML: 755 INJECTION, SOLUTION INTRAVENOUS at 21:01

## 2025-02-11 RX ADMIN — POTASSIUM CHLORIDE 10 MEQ: 10 INJECTION, SOLUTION INTRAVENOUS at 19:22

## 2025-02-11 RX ADMIN — Medication 10 ML: at 23:43

## 2025-02-11 RX ADMIN — THIAMINE HYDROCHLORIDE 200 MG: 100 INJECTION, SOLUTION INTRAMUSCULAR; INTRAVENOUS at 18:14

## 2025-02-11 ASSESSMENT — ENCOUNTER SYMPTOMS
BLOOD IN STOOL: 0
ABDOMINAL DISTENTION: 0
VOMITING: 0
COUGH: 0
NAUSEA: 0
ABDOMINAL PAIN: 0
DIARRHEA: 0
SHORTNESS OF BREATH: 0
ANAL BLEEDING: 0

## 2025-02-11 ASSESSMENT — PAIN - FUNCTIONAL ASSESSMENT: PAIN_FUNCTIONAL_ASSESSMENT: NONE - DENIES PAIN

## 2025-02-11 NOTE — ED PROVIDER NOTES
[facility] EMERGENCY DEPARTMENT  EMERGENCY DEPARTMENT ENCOUNTER      Pt Name: Maty Villarreal  MRN: [id number]  Birthdate 1950  Date of evaluation: 2/11/2025  Provider: Von Gallardo MD    CHIEF COMPLAINT       Chief Complaint   Patient presents with    Leg Swelling         HISTORY OF PRESENT ILLNESS   (Location/Symptom, Timing/Onset, Context/Setting, Quality, Duration, Modifying Factors, Severity)  Note limiting factors.   74F w/ hx OA, liver cirrhosis, HCV, alcoholism p/w 3months of BLE swelling. Pt reports 3months of BLE swelling involving lower and upper parts of leg. No pain, redness, rash or open wounds. No ext weakness/numbness. Pt also reports tremors since last drink this morning (about 1 shot of liquor) and she is worried about etoh withdrawal. No CP/SOB, dizziness, syncope. No F/C, cough, N/V, rectal bleeding. No head or neck pain. Denies recent falls or trauma. Takes suboxone daily which has been buying from a friend, last dose also this morning.            Review of External Medical Records:     Nursing Notes were reviewed.    REVIEW OF SYSTEMS    (2-9 systems for level 4, 10 or more for level 5)     Review of Systems   Constitutional:  Negative for diaphoresis and fever.   HENT:  Negative for nosebleeds.    Eyes:  Negative for visual disturbance.   Respiratory:  Negative for cough and shortness of breath.    Cardiovascular:  Positive for leg swelling. Negative for chest pain and palpitations.   Gastrointestinal:  Negative for abdominal distention, abdominal pain, anal bleeding, blood in stool, diarrhea, nausea and vomiting.   Endocrine: Negative for polyuria.   Genitourinary:  Negative for difficulty urinating, dysuria, frequency and hematuria.   Musculoskeletal:  Negative for joint swelling.   Skin:  Negative for wound.   Allergic/Immunologic: Negative for immunocompromised state.   Neurological:  Negative for seizures and syncope.   Hematological:  Does not bruise/bleed easily.

## 2025-02-11 NOTE — ED TRIAGE NOTES
Pt c/o bilateral leg swelling x months, pt is also an alcoholic, wants detox ,denies cp or sob, denies fever, n/v, or diarrhea , stated that [facility] will not admit her for detox, last drink was this am , one shot, also her last dose of Suboxone that she bought from someone is out today    Principal Discharge DX:	Nausea

## 2025-02-11 NOTE — ED NOTES
74-year-old female with a history of alcoholism, neuropathy, and cirrhosis presents with bilateral lower extremity swelling.  States has been going on for \"months.\"  Also requesting detox.  Denies fevers, chest pain, difficulty breathing, abdominal pain, cough, congestion, rhinorrhea.  No OTC treatment PTA.  Has been prescribed diuretics but is not taking them.  Last drink, \"1 shot this morning.\"  No history of seizure.  Denies SI/HI/hallucinations, smoking, vaping, marijuana.  Denies pain.   Takes lyrica.   She is also reporting that she has been buying Suboxone from somebody and taking it.  No prescription.  States she ran out.      11:49 AM  I have evaluated the patient as the Provider in Rapid Medical Evaluation (RME). I have reviewed her vital signs and the triage nurse assessment. I have talked with the patient and any available family and advised that I am the provider in triage and have ordered the appropriate study to initiate their work up based on the clinical presentation during my assessment. I have advised that the patient will be accommodated in the Main ED as soon as possible. I have also requested to contact the triage nurse or myself immediately if the patient experiences any changes in their condition during this brief waiting period.  NE Rosenthal NP, Kelly E, APRN - NP  02/11/25 1150       Yasmin Espinal APRN - NP  02/11/25 1152

## 2025-02-12 LAB
ALBUMIN SERPL-MCNC: 3 G/DL (ref 3.5–5)
ALBUMIN/GLOB SERPL: 0.6 (ref 1.1–2.2)
ALP SERPL-CCNC: 113 U/L (ref 45–117)
ALT SERPL-CCNC: 57 U/L (ref 12–78)
ANION GAP SERPL CALC-SCNC: 8 MMOL/L (ref 2–12)
AST SERPL-CCNC: 184 U/L (ref 15–37)
BASOPHILS # BLD: 0.04 K/UL (ref 0–0.1)
BASOPHILS NFR BLD: 0.5 % (ref 0–1)
BILIRUB SERPL-MCNC: 1.8 MG/DL (ref 0.2–1)
BUN SERPL-MCNC: 8 MG/DL (ref 6–20)
BUN/CREAT SERPL: 15 (ref 12–20)
CALCIUM SERPL-MCNC: 8.5 MG/DL (ref 8.5–10.1)
CHLORIDE SERPL-SCNC: 101 MMOL/L (ref 97–108)
CO2 SERPL-SCNC: 28 MMOL/L (ref 21–32)
COMMENT:: NORMAL
COMMENT:: NORMAL
CREAT SERPL-MCNC: 0.55 MG/DL (ref 0.55–1.02)
DIFFERENTIAL METHOD BLD: ABNORMAL
EOSINOPHIL # BLD: 0.23 K/UL (ref 0–0.4)
EOSINOPHIL NFR BLD: 3.1 % (ref 0–7)
ERYTHROCYTE [DISTWIDTH] IN BLOOD BY AUTOMATED COUNT: 16.6 % (ref 11.5–14.5)
ETHANOL SERPL-MCNC: <10 MG/DL (ref 0–0.08)
GLOBULIN SER CALC-MCNC: 4.7 G/DL (ref 2–4)
GLUCOSE SERPL-MCNC: 103 MG/DL (ref 65–100)
HCT VFR BLD AUTO: 32.3 % (ref 35–47)
HGB BLD-MCNC: 10.2 G/DL (ref 11.5–16)
IMM GRANULOCYTES # BLD AUTO: 0.03 K/UL (ref 0–0.04)
IMM GRANULOCYTES NFR BLD AUTO: 0.4 % (ref 0–0.5)
LYMPHOCYTES # BLD: 0.86 K/UL (ref 0.8–3.5)
LYMPHOCYTES NFR BLD: 11.8 % (ref 12–49)
MCH RBC QN AUTO: 26.2 PG (ref 26–34)
MCHC RBC AUTO-ENTMCNC: 31.6 G/DL (ref 30–36.5)
MCV RBC AUTO: 82.8 FL (ref 80–99)
MONOCYTES # BLD: 0.45 K/UL (ref 0–1)
MONOCYTES NFR BLD: 6.2 % (ref 5–13)
NEUTS SEG # BLD: 5.7 K/UL (ref 1.8–8)
NEUTS SEG NFR BLD: 78 % (ref 32–75)
NRBC # BLD: 0 K/UL (ref 0–0.01)
NRBC BLD-RTO: 0 PER 100 WBC
PLATELET # BLD AUTO: 127 K/UL (ref 150–400)
PMV BLD AUTO: 10.1 FL (ref 8.9–12.9)
POTASSIUM SERPL-SCNC: 3.2 MMOL/L (ref 3.5–5.1)
PROT SERPL-MCNC: 7.7 G/DL (ref 6.4–8.2)
RBC # BLD AUTO: 3.9 M/UL (ref 3.8–5.2)
SODIUM SERPL-SCNC: 137 MMOL/L (ref 136–145)
SPECIMEN HOLD: NORMAL
SPECIMEN HOLD: NORMAL
WBC # BLD AUTO: 7.3 K/UL (ref 3.6–11)

## 2025-02-12 PROCEDURE — 1200000000 HC SEMI PRIVATE

## 2025-02-12 PROCEDURE — 2500000003 HC RX 250 WO HCPCS

## 2025-02-12 PROCEDURE — 6370000000 HC RX 637 (ALT 250 FOR IP): Performed by: HOSPITALIST

## 2025-02-12 PROCEDURE — 80053 COMPREHEN METABOLIC PANEL: CPT

## 2025-02-12 PROCEDURE — 6360000002 HC RX W HCPCS: Performed by: EMERGENCY MEDICINE

## 2025-02-12 PROCEDURE — 97165 OT EVAL LOW COMPLEX 30 MIN: CPT

## 2025-02-12 PROCEDURE — 36415 COLL VENOUS BLD VENIPUNCTURE: CPT

## 2025-02-12 PROCEDURE — 97530 THERAPEUTIC ACTIVITIES: CPT

## 2025-02-12 PROCEDURE — 6370000000 HC RX 637 (ALT 250 FOR IP): Performed by: STUDENT IN AN ORGANIZED HEALTH CARE EDUCATION/TRAINING PROGRAM

## 2025-02-12 PROCEDURE — 2500000003 HC RX 250 WO HCPCS: Performed by: STUDENT IN AN ORGANIZED HEALTH CARE EDUCATION/TRAINING PROGRAM

## 2025-02-12 PROCEDURE — 97116 GAIT TRAINING THERAPY: CPT

## 2025-02-12 PROCEDURE — 85025 COMPLETE CBC W/AUTO DIFF WBC: CPT

## 2025-02-12 PROCEDURE — 82077 ASSAY SPEC XCP UR&BREATH IA: CPT

## 2025-02-12 PROCEDURE — 6360000002 HC RX W HCPCS: Performed by: STUDENT IN AN ORGANIZED HEALTH CARE EDUCATION/TRAINING PROGRAM

## 2025-02-12 PROCEDURE — 97161 PT EVAL LOW COMPLEX 20 MIN: CPT

## 2025-02-12 PROCEDURE — 97535 SELF CARE MNGMENT TRAINING: CPT

## 2025-02-12 RX ORDER — PANTOPRAZOLE SODIUM 40 MG/1
40 TABLET, DELAYED RELEASE ORAL
Status: DISCONTINUED | OUTPATIENT
Start: 2025-02-13 | End: 2025-02-14 | Stop reason: HOSPADM

## 2025-02-12 RX ORDER — PHENOBARBITAL 32.4 MG/1
16.2 TABLET ORAL EVERY 6 HOURS PRN
Status: DISCONTINUED | OUTPATIENT
Start: 2025-02-14 | End: 2025-02-14 | Stop reason: HOSPADM

## 2025-02-12 RX ORDER — PHENOBARBITAL 32.4 MG/1
32.4 TABLET ORAL 2 TIMES DAILY
Status: COMPLETED | OUTPATIENT
Start: 2025-02-13 | End: 2025-02-14

## 2025-02-12 RX ORDER — PHENOBARBITAL 32.4 MG/1
32.4 TABLET ORAL EVERY 6 HOURS PRN
Status: ACTIVE | OUTPATIENT
Start: 2025-02-12 | End: 2025-02-14

## 2025-02-12 RX ORDER — PHENOBARBITAL 32.4 MG/1
32.4 TABLET ORAL 4 TIMES DAILY
Status: COMPLETED | OUTPATIENT
Start: 2025-02-12 | End: 2025-02-13

## 2025-02-12 RX ORDER — PHENOBARBITAL 32.4 MG/1
16.2 TABLET ORAL 2 TIMES DAILY
Status: DISCONTINUED | OUTPATIENT
Start: 2025-02-14 | End: 2025-02-14 | Stop reason: HOSPADM

## 2025-02-12 RX ORDER — PREGABALIN 75 MG/1
150 CAPSULE ORAL 3 TIMES DAILY
Status: DISCONTINUED | OUTPATIENT
Start: 2025-02-12 | End: 2025-02-14 | Stop reason: HOSPADM

## 2025-02-12 RX ORDER — FUROSEMIDE 10 MG/ML
40 INJECTION INTRAMUSCULAR; INTRAVENOUS DAILY
Status: DISCONTINUED | OUTPATIENT
Start: 2025-02-13 | End: 2025-02-14 | Stop reason: HOSPADM

## 2025-02-12 RX ADMIN — THIAMINE HYDROCHLORIDE 200 MG: 100 INJECTION, SOLUTION INTRAMUSCULAR; INTRAVENOUS at 13:12

## 2025-02-12 RX ADMIN — Medication 10 ML: at 02:06

## 2025-02-12 RX ADMIN — PREGABALIN 150 MG: 100 CAPSULE ORAL at 20:44

## 2025-02-12 RX ADMIN — Medication 10 ML: at 12:01

## 2025-02-12 RX ADMIN — Medication 10 ML: at 12:02

## 2025-02-12 RX ADMIN — PHENOBARBITAL 32.4 MG: 32.4 TABLET ORAL at 17:29

## 2025-02-12 RX ADMIN — CHLORDIAZEPOXIDE HYDROCHLORIDE 50 MG: 25 CAPSULE ORAL at 11:58

## 2025-02-12 RX ADMIN — PREGABALIN 150 MG: 100 CAPSULE ORAL at 03:10

## 2025-02-12 RX ADMIN — PREGABALIN 150 MG: 100 CAPSULE ORAL at 14:21

## 2025-02-12 RX ADMIN — POTASSIUM CHLORIDE 40 MEQ: 750 TABLET, EXTENDED RELEASE ORAL at 03:09

## 2025-02-12 RX ADMIN — POTASSIUM CHLORIDE 40 MEQ: 750 TABLET, EXTENDED RELEASE ORAL at 12:04

## 2025-02-12 RX ADMIN — FUROSEMIDE 40 MG: 10 INJECTION, SOLUTION INTRAMUSCULAR; INTRAVENOUS at 11:58

## 2025-02-12 RX ADMIN — PREGABALIN 150 MG: 100 CAPSULE ORAL at 12:01

## 2025-02-12 RX ADMIN — PHENOBARBITAL 32.4 MG: 32.4 TABLET ORAL at 20:44

## 2025-02-12 RX ADMIN — PHENOBARBITAL 32.4 MG: 32.4 TABLET ORAL at 14:21

## 2025-02-12 RX ADMIN — HEPARIN SODIUM 5000 UNITS: 5000 INJECTION INTRAVENOUS; SUBCUTANEOUS at 14:21

## 2025-02-12 ASSESSMENT — PAIN SCALES - GENERAL
PAINLEVEL_OUTOF10: 0
PAINLEVEL_OUTOF10: 0

## 2025-02-12 NOTE — PLAN OF CARE
Problem: Occupational Therapy - Adult  Goal: By Discharge: Performs self-care activities at highest level of function for planned discharge setting.  See evaluation for individualized goals.  Description: FUNCTIONAL STATUS PRIOR TO ADMISSION:  Patient was ambulatory using no AD. INDP with ADLs and IADLs. Cares for her cat named Dustin.     HOME SUPPORT: Patient lived alone with neighbors and senior living apartment staff ([facility]) to provide assistance.    Occupational Therapy Goals:  Initiated 2/12/2025  1.  Patient will perform upper body dressing with Briscoe within 7 day(s).  2.  Patient will perform lower body dressing with Briscoe within 7 day(s).  3.  Patient will perform bathing with Briscoe within 7 day(s).  4.  Patient will perform toilet transfers with Briscoe  within 7 day(s).  5.  Patient will perform all aspects of toileting with Briscoe within 7 day(s).  6.  Patient will participate in upper extremity therapeutic exercise/activities with Briscoe for 5 minutes within 7 day(s).    7.  Patient will utilize energy conservation techniques during functional activities with verbal, visual, and tactile cues within 7 day(s).    Outcome: Progressing   OCCUPATIONAL THERAPY EVALUATION    Patient: Maty Villarreal (74 y.o. female)  Date: 2/12/2025  Primary Diagnosis: Volume overload [E87.70]         Precautions: Fall Risk                  ASSESSMENT :  The patient is limited by decreased functional mobility, independence in ADLs, high-level IADLs, strength, activity tolerance, endurance, safety awareness, coordination, balance following admission from [facility] for tremulous activity, anxiety, BLE edema, and desire to undergo alcohol detox. Patient tolerated standing bathing tasks at sink and functional mobility to ED bathroom to complete toileting tasks with up to min A. Patient with unsteadiness and BUE tremors, noted multiple posterior LOBs during      Interpretation of Tool:  Represents clinically-significant functional categories (i.e. Activities of daily living).  Cutoff score 39.4 (19) correlates to a good likelihood of discharging home versus a facility  Dafne Perez, Shruthi Mejia, Emil Suarez, Ashley Urrutia, Jose Morrow, Krish Perez, -PAC “6-Clicks” Functional Assessment Scores Predict Acute Care Hospital Discharge Destination, Physical Therapy, Volume 94, Issue 9, 1 September 2014, Pages 6440-3585, [email or web address]       Pain Rating:  Patient did not endorse pain    Activity Tolerance:   Fair  and requires rest breaks    After treatment:   Patient left in no apparent distress in bed, Call bell within reach, and Side rails x3    COMMUNICATION/EDUCATION:   The patient's plan of care was discussed with: physical therapist, occupational therapist, and registered nurse         Thank you for this referral.  Edelmira Jacobo, OT  Minutes: 29    Occupational Therapy Evaluation Charge Determination   History Examination Decision-Making   LOW Complexity : Brief history review  LOW Complexity: 1-3 Performance deficits relating to physical, cognitive, or psychosocial skills that result in activity limitations and/or participation restrictions LOW Complexity: No comorbidities that affect functional and  no verbal  or physical assist needed to complete eval tasks   Based on the above components, the patient evaluation is determined to be of the following complexity level: Low

## 2025-02-12 NOTE — CONSULTS
Active Member of Clubs or Organizations: No     Attends Club or Organization Meetings: Never     Marital Status:    Intimate Partner Violence: Not At Risk (7/6/2023)    Humiliation, Afraid, Rape, and Kick questionnaire     Fear of Current or Ex-Partner: No     Emotionally Abused: No     Physically Abused: No     Sexually Abused: No   Housing Stability: Low Risk  (9/4/2024)    Housing Stability Vital Sign     Unable to Pay for Housing in the Last Year: No     Number of Times Moved in the Last Year: 0     Homeless in the Last Year: No      FAMILY HISTORY: .   Family History   Problem Relation Age of Onset    Anesth Problems Neg Hx     Other Father         UNKNOWN HEALTH HX    Other Mother         UNKNOWN HEALTH HX    Breast Cancer Maternal Grandmother        REVIEW OF SYSTEMS:   A 12 system review of systems was completed and was negative for the following 12 systems except as noted above CV, Pulmonary, HEENT, skin, lymphatics, GI, , hematologic, renal, endocrinology, rheumatologic, MSK         Physical Exam     /84   Pulse 99   Temp 98.1 °F (36.7 °C) (Oral)   Resp 17   Wt 77.7 kg (171 lb 4.8 oz)   SpO2 97%   BMI 30.34 kg/m²     CIWA: 5 - mild withdrawal  COWS: 0       Alert and Oriented  No distress  Non labored respirations  Regular pulse  Abdomen soft  Neuro grossly intact  No track marks   Adequate unassisted gait.         DATA     LABORATORY DATA:     Latest Reference Range & Units 02/11/25 12:08   NT Pro-BNP <450 PG/ML 65     Labs Reviewed   CBC WITH AUTO DIFFERENTIAL - Abnormal; Notable for the following components:       Result Value    Hemoglobin 10.1 (*)     Hematocrit 32.7 (*)     RDW 16.6 (*)     Platelets 126 (*)     Monocytes % 13.5 (*)     Immature Granulocytes % 1.2 (*)     Immature Granulocytes Absolute 0.06 (*)     All other components within normal limits   COMPREHENSIVE METABOLIC PANEL - Abnormal; Notable for the following components:    Potassium 2.9 (*)     Glucose 136 (*)      Total Bilirubin 1.5 (*)      (*)     Alk Phosphatase 138 (*)     Total Protein 8.3 (*)     Albumin 2.9 (*)     Globulin 5.4 (*)     Albumin/Globulin Ratio 0.5 (*)     All other components within normal limits   COMPREHENSIVE METABOLIC PANEL - Abnormal; Notable for the following components:    Potassium 3.2 (*)     Glucose 103 (*)     Total Bilirubin 1.8 (*)      (*)     Albumin 3.0 (*)     Globulin 4.7 (*)     Albumin/Globulin Ratio 0.6 (*)     All other components within normal limits   CBC WITH AUTO DIFFERENTIAL - Abnormal; Notable for the following components:    Hemoglobin 10.2 (*)     Hematocrit 32.3 (*)     RDW 16.6 (*)     Platelets 127 (*)     Neutrophils % 78.0 (*)     Lymphocytes % 11.8 (*)     All other components within normal limits   BRAIN NATRIURETIC PEPTIDE   URINE DRUG SCREEN   TROPONIN   ETHANOL   EXTRA TUBES HOLD   EXTRA TUBES HOLD     Admission on 02/11/2025   Component Date Value Ref Range Status    WBC 02/11/2025 5.1  3.6 - 11.0 K/uL Final    RBC 02/11/2025 3.85  3.80 - 5.20 M/uL Final    Hemoglobin 02/11/2025 10.1 (L)  11.5 - 16.0 g/dL Final    Hematocrit 02/11/2025 32.7 (L)  35.0 - 47.0 % Final    MCV 02/11/2025 84.9  80.0 - 99.0 FL Final    MCH 02/11/2025 26.2  26.0 - 34.0 PG Final    MCHC 02/11/2025 30.9  30.0 - 36.5 g/dL Final    RDW 02/11/2025 16.6 (H)  11.5 - 14.5 % Final    Platelets 02/11/2025 126 (L)  150 - 400 K/uL Final    MPV 02/11/2025 10.5  8.9 - 12.9 FL Final    Nucleated RBCs 02/11/2025 0.0  0  WBC Final    nRBC 02/11/2025 0.00  0.00 - 0.01 K/uL Final    Neutrophils % 02/11/2025 57.4  32.0 - 75.0 % Final    Lymphocytes % 02/11/2025 22.0  12.0 - 49.0 % Final    Monocytes % 02/11/2025 13.5 (H)  5.0 - 13.0 % Final    Eosinophils % 02/11/2025 5.3  0.0 - 7.0 % Final    Basophils % 02/11/2025 0.6  0.0 - 1.0 % Final    Immature Granulocytes % 02/11/2025 1.2 (H)  0.0 - 0.5 % Final    Neutrophils Absolute 02/11/2025 2.95  1.80 - 8.00 K/UL Final    Lymphocytes

## 2025-02-12 NOTE — PLAN OF CARE
Problem: Physical Therapy - Adult  Goal: By Discharge: Performs mobility at highest level of function for planned discharge setting.  See evaluation for individualized goals.  Description: FUNCTIONAL STATUS PRIOR TO ADMISSION: Patient was independent and active without use of DME, h/o falls.    HOME SUPPORT PRIOR TO ADMISSION: The patient lived alone in a independent senior apartment ([facility]) on the 11th floor accessible by elevator.    Physical Therapy Goals  Initiated 2/12/2025  1.  Patient will move from supine to sit and sit to supine in bed with independence within 7 day(s).    2.  Patient will perform sit to stand with independence within 7 day(s).  3.  Patient will transfer from bed to chair and chair to bed with independence using the least restrictive device within 7 day(s).  4.  Patient will ambulate with independence for 300 feet with the least restrictive device within 7 day(s).   5.  Patient will ascend/descend 4 stairs with  handrail(s) with modified independence within 7 day(s).    Outcome: Progressing     PHYSICAL THERAPY EVALUATION    Patient: Maty Villarreal (74 y.o. female)  Date: 2/12/2025  Primary Diagnosis: Volume overload [E87.70]       Precautions: Restrictions/Precautions: Fall Risk                      ASSESSMENT :   Patient was seen in the ER s/p admission with BLE edema, volume overload.  Received on the stretcher, SpO2 upper 90's on 2L, none at baseline.  Removed O2 for RA trial, SpO2 stable/ low 90's, no SOB.  Pt mobilized at overall CGA to min A level.  Gait w/o DME is slow, shuffled and mildly unsteady w/ no overt LOB, initially required min hand held assist, improved to CGA w/ time ambulating.  Pt completed ADL task at the sink w/ fair supported stand balance. She did have one LOB requiring assist to prevent a fall when turning to walk back to the stretcher.      Currently pt's ADL's and mobility are limited by generalized weakness, BLE edema (pt reporting some improvement  []  2 [x]  3  []  4   5.  Walking in hospital room? []  1 []  2 [x]  3  []  4   6.  Climbing 3-5 steps with a railing? []  1 [x]  2 []  3  []  4     Raw Score: 17/24                            Cutoff score <=171,2,3 had higher odds of discharging home with home health or need of SNF/IPR.    1. Dafne Perez, Shruthi Mejia, Emil Suarez, Ashley Urrutia, Jose Morrow, Krish Perez.  Validity of the AM-PAC “6-Clicks” Inpatient Daily Activity and Basic Mobility Short Forms. Physical Therapy Mar 2014, [phone]; DOI: 10.2522/ptj.05534439  2. Willian VALLADARES, Polina VAZQUEZ, Ashish VAZQUEZ, Don VAZQUEZ. Association of AM-PAC \"6-Clicks\" Basic Mobility and Daily Activity Scores With Discharge Destination. Phys Ther. 2021 Apr 4;101(4):sphc724. doi: 10.1093/ptj/rjlu537. PMID: 52823546.  3. Morro VAZQUEZ, Ching D, Fannie S, Troy K, Viet S. Activity Measure for Post-Acute Care \"6-Clicks\" Basic Mobility Scores Predict Discharge Destination After Acute Care Hospitalization in Select Patient Groups: A Retrospective, Observational Study. Arch Rehabil Res Clin Transl. 2022 Jul 16;4(3):313198. doi: 10.1016/j.arrct.2022.674778. PMID: 18987687; PMCID: [id number].  4. Chris PHELPS, Evelin S, Edie W, Edelmira WESTFALL. AM-PAC Short Forms Manual 4.0. Revised 2/2020.                                                                                                                                                                                                                              Pain Rating:  None indicated      Activity Tolerance:   Fair  and requires rest breaks    After treatment:   Patient left in no apparent distress in bed eating breakfast, Call bell within reach and Side rails x2 (ER stretcher)    COMMUNICATION/EDUCATION:   The patient's plan of care was discussed with: occupational therapist and registered nurse    Patient Education  Education Given To: Patient  Education Provided: Role of Therapy;Plan of Care;Transfer

## 2025-02-12 NOTE — ED NOTES
Per Dr. Schrader at the bedside, need to contact Pharmacy to verify the correct dosing of pt's suboxone. Holding current ordered Suboxone at this time, until correct dose is verified with Pharmacy. Dr. Schrader verbally verifies at the bedside, pt to receive 200mg IV dose of Thiamine instead of 100mg tablet.

## 2025-02-12 NOTE — ED NOTES
identified.         Electronically signed by Lucio Mejia        Abnormal labs:   Abnormal Labs Reviewed   CBC WITH AUTO DIFFERENTIAL - Abnormal; Notable for the following components:       Result Value    Hemoglobin 10.1 (*)     Hematocrit 32.7 (*)     RDW 16.6 (*)     Platelets 126 (*)     Monocytes % 13.5 (*)     Immature Granulocytes % 1.2 (*)     Immature Granulocytes Absolute 0.06 (*)     All other components within normal limits   COMPREHENSIVE METABOLIC PANEL - Abnormal; Notable for the following components:    Potassium 2.9 (*)     Glucose 136 (*)     Total Bilirubin 1.5 (*)      (*)     Alk Phosphatase 138 (*)     Total Protein 8.3 (*)     Albumin 2.9 (*)     Globulin 5.4 (*)     Albumin/Globulin Ratio 0.5 (*)     All other components within normal limits   COMPREHENSIVE METABOLIC PANEL - Abnormal; Notable for the following components:    Potassium 3.2 (*)     Glucose 103 (*)     Total Bilirubin 1.8 (*)      (*)     Albumin 3.0 (*)     Globulin 4.7 (*)     Albumin/Globulin Ratio 0.6 (*)     All other components within normal limits   CBC WITH AUTO DIFFERENTIAL - Abnormal; Notable for the following components:    Hemoglobin 10.2 (*)     Hematocrit 32.3 (*)     RDW 16.6 (*)     Platelets 127 (*)     Neutrophils % 78.0 (*)     Lymphocytes % 11.8 (*)     All other components within normal limits       Vitals/MEWS: MEWS Score: 3  Level of Consciousness: Alert (0)   Vitals:    02/12/25 1515 02/12/25 1530 02/12/25 1545 02/12/25 1600   BP:    104/62   Pulse: 84 81 81 78   Resp: 22 22 17 16   Temp:    98.5 °F (36.9 °C)   TempSrc:    Oral   SpO2: 93% 92% 92% 93%   Weight:         DI:   Predictive Model Details          29 (Normal)  Factor Value    Calculated 2/12/2025 16:30 42% Age 74 years old    Deterioration Index Model 39% Supplemental oxygen Nasal cannula     5% Systolic 104     4% Potassium abnormal (3.2 mmol/L)     3% Pulse oximetry 93 %     3% Sodium 137 mmol/L     2% Platelet count abnormal  Count 127 K/uL     -3% Total Admins Last 24 Hours 25     3% Has Imaging Procedure present      Recent Labs     02/11/25  1208 02/12/25  0052   WBC 5.1 7.3         Recommendation    Plan for next 24 hours: see notes   Additional Comments: NA     Pending orders: [id number]  Consults ordered: IP CONSULT TO SOCIAL WORK  IP CONSULT TO BSMART  IP CONSULT TO SOCIAL WORK  IP CONSULT TO ADDICTION MEDICINE    Consulted provider:      If any further questions, please call Sending RN at [phone]  Electronically signed by: Electronically signed by Jo Marino RN on 2/12/2025 at 4:30 PM

## 2025-02-12 NOTE — H&P
History & Physical    Primary Care Provider: Jacy Perez, NE - NP  Source of Information: Patient and chart review    History of Presenting Illness:   Maty Villarreal is a 74 y.o. female with pmh of cirrhosis, etoh abuse with peripheral neuropathy, opioid abuse on suboxone, hx of alcohol pancreatitis who presented to ED with multiple complaints.  She reports worsening bilateral LE swelling over the last 3 months. Initially had swelling from her ankles to knee but now reports swelling up to her thighs.  She also admits to drinking about a pint of liquor daily and states she wants to undergo detox. She feels anxious and tremulous.   Denies any previous hx of chf. Denies pnd or orthopnea.    The patient denies any fever, chills, chest or abdominal pain, nausea, vomiting, cough, congestion, recent illness, palpitations, or dysuria.  Remarkable vitals on ER Presentation: hr to 122, spo2-87% on RA  Labs Remarkable for: K-2.9, BILI 1.5, ast 138, alk phos 138, alb 2.9  ER Images: CT Chest: No PE. Cardiomegaly and interstitial pulmonary edema.  ER Rx: k-20meq iv, mag 2g, valium     Review of Systems:  Pertinent items are noted in the History of Present Illness.     Past Medical History:   Diagnosis Date    Arthritis     OSTEO    Chronic pain     Cirrhosis (HCC)     Hepatitis C     treated and gone 1980's    Neuropathy     Tuberculosis 1962    +HAI TEST # 3; TREATED AND NOW LUNGS HAVE ALWAYS BEEN CLEAR      Past Surgical History:   Procedure Laterality Date    ANUS SURGERY  6/7/2023    SPHINCTEROTOMY SPHINCTEROPLASTY performed by David Rojas MD at [facility] ENDOSCOPY    COLONOSCOPY N/A 8/25/2022    COLONOSCOPY performed by Pal Alan MD at [facility] ENDOSCOPY    COLONOSCOPY      HISTORY OF POLYP    COLONOSCOPY N/A 9/5/2024    COLONOSCOPY DIAGNOSTIC performed by Adele Anderson MD at [facility] ENDOSCOPY    ERCP N/A 6/7/2023    ERCP ENDOSCOPIC RETROGRADE CHOLANGIOPANCREATOGRAPHY performed by David Rojas MD at [facility]  Nucleated RBCs 0.0 0  WBC    nRBC 0.00 0.00 - 0.01 K/uL    Neutrophils % 57.4 32.0 - 75.0 %    Lymphocytes % 22.0 12.0 - 49.0 %    Monocytes % 13.5 (H) 5.0 - 13.0 %    Eosinophils % 5.3 0.0 - 7.0 %    Basophils % 0.6 0.0 - 1.0 %    Immature Granulocytes % 1.2 (H) 0.0 - 0.5 %    Neutrophils Absolute 2.95 1.80 - 8.00 K/UL    Lymphocytes Absolute 1.13 0.80 - 3.50 K/UL    Monocytes Absolute 0.69 0.00 - 1.00 K/UL    Eosinophils Absolute 0.27 0.00 - 0.40 K/UL    Basophils Absolute 0.03 0.00 - 0.10 K/UL    Immature Granulocytes Absolute 0.06 (H) 0.00 - 0.04 K/UL    Differential Type AUTOMATED     Comprehensive Metabolic Panel    Collection Time: 02/11/25 12:08 PM   Result Value Ref Range    Sodium 141 136 - 145 mmol/L    Potassium 2.9 (L) 3.5 - 5.1 mmol/L    Chloride 104 97 - 108 mmol/L    CO2 29 21 - 32 mmol/L    Anion Gap 8 2 - 12 mmol/L    Glucose 136 (H) 65 - 100 mg/dL    BUN 11 6 - 20 MG/DL    Creatinine 0.59 0.55 - 1.02 MG/DL    BUN/Creatinine Ratio 19 12 - 20      Est, Glom Filt Rate >90 >60 ml/min/1.73m2    Calcium 9.0 8.5 - 10.1 MG/DL    Total Bilirubin 1.5 (H) 0.2 - 1.0 MG/DL    ALT 65 12 - 78 U/L     (H) 15 - 37 U/L    Alk Phosphatase 138 (H) 45 - 117 U/L    Total Protein 8.3 (H) 6.4 - 8.2 g/dL    Albumin 2.9 (L) 3.5 - 5.0 g/dL    Globulin 5.4 (H) 2.0 - 4.0 g/dL    Albumin/Globulin Ratio 0.5 (L) 1.1 - 2.2     Brain Natriuretic Peptide    Collection Time: 02/11/25 12:08 PM   Result Value Ref Range    NT Pro-BNP 65 <450 PG/ML   Troponin    Collection Time: 02/11/25 12:08 PM   Result Value Ref Range    Troponin, High Sensitivity 10 0 - 51 ng/L   Urine Drug Screen    Collection Time: 02/11/25 12:17 PM   Result Value Ref Range    Amphetamine, Urine Negative NEG      Barbiturates, Urine Negative NEG      Benzodiazepines, Urine Negative NEG      Cocaine, Urine Negative NEG      Methadone, Urine Negative NEG      Opiates, Urine Negative NEG      Phencyclidine, Urine Negative NEG      THC, TH-Cannabinol,

## 2025-02-12 NOTE — ED NOTES
Dr. Schrader notified pharmacy unable to verify pt's dose of Suboxone because pt states, \"I get it from a neighnor\" when asked who prescribes her Suboxone. Awaiting further orders from Dr. Schrader at this time regarding Suboxone dose. Plan of care ongoing.

## 2025-02-12 NOTE — PROGRESS NOTES
Orders received, chart reviewed and patient evaluated by physical therapy. Pending progression with skilled acute physical therapy, recommend:    Intermittent physical therapy up to 2-3x/week in previous living setting    Full evaluation to follow.

## 2025-02-12 NOTE — PROGRESS NOTES
Hospitalist Progress Note  Parris Schrader MD  Answering service: [phone] OR [phone] from in house phone        Date of Service:  2025  NAME:  Maty Villarreal  :  1950  MRN:  [id number]      Admission Summary:   Maty Villarreal is a 74 y.o. female with pmh of cirrhosis, etoh abuse with peripheral neuropathy, opioid abuse on suboxone, hx of alcohol pancreatitis who presented to ED with multiple complaints.  She reports worsening bilateral LE swelling over the last 3 months. Initially had swelling from her ankles to knee but now reports swelling up to her thighs.  She also admits to drinking about a pint of liquor daily and states she wants to undergo detox. She feels anxious and tremulous.   Denies any previous hx of chf. Denies pnd or orthopnea.       Interval history / Subjective:   Follow-up for cirrhosis volume overload bilateral lower extremity edema  Will check echocardiogram, continue diuresis  Pt said she is on suboxone, pt denies any complain     Assessment & Plan:      Volume Overload / Bilateral LE Edema / Pulmonary Edema  -no previous echo on file for comparison  -no previous hx of chf  -lasix 40mg in ED - continue bid watch cr  -strict I&Os with daily weights  -echo in am- PENDING     Hypoxia  -transient and likely 2/2 #1 above  -continue diuresis currently on 1 L nasal cannula  -echo in am     ETOH Withdrawal with abnormal LFT with slightly elevated bilirubin  -phenobarb load  -librium taper  -ciwa with valium  -therapeutic vits     Alcoholic Cirrhosis  -hepatology f/u outpatient     Opoiod Abuse  -cont pta suboxone     Hypokalemia  -replete to goal of 4     Peripheral Neuropathy  -cont pta lyrica          Code status: Full  Prophylaxis: Lovenox  Care Plan discussed with: RN/PT  Anticipated Disposition: 24 HRS     Principal Problem:    Volume overload  Resolved Problems:    * No resolved hospital

## 2025-02-13 ENCOUNTER — APPOINTMENT (OUTPATIENT)
Facility: HOSPITAL | Age: 75
End: 2025-02-13
Payer: MEDICARE

## 2025-02-13 LAB
ECHO AO ROOT DIAM: 3.3 CM
ECHO AO ROOT INDEX: 1.85 CM/M2
ECHO AV AREA PEAK VELOCITY: 2.6 CM2
ECHO AV AREA/BSA PEAK VELOCITY: 1.5 CM2/M2
ECHO AV PEAK GRADIENT: 6 MMHG
ECHO AV PEAK VELOCITY: 1.3 M/S
ECHO AV VELOCITY RATIO: 0.69
ECHO BSA: 1.83 M2
ECHO EST RA PRESSURE: 8 MMHG
ECHO LA VOL A-L A2C: 97 ML (ref 22–52)
ECHO LA VOL A-L A4C: 43 ML (ref 22–52)
ECHO LA VOL BP: 73 ML (ref 22–52)
ECHO LA VOL MOD A2C: 84 ML (ref 22–52)
ECHO LA VOL MOD A4C: 41 ML (ref 22–52)
ECHO LA VOL/BSA BIPLANE: 41 ML/M2 (ref 16–34)
ECHO LA VOLUME AREA LENGTH: 81 ML
ECHO LA VOLUME INDEX A-L A2C: 54 ML/M2 (ref 16–34)
ECHO LA VOLUME INDEX A-L A4C: 24 ML/M2 (ref 16–34)
ECHO LA VOLUME INDEX AREA LENGTH: 46 ML/M2 (ref 16–34)
ECHO LA VOLUME INDEX MOD A2C: 47 ML/M2 (ref 16–34)
ECHO LA VOLUME INDEX MOD A4C: 23 ML/M2 (ref 16–34)
ECHO LV E' LATERAL VELOCITY: 11.07 CM/S
ECHO LV E' SEPTAL VELOCITY: 8.14 CM/S
ECHO LV EF PHYSICIAN: 60 %
ECHO LV FRACTIONAL SHORTENING: 43 % (ref 28–44)
ECHO LV INTERNAL DIMENSION DIASTOLE INDEX: 2.64 CM/M2
ECHO LV INTERNAL DIMENSION DIASTOLIC: 4.7 CM (ref 3.9–5.3)
ECHO LV INTERNAL DIMENSION SYSTOLIC INDEX: 1.52 CM/M2
ECHO LV INTERNAL DIMENSION SYSTOLIC: 2.7 CM
ECHO LV IVSD: 0.9 CM (ref 0.6–0.9)
ECHO LV MASS 2D: 142.7 G (ref 67–162)
ECHO LV MASS INDEX 2D: 80.2 G/M2 (ref 43–95)
ECHO LV POSTERIOR WALL DIASTOLIC: 0.9 CM (ref 0.6–0.9)
ECHO LV RELATIVE WALL THICKNESS RATIO: 0.38
ECHO LVOT AREA: 3.5 CM2
ECHO LVOT DIAM: 2.1 CM
ECHO LVOT PEAK GRADIENT: 4 MMHG
ECHO LVOT PEAK VELOCITY: 0.9 M/S
ECHO MV A VELOCITY: 0.89 M/S
ECHO MV E VELOCITY: 0.8 M/S
ECHO MV E/A RATIO: 0.9
ECHO MV E/E' LATERAL: 7.23
ECHO MV E/E' RATIO (AVERAGED): 8.53
ECHO MV E/E' SEPTAL: 9.83
ECHO PV MAX VELOCITY: 0.8 M/S
ECHO PV PEAK GRADIENT: 3 MMHG
ECHO RIGHT VENTRICULAR SYSTOLIC PRESSURE (RVSP): 35 MMHG
ECHO RV FREE WALL PEAK S': 12.1 CM/S
ECHO RV TAPSE: 1.1 CM (ref 1.7–?)
ECHO TV REGURGITANT MAX VELOCITY: 2.58 M/S
ECHO TV REGURGITANT PEAK GRADIENT: 27 MMHG

## 2025-02-13 PROCEDURE — 6370000000 HC RX 637 (ALT 250 FOR IP): Performed by: HOSPITALIST

## 2025-02-13 PROCEDURE — 6360000002 HC RX W HCPCS: Performed by: STUDENT IN AN ORGANIZED HEALTH CARE EDUCATION/TRAINING PROGRAM

## 2025-02-13 PROCEDURE — 6360000002 HC RX W HCPCS: Performed by: HOSPITALIST

## 2025-02-13 PROCEDURE — 6370000000 HC RX 637 (ALT 250 FOR IP)

## 2025-02-13 PROCEDURE — 6360000002 HC RX W HCPCS: Performed by: EMERGENCY MEDICINE

## 2025-02-13 PROCEDURE — C8924 2D TTE W OR W/O FOL W/CON,FU: HCPCS

## 2025-02-13 PROCEDURE — 2700000000 HC OXYGEN THERAPY PER DAY

## 2025-02-13 PROCEDURE — C8929 TTE W OR WO FOL WCON,DOPPLER: HCPCS

## 2025-02-13 PROCEDURE — 1200000000 HC SEMI PRIVATE

## 2025-02-13 PROCEDURE — 6370000000 HC RX 637 (ALT 250 FOR IP): Performed by: STUDENT IN AN ORGANIZED HEALTH CARE EDUCATION/TRAINING PROGRAM

## 2025-02-13 PROCEDURE — 6360000004 HC RX CONTRAST MEDICATION: Performed by: INTERNAL MEDICINE

## 2025-02-13 PROCEDURE — 2500000003 HC RX 250 WO HCPCS: Performed by: STUDENT IN AN ORGANIZED HEALTH CARE EDUCATION/TRAINING PROGRAM

## 2025-02-13 PROCEDURE — 94760 N-INVAS EAR/PLS OXIMETRY 1: CPT

## 2025-02-13 RX ADMIN — Medication 100 MG: at 08:00

## 2025-02-13 RX ADMIN — PANTOPRAZOLE SODIUM 40 MG: 40 TABLET, DELAYED RELEASE ORAL at 05:57

## 2025-02-13 RX ADMIN — FUROSEMIDE 40 MG: 10 INJECTION, SOLUTION INTRAMUSCULAR; INTRAVENOUS at 08:00

## 2025-02-13 RX ADMIN — Medication 10 ML: at 08:06

## 2025-02-13 RX ADMIN — POLYETHYLENE GLYCOL 3350 17 G: 17 POWDER, FOR SOLUTION ORAL at 21:56

## 2025-02-13 RX ADMIN — SULFUR HEXAFLUORIDE 2 ML: KIT at 11:32

## 2025-02-13 RX ADMIN — PHENOBARBITAL 32.4 MG: 32.4 TABLET ORAL at 08:00

## 2025-02-13 RX ADMIN — Medication 10 ML: at 21:42

## 2025-02-13 RX ADMIN — PHENOBARBITAL 32.4 MG: 32.4 TABLET ORAL at 21:40

## 2025-02-13 RX ADMIN — PREGABALIN 150 MG: 100 CAPSULE ORAL at 14:02

## 2025-02-13 RX ADMIN — PREGABALIN 150 MG: 100 CAPSULE ORAL at 21:40

## 2025-02-13 RX ADMIN — POTASSIUM BICARBONATE 40 MEQ: 782 TABLET, EFFERVESCENT ORAL at 07:59

## 2025-02-13 RX ADMIN — BUPRENORPHINE HYDROCHLORIDE, NALOXONE HYDROCHLORIDE 1 FILM: 2; .5 FILM, SOLUBLE BUCCAL; SUBLINGUAL at 08:04

## 2025-02-13 RX ADMIN — PREGABALIN 150 MG: 100 CAPSULE ORAL at 08:00

## 2025-02-13 NOTE — PROGRESS NOTES
[facility]                                                                                          Hospitalist Progress Note  Jeanine Rodriguez PA-C  Answering service: [phone] OR [phone] from in house phone        Date of Service:  2025  NAME:  Maty Villarreal  :  1950  MRN:  [id number]       Admission Summary:   Maty Villarreal is a 74 y.o. female with a past medical history of cirrhosis, EtOH abuse with peripheral neuropathy, opioid abuse on suboxone, hx of alcohol pancreatitis who presented to ED on 2025 with multiple complaints. She reported worsening bilateral LE swelling over the last 3 months. Initially had swelling from her ankles to knee but now reports swelling up to her thighs. She also admitted to drinking about a pint of liquor daily and states she wants to undergo detox. She felt anxious and tremulous.  Denied any previous hx of chf. Denies pnd or orthopnea.       Interval history / Subjective:   Patient seen and examined on rounds. Patient states she feels well today, denies any tremors or anxiety. Denied lightheadedness, dizziness, CP, SOB, abdominal pain. States she is very motivated to attend rehab and looking forward to getting better. Patient has no further complaints at this time.    Patient medically stable for discharge to rehab facility.     Assessment & Plan:     Volume Overload / Bilateral LE Edema / Pulmonary Edema  -No previous ECHO on file for comparison  -ECHO (): EF of 60 - 65%, left ventricle size is normal, normal diastolic function, mild annular calcification of mitral valve  -No previous hx of CHF  -Lasix 40mg in ED - continue BID, watch creatinine  -Strict I&Os with daily weights     Hypoxia (Resolved)  -Likely secondary to above  -Continue diuresis  -Room air     ETOH Withdrawal  Abnormal LFT with slightly elevated bilirubin  Alcoholic Cirrhosis  -Continue phenobarb taper  -CIWA with valium  -Hepatology f/u  Oral Q6H PRN    furosemide (LASIX) injection 40 mg  40 mg IntraVENous Daily    sodium chloride flush 0.9 % injection 5-40 mL  5-40 mL IntraVENous 2 times per day    buprenorphine-naloxone (SUBOXONE) 2-0.5 MG SL film 1 Film  1 Film SubLINGual Daily    diazePAM (VALIUM) tablet 10 mg  10 mg Oral Once    thiamine tablet 100 mg  100 mg Oral Daily    LORazepam (ATIVAN) tablet 1 mg  1 mg Oral Q1H PRN    Or    diazePAM (VALIUM) injection 5 mg  5 mg IntraVENous Q4H PRN    Or    LORazepam (ATIVAN) tablet 2 mg  2 mg Oral Q1H PRN    Or    diazePAM (VALIUM) injection 5 mg  5 mg IntraVENous Q4H PRN    Or    LORazepam (ATIVAN) tablet 3 mg  3 mg Oral Q1H PRN    Or    diazePAM (VALIUM) injection 5 mg  5 mg IntraVENous Q4H PRN    Or    LORazepam (ATIVAN) tablet 4 mg  4 mg Oral Q1H PRN    Or    diazePAM (VALIUM) injection 5 mg  5 mg IntraVENous Q4H PRN    sodium chloride flush 0.9 % injection 5-40 mL  5-40 mL IntraVENous 2 times per day    sodium chloride flush 0.9 % injection 5-40 mL  5-40 mL IntraVENous PRN    0.9 % sodium chloride infusion   IntraVENous PRN    potassium chloride (KLOR-CON) extended release tablet 40 mEq  40 mEq Oral PRN    Or    potassium bicarb-citric acid (EFFER-K) effervescent tablet 40 mEq  40 mEq Oral PRN    Or    potassium chloride 10 mEq/100 mL IVPB (Peripheral Line)  10 mEq IntraVENous PRN    magnesium sulfate 2000 mg in 50 mL IVPB premix  2,000 mg IntraVENous PRN    ondansetron (ZOFRAN-ODT) disintegrating tablet 4 mg  4 mg Oral Q8H PRN    Or    ondansetron (ZOFRAN) injection 4 mg  4 mg IntraVENous Q6H PRN    polyethylene glycol (GLYCOLAX) packet 17 g  17 g Oral Daily PRN    heparin (porcine) injection 5,000 Units  5,000 Units SubCUTAneous 3 times per day     ______________________________________________________________________  EXPECTED LENGTH OF STAY: Unable to retrieve estimated LOS  ACTUAL LENGTH OF STAY:          2                 Jeanine Rodriguez PA-C

## 2025-02-13 NOTE — CARE COORDINATION
CM met with patient at bedside to introduce self and explain role. Patient lives alone in an apartment with elevator access on the 12th floor. Patient is independent at baseline with ADLs, IADls and ambulation. CM received consult for residential addiction treatment placement. Per Addiction Specialist, residential treatments programs recommended are the following: [facility], [facility] and [facility]. CM contacted [facility] with [facility] (p: [phone]). CM completed referral process; however, per Malia they only accept Medicaid and commercial insurance. Per Malia, intake will review referral; however, unlikely patient will be accepted due to [facility] Medicare insurance. CM called and spoke with [facility] Admissions Coordinator (p: [phone]); center only accepts Medicaid and commercial insurance. CM called and spoke with [facility] [employer] [facility] Admissions Coordinator (p: [phone]); center only accepts Medicaid. Patient aware and appreciative of support provided. CM will continue to follow as needed.    SHANA Daly   [phone]

## 2025-02-13 NOTE — PROGRESS NOTES
4 Eyes Skin Assessment     NAME:  Maty Villarreal  YOB: 1950  MEDICAL RECORD NUMBER:  [id number]    The patient is being assessed for  Admission    I agree that at least one RN has performed a thorough Head to Toe Skin Assessment on the patient. ALL assessment sites listed below have been assessed.      Areas assessed by both nurses:    Head, Face, Ears, Shoulders, Back, Chest, Arms, Elbows, Hands, Sacrum. Buttock, Coccyx, Ischium, Legs. Feet and Heels, and Under Medical Devices         Does the Patient have a Wound? No noted wound(s)       Henry Prevention initiated by RN: Yes  Wound Care Orders initiated by RN: No    Pressure Injury (Stage 3,4, Unstageable, DTI, NWPT, and Complex wounds) if present, place Wound referral order by RN under : No    New Ostomies, if present place, Ostomy referral order under : No     Nurse 1 eSignature: Electronically signed by Marta Marrero RN on 2/12/25 at 7:09 PM EST    **SHARE this note so that the co-signing nurse can place an eSignature**    Nurse 2 eSignature: Electronically signed by Teresa Lyles RN on 2/12/25 at 7:10 PM EST

## 2025-02-13 NOTE — PROGRESS NOTES
Occupational Therapy   02.13.2025    Chart reviewed in prep for OT treatment, US at bedside. Will defer and follow up as able and medically appropriate. Thank you.     Chula Mccallum MS, OTR/L

## 2025-02-14 VITALS
OXYGEN SATURATION: 96 % | SYSTOLIC BLOOD PRESSURE: 100 MMHG | HEART RATE: 78 BPM | BODY MASS INDEX: 29.02 KG/M2 | HEIGHT: 63 IN | WEIGHT: 163.8 LBS | RESPIRATION RATE: 18 BRPM | TEMPERATURE: 97.9 F | DIASTOLIC BLOOD PRESSURE: 63 MMHG

## 2025-02-14 LAB
ALBUMIN SERPL-MCNC: 2.4 G/DL (ref 3.5–5)
ALBUMIN/GLOB SERPL: 0.5 (ref 1.1–2.2)
ALP SERPL-CCNC: 105 U/L (ref 45–117)
ALT SERPL-CCNC: 41 U/L (ref 12–78)
ANION GAP SERPL CALC-SCNC: 4 MMOL/L (ref 2–12)
AST SERPL-CCNC: 99 U/L (ref 15–37)
BILIRUB SERPL-MCNC: 1.5 MG/DL (ref 0.2–1)
BUN SERPL-MCNC: 11 MG/DL (ref 6–20)
BUN/CREAT SERPL: 15 (ref 12–20)
CALCIUM SERPL-MCNC: 8.8 MG/DL (ref 8.5–10.1)
CHLORIDE SERPL-SCNC: 100 MMOL/L (ref 97–108)
CO2 SERPL-SCNC: 32 MMOL/L (ref 21–32)
CREAT SERPL-MCNC: 0.73 MG/DL (ref 0.55–1.02)
ERYTHROCYTE [DISTWIDTH] IN BLOOD BY AUTOMATED COUNT: 17.3 % (ref 11.5–14.5)
GLOBULIN SER CALC-MCNC: 4.4 G/DL (ref 2–4)
GLUCOSE SERPL-MCNC: 103 MG/DL (ref 65–100)
HCT VFR BLD AUTO: 29.7 % (ref 35–47)
HGB BLD-MCNC: 9.3 G/DL (ref 11.5–16)
MCH RBC QN AUTO: 26.6 PG (ref 26–34)
MCHC RBC AUTO-ENTMCNC: 31.3 G/DL (ref 30–36.5)
MCV RBC AUTO: 85.1 FL (ref 80–99)
NRBC # BLD: 0 K/UL (ref 0–0.01)
NRBC BLD-RTO: 0 PER 100 WBC
PLATELET # BLD AUTO: 118 K/UL (ref 150–400)
PMV BLD AUTO: 11.6 FL (ref 8.9–12.9)
POTASSIUM SERPL-SCNC: 3.6 MMOL/L (ref 3.5–5.1)
PROT SERPL-MCNC: 6.8 G/DL (ref 6.4–8.2)
RBC # BLD AUTO: 3.49 M/UL (ref 3.8–5.2)
SODIUM SERPL-SCNC: 136 MMOL/L (ref 136–145)
WBC # BLD AUTO: 5.9 K/UL (ref 3.6–11)

## 2025-02-14 PROCEDURE — 6370000000 HC RX 637 (ALT 250 FOR IP): Performed by: HOSPITALIST

## 2025-02-14 PROCEDURE — 6370000000 HC RX 637 (ALT 250 FOR IP): Performed by: STUDENT IN AN ORGANIZED HEALTH CARE EDUCATION/TRAINING PROGRAM

## 2025-02-14 PROCEDURE — 85027 COMPLETE CBC AUTOMATED: CPT

## 2025-02-14 PROCEDURE — 6360000002 HC RX W HCPCS: Performed by: EMERGENCY MEDICINE

## 2025-02-14 PROCEDURE — 6360000002 HC RX W HCPCS: Performed by: HOSPITALIST

## 2025-02-14 PROCEDURE — 2500000003 HC RX 250 WO HCPCS

## 2025-02-14 PROCEDURE — 80053 COMPREHEN METABOLIC PANEL: CPT

## 2025-02-14 PROCEDURE — 2500000003 HC RX 250 WO HCPCS: Performed by: STUDENT IN AN ORGANIZED HEALTH CARE EDUCATION/TRAINING PROGRAM

## 2025-02-14 PROCEDURE — 6370000000 HC RX 637 (ALT 250 FOR IP): Performed by: NURSE PRACTITIONER

## 2025-02-14 RX ORDER — POTASSIUM CHLORIDE 3 G/15ML
15 SOLUTION ORAL DAILY
Qty: 600 ML | Refills: 0 | Status: SHIPPED | OUTPATIENT
Start: 2025-02-14 | End: 2025-05-15

## 2025-02-14 RX ORDER — POTASSIUM CHLORIDE 1500 MG/1
40 TABLET, EXTENDED RELEASE ORAL PRN
Qty: 60 TABLET | Refills: 3 | Status: CANCELLED | OUTPATIENT
Start: 2025-02-14

## 2025-02-14 RX ORDER — BUPRENORPHINE HYDROCHLORIDE AND NALOXONE HYDROCHLORIDE DIHYDRATE 2; .5 MG/1; MG/1
1 TABLET SUBLINGUAL DAILY
Qty: 90 TABLET | Refills: 0 | Status: SHIPPED | OUTPATIENT
Start: 2025-02-14 | End: 2025-05-15

## 2025-02-14 RX ORDER — PHENOBARBITAL 16.2 MG/1
TABLET ORAL
Qty: 6 TABLET | Refills: 0 | Status: CANCELLED | OUTPATIENT
Start: 2025-02-14 | End: 2025-02-15

## 2025-02-14 RX ORDER — PANTOPRAZOLE SODIUM 40 MG/1
40 TABLET, DELAYED RELEASE ORAL
Qty: 30 TABLET | Refills: 3 | Status: SHIPPED | OUTPATIENT
Start: 2025-02-15

## 2025-02-14 RX ADMIN — Medication 10 ML: at 09:41

## 2025-02-14 RX ADMIN — POLYETHYLENE GLYCOL 3350 17 G: 17 POWDER, FOR SOLUTION ORAL at 10:04

## 2025-02-14 RX ADMIN — PHENOBARBITAL 32.4 MG: 32.4 TABLET ORAL at 09:40

## 2025-02-14 RX ADMIN — Medication 100 MG: at 09:40

## 2025-02-14 RX ADMIN — PREGABALIN 150 MG: 100 CAPSULE ORAL at 09:41

## 2025-02-14 RX ADMIN — BUPRENORPHINE HYDROCHLORIDE, NALOXONE HYDROCHLORIDE 1 FILM: 2; .5 FILM, SOLUBLE BUCCAL; SUBLINGUAL at 09:40

## 2025-02-14 RX ADMIN — PANTOPRAZOLE SODIUM 40 MG: 40 TABLET, DELAYED RELEASE ORAL at 06:42

## 2025-02-14 RX ADMIN — MAGNESIUM HYDROXIDE 30 ML: 400 SUSPENSION ORAL at 10:04

## 2025-02-14 RX ADMIN — FUROSEMIDE 40 MG: 10 INJECTION, SOLUTION INTRAMUSCULAR; INTRAVENOUS at 09:41

## 2025-02-14 NOTE — CARE COORDINATION
Care Management Initial Assessment       RUR: 14% Low   Readmission? No  1st IM letter given? No  1st  letter given: NA    CM met with patient at bedside to introduce self and explain role. Patient lives alone in an apartment with elevator access on the 12th floor. Patient is independent at baseline with ADLs, IADls and ambulation. CM received consult for residential addiction treatment placement. Per Addiction Specialist, residential treatments programs recommended are the following: [facility], [facility] and [facility]. CM contacted Maliajolene Nuñez with [facility] (p: [phone]). CM completed referral process; however, per Malia they only accept Medicaid and commercial insurance. Per [place], intake will review referral; however, unlikely patient will be accepted due to [facility] Medicare insurance. CM called and spoke with [facility] Admissions Coordinator (p: [phone]); center only accepts Medicaid and commercial insurance. CM called and spoke with [facility] Admissions Coordinator (p: [phone]); center only accepts Medicaid. Patient aware and appreciative of support provided. CM will continue to follow as needed.      02/14/25 7444   Service Assessment   Patient Orientation Alert and Oriented;Person;Place;Situation;Self   Cognition Alert   History Provided By Patient   Primary Caregiver Self   Accompanied By/Relationship Self   Support Systems Friends/Neighbors   Patient's Healthcare Decision Maker is: Legal Next of Kin   PCP Verified by CM Yes   Last Visit to PCP Within last 3 months   Prior Functional Level Independent in ADLs/IADLs   Current Functional Level Independent in ADLs/IADLs   Can patient return to prior living arrangement Yes   Ability to make needs known: Good   Family able to assist with home care needs: No   Would you like for me to discuss the discharge plan with any other  family members/significant others, and if so, who? No   Financial Resources Medicare   Social/Functional History   Lives With Alone   Type of Home Apartment   Home Layout One level   Home Access Elevator   Bathroom Equipment None   Home Equipment None   Prior Level of Assist for ADLs Independent   Prior Level of Assist for Homemaking Independent   Ambulation Assistance Independent   Prior Level of Assist for Transfers Independent   Occupation Retired   Discharge Planning   Type of Residence Apartment   Living Arrangements Alone   Current Services Prior To Admission None   Potential Assistance Needed N/A   DME Ordered? No   Potential Assistance Purchasing Medications No   Type of Home Care Services None   Patient expects to be discharged to: [facility]     SHANA Daly   [phone]

## 2025-02-14 NOTE — DISCHARGE INSTRUCTIONS
Discharge Instructions       PATIENT ID: Maty Villarreal  MRN: [id number]   YOB: 1950    DATE OF ADMISSION: 2/11/2025   DATE OF DISCHARGE: 2/14/2025    PRIMARY CARE PROVIDER: Jacy Perez     ATTENDING PHYSICIAN: Avila Wilson MD   DISCHARGING PROVIDER: NE Amaya CNP    To contact this individual call [phone] and ask the  to page.   If unavailable ask to be transferred the Adult Hospitalist Department.    DISCHARGE DIAGNOSES volume overload    CONSULTATIONS: [unfilled]    PROCEDURES/SURGERIES: * No surgery found *    PENDING TEST RESULTS:   At the time of discharge the following test results are still pending: none    FOLLOW UP APPOINTMENTS:   [facility]     ADDITIONAL CARE RECOMMENDATIONS: none    DIET: regular diet  Oral Nutritional Supplements: Ensure High Proonce a day    ACTIVITY: activity as tolerated    WOUND CARE: none    EQUIPMENT needed: none      DISCHARGE MEDICATIONS:   See Medication Reconciliation Form    It is important that you take the medication exactly as they are prescribed.   Keep your medication in the bottles provided by the pharmacist and keep a list of the medication names, dosages, and times to be taken in your wallet.   Do not take other medications without consulting your doctor.       NOTIFY YOUR PHYSICIAN FOR ANY OF THE FOLLOWING:   Fever over 101 degrees for 24 hours.   Chest pain, shortness of breath, fever, chills, nausea, vomiting, diarrhea, change in mentation, falling, weakness, bleeding. Severe pain or pain not relieved by medications.  Or, any other signs or symptoms that you may have questions about.      DISPOSITION:    Home With:   OT  PT  HH  RN       SNF/Inpatient Rehab/LTAC    Independent/assisted living    Hospice    Other:     CDMP Checked:   Yes y     PROBLEM LIST Updated:  Yes y       Signed:   NE Amaya CNP  2/14/2025  10:03 AM

## 2025-02-14 NOTE — DISCHARGE SUMMARY
Discharge Summary       PATIENT ID: Maty Villarreal  MRN: [id number]   YOB: 1950    DATE OF ADMISSION: 2/11/2025  4:55 PM    DATE OF DISCHARGE: 02/14/25     PRIMARY CARE PROVIDER: Jacy Perez APRN - [place]     ATTENDING PHYSICIAN: Dr. Avila Wilson  DISCHARGING PROVIDER: NE Amaya - CNP    To contact this individual call [phone] and ask the  to page.  If unavailable ask to be transferred the Adult Hospitalist Department.    CONSULTATIONS: IP CONSULT TO SOCIAL WORK  IP CONSULT TO BSMART  IP CONSULT TO SOCIAL WORK  IP CONSULT TO ADDICTION MEDICINE    PROCEDURES/SURGERIES: * No surgery found *     ADMITTING DIAGNOSES & HOSPITAL COURSE:   Maty Villarreal is a 74 y.o. female with a past medical history of cirrhosis, EtOH abuse with peripheral neuropathy, opioid abuse on suboxone, hx of alcohol pancreatitis who presented to ED on 2/11/2025 with multiple complaints. She reported worsening bilateral LE swelling over the last 3 months. Initially had swelling from her ankles to knee but now reports swelling up to her thighs. She also admitted to drinking about a pint of liquor daily and states she wants to undergo detox. She felt anxious and tremulous.  Denied any previous hx of chf. Denies pnd or orthopnea.     Volume Overload / Bilateral LE Edema / Pulmonary Edema  -No previous ECHO on file for comparison  -ECHO (2/13): EF of 60 - 65%, left ventricle size is normal, normal diastolic function, mild annular calcification of mitral valve  -No previous hx of CHF  -Lasix 40mg in ED - continue BID, watch creatinine  -Strict I&Os with daily weights     Hypoxia (Resolved)  -Likely secondary to above  -Continue diuresis  -Room air     ETOH Withdrawal  Abnormal LFT with slightly elevated bilirubin  Alcoholic Cirrhosis  -Continue phenobarb taper  -CIWA with valium  -Hepatology f/u outpatient     Opoiod Abuse  -Continue home suboxone  -Addiction medicine consulted: She is a good  candidate to go to residential addiction treatment - which is what she wants to do - has previously completed treatment at [facility] [place]. Gave 2 options for discharge, this has been passed along to case management.         DISCHARGE DIAGNOSES / PLAN:       Alcoholic Cirrhosis, outpatient substance abuse treatment, [facility] with [facility]  Volume overload, continue with lasix daily and potassium supplements  Peripheral Neuropathy, continue with lyrica       PENDING TEST RESULTS:   At the time of discharge the following test results are still pending: none    FOLLOW UP APPOINTMENTS:    Follow-up Information       Follow up With Specialties Details Why Contact Info    Jacy Perez APRN - NP Nurse Practitioner Follow up in 1 week(s) following recent hospitalization [address] [address] [phone]      Kofi Amaya MD Hepatology, Gastroenterology Schedule an appointment as soon as possible for a visit follow up from hospitalization [address] [facility]  [address] [place] [address] [id number] [phone]                ADDITIONAL CARE RECOMMENDATIONS: none    DIET: regular diet  Oral Nutritional Supplements: Ensure High Proonce a day    ACTIVITY: activity as tolerated    WOUND CARE: none    EQUIPMENT needed: none      DISCHARGE MEDICATIONS:     Medication List        START taking these medications      buprenorphine-naloxone 2-0.5 MG Subl  Commonly known as: SUBOXONE  Place 1 tablet under the tongue daily for 90 days. Max Daily Amount: 1 tablet     pantoprazole 40 MG tablet  Commonly known as: PROTONIX  Take 1 tablet by mouth every morning (before breakfast)  Start taking on: February 15, 2025     Potassium Chloride 40 MEQ/15ML (20%) Soln  Take 15 mLs by mouth daily            CONTINUE taking these medications      potassium chloride 20 MEQ extended release tablet  Commonly known as: KLOR-CON M  Take 1 tablet by mouth 2 times daily for 5 days     pregabalin 150 MG

## 2025-02-24 ENCOUNTER — APPOINTMENT (OUTPATIENT)
Facility: HOSPITAL | Age: 75
End: 2025-02-24
Payer: MEDICARE

## 2025-02-24 ENCOUNTER — HOSPITAL ENCOUNTER (EMERGENCY)
Facility: HOSPITAL | Age: 75
Discharge: HOME | End: 2025-02-24
Attending: EMERGENCY MEDICINE
Payer: MEDICARE

## 2025-02-24 ENCOUNTER — APPOINTMENT (OUTPATIENT)
Facility: HOSPITAL | Age: 75
End: 2025-02-24
Attending: EMERGENCY MEDICINE
Payer: MEDICARE

## 2025-02-24 VITALS
RESPIRATION RATE: 24 BRPM | HEART RATE: 92 BPM | SYSTOLIC BLOOD PRESSURE: 126 MMHG | DIASTOLIC BLOOD PRESSURE: 108 MMHG | WEIGHT: 183.42 LBS | BODY MASS INDEX: 32.5 KG/M2 | HEIGHT: 63 IN | TEMPERATURE: 98.8 F | OXYGEN SATURATION: 95 %

## 2025-02-24 DIAGNOSIS — R60.0 LOWER EXTREMITY EDEMA: Primary | ICD-10-CM

## 2025-02-24 LAB
ALBUMIN SERPL-MCNC: 2.5 G/DL (ref 3.5–5)
ALBUMIN/GLOB SERPL: 0.5 (ref 1.1–2.2)
ALP SERPL-CCNC: 149 U/L (ref 45–117)
ALT SERPL-CCNC: 37 U/L (ref 12–78)
ANION GAP SERPL CALC-SCNC: 7 MMOL/L (ref 2–12)
AST SERPL-CCNC: 101 U/L (ref 15–37)
BILIRUB SERPL-MCNC: 0.7 MG/DL (ref 0.2–1)
BUN SERPL-MCNC: 14 MG/DL (ref 6–20)
BUN/CREAT SERPL: 25 (ref 12–20)
CALCIUM SERPL-MCNC: 8.6 MG/DL (ref 8.5–10.1)
CHLORIDE SERPL-SCNC: 108 MMOL/L (ref 97–108)
CO2 SERPL-SCNC: 27 MMOL/L (ref 21–32)
COMMENT:: NORMAL
CREAT SERPL-MCNC: 0.57 MG/DL (ref 0.55–1.02)
EKG ATRIAL RATE: 90 BPM
EKG DIAGNOSIS: NORMAL
EKG P AXIS: 39 DEGREES
EKG P-R INTERVAL: 140 MS
EKG Q-T INTERVAL: 388 MS
EKG QRS DURATION: 70 MS
EKG QTC CALCULATION (BAZETT): 474 MS
EKG R AXIS: -1 DEGREES
EKG T AXIS: 17 DEGREES
EKG VENTRICULAR RATE: 90 BPM
GLOBULIN SER CALC-MCNC: 4.9 G/DL (ref 2–4)
GLUCOSE SERPL-MCNC: 118 MG/DL (ref 65–100)
NT PRO BNP: 145 PG/ML
POTASSIUM SERPL-SCNC: 3.3 MMOL/L (ref 3.5–5.1)
PROT SERPL-MCNC: 7.4 G/DL (ref 6.4–8.2)
SODIUM SERPL-SCNC: 142 MMOL/L (ref 136–145)
SPECIMEN HOLD: NORMAL
TROPONIN I SERPL HS-MCNC: 8 NG/L (ref 0–51)

## 2025-02-24 PROCEDURE — 93005 ELECTROCARDIOGRAM TRACING: CPT | Performed by: EMERGENCY MEDICINE

## 2025-02-24 PROCEDURE — 99285 EMERGENCY DEPT VISIT HI MDM: CPT

## 2025-02-24 PROCEDURE — 71045 X-RAY EXAM CHEST 1 VIEW: CPT

## 2025-02-24 PROCEDURE — 84484 ASSAY OF TROPONIN QUANT: CPT

## 2025-02-24 PROCEDURE — 36415 COLL VENOUS BLD VENIPUNCTURE: CPT

## 2025-02-24 PROCEDURE — 83880 ASSAY OF NATRIURETIC PEPTIDE: CPT

## 2025-02-24 PROCEDURE — 93970 EXTREMITY STUDY: CPT

## 2025-02-24 PROCEDURE — 80053 COMPREHEN METABOLIC PANEL: CPT

## 2025-02-24 RX ORDER — FUROSEMIDE 20 MG/1
20 TABLET ORAL 2 TIMES DAILY
Qty: 14 TABLET | Refills: 0 | Status: SHIPPED | OUTPATIENT
Start: 2025-02-24 | End: 2025-03-03

## 2025-02-24 RX ORDER — POTASSIUM CHLORIDE 750 MG/1
10 TABLET, EXTENDED RELEASE ORAL 2 TIMES DAILY
Qty: 14 TABLET | Refills: 0 | Status: SHIPPED | OUTPATIENT
Start: 2025-02-24 | End: 2025-03-03

## 2025-02-24 ASSESSMENT — PAIN - FUNCTIONAL ASSESSMENT: PAIN_FUNCTIONAL_ASSESSMENT: NONE - DENIES PAIN

## 2025-02-24 NOTE — ED PROVIDER NOTES
[facility] EMERGENCY DEPARTMENT  EMERGENCY DEPARTMENT ENCOUNTER      Patient Name: Maty Villarreal  MRN: [id number]  Birthdate 1950  Date of Evaluation: 2/24/2025  Physician: Paramjit Chester MD    CHIEF COMPLAINT       Chief Complaint   Patient presents with    Leg Swelling       HISTORY OF PRESENT ILLNESS   (Location/Symptom, Timing/Onset, Context/Setting, Quality, Duration, Modifying Factors, Severity)   Maty Villarreal, 74 y.o., female     74-year-old female with a history of alcohol abuse, cirrhosis, recent fluid overload presents with lower extremity edema and shortness of breath.  Patient reports symptoms have been getting worse and she was recently discharged.  She states that her home diuretic does not work.  She states that the swelling has now progressed above the knee.          Nursing Notes were reviewed.    REVIEW OF SYSTEMS    (Not required)   Review of Systems    Except as noted above the remainder of the review of systems was reviewed and negative.     PAST MEDICAL HISTORY     Past Medical History:   Diagnosis Date    Alcohol use disorder     Arthritis     OSTEO    Chronic pain     Cirrhosis (HCC)     Hepatitis C     viral load negative sept 2024 bshsi    Neuropathy     Opioid use disorder     Tuberculosis 1962    +HAI TEST # 3; TREATED AND NOW LUNGS HAVE ALWAYS BEEN CLEAR       SURGICAL HISTORY       Past Surgical History:   Procedure Laterality Date    ANUS SURGERY  6/7/2023    SPHINCTEROTOMY SPHINCTEROPLASTY performed by David Rojas MD at [facility] ENDOSCOPY    COLONOSCOPY N/A 8/25/2022    COLONOSCOPY performed by Pal Alan MD at [facility] ENDOSCOPY    COLONOSCOPY      HISTORY OF POLYP    COLONOSCOPY N/A 9/5/2024    COLONOSCOPY DIAGNOSTIC performed by Adele Anderson MD at [facility] ENDOSCOPY    ERCP N/A 6/7/2023    ERCP ENDOSCOPIC RETROGRADE CHOLANGIOPANCREATOGRAPHY performed by David Rojas MD at [facility] ENDOSCOPY    ERCP N/A 6/7/2023    ERCP BALLOON SWEEP performed by David Rojas MD at [facility]

## 2025-02-24 NOTE — ED TRIAGE NOTES
Patient was admitted last month for leg swelling and shortness of breath. The swelling has continued and has progressed to above the knee. Denies chest pain.

## 2025-02-27 LAB — ECHO BSA: 1.92 M2

## 2025-03-09 ENCOUNTER — APPOINTMENT (OUTPATIENT)
Facility: HOSPITAL | Age: 75
DRG: 206 | End: 2025-03-09
Payer: MEDICARE

## 2025-03-09 ENCOUNTER — HOSPITAL ENCOUNTER (INPATIENT)
Facility: HOSPITAL | Age: 75
LOS: 3 days | Discharge: HOME | DRG: 206 | End: 2025-03-12
Attending: EMERGENCY MEDICINE | Admitting: HOSPITALIST
Payer: MEDICARE

## 2025-03-09 DIAGNOSIS — R60.9 EDEMA, UNSPECIFIED TYPE: Primary | ICD-10-CM

## 2025-03-09 DIAGNOSIS — G62.9 NEUROPATHY: ICD-10-CM

## 2025-03-09 DIAGNOSIS — R09.02 HYPOXEMIA: ICD-10-CM

## 2025-03-09 LAB
ALBUMIN SERPL-MCNC: 2.7 G/DL (ref 3.5–5)
ALBUMIN/GLOB SERPL: 0.6 (ref 1.1–2.2)
ALP SERPL-CCNC: 145 U/L (ref 45–117)
ALT SERPL-CCNC: 42 U/L (ref 12–78)
ANION GAP SERPL CALC-SCNC: 8 MMOL/L (ref 2–12)
AST SERPL-CCNC: 117 U/L (ref 15–37)
B PERT DNA SPEC QL NAA+PROBE: NOT DETECTED
BASOPHILS # BLD: 0.04 K/UL (ref 0–0.1)
BASOPHILS NFR BLD: 0.7 % (ref 0–1)
BILIRUB SERPL-MCNC: 0.9 MG/DL (ref 0.2–1)
BORDETELLA PARAPERTUSSIS BY PCR: NOT DETECTED
BUN SERPL-MCNC: 12 MG/DL (ref 6–20)
BUN/CREAT SERPL: 18 (ref 12–20)
C PNEUM DNA SPEC QL NAA+PROBE: NOT DETECTED
CALCIUM SERPL-MCNC: 8.3 MG/DL (ref 8.5–10.1)
CHLORIDE SERPL-SCNC: 108 MMOL/L (ref 97–108)
CO2 SERPL-SCNC: 26 MMOL/L (ref 21–32)
COMMENT:: NORMAL
CREAT SERPL-MCNC: 0.65 MG/DL (ref 0.55–1.02)
DIFFERENTIAL METHOD BLD: ABNORMAL
EKG ATRIAL RATE: 96 BPM
EKG DIAGNOSIS: NORMAL
EKG P AXIS: 68 DEGREES
EKG P-R INTERVAL: 140 MS
EKG Q-T INTERVAL: 388 MS
EKG QRS DURATION: 76 MS
EKG QTC CALCULATION (BAZETT): 490 MS
EKG R AXIS: 22 DEGREES
EKG T AXIS: 29 DEGREES
EKG VENTRICULAR RATE: 96 BPM
EOSINOPHIL # BLD: 0.32 K/UL (ref 0–0.4)
EOSINOPHIL NFR BLD: 5.6 % (ref 0–7)
ERYTHROCYTE [DISTWIDTH] IN BLOOD BY AUTOMATED COUNT: 20.3 % (ref 11.5–14.5)
FLUAV SUBTYP SPEC NAA+PROBE: NOT DETECTED
FLUBV RNA SPEC QL NAA+PROBE: NOT DETECTED
GLOBULIN SER CALC-MCNC: 4.9 G/DL (ref 2–4)
GLUCOSE SERPL-MCNC: 103 MG/DL (ref 65–100)
HADV DNA SPEC QL NAA+PROBE: NOT DETECTED
HCOV 229E RNA SPEC QL NAA+PROBE: NOT DETECTED
HCOV HKU1 RNA SPEC QL NAA+PROBE: NOT DETECTED
HCOV NL63 RNA SPEC QL NAA+PROBE: NOT DETECTED
HCOV OC43 RNA SPEC QL NAA+PROBE: NOT DETECTED
HCT VFR BLD AUTO: 30.9 % (ref 35–47)
HGB BLD-MCNC: 9.7 G/DL (ref 11.5–16)
HMPV RNA SPEC QL NAA+PROBE: NOT DETECTED
HPIV1 RNA SPEC QL NAA+PROBE: NOT DETECTED
HPIV2 RNA SPEC QL NAA+PROBE: NOT DETECTED
HPIV3 RNA SPEC QL NAA+PROBE: NOT DETECTED
HPIV4 RNA SPEC QL NAA+PROBE: NOT DETECTED
IMM GRANULOCYTES # BLD AUTO: 0.04 K/UL (ref 0–0.04)
IMM GRANULOCYTES NFR BLD AUTO: 0.7 % (ref 0–0.5)
LYMPHOCYTES # BLD: 1.17 K/UL (ref 0.8–3.5)
LYMPHOCYTES NFR BLD: 20.5 % (ref 12–49)
M PNEUMO DNA SPEC QL NAA+PROBE: NOT DETECTED
MCH RBC QN AUTO: 25.9 PG (ref 26–34)
MCHC RBC AUTO-ENTMCNC: 31.4 G/DL (ref 30–36.5)
MCV RBC AUTO: 82.4 FL (ref 80–99)
MONOCYTES # BLD: 0.91 K/UL (ref 0–1)
MONOCYTES NFR BLD: 16 % (ref 5–13)
NEUTS SEG # BLD: 3.22 K/UL (ref 1.8–8)
NEUTS SEG NFR BLD: 56.5 % (ref 32–75)
NRBC # BLD: 0 K/UL (ref 0–0.01)
NRBC BLD-RTO: 0 PER 100 WBC
NT PRO BNP: 231 PG/ML
PLATELET # BLD AUTO: 124 K/UL (ref 150–400)
PMV BLD AUTO: 10.6 FL (ref 8.9–12.9)
POTASSIUM SERPL-SCNC: 3.2 MMOL/L (ref 3.5–5.1)
PROT SERPL-MCNC: 7.6 G/DL (ref 6.4–8.2)
RBC # BLD AUTO: 3.75 M/UL (ref 3.8–5.2)
RBC MORPH BLD: ABNORMAL
RSV RNA SPEC QL NAA+PROBE: NOT DETECTED
RV+EV RNA SPEC QL NAA+PROBE: NOT DETECTED
SARS-COV-2 RNA RESP QL NAA+PROBE: NOT DETECTED
SODIUM SERPL-SCNC: 142 MMOL/L (ref 136–145)
SPECIMEN HOLD: NORMAL
TROPONIN I SERPL HS-MCNC: 15 NG/L (ref 0–51)
WBC # BLD AUTO: 5.7 K/UL (ref 3.6–11)

## 2025-03-09 PROCEDURE — 84484 ASSAY OF TROPONIN QUANT: CPT

## 2025-03-09 PROCEDURE — 93005 ELECTROCARDIOGRAM TRACING: CPT | Performed by: EMERGENCY MEDICINE

## 2025-03-09 PROCEDURE — 99285 EMERGENCY DEPT VISIT HI MDM: CPT

## 2025-03-09 PROCEDURE — 71045 X-RAY EXAM CHEST 1 VIEW: CPT

## 2025-03-09 PROCEDURE — 71275 CT ANGIOGRAPHY CHEST: CPT

## 2025-03-09 PROCEDURE — 6370000000 HC RX 637 (ALT 250 FOR IP): Performed by: NURSE PRACTITIONER

## 2025-03-09 PROCEDURE — 0202U NFCT DS 22 TRGT SARS-COV-2: CPT

## 2025-03-09 PROCEDURE — 96374 THER/PROPH/DIAG INJ IV PUSH: CPT

## 2025-03-09 PROCEDURE — 93010 ELECTROCARDIOGRAM REPORT: CPT | Performed by: INTERNAL MEDICINE

## 2025-03-09 PROCEDURE — 93970 EXTREMITY STUDY: CPT

## 2025-03-09 PROCEDURE — HZ2ZZZZ DETOXIFICATION SERVICES FOR SUBSTANCE ABUSE TREATMENT: ICD-10-PCS | Performed by: NURSE PRACTITIONER

## 2025-03-09 PROCEDURE — 6360000004 HC RX CONTRAST MEDICATION: Performed by: HOSPITALIST

## 2025-03-09 PROCEDURE — 1100000000 HC RM PRIVATE

## 2025-03-09 PROCEDURE — 80053 COMPREHEN METABOLIC PANEL: CPT

## 2025-03-09 PROCEDURE — 6360000002 HC RX W HCPCS: Performed by: EMERGENCY MEDICINE

## 2025-03-09 PROCEDURE — 83880 ASSAY OF NATRIURETIC PEPTIDE: CPT

## 2025-03-09 PROCEDURE — 85025 COMPLETE CBC W/AUTO DIFF WBC: CPT

## 2025-03-09 PROCEDURE — 6360000002 HC RX W HCPCS: Performed by: NURSE PRACTITIONER

## 2025-03-09 PROCEDURE — 2500000003 HC RX 250 WO HCPCS: Performed by: NURSE PRACTITIONER

## 2025-03-09 RX ORDER — POTASSIUM CHLORIDE 750 MG/1
40 TABLET, EXTENDED RELEASE ORAL PRN
Status: DISCONTINUED | OUTPATIENT
Start: 2025-03-09 | End: 2025-03-12 | Stop reason: HOSPADM

## 2025-03-09 RX ORDER — BUPRENORPHINE HYDROCHLORIDE AND NALOXONE HYDROCHLORIDE DIHYDRATE 2; .5 MG/1; MG/1
1 TABLET SUBLINGUAL DAILY
Status: DISCONTINUED | OUTPATIENT
Start: 2025-03-09 | End: 2025-03-12 | Stop reason: HOSPADM

## 2025-03-09 RX ORDER — ENOXAPARIN SODIUM 100 MG/ML
40 INJECTION SUBCUTANEOUS DAILY
Status: DISCONTINUED | OUTPATIENT
Start: 2025-03-09 | End: 2025-03-12 | Stop reason: HOSPADM

## 2025-03-09 RX ORDER — LANOLIN ALCOHOL/MO/W.PET/CERES
100 CREAM (GRAM) TOPICAL DAILY
Status: DISCONTINUED | OUTPATIENT
Start: 2025-03-09 | End: 2025-03-12 | Stop reason: HOSPADM

## 2025-03-09 RX ORDER — ACETAMINOPHEN 325 MG/1
650 TABLET ORAL EVERY 6 HOURS PRN
Status: DISCONTINUED | OUTPATIENT
Start: 2025-03-09 | End: 2025-03-12 | Stop reason: HOSPADM

## 2025-03-09 RX ORDER — PREGABALIN 75 MG/1
150 CAPSULE ORAL 3 TIMES DAILY
Status: DISCONTINUED | OUTPATIENT
Start: 2025-03-09 | End: 2025-03-12 | Stop reason: HOSPADM

## 2025-03-09 RX ORDER — SODIUM CHLORIDE 0.9 % (FLUSH) 0.9 %
5-40 SYRINGE (ML) INJECTION PRN
Status: DISCONTINUED | OUTPATIENT
Start: 2025-03-09 | End: 2025-03-12 | Stop reason: HOSPADM

## 2025-03-09 RX ORDER — LORAZEPAM 2 MG/ML
3 INJECTION INTRAMUSCULAR
Status: DISCONTINUED | OUTPATIENT
Start: 2025-03-09 | End: 2025-03-12 | Stop reason: HOSPADM

## 2025-03-09 RX ORDER — LORAZEPAM 1 MG/1
4 TABLET ORAL
Status: DISCONTINUED | OUTPATIENT
Start: 2025-03-09 | End: 2025-03-12 | Stop reason: HOSPADM

## 2025-03-09 RX ORDER — LORAZEPAM 1 MG/1
2 TABLET ORAL
Status: DISCONTINUED | OUTPATIENT
Start: 2025-03-09 | End: 2025-03-12 | Stop reason: HOSPADM

## 2025-03-09 RX ORDER — LORAZEPAM 1 MG/1
3 TABLET ORAL
Status: DISCONTINUED | OUTPATIENT
Start: 2025-03-09 | End: 2025-03-12 | Stop reason: HOSPADM

## 2025-03-09 RX ORDER — IOPAMIDOL 755 MG/ML
100 INJECTION, SOLUTION INTRAVASCULAR
Status: COMPLETED | OUTPATIENT
Start: 2025-03-09 | End: 2025-03-09

## 2025-03-09 RX ORDER — LORAZEPAM 1 MG/1
1 TABLET ORAL
Status: DISCONTINUED | OUTPATIENT
Start: 2025-03-09 | End: 2025-03-12 | Stop reason: HOSPADM

## 2025-03-09 RX ORDER — POTASSIUM CHLORIDE 7.45 MG/ML
10 INJECTION INTRAVENOUS PRN
Status: DISCONTINUED | OUTPATIENT
Start: 2025-03-09 | End: 2025-03-12 | Stop reason: HOSPADM

## 2025-03-09 RX ORDER — FUROSEMIDE 40 MG/1
40 TABLET ORAL DAILY
Status: DISCONTINUED | OUTPATIENT
Start: 2025-03-10 | End: 2025-03-12 | Stop reason: HOSPADM

## 2025-03-09 RX ORDER — LORAZEPAM 2 MG/ML
2 INJECTION INTRAMUSCULAR
Status: DISCONTINUED | OUTPATIENT
Start: 2025-03-09 | End: 2025-03-12 | Stop reason: HOSPADM

## 2025-03-09 RX ORDER — ONDANSETRON 4 MG/1
4 TABLET, ORALLY DISINTEGRATING ORAL EVERY 8 HOURS PRN
Status: DISCONTINUED | OUTPATIENT
Start: 2025-03-09 | End: 2025-03-12 | Stop reason: HOSPADM

## 2025-03-09 RX ORDER — SODIUM CHLORIDE 9 MG/ML
INJECTION, SOLUTION INTRAVENOUS PRN
Status: DISCONTINUED | OUTPATIENT
Start: 2025-03-09 | End: 2025-03-12 | Stop reason: HOSPADM

## 2025-03-09 RX ORDER — LORAZEPAM 2 MG/ML
1 INJECTION INTRAMUSCULAR
Status: DISCONTINUED | OUTPATIENT
Start: 2025-03-09 | End: 2025-03-12 | Stop reason: HOSPADM

## 2025-03-09 RX ORDER — SPIRONOLACTONE 25 MG/1
25 TABLET ORAL DAILY
Status: DISCONTINUED | OUTPATIENT
Start: 2025-03-09 | End: 2025-03-12 | Stop reason: HOSPADM

## 2025-03-09 RX ORDER — ONDANSETRON 2 MG/ML
4 INJECTION INTRAMUSCULAR; INTRAVENOUS EVERY 6 HOURS PRN
Status: DISCONTINUED | OUTPATIENT
Start: 2025-03-09 | End: 2025-03-12 | Stop reason: HOSPADM

## 2025-03-09 RX ORDER — POTASSIUM CHLORIDE 750 MG/1
40 TABLET, EXTENDED RELEASE ORAL ONCE
Status: COMPLETED | OUTPATIENT
Start: 2025-03-09 | End: 2025-03-09

## 2025-03-09 RX ORDER — SODIUM CHLORIDE 0.9 % (FLUSH) 0.9 %
5-40 SYRINGE (ML) INJECTION EVERY 12 HOURS SCHEDULED
Status: DISCONTINUED | OUTPATIENT
Start: 2025-03-09 | End: 2025-03-12 | Stop reason: HOSPADM

## 2025-03-09 RX ORDER — BUMETANIDE 1 MG/1
1 TABLET ORAL DAILY
Qty: 30 TABLET | Refills: 3 | Status: SHIPPED | OUTPATIENT
Start: 2025-03-09 | End: 2025-03-12 | Stop reason: HOSPADM

## 2025-03-09 RX ORDER — BUMETANIDE 0.25 MG/ML
1 INJECTION, SOLUTION INTRAMUSCULAR; INTRAVENOUS
Status: COMPLETED | OUTPATIENT
Start: 2025-03-09 | End: 2025-03-09

## 2025-03-09 RX ORDER — MAGNESIUM SULFATE IN WATER 40 MG/ML
2000 INJECTION, SOLUTION INTRAVENOUS PRN
Status: DISCONTINUED | OUTPATIENT
Start: 2025-03-09 | End: 2025-03-12 | Stop reason: HOSPADM

## 2025-03-09 RX ORDER — POLYETHYLENE GLYCOL 3350 17 G/17G
17 POWDER, FOR SOLUTION ORAL DAILY PRN
Status: DISCONTINUED | OUTPATIENT
Start: 2025-03-09 | End: 2025-03-12 | Stop reason: HOSPADM

## 2025-03-09 RX ORDER — LORAZEPAM 2 MG/ML
4 INJECTION INTRAMUSCULAR
Status: DISCONTINUED | OUTPATIENT
Start: 2025-03-09 | End: 2025-03-12 | Stop reason: HOSPADM

## 2025-03-09 RX ORDER — ACETAMINOPHEN 650 MG/1
650 SUPPOSITORY RECTAL EVERY 6 HOURS PRN
Status: DISCONTINUED | OUTPATIENT
Start: 2025-03-09 | End: 2025-03-12 | Stop reason: HOSPADM

## 2025-03-09 RX ADMIN — BUMETANIDE 1 MG: 0.25 INJECTION INTRAMUSCULAR; INTRAVENOUS at 11:51

## 2025-03-09 RX ADMIN — SODIUM CHLORIDE, PRESERVATIVE FREE 10 ML: 5 INJECTION INTRAVENOUS at 20:52

## 2025-03-09 RX ADMIN — PREGABALIN 150 MG: 75 CAPSULE ORAL at 20:51

## 2025-03-09 RX ADMIN — LORAZEPAM 1 MG: 1 TABLET ORAL at 16:18

## 2025-03-09 RX ADMIN — SODIUM CHLORIDE, PRESERVATIVE FREE 10 ML: 5 INJECTION INTRAVENOUS at 20:51

## 2025-03-09 RX ADMIN — ENOXAPARIN SODIUM 40 MG: 100 INJECTION SUBCUTANEOUS at 14:17

## 2025-03-09 RX ADMIN — POTASSIUM CHLORIDE 40 MEQ: 750 TABLET, FILM COATED, EXTENDED RELEASE ORAL at 14:16

## 2025-03-09 RX ADMIN — PREGABALIN 150 MG: 75 CAPSULE ORAL at 14:17

## 2025-03-09 RX ADMIN — IOPAMIDOL 80 ML: 755 INJECTION, SOLUTION INTRAVENOUS at 14:52

## 2025-03-09 ASSESSMENT — PAIN SCALES - GENERAL: PAINLEVEL_OUTOF10: 0

## 2025-03-09 ASSESSMENT — LIFESTYLE VARIABLES
HOW OFTEN DO YOU HAVE A DRINK CONTAINING ALCOHOL: 4 OR MORE TIMES A WEEK
HOW MANY STANDARD DRINKS CONTAINING ALCOHOL DO YOU HAVE ON A TYPICAL DAY: 5 OR 6

## 2025-03-09 ASSESSMENT — PAIN - FUNCTIONAL ASSESSMENT: PAIN_FUNCTIONAL_ASSESSMENT: 0-10

## 2025-03-09 NOTE — ED TRIAGE NOTES
Pt arrives to ED via EMS from home with reports of SOB and weakness for the last few weeks, accompanied with BLE edema. She reports she was recently evaluated for the same symptoms. She also reports she has been taking her lasix as prescribed but feels as though it has been ineffective.   Denies any CP, recent falls, thinners

## 2025-03-09 NOTE — H&P
History and Physical    Date of Service:  3/9/2025  Primary Care Provider: Jacy Perez APRN - NP  Source of information: patient, electronic medical record    Chief Complaint: Shortness of Breath and Fatigue      History of Presenting Illness:   Maty Villarreal is a 74 y.o. female with a past medical history significant for alcohol use disorder, opiate use disorder, neuropathy.  Patient had recently presented to the emergency department at our facility with complaints of shortness of breath, was told she had increased fluid and was put on diuretics.  Today patient presents to the emergency department by EMS with complaints of worsening shortness of breath.  Vital signs revealed saturations in the 80s on room air, she was placed on supplemental oxygen via nasal cannula.  BNP was normal at 231, troponin normal at 15 however noted to be hypokalemic, 3.2.  No leukocytosis.  Chest radiograph was done, no acute abnormalities noted.  Patient was given dose of bumetanide and referred to the hospitalist service for further management.  At the moment of the initial interview, patient was on 2 L of supplemental oxygen via nasal cannula.  Saturations down to 87% on room air.  Lung sounds were unremarkable, noted edema of the lower extremities.           REVIEW OF SYSTEMS:  A comprehensive review of systems was negative except for that written in the History of Present Illness.     Past Medical History:   Diagnosis Date    Alcohol use disorder     Arthritis     OSTEO    Chronic pain     Cirrhosis (HCC)     Hepatitis C     viral load negative sept 2024 bsi    Neuropathy     Opioid use disorder     Tuberculosis 1962    +HAI TEST # 3; TREATED AND NOW LUNGS HAVE ALWAYS BEEN CLEAR      Past Surgical History:   Procedure Laterality Date    ANUS SURGERY  6/7/2023    SPHINCTEROTOMY SPHINCTEROPLASTY performed by David Rojas MD at [facility] ENDOSCOPY    COLONOSCOPY N/A 8/25/2022    COLONOSCOPY performed by Waqas

## 2025-03-09 NOTE — ACP (ADVANCE CARE PLANNING)
Advance Care Planning     Advance Care Planning Inpatient Note  [facility] Department    Today's Date: 3/9/2025  Unit: [facility] EMERGENCY DEPARTMENT    Received request from patient.  Upon review of chart and communication with care team, patient's decision making abilities are not in question.. Patient was/were present in the room during visit.    Goals of ACP Conversation:  Discuss advance care planning documents  Facilitate a discussion related to patient's goals of care as they align with the patient's values and beliefs.    Health Care Decision Makers:       Primary Decision Maker: Aye Ponce - [place] - [phone]  Summary:  Completed New Documents  Updated Healthcare Decision Maker    Advance Care Planning Documents (Patient Wishes):  Healthcare Power of /Advance Directive Appointment of Health Care Agent  Living Will/Advance Directive  Anatomical Gift/Organ Donation     Assessment:  Paged by ED for advanced care planning conversation with patient Maty Villarreal.  Ms. Villarreal was alert and oriented. I provided education on AMDs and patient was ready to complete.   She reports no family and that her trusted friend and neighbor Aye is her source of support.  After completing the AMD, I assessed patient Maty as spiritually coping. She expresses strong genevieve in God and appreciates that [employer] is a genevieve based organization. She especially enjoys the daily prayer.   I provided presence, active listening and affirmation of Maty's testimony. We discussed the verse in [place] (6:8) about walking humbly with God as this message spoke deeply to her.  Maty expressed gratitude for the visit and is aware that spiritual care is available upon request.    Interventions:  Provided education on documents for clarity and greater understanding  Discussed and provided education on state decision maker hierarchy  Assisted in the completion of documents according to patient's wishes at this time    Care

## 2025-03-09 NOTE — PROGRESS NOTES
Spiritual Health History and Assessment/Progress Note  [facility]    Advance Care Planning,  ,  ,      Name: Maty Villarreal MRN: [id number]    Age: 74 y.o.     Sex: female   Language: English   Anglican: Rastafari   Hypoxia     Date: 3/9/2025            Total Time Calculated: 48 min              Spiritual Assessment began in [facility] EMERGENCY DEPARTMENT        Referral/Consult From: Patient   Encounter Overview/Reason: Advance Care Planning  Service Provided For: Patient    Leah, Belief, Meaning:   Patient is connected with a leah tradition or spiritual practice and has beliefs or practices that help with coping during difficult times  Family/Friends No family/friends present      Importance and Influence:  Patient has spiritual/personal beliefs that influence decisions regarding their health  Family/Friends No family/friends present    Community:  Patient feels well-supported. Support system includes: Friends  Family/Friends No family/friends present    Assessment and Plan of Care:     Patient Interventions include: Facilitated expression of thoughts and feelings, Engaged in theological reflection, Affirmed coping skills/support systems, and Assisted in Advance Care Planning conversation  Family/Friends Interventions include: No family/friends present    Patient Plan of Care: Spiritual Care available upon further referral  Family/Friends Plan of Care: No family/friends present    Paged by ED for advanced care planning conversation with patient Maty Villarreal.  Ms. Villarreal was alert and oriented. I provided education on AMDs and patient was ready to complete.   She reports no family and that her trusted friend and neighbor Aye is her source of support.  After completing the AMD, I assessed patient Maty as spiritually coping. She expresses strong leah in God and appreciates that [employer] is a leah based organization. She especially enjoys the daily prayer.   I provided presence, active listening and

## 2025-03-09 NOTE — ED PROVIDER NOTES
[facility] EMERGENCY DEPARTMENT  EMERGENCY DEPARTMENT ENCOUNTER      Pt Name: Maty Villarreal  MRN: [id number]  Birthdate 1950  Date of evaluation: 3/9/2025  Provider: Paramjit Meléndez MD    CHIEF COMPLAINT       Chief Complaint   Patient presents with    Shortness of Breath    Fatigue         HISTORY OF PRESENT ILLNESS    This is a 74-year-old female who presents to the ED by EMS for shortness of breath which she has had for a week and a half or so since she was seen in the emergency department here for the same.  She was told she had increased fluid and was put on diuretics and states that that is not helping her.  She has not followed up with her primary physician and decided that she would call EMS today to bring her to the hospital because she was not getting any better.  She was not any worse.  She stated that she called EMS because the last time she was here she had to wait for 4+ hours in the waiting room to be seen.  She has not had any fever or chill and has not had any chest pain.  She denies any other acute symptomatology.            Review of External Medical Records:     Nursing Notes were reviewed.    REVIEW OF SYSTEMS       Review of Systems   Constitutional:  Negative for activity change, chills, fatigue and fever.   HENT:  Negative for congestion, ear pain, rhinorrhea, sinus pressure, sinus pain, sore throat and trouble swallowing.    Eyes: Negative.    Respiratory:  Positive for shortness of breath (For a week and a half). Negative for cough, chest tightness, wheezing and stridor.    Cardiovascular:  Negative for chest pain, palpitations and leg swelling.   Gastrointestinal:  Negative for abdominal pain, blood in stool, diarrhea, nausea and vomiting.   Endocrine: Negative.    Genitourinary:  Negative for decreased urine volume, difficulty urinating, flank pain, frequency and hematuria.   Musculoskeletal:  Negative for back pain, joint swelling and neck pain.   Skin:  Negative for color

## 2025-03-09 NOTE — DISCHARGE INSTRUCTIONS
Discharge Instructions       PATIENT ID: Maty Villarreal  MRN: [id number]   YOB: 1950    DATE OF ADMISSION: 3/9/2025   DATE OF DISCHARGE: 3/12/2025    PRIMARY CARE PROVIDER: Jacy Perez     ATTENDING PHYSICIAN: Avila Wilson MD   DISCHARGING PROVIDER: NE Mello NP    To contact this individual call [phone] and ask the  to page.   If unavailable ask to be transferred the Adult Hospitalist Department.    DISCHARGE DIAGNOSES: alcohol withdrawal    CONSULTATIONS: addiction medicine, psychiatry    PROCEDURES/SURGERIES: * No surgery found *    PENDING TEST RESULTS:   At the time of discharge the following test results are still pending: none    FOLLOW UP APPOINTMENTS:    Dr. Amaya    ADDITIONAL CARE RECOMMENDATIONS:   You are seen by Dr. Young (addiction medicine). You were started on Acamprosate also known as Campral.  This medication is for alcohol dependence and is to help you abstain from alcohol.  Patient is to reduce cravings and relapse does not block the effects of alcohol.    I have sent a weeks supply of pregabalin to your pharmacy.  Please follow-up with your routine doctor for further refills.    Spironolactone is the diuretic you are currently on.  This medication is potassium sparing and does not require potassium supplementation    Wellbutrin is an antidepressant    Please follow-up with Dr. Mac.     DIET: Regular    ACTIVITY: activity as tolerated    WOUND CARE: none    EQUIPMENT needed: none        DISCHARGE MEDICATIONS:   See Medication Reconciliation Form    It is important that you take the medication exactly as they are prescribed.   Keep your medication in the bottles provided by the pharmacist and keep a list of the medication names, dosages, and times to be taken in your wallet.   Do not take other medications without consulting your doctor.       NOTIFY YOUR PHYSICIAN FOR ANY OF THE FOLLOWING:   Fever over 101 degrees for 24

## 2025-03-09 NOTE — ED NOTES
ED TO INPATIENT SBAR HANDOFF    Patient Name: Maty Villarreal   :  1950  74 y.o.   MRN:  [id number]  ED Room #:  ER18/18     Situation  Code Status: Full Code   Allergies: Patient has no known allergies.  Weight: Patient Vitals for the past 96 hrs (Last 3 readings):   Weight   25 0810 84 kg (185 lb 3 oz)       Arrived from: home    Chief Complaint:   Chief Complaint   Patient presents with    Shortness of Breath    Fatigue       Hospital Problem/Diagnosis:  Principal Problem:    Hypoxia  Resolved Problems:    * No resolved hospital problems. *      Mobility: no current mobility problem   ED Fall Risk: Presents to emergency department  because of falls (Syncope, seizure, or loss of consciousness): No, Age > 70: Yes, Altered Mental Status, Intoxication with alcohol or substance confusion (Disorientation, impaired judgment, poor safety awaremess, or inability to follow instructions): No, Impaired Mobility: Ambulates or transfers with assistive devices or assistance; Unable to ambulate or transer.: No, Nursing Judgement: Yes   Fell in ED or prior to admission: no   Restraints: no     Sitter: no   Family/Caregiver Present: no    Neet to know social/safety information: drinks 1 pint/alcohol daily    Background  History:   Past Medical History:   Diagnosis Date    Alcohol use disorder     Arthritis     OSTEO    Chronic pain     Cirrhosis (HCC)     Hepatitis C     viral load negative 2024 bshsi    Neuropathy     Opioid use disorder     Tuberculosis     +HAI TEST # 3; TREATED AND NOW LUNGS HAVE ALWAYS BEEN CLEAR       Assessment    Abnormal Assessment Findings: SOB, Hypoxia  Imaging:   CTA CHEST W WO CONTRAST   Final Result   There is no pulmonary embolism.   There is no aortic aneurysm.   Minimal interstitial edema and trace bilateral effusions.   Hepatic steatosis.    Incidental findings are as described above.          Electronically signed by KARUNA CASTANEDA      XR CHEST PORTABLE   Final Result   No

## 2025-03-09 NOTE — ACP (ADVANCE CARE PLANNING)
Advance Care Planning     Advance Care Planning (ACP) Physician/NP/PA Conversation    Date of Conversation: 3/9/2025  Conducted with: Patient with Decision Making Capacity  Other persons present: None    Healthcare Decision Maker: Aye Ponce at [phone].  This is a neighbor, patient has no family.  Patient interested in doing durable medical power of  with this person  Consult placed to spiritual services for assistance with this    Care Preferences:    Hospitalization:  \"If your health worsens and it becomes clear that your chance of recovery is unlikely, what would be your preference regarding hospitalization?\"  The patient would prefer hospitalization.    Ventilation:  \"If you were unable to breath on your own and your chance of recovery was unlikely, what would be your preference about the use of a ventilator (breathing machine) if it was available to you?\"  The patient would desire the use of a ventilator.    Resuscitation:  \"In the event your heart stopped as a result of an underlying serious health condition, would you want attempts made to restart your heart, or would you prefer a natural death?\"  Yes, attempt to resuscitate.    ventilation preferences, hospitalization preferences, and resuscitation preferences    Conversation Outcomes / Follow-Up Plan:  ACP complete - no further action today  Reviewed DNR/DNI and patient elects Full Code (Attempt Resuscitation)    Length of Voluntary ACP Conversation in minutes:  16 minutes    NE Sethi - NP

## 2025-03-10 LAB
ANION GAP SERPL CALC-SCNC: 4 MMOL/L (ref 2–12)
BUN SERPL-MCNC: 9 MG/DL (ref 6–20)
BUN/CREAT SERPL: 13 (ref 12–20)
CALCIUM SERPL-MCNC: 8.4 MG/DL (ref 8.5–10.1)
CHLORIDE SERPL-SCNC: 97 MMOL/L (ref 97–108)
CO2 SERPL-SCNC: 34 MMOL/L (ref 21–32)
COMMENT:: NORMAL
CREAT SERPL-MCNC: 0.7 MG/DL (ref 0.55–1.02)
ECHO BSA: 1.93 M2
FOLATE SERPL-MCNC: 18.4 NG/ML (ref 5–21)
GLUCOSE BLD STRIP.AUTO-MCNC: 140 MG/DL (ref 65–117)
GLUCOSE SERPL-MCNC: 138 MG/DL (ref 65–100)
POTASSIUM SERPL-SCNC: 3.6 MMOL/L (ref 3.5–5.1)
SERVICE CMNT-IMP: ABNORMAL
SODIUM SERPL-SCNC: 135 MMOL/L (ref 136–145)
SPECIMEN HOLD: NORMAL

## 2025-03-10 PROCEDURE — 2700000000 HC OXYGEN THERAPY PER DAY

## 2025-03-10 PROCEDURE — 80048 BASIC METABOLIC PNL TOTAL CA: CPT

## 2025-03-10 PROCEDURE — 82962 GLUCOSE BLOOD TEST: CPT

## 2025-03-10 PROCEDURE — 97161 PT EVAL LOW COMPLEX 20 MIN: CPT

## 2025-03-10 PROCEDURE — 97530 THERAPEUTIC ACTIVITIES: CPT

## 2025-03-10 PROCEDURE — 82746 ASSAY OF FOLIC ACID SERUM: CPT

## 2025-03-10 PROCEDURE — 94760 N-INVAS EAR/PLS OXIMETRY 1: CPT

## 2025-03-10 PROCEDURE — 1100000000 HC RM PRIVATE

## 2025-03-10 PROCEDURE — 6370000000 HC RX 637 (ALT 250 FOR IP)

## 2025-03-10 PROCEDURE — 6370000000 HC RX 637 (ALT 250 FOR IP): Performed by: NURSE PRACTITIONER

## 2025-03-10 PROCEDURE — 2500000003 HC RX 250 WO HCPCS: Performed by: NURSE PRACTITIONER

## 2025-03-10 PROCEDURE — 6360000002 HC RX W HCPCS: Performed by: NURSE PRACTITIONER

## 2025-03-10 RX ORDER — ACAMPROSATE CALCIUM 333 MG/1
666 TABLET, DELAYED RELEASE ORAL 3 TIMES DAILY
Status: DISCONTINUED | OUTPATIENT
Start: 2025-03-10 | End: 2025-03-12 | Stop reason: HOSPADM

## 2025-03-10 RX ORDER — MULTIVITAMIN WITH IRON
1 TABLET ORAL DAILY
Status: DISCONTINUED | OUTPATIENT
Start: 2025-03-10 | End: 2025-03-12 | Stop reason: HOSPADM

## 2025-03-10 RX ADMIN — LORAZEPAM 1 MG: 1 TABLET ORAL at 22:17

## 2025-03-10 RX ADMIN — BUPRENORPHINE HYDROCHLORIDE AND NALOXONE HYDROCHLORIDE DIHYDRATE 1 TABLET: 2; .5 TABLET SUBLINGUAL at 09:07

## 2025-03-10 RX ADMIN — PREGABALIN 150 MG: 75 CAPSULE ORAL at 20:34

## 2025-03-10 RX ADMIN — SODIUM CHLORIDE, PRESERVATIVE FREE 10 ML: 5 INJECTION INTRAVENOUS at 09:07

## 2025-03-10 RX ADMIN — PREGABALIN 150 MG: 75 CAPSULE ORAL at 14:48

## 2025-03-10 RX ADMIN — LORAZEPAM 1 MG: 1 TABLET ORAL at 14:48

## 2025-03-10 RX ADMIN — LORAZEPAM 1 MG: 1 TABLET ORAL at 09:11

## 2025-03-10 RX ADMIN — Medication 100 MG: at 09:06

## 2025-03-10 RX ADMIN — SODIUM CHLORIDE, PRESERVATIVE FREE 10 ML: 5 INJECTION INTRAVENOUS at 20:34

## 2025-03-10 RX ADMIN — SPIRONOLACTONE 25 MG: 25 TABLET, FILM COATED ORAL at 09:07

## 2025-03-10 RX ADMIN — PREGABALIN 150 MG: 75 CAPSULE ORAL at 09:07

## 2025-03-10 RX ADMIN — THERA TABS 1 TABLET: TAB at 14:48

## 2025-03-10 RX ADMIN — FUROSEMIDE 40 MG: 40 TABLET ORAL at 09:07

## 2025-03-10 NOTE — PROGRESS NOTES
[facility] Adult  Hospitalist Group                                                                                          Hospitalist Progress Note  Yazmin George PA-C  Answering service: [phone] OR [phone] from in house phone        Date of Service:  3/10/2025  NAME:  Maty Villarreal  :  1950  MRN:  [id number]       Admission Summary:     Maty Villarreal is a 74 y.o. female with a past medical history significant for alcohol use disorder, opiate use disorder, neuropathy.  Patient had recently presented to the emergency department at our facility with complaints of shortness of breath, was told she had increased fluid and was put on diuretics.  Today patient presents to the emergency department by EMS with complaints of worsening shortness of breath.  Vital signs revealed saturations in the 80s on room air, she was placed on supplemental oxygen via nasal cannula.  BNP was normal at 231, troponin normal at 15 however noted to be hypokalemic, 3.2.  No leukocytosis.  Chest radiograph was done, no acute abnormalities noted.  Patient was given dose of bumetanide and referred to the hospitalist service for further management.  At the moment of the initial interview, patient was on 2 L of supplemental oxygen via nasal cannula.  Saturations down to 87% on room air.  Lung sounds were unremarkable, noted edema of the lower extremities.          Interval history / Subjective:     Seen and examined this am. No complaints at this time. Notes she urinating a lot yesterday and her legs have since drastically reduced in size.   Currently speaking in full sentences and effortlessly breathing on RA  Denies anxiety, tremors, visual/auditory hallucinations. Did receive a PRN 1mg Ativan this am  Asking about options for outpatient rehab centers after she is discharged. Seems motivated to stop drinking      Assessment & Plan:     Acute hypoxic respiratory failure -- resolved  - Unclear etiology  - Patient did

## 2025-03-10 NOTE — PLAN OF CARE
Problem: Pain  Goal: Verbalizes/displays adequate comfort level or baseline comfort level  3/10/2025 1733 by Jesenia Jeong, RN  Outcome: Progressing  3/10/2025 0340 by Diana Daniel RN  Outcome: Progressing     Problem: Risk for Elopement  Goal: Patient will not exit the unit/facility without proper excort  3/10/2025 1733 by Jesenia Jeong, RN  Outcome: Progressing  3/10/2025 0340 by Diana Daniel RN  Outcome: Progressing     Problem: Safety - Adult  Goal: Free from fall injury  3/10/2025 1733 by Jesenia Jeong, RN  Outcome: Progressing  3/10/2025 0340 by Diana Daniel RN  Outcome: Progressing

## 2025-03-10 NOTE — PROGRESS NOTES
Occupational Therapy  03/10/25    Chart review completed in prep for OT eval. Spoke with PT and patient; pt is functioning near her baseline of (I). Further acute OT is not indicated at this time. OT orders will be completed.

## 2025-03-10 NOTE — CARE COORDINATION
Care Management Initial Assessment       RUR:20%  Readmission? Yes   1st IM letter given? Yes - 3/10  1st  letter given: No     CM met with patient/spouse at bedside to introduce self and role. Patient lives alone in an apartment. She is independent with ADLs and drives.  Patient requested outpatient substance abuse resources.  CM provided several resources for patient to contact.    ADLs: Independent  DME: None  PCP follow up: Jacy Perez  Previous Home Health: None  Previous Skilled Nursing Facility: None  Previous Inpatient Rehab: None  Insurance verified: [employer]  Pharmacy: [facility]  Emergency Contact: Aye Ponce [phone]    CM will follow patient progress and assist as needed with PAT plan.      03/10/25 7607   Service Assessment   Patient Orientation Alert and Oriented   Cognition Alert   History Provided By Patient   Primary Caregiver Self   Support Systems Friends/Neighbors   Patient's Healthcare Decision Maker is: Named in Scanned ACP Document   PCP Verified by CM Yes   Last Visit to PCP Within last 3 months   Prior Functional Level Independent in ADLs/IADLs   Current Functional Level Independent in ADLs/IADLs   Can patient return to prior living arrangement Yes   Ability to make needs known: Good   Family able to assist with home care needs: No   Would you like for me to discuss the discharge plan with any other family members/significant others, and if so, who? No   Financial Resources Medicare   Social/Functional History   Lives With Alone   Type of Home Apartment   Home Layout One level   Home Access Elevator   Bathroom Shower/Tub Tub/Shower unit   Bathroom Toilet Standard   Bathroom Equipment Shower chair;Grab bars in shower   Home Equipment None   Prior Level of Assist for ADLs Independent   Prior Level of Assist for Homemaking Independent   Homemaking Responsibilities Yes   Ambulation Assistance Independent   Prior Level of Assist for Transfers Independent

## 2025-03-10 NOTE — PROGRESS NOTES
PHYSICAL THERAPY EVALUATION/DISCHARGE    Patient: Maty Villarreal (74 y.o. female)  Date: 3/10/2025  Primary Diagnosis: Hypoxemia [R09.02]  Hypoxia [R09.02]  Edema, unspecified type [R60.9]       Precautions:              ASSESSMENT AND RECOMMENDATIONS:  Based on the objective data below, the patient presents in bed and most agreeable to assessment.  Patient admitted for SOB with fluid overload.  She states feeling much better and swelling down in both legs.  Patient presents with excellent ROM, strength and general mobility.  Assessed on room air and managed to maintain oxygen saturations >90% throughout session.  Educated her on increasing mobility using either BSC or toilet vs pur wick.  She is safe to walk in hallway but also advised her to stay close to rail and if feels at all uncomfortable to ask for staff assist.  Left on room air.    Functional Outcome Measure:  The patient scored 24 on the [facility] outcome measure which is indicative of no needs.          Further skilled acute physical therapy is not indicated at this time.       PLAN :  Recommendation for discharge: (in order for the patient to meet his/her long term goals):   No skilled physical therapy    Other factors to consider for discharge: lives alone and no support system    IF patient discharges home will need the following DME: none       SUBJECTIVE:   Patient stated “I know I'm still a little weak.”    OBJECTIVE DATA SUMMARY:     Past Medical History:   Diagnosis Date    Alcohol use disorder     Arthritis     OSTEO    Chronic pain     Cirrhosis (HCC)     Hepatitis C     viral load negative sept 2024 bsi    Neuropathy     Opioid use disorder     Tuberculosis 1962    +HAI TEST # 3; TREATED AND NOW LUNGS HAVE ALWAYS BEEN CLEAR     Past Surgical History:   Procedure Laterality Date    ANUS SURGERY  6/7/2023    SPHINCTEROTOMY SPHINCTEROPLASTY performed by David Rojas MD at [facility] ENDOSCOPY    COLONOSCOPY N/A 8/25/2022    COLONOSCOPY performed by

## 2025-03-11 PROCEDURE — 6370000000 HC RX 637 (ALT 250 FOR IP): Performed by: NURSE PRACTITIONER

## 2025-03-11 PROCEDURE — 2500000003 HC RX 250 WO HCPCS: Performed by: NURSE PRACTITIONER

## 2025-03-11 PROCEDURE — 1100000000 HC RM PRIVATE

## 2025-03-11 PROCEDURE — 6360000002 HC RX W HCPCS: Performed by: NURSE PRACTITIONER

## 2025-03-11 PROCEDURE — 6370000000 HC RX 637 (ALT 250 FOR IP): Performed by: INTERNAL MEDICINE

## 2025-03-11 PROCEDURE — 6370000000 HC RX 637 (ALT 250 FOR IP)

## 2025-03-11 RX ORDER — ACAMPROSATE CALCIUM 333 MG/1
666 TABLET, DELAYED RELEASE ORAL 3 TIMES DAILY
Qty: 180 TABLET | Refills: 0 | Status: SHIPPED | OUTPATIENT
Start: 2025-03-11 | End: 2025-04-10

## 2025-03-11 RX ADMIN — BUPRENORPHINE HYDROCHLORIDE AND NALOXONE HYDROCHLORIDE DIHYDRATE 1 TABLET: 2; .5 TABLET SUBLINGUAL at 09:01

## 2025-03-11 RX ADMIN — ENOXAPARIN SODIUM 40 MG: 100 INJECTION SUBCUTANEOUS at 09:02

## 2025-03-11 RX ADMIN — FUROSEMIDE 40 MG: 40 TABLET ORAL at 09:00

## 2025-03-11 RX ADMIN — PREGABALIN 150 MG: 75 CAPSULE ORAL at 09:01

## 2025-03-11 RX ADMIN — PREGABALIN 150 MG: 75 CAPSULE ORAL at 14:20

## 2025-03-11 RX ADMIN — Medication 100 MG: at 09:01

## 2025-03-11 RX ADMIN — ACETAMINOPHEN 650 MG: 325 TABLET ORAL at 05:42

## 2025-03-11 RX ADMIN — SPIRONOLACTONE 25 MG: 25 TABLET, FILM COATED ORAL at 09:01

## 2025-03-11 RX ADMIN — ACAMPROSATE CALCIUM 666 MG: 333 TABLET, DELAYED RELEASE ORAL at 20:44

## 2025-03-11 RX ADMIN — SODIUM CHLORIDE, PRESERVATIVE FREE 10 ML: 5 INJECTION INTRAVENOUS at 09:03

## 2025-03-11 RX ADMIN — SODIUM CHLORIDE, PRESERVATIVE FREE 10 ML: 5 INJECTION INTRAVENOUS at 20:45

## 2025-03-11 RX ADMIN — THERA TABS 1 TABLET: TAB at 09:01

## 2025-03-11 RX ADMIN — ACAMPROSATE CALCIUM 666 MG: 333 TABLET, DELAYED RELEASE ORAL at 14:20

## 2025-03-11 RX ADMIN — ACETAMINOPHEN 650 MG: 325 TABLET ORAL at 15:31

## 2025-03-11 RX ADMIN — PREGABALIN 150 MG: 75 CAPSULE ORAL at 20:45

## 2025-03-11 ASSESSMENT — PAIN DESCRIPTION - ORIENTATION: ORIENTATION: RIGHT;LEFT

## 2025-03-11 ASSESSMENT — PAIN - FUNCTIONAL ASSESSMENT: PAIN_FUNCTIONAL_ASSESSMENT: ACTIVITIES ARE NOT PREVENTED

## 2025-03-11 ASSESSMENT — PAIN SCALES - GENERAL: PAINLEVEL_OUTOF10: 7

## 2025-03-11 ASSESSMENT — PAIN DESCRIPTION - DESCRIPTORS: DESCRIPTORS: TIGHTNESS

## 2025-03-11 ASSESSMENT — PAIN DESCRIPTION - LOCATION: LOCATION: HEAD

## 2025-03-11 ASSESSMENT — PAIN SCALES - WONG BAKER: WONGBAKER_NUMERICALRESPONSE: NO HURT

## 2025-03-11 NOTE — PROGRESS NOTES
Physician Progress Note      PATIENT:               IRVING WOOD  CSN #:                  [id number]  :                       1950  ADMIT DATE:       3/9/2025 8:14 AM  DISCH DATE:  RESPONDING  PROVIDER #:        Yazmin George PA-C          QUERY TEXT:    Patient admitted with Acute hypoxic respiratory failure. Noted documentation   of RR 12-20, SpO2% 87-98, utilizing 2L-3L NC, without respiratory distress,   accessory muscle use or increased work of breathing documented.  In order to support the diagnosis of acute respiratory failure, please include   additional clinical indicators in your documentation.  Or please document if   the diagnosis of acute respiratory failure has been ruled out after further   study.    The medical record reflects the following:  Risk Factors: 74 y.o. female with Alcohol use disorder, Opiate use disorder,   Neuropathy    Clinical Indicators:  3/9 ED Provider  \"General:  She is not in acute distress.  Appearance: She is not ill-appearing or diaphoretic.\"    \"Pulmonary:  Effort: Pulmonary effort is normal. No respiratory distress.  Breath sounds: Normal breath sounds. No wheezing, rhonchi or rales.\"    \"And listening to her chest again its relatively clear with just an occasional   crackle in the right lower lung field.\"    3/10 PN  \"At the moment of the initial interview, patient was on 2 L of supplemental   oxygen via nasal cannula.  Saturations down to 87% on room air.  Lung sounds   were unremarkable, noted edema of the lower extremities.\"  \"- CTA 3/9 with e/o Minimal interstitial edema and trace bilateral effusions,   neg for PE /PNA\"    \"General :  alert x 3, awake, no acute distress,\"    \"Chest: Clear to auscultation bilaterally, on RA\"    Treatment: Pulse oximetry, Chest imaging, Supplemental Oxygen,    Thank you,  Suzan Forde, BSN, RN, CCRN, CRCR  Options provided:  -- Acute Respiratory Failure as evidenced by, Please document evidence.  -- Acute Respiratory Failure

## 2025-03-11 NOTE — PROGRESS NOTES
[facility] Adult  Hospitalist Group                                                                                          Hospitalist Progress Note  Yazmin George PA-C  Answering service: [phone] OR [phone] from in house phone        Date of Service:  3/11/2025  NAME:  Maty Villarreal  :  1950  MRN:  [id number]       Admission Summary:     Maty Villarreal is a 74 y.o. female with a past medical history significant for alcohol use disorder, opiate use disorder, neuropathy.  Patient had recently presented to the emergency department at our facility with complaints of shortness of breath, was told she had increased fluid and was put on diuretics.  Today patient presents to the emergency department by EMS with complaints of worsening shortness of breath.  Vital signs revealed saturations in the 80s on room air, she was placed on supplemental oxygen via nasal cannula.  BNP was normal at 231, troponin normal at 15 however noted to be hypokalemic, 3.2.  No leukocytosis.  Chest radiograph was done, no acute abnormalities noted.  Patient was given dose of bumetanide and referred to the hospitalist service for further management.  At the moment of the initial interview, patient was on 2 L of supplemental oxygen via nasal cannula.  Saturations down to 87% on room air.  Lung sounds were unremarkable, noted edema of the lower extremities.          Interval history / Subjective:     F/u Alcohol withdrawal   Seen and examined this am. No complaints at this time. Notes the swelling in her legs continue to improve. Reports some tremors earlier but notes they have since resolved. Thinks she may have had an auditory hallucination last night because she thought she heard arguing outside her room but when she asked the nurse she said no one was arguing. Also endorses feeling very down. She is interested in a psych consult.   Continue CIWA checks and PRN Benzos   Asking about options for outpatient rehab

## 2025-03-11 NOTE — PROGRESS NOTES
Cc: OUD    Assessment/Plan:    Maty Villarreal, is a 74 y.o.  female with opioid use disorder stable on bup/naloxone and alcohol use disorder admitted for alcohol withdrawal management.  Has OUD which is stable on bup/naloxone.      She has tried to connect to outpatient addiction support (behavioral health or medical care) but been unable to find something that accepts her insurance.     Encouraged her to reach out to her insurance company to identify a program they cover rather than calling all the individual programs as she has done.     Discussed risks and benefits of acamprosate for AUD.  She is interested in this and is comfortable with TID dosing given she is taking lyrica TID.  Discussed that naltrexone is not an option given her use of bup/naloxone for OUD.     Rx for acamprosate sent to her pharmacy.      Joo Young MD [facility]  [facility]  [phone]  Fax [phone]    Subjective:    Maty reports some anxiety and has been treated with prn ativan.      Objective:     /60   Pulse 91   Temp 99 °F (37.2 °C) (Oral)   Resp 20   Ht 1.6 m (5' 3\")   Wt 84 kg (185 lb 3 oz)   SpO2 97%   BMI 32.80 kg/m²     CIWA-Ar: 7     Alert and oriented, walking in hallway, calm.    No distress  Non labored respirations  Pupils equal round and reactive to light.  Abdomen soft  No edema in legs  No tract marks on skin

## 2025-03-11 NOTE — PSYCHOTHERAPY
Telephone call to [facility] (consult) Madison Blankenship NP, to see patient regarding     eval/treat.

## 2025-03-11 NOTE — BSMART NOTE
impairments affecting communication. The patient's preference for learning can be described as: can read and write adequately.  The patient's hearing is normal.  The patient's vision is normal.    The patient reports coping skills include: pickle ball, swimming, bike riding, prayer    Elsie Beach MA, LPC intern

## 2025-03-12 VITALS
HEIGHT: 63 IN | RESPIRATION RATE: 17 BRPM | BODY MASS INDEX: 32.81 KG/M2 | TEMPERATURE: 98.4 F | WEIGHT: 185.19 LBS | HEART RATE: 79 BPM | DIASTOLIC BLOOD PRESSURE: 70 MMHG | OXYGEN SATURATION: 97 % | SYSTOLIC BLOOD PRESSURE: 115 MMHG

## 2025-03-12 LAB
BASOPHILS # BLD: 0.04 K/UL (ref 0–0.1)
BASOPHILS NFR BLD: 0.6 % (ref 0–1)
DIFFERENTIAL METHOD BLD: ABNORMAL
EOSINOPHIL # BLD: 0.43 K/UL (ref 0–0.4)
EOSINOPHIL NFR BLD: 6 % (ref 0–7)
ERYTHROCYTE [DISTWIDTH] IN BLOOD BY AUTOMATED COUNT: 20.2 % (ref 11.5–14.5)
HCT VFR BLD AUTO: 30.7 % (ref 35–47)
HGB BLD-MCNC: 9.8 G/DL (ref 11.5–16)
IMM GRANULOCYTES # BLD AUTO: 0.06 K/UL (ref 0–0.04)
IMM GRANULOCYTES NFR BLD AUTO: 0.8 % (ref 0–0.5)
LYMPHOCYTES # BLD: 1.34 K/UL (ref 0.8–3.5)
LYMPHOCYTES NFR BLD: 18.9 % (ref 12–49)
MCH RBC QN AUTO: 26 PG (ref 26–34)
MCHC RBC AUTO-ENTMCNC: 31.9 G/DL (ref 30–36.5)
MCV RBC AUTO: 81.4 FL (ref 80–99)
MONOCYTES # BLD: 0.76 K/UL (ref 0–1)
MONOCYTES NFR BLD: 10.7 % (ref 5–13)
NEUTS SEG # BLD: 4.47 K/UL (ref 1.8–8)
NEUTS SEG NFR BLD: 63 % (ref 32–75)
NRBC # BLD: 0 K/UL (ref 0–0.01)
NRBC BLD-RTO: 0 PER 100 WBC
PLATELET # BLD AUTO: 149 K/UL (ref 150–400)
PMV BLD AUTO: 10.9 FL (ref 8.9–12.9)
RBC # BLD AUTO: 3.77 M/UL (ref 3.8–5.2)
RBC MORPH BLD: ABNORMAL
WBC # BLD AUTO: 7.1 K/UL (ref 3.6–11)

## 2025-03-12 PROCEDURE — 6370000000 HC RX 637 (ALT 250 FOR IP): Performed by: NURSE PRACTITIONER

## 2025-03-12 PROCEDURE — 6360000002 HC RX W HCPCS: Performed by: NURSE PRACTITIONER

## 2025-03-12 PROCEDURE — 2500000003 HC RX 250 WO HCPCS: Performed by: NURSE PRACTITIONER

## 2025-03-12 PROCEDURE — 85025 COMPLETE CBC W/AUTO DIFF WBC: CPT

## 2025-03-12 PROCEDURE — 6370000000 HC RX 637 (ALT 250 FOR IP)

## 2025-03-12 RX ORDER — BUPROPION HYDROCHLORIDE 150 MG/1
150 TABLET ORAL DAILY
Qty: 30 TABLET | Refills: 3 | Status: SHIPPED | OUTPATIENT
Start: 2025-03-13

## 2025-03-12 RX ORDER — SPIRONOLACTONE 25 MG/1
25 TABLET ORAL DAILY
Qty: 30 TABLET | Refills: 3 | Status: SHIPPED | OUTPATIENT
Start: 2025-03-13

## 2025-03-12 RX ORDER — PREGABALIN 150 MG/1
150 CAPSULE ORAL 3 TIMES DAILY
Qty: 21 CAPSULE | Refills: 0 | Status: SHIPPED | OUTPATIENT
Start: 2025-03-12 | End: 2025-03-19

## 2025-03-12 RX ORDER — BUPROPION HYDROCHLORIDE 150 MG/1
150 TABLET ORAL DAILY
Status: DISCONTINUED | OUTPATIENT
Start: 2025-03-12 | End: 2025-03-12 | Stop reason: HOSPADM

## 2025-03-12 RX ADMIN — SODIUM CHLORIDE, PRESERVATIVE FREE 10 ML: 5 INJECTION INTRAVENOUS at 08:58

## 2025-03-12 RX ADMIN — FUROSEMIDE 40 MG: 40 TABLET ORAL at 08:55

## 2025-03-12 RX ADMIN — Medication 100 MG: at 08:55

## 2025-03-12 RX ADMIN — LORAZEPAM 1 MG: 1 TABLET ORAL at 09:07

## 2025-03-12 RX ADMIN — PREGABALIN 150 MG: 75 CAPSULE ORAL at 13:29

## 2025-03-12 RX ADMIN — SPIRONOLACTONE 25 MG: 25 TABLET, FILM COATED ORAL at 08:55

## 2025-03-12 RX ADMIN — THERA TABS 1 TABLET: TAB at 08:55

## 2025-03-12 RX ADMIN — POLYETHYLENE GLYCOL 3350 17 G: 17 POWDER, FOR SOLUTION ORAL at 13:54

## 2025-03-12 RX ADMIN — ENOXAPARIN SODIUM 40 MG: 100 INJECTION SUBCUTANEOUS at 08:57

## 2025-03-12 RX ADMIN — BUPRENORPHINE HYDROCHLORIDE AND NALOXONE HYDROCHLORIDE DIHYDRATE 1 TABLET: 2; .5 TABLET SUBLINGUAL at 08:55

## 2025-03-12 RX ADMIN — PREGABALIN 150 MG: 75 CAPSULE ORAL at 08:55

## 2025-03-12 RX ADMIN — BUPROPION HYDROCHLORIDE 150 MG: 150 TABLET, EXTENDED RELEASE ORAL at 13:29

## 2025-03-12 ASSESSMENT — PAIN DESCRIPTION - LOCATION: LOCATION: LEG

## 2025-03-12 ASSESSMENT — PAIN DESCRIPTION - ORIENTATION: ORIENTATION: LEFT;RIGHT

## 2025-03-12 ASSESSMENT — PAIN DESCRIPTION - DESCRIPTORS: DESCRIPTORS: NUMBNESS

## 2025-03-12 ASSESSMENT — PAIN SCALES - GENERAL: PAINLEVEL_OUTOF10: 4

## 2025-03-12 NOTE — PLAN OF CARE
Patient being discharged home via uber transport. Patient is alert and oriented X4, ambulatory, and on room air. Peripheral IV removed prior to discharge, tip intact, minimal blood loss. Patient verbalized discharge instructions with this RN.      Problem: Pain  Goal: Verbalizes/displays adequate comfort level or baseline comfort level  Outcome: Adequate for Discharge     Problem: Risk for Elopement  Goal: Patient will not exit the unit/facility without proper excort  Outcome: Adequate for Discharge     Problem: Safety - Adult  Goal: Free from fall injury  Outcome: Adequate for Discharge

## 2025-03-12 NOTE — DISCHARGE SUMMARY
Discharge Summary       PATIENT ID: Maty Villarreal  MRN: [id number]   YOB: 1950    DATE OF ADMISSION: 3/9/2025  8:14 AM    DATE OF DISCHARGE: 3/12/2025  PRIMARY CARE PROVIDER: Jacy Perez APRN - NP     ATTENDING PHYSICIAN: Dr. Wilson  DISCHARGING PROVIDER: NE Mello NP    To contact this individual call [phone] and ask the  to page.  If unavailable ask to be transferred the Adult Hospitalist Department.    CONSULTATIONS: IP CONSULT TO SOCIAL WORK  IP CONSULT TO SPIRITUAL SERVICES  IP CONSULT TO ADDICTION MEDICINE  IP CONSULT TO PSYCHIATRY    PROCEDURES/SURGERIES: * No surgery found *    ADMITTING DIAGNOSES & HOSPITAL COURSE:   Maty Villarreal is a 74 y.o. female with a past medical history significant for alcohol use disorder, opiate use disorder, neuropathy.  Patient had recently presented to the emergency department at our facility with complaints of shortness of breath, was told she had increased fluid and was put on diuretics.  Today patient presents to the emergency department by EMS with complaints of worsening shortness of breath.  Vital signs revealed saturations in the 80s on room air, she was placed on supplemental oxygen via nasal cannula.  BNP was normal at 231, troponin normal at 15 however noted to be hypokalemic, 3.2.  No leukocytosis.  Chest radiograph was done, no acute abnormalities noted.  Patient was given dose of bumetanide and referred to the hospitalist service for further management.  At the moment of the initial interview, patient was on 2 L of supplemental oxygen via nasal cannula.  Saturations down to 87% on room air.  Lung sounds were unremarkable, noted edema of the lower extremities.    3/12/2025  VSS  Ambulatory on unit  Denies auditory/visual hallucinations  No sweats or tremors  Tolerating food without any issues  Medically stable for discharge  Please see detailed d/c instructions below      DISCHARGE DIAGNOSES / PLAN:      Acute

## 2025-03-12 NOTE — PLAN OF CARE
Problem: Pain  Goal: Verbalizes/displays adequate comfort level or baseline comfort level  Outcome: Progressing     Problem: Risk for Elopement  Goal: Patient will not exit the unit/facility without proper excort  Outcome: Progressing     Problem: Safety - Adult  Goal: Free from fall injury  Outcome: Progressing

## 2025-03-12 NOTE — CARE COORDINATION
PAT:     Cm scheduled uber transport at 4:30 p.m. TIERRA nelson.    [facility]  [facility] Department  Ph:[phone]

## 2025-03-12 NOTE — CONSULTS
and Pep\".  She denies ever taking anything else to include Wellbutrin but is willing to try. Otherwise she denies significant psychiatric symptoms like suicide, homicide, or audiovisual hallucinations.  Sleep and appetite are fair.    PAST PSYCHIATRIC HISTORY: unknown    SUBSTANCE ABUSE HISTORY: alcohol use d/o    PSYCHOSOCIAL HISTORY: living in apt alone with cat; does have helpful neighbors    MENTAL STATUS EXAM:   Maty Villarreal is a 74 y.o. Black /  female who appears her stated age.  She is dressed in hospital attire sitting in bedside chair moderately groomed wearing a black and white bandanna covering her hair.  She maintains good eye contact throughout interview.   No psychomotor agitation/retardation is observed, she holds her hands out to show me no further tremors at this time.  Her speech is normal in rate, tone, and volume. Her self-reported mood is \"OK\". Her affect is congruent with mood and situation, full range. She denies auditory and visual hallucinations. No paranoia or delusions are elicited with assessment. His/Her thought processes are linear and goal-directed. She denies suicidal and homicidal ideation. She is alert and oriented X 4. Her memory appears intact as evidenced by conversation/answers to my questions. Insight and judgment are limited/fair.    DIAGNOSTIC IMPRESSION: Alcohol use disorder, a/w anxiety, r/o seasonal affective d/o    ASSESSMENT/PLAN:     Initiate Wellbutrin  today and follow up outpatient with psychiatric provider for further management    [facility] [phone]    Important for her also to follow up with therapist- if B smart/CM can assist in helping recommend/provide outpatient resources that may be beneficial    She is requesting to have help being set up with GI (Dr. Kirby) and hepatology (Dr. Amaya) before she is discharged  as her liver health is concerning to her, wanting to get ahead and take better self care.    Thank you so 
(ATIVAN) injection 3 mg  3 mg IntraVENous Q1H PRN    Or    LORazepam (ATIVAN) tablet 4 mg  4 mg Oral Q1H PRN    Or    LORazepam (ATIVAN) injection 4 mg  4 mg IntraVENous Q1H PRN    spironolactone (ALDACTONE) tablet 25 mg  25 mg Oral Daily    furosemide (LASIX) tablet 40 mg  40 mg Oral Daily

## 2025-03-17 ENCOUNTER — TELEPHONE (OUTPATIENT)
Age: 75
End: 2025-03-17

## 2025-03-17 NOTE — TELEPHONE ENCOUNTER
----- Message from MICHAEL PALACIO LPN sent at 3/14/2025  2:21 PM EDT -----  Regarding: RE: Please advise  Dx: Alcoholic liver disease. Needs appt within 6-8weeks  ----- Message -----  From: Sienna Myers  Sent: 3/13/2025  10:41 AM EDT  To: Sherrell Salazar LPN  Subject: Please advise                                    Patient was discharged from [facility] previously tried to reach patient in December to be scheduled for 6 to 8 week visit but she never returned call please review current results in Epic to determine how soon she needs to be see currently

## 2025-03-17 NOTE — TELEPHONE ENCOUNTER
1st attempt made to schedule as follows per CFW: 6-8 week - [place] 6.5 at 8:30am; 6.19.25 at 9:30am; and ANY openings on Shamika 25 and 26; MLS - db 6.12.25 at 11:30am; 6.13 at 11am, db 12pm; Openings on Shamika 26 and 27

## 2025-04-07 ENCOUNTER — APPOINTMENT (OUTPATIENT)
Facility: HOSPITAL | Age: 75
End: 2025-04-07
Payer: MEDICARE

## 2025-04-07 ENCOUNTER — HOSPITAL ENCOUNTER (EMERGENCY)
Facility: HOSPITAL | Age: 75
Discharge: HOME | End: 2025-04-07
Attending: EMERGENCY MEDICINE
Payer: MEDICARE

## 2025-04-07 VITALS
SYSTOLIC BLOOD PRESSURE: 146 MMHG | HEART RATE: 80 BPM | BODY MASS INDEX: 29.88 KG/M2 | HEIGHT: 63 IN | WEIGHT: 168.65 LBS | TEMPERATURE: 98.8 F | OXYGEN SATURATION: 95 % | DIASTOLIC BLOOD PRESSURE: 92 MMHG | RESPIRATION RATE: 10 BRPM

## 2025-04-07 DIAGNOSIS — F10.90 ALCOHOL USE DISORDER: Primary | ICD-10-CM

## 2025-04-07 DIAGNOSIS — E87.6 HYPOKALEMIA: ICD-10-CM

## 2025-04-07 LAB
ALBUMIN SERPL-MCNC: 3.3 G/DL (ref 3.5–5)
ALBUMIN/GLOB SERPL: 0.6 (ref 1.1–2.2)
ALP SERPL-CCNC: 134 U/L (ref 45–117)
ALT SERPL-CCNC: 52 U/L (ref 12–78)
ANION GAP SERPL CALC-SCNC: 7 MMOL/L (ref 2–12)
APPEARANCE UR: CLEAR
AST SERPL-CCNC: 157 U/L (ref 15–37)
BACTERIA URNS QL MICRO: ABNORMAL /HPF
BASOPHILS # BLD: 0.05 K/UL (ref 0–0.1)
BASOPHILS NFR BLD: 0.9 % (ref 0–1)
BILIRUB SERPL-MCNC: 1.5 MG/DL (ref 0.2–1)
BILIRUB UR QL CFM: POSITIVE
BUN SERPL-MCNC: 10 MG/DL (ref 6–20)
BUN/CREAT SERPL: 14 (ref 12–20)
CALCIUM SERPL-MCNC: 8.3 MG/DL (ref 8.5–10.1)
CHLORIDE SERPL-SCNC: 100 MMOL/L (ref 97–108)
CO2 SERPL-SCNC: 31 MMOL/L (ref 21–32)
COLOR UR: ABNORMAL
COMMENT:: NORMAL
CREAT SERPL-MCNC: 0.73 MG/DL (ref 0.55–1.02)
DIFFERENTIAL METHOD BLD: ABNORMAL
EOSINOPHIL # BLD: 0.09 K/UL (ref 0–0.4)
EOSINOPHIL NFR BLD: 1.6 % (ref 0–7)
EPITH CASTS URNS QL MICRO: ABNORMAL /LPF
ERYTHROCYTE [DISTWIDTH] IN BLOOD BY AUTOMATED COUNT: 20 % (ref 11.5–14.5)
ETHANOL SERPL-MCNC: 160 MG/DL (ref 0–0.08)
GLOBULIN SER CALC-MCNC: 5.2 G/DL (ref 2–4)
GLUCOSE SERPL-MCNC: 126 MG/DL (ref 65–100)
GLUCOSE UR STRIP.AUTO-MCNC: NEGATIVE MG/DL
HCT VFR BLD AUTO: 34.5 % (ref 35–47)
HGB BLD-MCNC: 10.7 G/DL (ref 11.5–16)
HGB UR QL STRIP: ABNORMAL
HYALINE CASTS URNS QL MICRO: ABNORMAL /LPF (ref 0–5)
IMM GRANULOCYTES # BLD AUTO: 0.03 K/UL (ref 0–0.04)
IMM GRANULOCYTES NFR BLD AUTO: 0.5 % (ref 0–0.5)
KETONES UR QL STRIP.AUTO: ABNORMAL MG/DL
LEUKOCYTE ESTERASE UR QL STRIP.AUTO: ABNORMAL
LIPASE SERPL-CCNC: 53 U/L (ref 13–75)
LYMPHOCYTES # BLD: 1.33 K/UL (ref 0.8–3.5)
LYMPHOCYTES NFR BLD: 23.1 % (ref 12–49)
MCH RBC QN AUTO: 25.9 PG (ref 26–34)
MCHC RBC AUTO-ENTMCNC: 31 G/DL (ref 30–36.5)
MCV RBC AUTO: 83.5 FL (ref 80–99)
MONOCYTES # BLD: 1.06 K/UL (ref 0–1)
MONOCYTES NFR BLD: 18.4 % (ref 5–13)
NEUTS SEG # BLD: 3.21 K/UL (ref 1.8–8)
NEUTS SEG NFR BLD: 55.5 % (ref 32–75)
NITRITE UR QL STRIP.AUTO: NEGATIVE
NRBC # BLD: 0 K/UL (ref 0–0.01)
NRBC BLD-RTO: 0 PER 100 WBC
NT PRO BNP: 400 PG/ML
PH UR STRIP: 6 (ref 5–8)
PLATELET # BLD AUTO: 145 K/UL (ref 150–400)
PMV BLD AUTO: 10.4 FL (ref 8.9–12.9)
POTASSIUM SERPL-SCNC: 2.7 MMOL/L (ref 3.5–5.1)
PROT SERPL-MCNC: 8.5 G/DL (ref 6.4–8.2)
PROT UR STRIP-MCNC: 100 MG/DL
RBC # BLD AUTO: 4.13 M/UL (ref 3.8–5.2)
RBC #/AREA URNS HPF: ABNORMAL /HPF (ref 0–5)
SODIUM SERPL-SCNC: 138 MMOL/L (ref 136–145)
SP GR UR REFRACTOMETRY: 1.02 (ref 1–1.03)
SPECIMEN HOLD: NORMAL
SPECIMEN HOLD: NORMAL
TROPONIN I SERPL HS-MCNC: 22 NG/L (ref 0–51)
UROBILINOGEN UR QL STRIP.AUTO: 2 EU/DL (ref 0.2–1)
WBC # BLD AUTO: 5.8 K/UL (ref 3.6–11)
WBC URNS QL MICRO: ABNORMAL /HPF (ref 0–4)

## 2025-04-07 PROCEDURE — 83880 ASSAY OF NATRIURETIC PEPTIDE: CPT

## 2025-04-07 PROCEDURE — 80053 COMPREHEN METABOLIC PANEL: CPT

## 2025-04-07 PROCEDURE — 93970 EXTREMITY STUDY: CPT

## 2025-04-07 PROCEDURE — 81001 URINALYSIS AUTO W/SCOPE: CPT

## 2025-04-07 PROCEDURE — 82077 ASSAY SPEC XCP UR&BREATH IA: CPT

## 2025-04-07 PROCEDURE — 84484 ASSAY OF TROPONIN QUANT: CPT

## 2025-04-07 PROCEDURE — 85025 COMPLETE CBC W/AUTO DIFF WBC: CPT

## 2025-04-07 PROCEDURE — 6370000000 HC RX 637 (ALT 250 FOR IP): Performed by: EMERGENCY MEDICINE

## 2025-04-07 PROCEDURE — 71046 X-RAY EXAM CHEST 2 VIEWS: CPT

## 2025-04-07 PROCEDURE — 99284 EMERGENCY DEPT VISIT MOD MDM: CPT

## 2025-04-07 PROCEDURE — 83690 ASSAY OF LIPASE: CPT

## 2025-04-07 RX ORDER — POTASSIUM CHLORIDE 750 MG/1
40 TABLET, EXTENDED RELEASE ORAL
Status: COMPLETED | OUTPATIENT
Start: 2025-04-07 | End: 2025-04-07

## 2025-04-07 RX ORDER — DIAZEPAM 2 MG/1
2 TABLET ORAL ONCE
Status: COMPLETED | OUTPATIENT
Start: 2025-04-07 | End: 2025-04-07

## 2025-04-07 RX ORDER — CHLORDIAZEPOXIDE HYDROCHLORIDE 25 MG/1
CAPSULE, GELATIN COATED ORAL
Qty: 20 CAPSULE | Refills: 0 | Status: SHIPPED | OUTPATIENT
Start: 2025-04-07 | End: 2025-04-11

## 2025-04-07 RX ADMIN — DIAZEPAM 2 MG: 2 TABLET ORAL at 12:21

## 2025-04-07 RX ADMIN — POTASSIUM CHLORIDE 40 MEQ: 750 TABLET, FILM COATED, EXTENDED RELEASE ORAL at 12:21

## 2025-04-07 ASSESSMENT — ENCOUNTER SYMPTOMS
VOMITING: 0
CONSTIPATION: 0
COLOR CHANGE: 0
BACK PAIN: 0
DIARRHEA: 0
NAUSEA: 0
SHORTNESS OF BREATH: 0
ABDOMINAL PAIN: 0

## 2025-04-07 ASSESSMENT — PAIN SCALES - GENERAL: PAINLEVEL_OUTOF10: 8

## 2025-04-07 ASSESSMENT — PAIN - FUNCTIONAL ASSESSMENT: PAIN_FUNCTIONAL_ASSESSMENT: 0-10

## 2025-04-07 ASSESSMENT — PAIN DESCRIPTION - PAIN TYPE: TYPE: ACUTE PAIN

## 2025-04-07 ASSESSMENT — PAIN DESCRIPTION - LOCATION: LOCATION: ABDOMEN

## 2025-04-07 NOTE — ED NOTES
Bedside and Verbal shift change report given to TIERRA Rivera (oncoming nurse) by TIERRA Grant (offgoing nurse). Report included the following information Nurse Handoff Report, Index, ED Encounter Summary, ED SBAR, Adult Overview, MAR, Recent Results, and Event Log.

## 2025-04-07 NOTE — ED TRIAGE NOTES
TRIAGE NOTE:  Patient arrives from home with swollen ankles and increased SOB.     She reports daily drinking, last drink was this morning.      Patient took buprenorphine -naloxone post admission last time.      Patient would like to detox off of ETOH.

## 2025-04-07 NOTE — ED PROVIDER NOTES
otherwise noted below, none     Procedures        FINAL IMPRESSION    No diagnosis found.      DISPOSITION/PLAN   DISPOSITION        PATIENT REFERRED TO:  No follow-up provider specified.    DISCHARGE MEDICATIONS:  New Prescriptions    No medications on file     Controlled Substances Monitoring:          No data to display                (Please note that portions of this note were completed with a voice recognition program.  Efforts were made to edit the dictations but occasionally words are mis-transcribed.)    Jeremiah Toth MD (electronically signed)  Attending Emergency Physician           Jeremiah Toth MD  04/07/25 3960

## 2025-04-10 LAB — ECHO BSA: 1.84 M2

## 2025-05-03 ENCOUNTER — HOSPITAL ENCOUNTER (INPATIENT)
Facility: HOSPITAL | Age: 75
LOS: 3 days | Discharge: HOME | DRG: 439 | End: 2025-05-06
Attending: EMERGENCY MEDICINE | Admitting: FAMILY MEDICINE
Payer: MEDICARE

## 2025-05-03 ENCOUNTER — APPOINTMENT (OUTPATIENT)
Facility: HOSPITAL | Age: 75
DRG: 439 | End: 2025-05-03
Payer: MEDICARE

## 2025-05-03 DIAGNOSIS — F10.929 ACUTE ALCOHOLIC INTOXICATION WITH COMPLICATION: ICD-10-CM

## 2025-05-03 DIAGNOSIS — E87.6 HYPOKALEMIA: ICD-10-CM

## 2025-05-03 DIAGNOSIS — J96.01 ACUTE RESPIRATORY FAILURE WITH HYPOXIA (HCC): ICD-10-CM

## 2025-05-03 DIAGNOSIS — K85.20 ALCOHOL-INDUCED ACUTE PANCREATITIS WITHOUT INFECTION OR NECROSIS: Primary | ICD-10-CM

## 2025-05-03 DIAGNOSIS — E83.42 HYPOMAGNESEMIA: ICD-10-CM

## 2025-05-03 LAB
ALBUMIN SERPL-MCNC: 3.1 G/DL (ref 3.5–5)
ALBUMIN/GLOB SERPL: 0.6 (ref 1.1–2.2)
ALP SERPL-CCNC: 141 U/L (ref 45–117)
ALT SERPL-CCNC: 65 U/L (ref 12–78)
AMPHET UR QL SCN: NEGATIVE
ANION GAP SERPL CALC-SCNC: 10 MMOL/L (ref 2–12)
APPEARANCE UR: CLEAR
AST SERPL-CCNC: 154 U/L (ref 15–37)
BACTERIA URNS QL MICRO: ABNORMAL /HPF
BARBITURATES UR QL SCN: NEGATIVE
BASOPHILS # BLD: 0.03 K/UL (ref 0–0.1)
BASOPHILS NFR BLD: 0.6 % (ref 0–1)
BENZODIAZ UR QL: NEGATIVE
BILIRUB SERPL-MCNC: 0.8 MG/DL (ref 0.2–1)
BILIRUB UR QL: NEGATIVE
BUN SERPL-MCNC: 14 MG/DL (ref 6–20)
BUN/CREAT SERPL: 21 (ref 12–20)
CALCIUM SERPL-MCNC: 8.8 MG/DL (ref 8.5–10.1)
CANNABINOIDS UR QL SCN: NEGATIVE
CHLORIDE SERPL-SCNC: 101 MMOL/L (ref 97–108)
CO2 SERPL-SCNC: 28 MMOL/L (ref 21–32)
COCAINE UR QL SCN: NEGATIVE
COLOR UR: ABNORMAL
COMMENT:: NORMAL
CREAT SERPL-MCNC: 0.66 MG/DL (ref 0.55–1.02)
DIFFERENTIAL METHOD BLD: ABNORMAL
EOSINOPHIL # BLD: 0.13 K/UL (ref 0–0.4)
EOSINOPHIL NFR BLD: 2.5 % (ref 0–7)
EPITH CASTS URNS QL MICRO: ABNORMAL /LPF
ERYTHROCYTE [DISTWIDTH] IN BLOOD BY AUTOMATED COUNT: 16.8 % (ref 11.5–14.5)
ETHANOL SERPL-MCNC: 263 MG/DL (ref 0–0.08)
GLOBULIN SER CALC-MCNC: 5 G/DL (ref 2–4)
GLUCOSE SERPL-MCNC: 133 MG/DL (ref 65–100)
GLUCOSE UR STRIP.AUTO-MCNC: NEGATIVE MG/DL
HCT VFR BLD AUTO: 34.1 % (ref 35–47)
HGB BLD-MCNC: 11.1 G/DL (ref 11.5–16)
HGB UR QL STRIP: ABNORMAL
HYALINE CASTS URNS QL MICRO: ABNORMAL /LPF (ref 0–5)
IMM GRANULOCYTES # BLD AUTO: 0.02 K/UL (ref 0–0.04)
IMM GRANULOCYTES NFR BLD AUTO: 0.4 % (ref 0–0.5)
KETONES UR QL STRIP.AUTO: NEGATIVE MG/DL
LEUKOCYTE ESTERASE UR QL STRIP.AUTO: NEGATIVE
LIPASE SERPL-CCNC: 271 U/L (ref 13–75)
LYMPHOCYTES # BLD: 1.65 K/UL (ref 0.8–3.5)
LYMPHOCYTES NFR BLD: 31.4 % (ref 12–49)
Lab: NORMAL
MAGNESIUM SERPL-MCNC: 1.2 MG/DL (ref 1.6–2.4)
MCH RBC QN AUTO: 26.4 PG (ref 26–34)
MCHC RBC AUTO-ENTMCNC: 32.6 G/DL (ref 30–36.5)
MCV RBC AUTO: 81.2 FL (ref 80–99)
METHADONE UR QL: NEGATIVE
MONOCYTES # BLD: 0.68 K/UL (ref 0–1)
MONOCYTES NFR BLD: 13 % (ref 5–13)
NEUTS SEG # BLD: 2.74 K/UL (ref 1.8–8)
NEUTS SEG NFR BLD: 52.1 % (ref 32–75)
NITRITE UR QL STRIP.AUTO: NEGATIVE
NRBC # BLD: 0 K/UL (ref 0–0.01)
NRBC BLD-RTO: 0 PER 100 WBC
NT PRO BNP: 123 PG/ML
OPIATES UR QL: NEGATIVE
PCP UR QL: NEGATIVE
PH UR STRIP: 5.5 (ref 5–8)
PLATELET # BLD AUTO: 157 K/UL (ref 150–400)
PMV BLD AUTO: 10.3 FL (ref 8.9–12.9)
POTASSIUM SERPL-SCNC: 2.8 MMOL/L (ref 3.5–5.1)
PROT SERPL-MCNC: 8.1 G/DL (ref 6.4–8.2)
PROT UR STRIP-MCNC: 30 MG/DL
RBC # BLD AUTO: 4.2 M/UL (ref 3.8–5.2)
RBC #/AREA URNS HPF: ABNORMAL /HPF (ref 0–5)
SODIUM SERPL-SCNC: 139 MMOL/L (ref 136–145)
SP GR UR REFRACTOMETRY: >1.03
SPECIMEN HOLD: NORMAL
TROPONIN I SERPL HS-MCNC: 8 NG/L (ref 0–51)
URINE CULTURE IF INDICATED: ABNORMAL
UROBILINOGEN UR QL STRIP.AUTO: 1 EU/DL (ref 0.2–1)
WBC # BLD AUTO: 5.3 K/UL (ref 3.6–11)
WBC URNS QL MICRO: ABNORMAL /HPF (ref 0–4)

## 2025-05-03 PROCEDURE — 6370000000 HC RX 637 (ALT 250 FOR IP): Performed by: EMERGENCY MEDICINE

## 2025-05-03 PROCEDURE — 6360000004 HC RX CONTRAST MEDICATION: Performed by: RADIOLOGY

## 2025-05-03 PROCEDURE — 83735 ASSAY OF MAGNESIUM: CPT

## 2025-05-03 PROCEDURE — 80307 DRUG TEST PRSMV CHEM ANLYZR: CPT

## 2025-05-03 PROCEDURE — 83690 ASSAY OF LIPASE: CPT

## 2025-05-03 PROCEDURE — 93005 ELECTROCARDIOGRAM TRACING: CPT | Performed by: EMERGENCY MEDICINE

## 2025-05-03 PROCEDURE — 96368 THER/DIAG CONCURRENT INF: CPT

## 2025-05-03 PROCEDURE — 80053 COMPREHEN METABOLIC PANEL: CPT

## 2025-05-03 PROCEDURE — 71275 CT ANGIOGRAPHY CHEST: CPT

## 2025-05-03 PROCEDURE — 81001 URINALYSIS AUTO W/SCOPE: CPT

## 2025-05-03 PROCEDURE — 2060000000 HC ICU INTERMEDIATE R&B

## 2025-05-03 PROCEDURE — 74177 CT ABD & PELVIS W/CONTRAST: CPT

## 2025-05-03 PROCEDURE — 83880 ASSAY OF NATRIURETIC PEPTIDE: CPT

## 2025-05-03 PROCEDURE — 96365 THER/PROPH/DIAG IV INF INIT: CPT

## 2025-05-03 PROCEDURE — 96375 TX/PRO/DX INJ NEW DRUG ADDON: CPT

## 2025-05-03 PROCEDURE — 99285 EMERGENCY DEPT VISIT HI MDM: CPT

## 2025-05-03 PROCEDURE — 82077 ASSAY SPEC XCP UR&BREATH IA: CPT

## 2025-05-03 PROCEDURE — 84484 ASSAY OF TROPONIN QUANT: CPT

## 2025-05-03 PROCEDURE — 85025 COMPLETE CBC W/AUTO DIFF WBC: CPT

## 2025-05-03 PROCEDURE — 6360000002 HC RX W HCPCS: Performed by: EMERGENCY MEDICINE

## 2025-05-03 RX ORDER — ONDANSETRON 2 MG/ML
4 INJECTION INTRAMUSCULAR; INTRAVENOUS
Status: COMPLETED | OUTPATIENT
Start: 2025-05-03 | End: 2025-05-03

## 2025-05-03 RX ORDER — KETOROLAC TROMETHAMINE 30 MG/ML
15 INJECTION, SOLUTION INTRAMUSCULAR; INTRAVENOUS
Status: COMPLETED | OUTPATIENT
Start: 2025-05-03 | End: 2025-05-03

## 2025-05-03 RX ORDER — IOPAMIDOL 755 MG/ML
100 INJECTION, SOLUTION INTRAVASCULAR
Status: COMPLETED | OUTPATIENT
Start: 2025-05-03 | End: 2025-05-03

## 2025-05-03 RX ORDER — POTASSIUM CHLORIDE 750 MG/1
40 TABLET, EXTENDED RELEASE ORAL ONCE
Status: COMPLETED | OUTPATIENT
Start: 2025-05-03 | End: 2025-05-03

## 2025-05-03 RX ORDER — LIDOCAINE 4 G/G
1 PATCH TOPICAL
Status: COMPLETED | OUTPATIENT
Start: 2025-05-03 | End: 2025-05-04

## 2025-05-03 RX ORDER — PREGABALIN 150 MG/1
150 CAPSULE ORAL 3 TIMES DAILY
COMMUNITY

## 2025-05-03 RX ORDER — MAGNESIUM SULFATE IN WATER 40 MG/ML
2000 INJECTION, SOLUTION INTRAVENOUS
Status: COMPLETED | OUTPATIENT
Start: 2025-05-03 | End: 2025-05-03

## 2025-05-03 RX ORDER — HYDROMORPHONE HYDROCHLORIDE 1 MG/ML
0.5 INJECTION, SOLUTION INTRAMUSCULAR; INTRAVENOUS; SUBCUTANEOUS
Status: ACTIVE | OUTPATIENT
Start: 2025-05-03 | End: 2025-05-04

## 2025-05-03 RX ORDER — POTASSIUM CHLORIDE 7.45 MG/ML
10 INJECTION INTRAVENOUS ONCE
Status: COMPLETED | OUTPATIENT
Start: 2025-05-03 | End: 2025-05-03

## 2025-05-03 RX ADMIN — MAGNESIUM SULFATE HEPTAHYDRATE 2000 MG: 40 INJECTION, SOLUTION INTRAVENOUS at 21:53

## 2025-05-03 RX ADMIN — PREGABALIN 150 MG: 100 CAPSULE ORAL at 22:33

## 2025-05-03 RX ADMIN — POTASSIUM CHLORIDE 10 MEQ: 7.46 INJECTION, SOLUTION INTRAVENOUS at 21:56

## 2025-05-03 RX ADMIN — KETOROLAC TROMETHAMINE 15 MG: 30 INJECTION, SOLUTION INTRAMUSCULAR; INTRAVENOUS at 20:39

## 2025-05-03 RX ADMIN — ONDANSETRON 4 MG: 2 INJECTION, SOLUTION INTRAMUSCULAR; INTRAVENOUS at 20:40

## 2025-05-03 RX ADMIN — IOPAMIDOL 100 ML: 755 INJECTION, SOLUTION INTRAVENOUS at 21:27

## 2025-05-03 RX ADMIN — POTASSIUM CHLORIDE 40 MEQ: 750 TABLET, FILM COATED, EXTENDED RELEASE ORAL at 21:59

## 2025-05-03 ASSESSMENT — PAIN DESCRIPTION - LOCATION: LOCATION: FLANK

## 2025-05-03 ASSESSMENT — LIFESTYLE VARIABLES
HOW OFTEN DO YOU HAVE A DRINK CONTAINING ALCOHOL: 4 OR MORE TIMES A WEEK
HOW MANY STANDARD DRINKS CONTAINING ALCOHOL DO YOU HAVE ON A TYPICAL DAY: 7 TO 9

## 2025-05-03 ASSESSMENT — PAIN SCALES - GENERAL: PAINLEVEL_OUTOF10: 10

## 2025-05-03 ASSESSMENT — PAIN DESCRIPTION - DESCRIPTORS: DESCRIPTORS: ACHING

## 2025-05-03 ASSESSMENT — PAIN DESCRIPTION - FREQUENCY: FREQUENCY: CONTINUOUS

## 2025-05-03 ASSESSMENT — PAIN DESCRIPTION - ORIENTATION: ORIENTATION: RIGHT

## 2025-05-03 ASSESSMENT — PAIN DESCRIPTION - ONSET: ONSET: ON-GOING

## 2025-05-03 ASSESSMENT — PAIN DESCRIPTION - PAIN TYPE: TYPE: ACUTE PAIN

## 2025-05-03 ASSESSMENT — PAIN - FUNCTIONAL ASSESSMENT
PAIN_FUNCTIONAL_ASSESSMENT: 0-10
PAIN_FUNCTIONAL_ASSESSMENT: ACTIVITIES ARE NOT PREVENTED

## 2025-05-04 LAB
ALBUMIN SERPL-MCNC: 2.9 G/DL (ref 3.5–5)
ALBUMIN/GLOB SERPL: 0.5 (ref 1.1–2.2)
ALP SERPL-CCNC: 128 U/L (ref 45–117)
ALT SERPL-CCNC: 65 U/L (ref 12–78)
AMMONIA PLAS-SCNC: 20 UMOL/L
AMPHET UR QL SCN: NEGATIVE
ANION GAP SERPL CALC-SCNC: 7 MMOL/L (ref 2–12)
APTT PPP: 29.5 SEC (ref 22.1–31)
AST SERPL-CCNC: 146 U/L (ref 15–37)
BARBITURATES UR QL SCN: NEGATIVE
BASOPHILS # BLD: 0.04 K/UL (ref 0–0.1)
BASOPHILS NFR BLD: 0.5 % (ref 0–1)
BENZODIAZ UR QL: NEGATIVE
BILIRUB SERPL-MCNC: 1.1 MG/DL (ref 0.2–1)
BUN SERPL-MCNC: 11 MG/DL (ref 6–20)
BUN/CREAT SERPL: 20 (ref 12–20)
CALCIUM SERPL-MCNC: 8.6 MG/DL (ref 8.5–10.1)
CANNABINOIDS UR QL SCN: NEGATIVE
CHLORIDE SERPL-SCNC: 104 MMOL/L (ref 97–108)
CO2 SERPL-SCNC: 27 MMOL/L (ref 21–32)
COCAINE UR QL SCN: NEGATIVE
CREAT SERPL-MCNC: 0.54 MG/DL (ref 0.55–1.02)
DIFFERENTIAL METHOD BLD: ABNORMAL
EOSINOPHIL # BLD: 0.07 K/UL (ref 0–0.4)
EOSINOPHIL NFR BLD: 0.8 % (ref 0–7)
ERYTHROCYTE [DISTWIDTH] IN BLOOD BY AUTOMATED COUNT: 16.8 % (ref 11.5–14.5)
FERRITIN SERPL-MCNC: 40 NG/ML (ref 8–252)
FOLATE SERPL-MCNC: 19 NG/ML (ref 5–21)
GLOBULIN SER CALC-MCNC: 5.3 G/DL (ref 2–4)
GLUCOSE SERPL-MCNC: 122 MG/DL (ref 65–100)
HCT VFR BLD AUTO: 34.9 % (ref 35–47)
HGB BLD-MCNC: 10.9 G/DL (ref 11.5–16)
IMM GRANULOCYTES # BLD AUTO: 0.05 K/UL (ref 0–0.04)
IMM GRANULOCYTES NFR BLD AUTO: 0.6 % (ref 0–0.5)
INR PPP: 1.2 (ref 0.9–1.1)
IRON SATN MFR SERPL: 10 % (ref 20–50)
IRON SERPL-MCNC: 41 UG/DL (ref 35–150)
LIPASE SERPL-CCNC: 140 U/L (ref 13–75)
LYMPHOCYTES # BLD: 0.47 K/UL (ref 0.8–3.5)
LYMPHOCYTES NFR BLD: 5.6 % (ref 12–49)
Lab: ABNORMAL
MAGNESIUM SERPL-MCNC: 1.2 MG/DL (ref 1.6–2.4)
MCH RBC QN AUTO: 26.4 PG (ref 26–34)
MCHC RBC AUTO-ENTMCNC: 31.2 G/DL (ref 30–36.5)
MCV RBC AUTO: 84.5 FL (ref 80–99)
METHADONE UR QL: NEGATIVE
MONOCYTES # BLD: 0.83 K/UL (ref 0–1)
MONOCYTES NFR BLD: 9.9 % (ref 5–13)
NEUTS SEG # BLD: 6.94 K/UL (ref 1.8–8)
NEUTS SEG NFR BLD: 82.6 % (ref 32–75)
NRBC # BLD: 0 K/UL (ref 0–0.01)
NRBC BLD-RTO: 0 PER 100 WBC
OPIATES UR QL: POSITIVE
PCP UR QL: NEGATIVE
PLATELET # BLD AUTO: 144 K/UL (ref 150–400)
PMV BLD AUTO: 10.6 FL (ref 8.9–12.9)
POTASSIUM SERPL-SCNC: 3.5 MMOL/L (ref 3.5–5.1)
PROT SERPL-MCNC: 8.2 G/DL (ref 6.4–8.2)
PROTHROMBIN TIME: 12.6 SEC (ref 9.2–11.2)
RBC # BLD AUTO: 4.13 M/UL (ref 3.8–5.2)
RBC MORPH BLD: ABNORMAL
SODIUM SERPL-SCNC: 138 MMOL/L (ref 136–145)
THERAPEUTIC RANGE: NORMAL SECS (ref 58–77)
TIBC SERPL-MCNC: 398 UG/DL (ref 250–450)
VIT B12 SERPL-MCNC: 624 PG/ML (ref 193–986)
WBC # BLD AUTO: 8.4 K/UL (ref 3.6–11)

## 2025-05-04 PROCEDURE — 82728 ASSAY OF FERRITIN: CPT

## 2025-05-04 PROCEDURE — 82607 VITAMIN B-12: CPT

## 2025-05-04 PROCEDURE — 2500000003 HC RX 250 WO HCPCS: Performed by: FAMILY MEDICINE

## 2025-05-04 PROCEDURE — 99223 1ST HOSP IP/OBS HIGH 75: CPT | Performed by: STUDENT IN AN ORGANIZED HEALTH CARE EDUCATION/TRAINING PROGRAM

## 2025-05-04 PROCEDURE — 82746 ASSAY OF FOLIC ACID SERUM: CPT

## 2025-05-04 PROCEDURE — 2580000003 HC RX 258: Performed by: FAMILY MEDICINE

## 2025-05-04 PROCEDURE — 80053 COMPREHEN METABOLIC PANEL: CPT

## 2025-05-04 PROCEDURE — 85025 COMPLETE CBC W/AUTO DIFF WBC: CPT

## 2025-05-04 PROCEDURE — 85610 PROTHROMBIN TIME: CPT

## 2025-05-04 PROCEDURE — 83690 ASSAY OF LIPASE: CPT

## 2025-05-04 PROCEDURE — 2060000000 HC ICU INTERMEDIATE R&B

## 2025-05-04 PROCEDURE — 83540 ASSAY OF IRON: CPT

## 2025-05-04 PROCEDURE — 80307 DRUG TEST PRSMV CHEM ANLYZR: CPT

## 2025-05-04 PROCEDURE — 83735 ASSAY OF MAGNESIUM: CPT

## 2025-05-04 PROCEDURE — 85730 THROMBOPLASTIN TIME PARTIAL: CPT

## 2025-05-04 PROCEDURE — 82140 ASSAY OF AMMONIA: CPT

## 2025-05-04 PROCEDURE — 83550 IRON BINDING TEST: CPT

## 2025-05-04 PROCEDURE — 6370000000 HC RX 637 (ALT 250 FOR IP): Performed by: FAMILY MEDICINE

## 2025-05-04 PROCEDURE — 6360000002 HC RX W HCPCS: Performed by: FAMILY MEDICINE

## 2025-05-04 PROCEDURE — 6370000000 HC RX 637 (ALT 250 FOR IP): Performed by: HOSPITALIST

## 2025-05-04 PROCEDURE — 6360000002 HC RX W HCPCS: Performed by: HOSPITALIST

## 2025-05-04 RX ORDER — ONDANSETRON 4 MG/1
4 TABLET, ORALLY DISINTEGRATING ORAL EVERY 8 HOURS PRN
Status: DISCONTINUED | OUTPATIENT
Start: 2025-05-04 | End: 2025-05-06 | Stop reason: HOSPADM

## 2025-05-04 RX ORDER — SODIUM CHLORIDE 0.9 % (FLUSH) 0.9 %
5-40 SYRINGE (ML) INJECTION EVERY 12 HOURS SCHEDULED
Status: DISCONTINUED | OUTPATIENT
Start: 2025-05-04 | End: 2025-05-06 | Stop reason: HOSPADM

## 2025-05-04 RX ORDER — SODIUM CHLORIDE 0.9 % (FLUSH) 0.9 %
5-40 SYRINGE (ML) INJECTION PRN
Status: DISCONTINUED | OUTPATIENT
Start: 2025-05-04 | End: 2025-05-06 | Stop reason: HOSPADM

## 2025-05-04 RX ORDER — MAGNESIUM SULFATE IN WATER 40 MG/ML
2000 INJECTION, SOLUTION INTRAVENOUS ONCE
Status: COMPLETED | OUTPATIENT
Start: 2025-05-04 | End: 2025-05-04

## 2025-05-04 RX ORDER — POTASSIUM CHLORIDE 7.45 MG/ML
10 INJECTION INTRAVENOUS PRN
Status: DISCONTINUED | OUTPATIENT
Start: 2025-05-04 | End: 2025-05-06 | Stop reason: HOSPADM

## 2025-05-04 RX ORDER — LORAZEPAM 2 MG/ML
1 INJECTION INTRAMUSCULAR
Status: DISCONTINUED | OUTPATIENT
Start: 2025-05-04 | End: 2025-05-06 | Stop reason: HOSPADM

## 2025-05-04 RX ORDER — LORAZEPAM 1 MG/1
1 TABLET ORAL
Status: DISCONTINUED | OUTPATIENT
Start: 2025-05-04 | End: 2025-05-06 | Stop reason: HOSPADM

## 2025-05-04 RX ORDER — LORAZEPAM 1 MG/1
3 TABLET ORAL
Status: DISCONTINUED | OUTPATIENT
Start: 2025-05-04 | End: 2025-05-06 | Stop reason: HOSPADM

## 2025-05-04 RX ORDER — ONDANSETRON 2 MG/ML
4 INJECTION INTRAMUSCULAR; INTRAVENOUS EVERY 6 HOURS PRN
Status: DISCONTINUED | OUTPATIENT
Start: 2025-05-04 | End: 2025-05-06 | Stop reason: HOSPADM

## 2025-05-04 RX ORDER — LORAZEPAM 2 MG/ML
4 INJECTION INTRAMUSCULAR
Status: DISCONTINUED | OUTPATIENT
Start: 2025-05-04 | End: 2025-05-06 | Stop reason: HOSPADM

## 2025-05-04 RX ORDER — PREGABALIN 75 MG/1
150 CAPSULE ORAL 3 TIMES DAILY
Status: DISCONTINUED | OUTPATIENT
Start: 2025-05-04 | End: 2025-05-06 | Stop reason: HOSPADM

## 2025-05-04 RX ORDER — POTASSIUM CHLORIDE 750 MG/1
40 TABLET, EXTENDED RELEASE ORAL PRN
Status: DISCONTINUED | OUTPATIENT
Start: 2025-05-04 | End: 2025-05-06 | Stop reason: HOSPADM

## 2025-05-04 RX ORDER — MAGNESIUM SULFATE IN WATER 40 MG/ML
2000 INJECTION, SOLUTION INTRAVENOUS PRN
Status: DISCONTINUED | OUTPATIENT
Start: 2025-05-04 | End: 2025-05-06 | Stop reason: HOSPADM

## 2025-05-04 RX ORDER — LORAZEPAM 2 MG/ML
3 INJECTION INTRAMUSCULAR
Status: DISCONTINUED | OUTPATIENT
Start: 2025-05-04 | End: 2025-05-06 | Stop reason: HOSPADM

## 2025-05-04 RX ORDER — LORAZEPAM 1 MG/1
4 TABLET ORAL
Status: DISCONTINUED | OUTPATIENT
Start: 2025-05-04 | End: 2025-05-06 | Stop reason: HOSPADM

## 2025-05-04 RX ORDER — POLYETHYLENE GLYCOL 3350 17 G/17G
17 POWDER, FOR SOLUTION ORAL DAILY PRN
Status: DISCONTINUED | OUTPATIENT
Start: 2025-05-04 | End: 2025-05-06 | Stop reason: HOSPADM

## 2025-05-04 RX ORDER — LORAZEPAM 1 MG/1
2 TABLET ORAL
Status: DISCONTINUED | OUTPATIENT
Start: 2025-05-04 | End: 2025-05-06 | Stop reason: HOSPADM

## 2025-05-04 RX ORDER — LANOLIN ALCOHOL/MO/W.PET/CERES
100 CREAM (GRAM) TOPICAL DAILY
Status: DISCONTINUED | OUTPATIENT
Start: 2025-05-04 | End: 2025-05-06 | Stop reason: HOSPADM

## 2025-05-04 RX ORDER — LORAZEPAM 2 MG/ML
2 INJECTION INTRAMUSCULAR
Status: DISCONTINUED | OUTPATIENT
Start: 2025-05-04 | End: 2025-05-06 | Stop reason: HOSPADM

## 2025-05-04 RX ORDER — ENOXAPARIN SODIUM 100 MG/ML
40 INJECTION SUBCUTANEOUS DAILY
Status: DISCONTINUED | OUTPATIENT
Start: 2025-05-04 | End: 2025-05-06 | Stop reason: HOSPADM

## 2025-05-04 RX ORDER — SODIUM CHLORIDE 9 MG/ML
INJECTION, SOLUTION INTRAVENOUS PRN
Status: DISCONTINUED | OUTPATIENT
Start: 2025-05-04 | End: 2025-05-06 | Stop reason: HOSPADM

## 2025-05-04 RX ADMIN — HYDROMORPHONE HYDROCHLORIDE 0.5 MG: 1 INJECTION, SOLUTION INTRAMUSCULAR; INTRAVENOUS; SUBCUTANEOUS at 11:01

## 2025-05-04 RX ADMIN — HYDROMORPHONE HYDROCHLORIDE 0.5 MG: 1 INJECTION, SOLUTION INTRAMUSCULAR; INTRAVENOUS; SUBCUTANEOUS at 06:13

## 2025-05-04 RX ADMIN — MAGNESIUM SULFATE HEPTAHYDRATE 2000 MG: 40 INJECTION, SOLUTION INTRAVENOUS at 09:28

## 2025-05-04 RX ADMIN — PREGABALIN 150 MG: 75 CAPSULE ORAL at 09:21

## 2025-05-04 RX ADMIN — PREGABALIN 150 MG: 75 CAPSULE ORAL at 20:43

## 2025-05-04 RX ADMIN — SODIUM CHLORIDE, PRESERVATIVE FREE 10 ML: 5 INJECTION INTRAVENOUS at 09:21

## 2025-05-04 RX ADMIN — HYDROMORPHONE HYDROCHLORIDE 0.5 MG: 1 INJECTION, SOLUTION INTRAMUSCULAR; INTRAVENOUS; SUBCUTANEOUS at 20:43

## 2025-05-04 RX ADMIN — Medication 100 MG: at 09:21

## 2025-05-04 RX ADMIN — HYDROMORPHONE HYDROCHLORIDE 0.5 MG: 1 INJECTION, SOLUTION INTRAMUSCULAR; INTRAVENOUS; SUBCUTANEOUS at 02:20

## 2025-05-04 RX ADMIN — SODIUM CHLORIDE: 0.9 INJECTION, SOLUTION INTRAVENOUS at 09:26

## 2025-05-04 RX ADMIN — SODIUM CHLORIDE, PRESERVATIVE FREE 10 ML: 5 INJECTION INTRAVENOUS at 20:44

## 2025-05-04 RX ADMIN — ENOXAPARIN SODIUM 40 MG: 100 INJECTION SUBCUTANEOUS at 17:13

## 2025-05-04 RX ADMIN — PREGABALIN 150 MG: 75 CAPSULE ORAL at 15:12

## 2025-05-04 ASSESSMENT — PAIN SCALES - GENERAL
PAINLEVEL_OUTOF10: 8
PAINLEVEL_OUTOF10: 7
PAINLEVEL_OUTOF10: 9
PAINLEVEL_OUTOF10: 9
PAINLEVEL_OUTOF10: 7
PAINLEVEL_OUTOF10: 9
PAINLEVEL_OUTOF10: 6

## 2025-05-04 ASSESSMENT — PAIN SCALES - WONG BAKER: WONGBAKER_NUMERICALRESPONSE: NO HURT

## 2025-05-04 ASSESSMENT — PAIN DESCRIPTION - LOCATION
LOCATION: FLANK;HEAD
LOCATION: FLANK;BACK
LOCATION: FLANK;SHOULDER
LOCATION: FLANK
LOCATION: ABDOMEN;FLANK
LOCATION: HEAD
LOCATION: FLANK

## 2025-05-04 ASSESSMENT — PAIN DESCRIPTION - DESCRIPTORS
DESCRIPTORS: ACHING;NAGGING
DESCRIPTORS: ACHING;DISCOMFORT;NAGGING
DESCRIPTORS: ACHING;DISCOMFORT;NAGGING
DESCRIPTORS: ACHING
DESCRIPTORS: ACHING;DISCOMFORT;NAGGING
DESCRIPTORS: ACHING
DESCRIPTORS: ACHING;DISCOMFORT;NAGGING

## 2025-05-04 ASSESSMENT — PAIN DESCRIPTION - ORIENTATION
ORIENTATION: LEFT;RIGHT
ORIENTATION: RIGHT
ORIENTATION: RIGHT
ORIENTATION: LEFT
ORIENTATION: RIGHT;POSTERIOR
ORIENTATION: RIGHT
ORIENTATION: MID

## 2025-05-04 ASSESSMENT — PAIN DESCRIPTION - PAIN TYPE
TYPE: ACUTE PAIN

## 2025-05-04 ASSESSMENT — PAIN - FUNCTIONAL ASSESSMENT: PAIN_FUNCTIONAL_ASSESSMENT: PREVENTS OR INTERFERES SOME ACTIVE ACTIVITIES AND ADLS

## 2025-05-04 NOTE — ED TRIAGE NOTES
Pt arrives via EMS from home with CC R sided flank pain. Pt reports pain 10/10.    Pt tells this RN \"I'm an alcoholic\".    Pt drank a pint of vodka before coming.     H/X: cirrhosis

## 2025-05-04 NOTE — CONSULTS
[facility] LIVER [facility]     Kofi Amaya MD, FACP, MACG, FAASLD   MD Kimberly Mcmahan, ABI Freeman, [facility]   Marlene Leebing, [facility]  Woo Laci, FNP-C   Shefali Troncoso, [facility]         Liver [facility]   at [facility]   [address], [address]   [place]  [address] [phone]   FAX: [phone]  Liver [facility]   [address], [address]   [place]  [address] [phone]   FAX: [phone]         HEPATOLOGY CONSULT NOTE  I was asked to see this patient in consultation for evaluation and management of liver disease.  I have reviewed the   Emergency room note,   Hospital admission note,  Notes by all other physicians who have seen the patient during this hospitalization to date.    I have reviewed the problem list, all past medical history and the reason for this hospitalization.    I have reviewed the allergies and the medications the patient was taking at home prior to this hospitalization.    HISTORY:  The patient has been seen previously by [facility] in hospital consultation and has not answer multiple phone calls to arrange clinic follow up. She says she is now on the \"wait list\"    The patient is a 74 y.o. female with steatotic liver disease related to alcohol use, HCV ab+, chronically dilated CBD s/p ERCP 2023 and EUS/ERCP in 2024, pancreatitis related to alcohol use.     She presented to [facility] on 5/3 with shortness of breath and requested alcohol detox. She reported that she actually drinks large undisclosed amount of vodka daily     In the ED Laboratory studies were significant for:    Sna 139, Scr 0.66 mg, , ALT 65, , TBILI 0.8 mg, ALB 3.1 gm, WBC 5.3, HB 11.1 gms, , INR 1.2    CT Abdomen pelvis W contrast 5/3/2025  LIVER: Diffuse steatosis. No visualized  reduction in TPO production by the liver, and bone marrow suppression.  Because platelet count does not reflect platelet platelet function, platelet transfusion is typically not required.      Coagulopathy  INR is often elevated in patients with hepatic synthetic dysfunction.  The INR in a cirrhotic patient does not reflect bleeding risk.      Alcohol use disorder- severe  Last drink on day of admission  Counseled on risks of continued drinking and strong recommendation of complete sobriety  PETH for baseline ordered    SYSTEM REVIEW:  Constitution systems: Negative for fever, chills, weight gain, weight loss.   Eyes: Negative for visual changes.  ENT: Negative for sore throat, painful swallowing.   Respiratory: Negative for cough, hemoptysis, SOB.   Cardiology: Negative for chest pain, palpitations.  GI:  Negative for constipation or diarrhea.  : Negative for urinary frequency, dysuria, hematuria, nocturia.   Skin: Negative for rash.  Hematology: Negative for easy bruising, blood clots.    Musculo-skelatal: Negative for back pain, muscle pain, weakness.  Neurologic: Negative for headaches, dizziness, vertigo, memory problems not related to HE.  Psychology: Negative for anxiety, depression.       FAMILY HISTORY:  No known family history of liver disease.    SOCIAL HISTORY:  From [facility]  Tobacco- none  Alcohol- vodka all day every day per patient- last 5 years  Drugs- opioids?  Worked as a nurse aid  No children      PHYSICAL EXAMINATION:  VS: BP (!) 142/83   Pulse 94   Temp 98.1 °F (36.7 °C) (Axillary)   Resp 17   Ht 1.6 m (5' 3\")   Wt 73.3 kg (161 lb 9.6 oz)   SpO2 96%   BMI 28.63 kg/m²     General:    No acute distress.   Eyes:    Sclera anicteric.   ENT:  No oral lesions.  Thyroid normal.  Nodes:  No adenopathy.   Skin:    No spider angiomata.  No jaundice.  No palmar erythema.  Respiratory:    Lungs clear to auscultation.  Cardiovascular:    Regular heart rate.  Abdomen:    Soft non-tender, obese. No

## 2025-05-04 NOTE — H&P
History and Physical    Date of Service:  5/3/2025  Primary Care Provider: Jacy Perez APRN - NP  Source of information: The patient and Chart review    Chief Complaint: Flank Pain      History of Presenting Illness:   Maty Villarreal is a 74 y.o. female with past medical history of liver cirrhosis, hepatitis C, opioid use disorder, alcohol use disorder, chronic pain, neuropathy, tuberculosis, osteoarthritis presented to the emergency department chief complaint of: Ankles and shortness of breath.  She also was requesting alcohol detox.  She reported drinks alcohol daily last drink this morning.  She also reportedly takes buprenorphine-naltrexone.  She was last hospitalized from 3/9/2025 to 3/12/2025 with acute hypoxic respiratory failure, edema of lower extremities, alcohol use disorder, opioid use disorder.  Tonight, on arrival emergency department, initial ported vital signs were temperature 98.0 °F, /91, heart rate 93, respiratory rate 18, saturation 98% on room air.  12-lead EKG showed normal sinus rhythm, nonspecific ST/T wave changes, prolonged QTc 482 ms at 91 bpm.  Abnormal labs including K2.8, magnesium 1.2, , albumin 2.1, alkaline phosphatase 141, , lipase 271, hemoglobin 11.1.  ED ordered magnesium sulfate 2000 mg IV, Toradol 15 mg IV, Zofran 4 mg IV, lidocaine 4% patch applied transdermally x 1, Dilaudid 0.5 mg IV x 1, Lyrica 150 mg p.o. x 1, KCl 40 mEq p.o. x 1 + KCl 10 mEq IV x 1.  Patient is now seen for admission to the hospitalist service.  Patient still rates her pain as a 9 out of 10 despite receiving Dilaudid a few hours ago.  When asked about her outpatient use of buprenorphine, she admits that she actually buys buprenorphine from somebody who lives in her apartment building.  I have reviewed  website regarding patient's prescription history showing  buprenorphine-naloxone 2-0.5 mg last filled on 2/16/2025.  Patient notes that she was taking

## 2025-05-04 NOTE — PLAN OF CARE
Problem: Discharge Planning  Goal: Discharge to home or other facility with appropriate resources  Outcome: Progressing  Flowsheets (Taken 5/4/2025 0800)  Discharge to home or other facility with appropriate resources: Identify barriers to discharge with patient and caregiver     Problem: Risk for Elopement  Goal: Patient will not exit the unit/facility without proper excort  Outcome: Progressing  Flowsheets (Taken 5/4/2025 0145 by Carlos Armstrong, RN)  Nursing Interventions for Elopement Risk: Collaborate with treatment team for drug withdrawal symptoms treatment     Problem: Safety - Adult  Goal: Free from fall injury  Outcome: Progressing  Flowsheets (Taken 5/4/2025 0800)  Free From Fall Injury: Instruct family/caregiver on patient safety     Problem: ABCDS Injury Assessment  Goal: Absence of physical injury  Outcome: Progressing     Problem: Pain  Goal: Verbalizes/displays adequate comfort level or baseline comfort level  Outcome: Progressing

## 2025-05-04 NOTE — ED PROVIDER NOTES
[facility] EMERGENCY DEPARTMENT  EMERGENCY DEPARTMENT ENCOUNTER      Pt Name: Maty Villarreal  MRN: [id number]  Birthdate 1950  Date of evaluation: 5/3/2025  Provider: Willian Ponce DO      HISTORY OF PRESENT ILLNESS      HPI    74-year-old female with a history of EtOH abuse presents to the emergency department by EMS complaining of the acute onset of right flank pain this evening that reportedly started after she drank a pint of vodka.  She describes the pain as severe and also pleuritic in nature causing some shortness of breath.  She was found to be hypoxic on room air placed on nasal cannula O2 and well.  She notes associated nausea but no vomiting or diarrhea.  She denies any known history of prior pancreatitis but she states that she has had pain like this before.    Nursing Notes were reviewed.    REVIEW OF SYSTEMS         Review of Systems        PAST MEDICAL HISTORY     Past Medical History:   Diagnosis Date    Alcohol use disorder     Arthritis     OSTEO    Chronic pain     Cirrhosis (HCC)     Hepatitis C     viral load negative sept 2024 bshsi    Neuropathy     Opioid use disorder     Tuberculosis 1962    +HAI TEST # 3; TREATED AND NOW LUNGS HAVE ALWAYS BEEN CLEAR         SURGICAL HISTORY       Past Surgical History:   Procedure Laterality Date    ANUS SURGERY  6/7/2023    SPHINCTEROTOMY SPHINCTEROPLASTY performed by David Rojas MD at [facility] ENDOSCOPY    COLONOSCOPY N/A 8/25/2022    COLONOSCOPY performed by Pal Alan MD at [facility] ENDOSCOPY    COLONOSCOPY      HISTORY OF POLYP    COLONOSCOPY N/A 9/5/2024    COLONOSCOPY DIAGNOSTIC performed by Adele Anderson MD at [facility] ENDOSCOPY    ERCP N/A 6/7/2023    ERCP ENDOSCOPIC RETROGRADE CHOLANGIOPANCREATOGRAPHY performed by David Rojas MD at [facility] ENDOSCOPY    ERCP N/A 6/7/2023    ERCP BALLOON SWEEP performed by David Rojas MD at [facility] ENDOSCOPY    ERCP N/A 9/2/2024    ENDOSCOPIC RETROGRADE CHOLANGIOPANCREATOGRAPHY.Endoscopic Ultrasonography  spent exclusive of procedures:  50 minutes.           REASSESSMENT     ED Course as of 05/03/25 2256   Sat May 03, 2025   2050 8:44 PM EKG shows normal sinus rhythm with a rate of 91 bpm with nonspecific T wave flattening but no acute ST or T wave abnormalities suggestive of ischemia. [WJ]      ED Course User Index  [WJ] Willian Ponce DO         CONSULTS:  IP CONSULT TO HOSPITALIST    PROCEDURES:     Procedures        (Please note that portions of this note were completed with a voice recognition program.  Efforts were made to edit the dictations but occasionally words are mis-transcribed.)    Willian Ponce DO (electronically signed)  Emergency Attending Physician              Willian Ponce DO  05/03/25 2251

## 2025-05-04 NOTE — PROGRESS NOTES
Hospitalist Progress Note  Uli Briones MD  Answering service: [phone]        Date of Service:  2025  NAME:  Maty Villarreal  :  1950  MRN:  [id number]      Admission Summary:   \"Maty Villarreal is a 74 y.o. female with past medical history of liver cirrhosis, hepatitis C, opioid use disorder, alcohol use disorder, chronic pain, neuropathy, tuberculosis, osteoarthritis presented to the emergency department chief complaint of: Ankles and shortness of breath.  She also was requesting alcohol detox.  She reported drinks alcohol daily last drink this morning.  She also reportedly takes buprenorphine-naltrexone.  She was last hospitalized from 3/9/2025 to 3/12/2025 with acute hypoxic respiratory failure, edema of lower extremities, alcohol use disorder, opioid use disorder.  Tonight, on arrival emergency department, initial ported vital signs were temperature 98.0 °F, /91, heart rate 93, respiratory rate 18, saturation 98% on room air.  12-lead EKG showed normal sinus rhythm, nonspecific ST/T wave changes, prolonged QTc 482 ms at 91 bpm.  Abnormal labs including K2.8, magnesium 1.2, , albumin 2.1, alkaline phosphatase 141, , lipase 271, hemoglobin 11.1.  ED ordered magnesium sulfate 2000 mg IV, Toradol 15 mg IV, Zofran 4 mg IV, lidocaine 4% patch applied transdermally x 1, Dilaudid 0.5 mg IV x 1, Lyrica 150 mg p.o. x 1, KCl 40 mEq p.o. x 1 + KCl 10 mEq IV x 1.  Patient is now seen for admission to the hospitalist service.  Patient still rates her pain as a 9 out of 10 despite receiving Dilaudid a few hours ago.  When asked about her outpatient use of buprenorphine, she admits that she actually buys buprenorphine from somebody who lives in her apartment building.  I have reviewed  website regarding patient's prescription history showing  buprenorphine-naloxone 2-0.5 mg last filled on 2025.

## 2025-05-04 NOTE — PROGRESS NOTES
Spiritual Health History and Assessment/Progress Note  [facility]    Spiritual/Emotional Needs,  ,  ,      Name: Maty Villarreal MRN: [id number]    Age: 74 y.o.     Sex: female   Language: English   Sabianist: Mosque   Alcohol-induced acute pancreatitis without infection or necrosis     Date: 5/4/2025            Total Time Calculated: 30 min              Spiritual Assessment began in [facility] 4 IM        Referral/Consult From: Nurse   Encounter Overview/Reason: Spiritual/Emotional Needs  Service Provided For: Patient    Leah, Belief, Meaning:   Patient is connected with a leah tradition or spiritual practice  Family/Friends No family/friends present      Importance and Influence:  Patient has spiritual/personal beliefs that influence decisions regarding their health  Family/Friends No family/friends present    Community:  Patient feels well-supported. Support system includes: Friends  Family/Friends No family/friends present    Assessment and Plan of Care:     Patient Interventions include: Facilitated expression of thoughts and feelings, Explored spiritual coping/struggle/distress, Engaged in theological reflection, and Affirmed coping skills/support systems  Family/Friends Interventions include: No family/friends present    Patient Plan of Care: Spiritual Care available upon further referral  Family/Friends Plan of Care: No family/friends present    I visited with patient Maty due to consult for spiritual distress. Maty remembered me from a previous encounter and welcomed the visit.    Maty expressed fear about her medical condition and regrets/shame around choices that she has made. She views this current condition as a divine \"wake-up call\". I provided an explorations of thoughts, affirmation of feelings, and empowerment. I also brought Maty a bible per her request.  Maty expressed gratitude for the visit and is aware that spiritual care is available upon request.    Electronically signed by David

## 2025-05-04 NOTE — CARE COORDINATION
Care Management Initial Assessment       RUR:22%  Readmission? No  1st IM letter given? No  1st  letter given: No    CM reviewed chart and noted a consult for \"rehab consideration.\"  Per chart, pt lives alone in an apartment and has supportive neighbor.  Pt admits to buying pills from a neighbor in her apartment building and drinks vodka daily.  CM attempted to meet with pt, however pt was sleeping and did not arise when her name was called a few times.  CM left a substance use treatment guide in patient's room, but will need to follow up when pt is more appropriate to discuss options.       05/04/25 4121   Service Assessment   Patient Orientation Alert and Oriented   Cognition Alert   History Provided By Unable to Assess   Primary Caregiver Self   Support Systems Friends/Neighbors   Patient's Healthcare Decision Maker is: Named in Scanned ACP Document   Prior Functional Level Independent in ADLs/IADLs   Current Functional Level Independent in ADLs/IADLs   Can patient return to prior living arrangement Unknown at present   Ability to make needs known: Fair   Family able to assist with home care needs: No   Social/Functional History   Lives With Alone   Type of Home Apartment

## 2025-05-05 LAB
ALBUMIN SERPL-MCNC: 2.6 G/DL (ref 3.5–5)
ALBUMIN/GLOB SERPL: 0.6 (ref 1.1–2.2)
ALP SERPL-CCNC: 102 U/L (ref 45–117)
ALT SERPL-CCNC: 54 U/L (ref 12–78)
AMMONIA PLAS-SCNC: 27 UMOL/L
ANION GAP SERPL CALC-SCNC: 8 MMOL/L (ref 2–12)
AST SERPL-CCNC: 116 U/L (ref 15–37)
BASOPHILS # BLD: 0.03 K/UL (ref 0–0.1)
BASOPHILS NFR BLD: 0.6 % (ref 0–1)
BILIRUB SERPL-MCNC: 2 MG/DL (ref 0.2–1)
BUN SERPL-MCNC: 11 MG/DL (ref 6–20)
BUN/CREAT SERPL: 20 (ref 12–20)
CALCIUM SERPL-MCNC: 8.5 MG/DL (ref 8.5–10.1)
CHLORIDE SERPL-SCNC: 99 MMOL/L (ref 97–108)
CO2 SERPL-SCNC: 32 MMOL/L (ref 21–32)
CREAT SERPL-MCNC: 0.54 MG/DL (ref 0.55–1.02)
DIFFERENTIAL METHOD BLD: ABNORMAL
EKG ATRIAL RATE: 91 BPM
EKG DIAGNOSIS: NORMAL
EKG P AXIS: 48 DEGREES
EKG P-R INTERVAL: 134 MS
EKG Q-T INTERVAL: 392 MS
EKG QRS DURATION: 80 MS
EKG QTC CALCULATION (BAZETT): 482 MS
EKG R AXIS: 4 DEGREES
EKG T AXIS: 28 DEGREES
EKG VENTRICULAR RATE: 91 BPM
EOSINOPHIL # BLD: 0.23 K/UL (ref 0–0.4)
EOSINOPHIL NFR BLD: 4.6 % (ref 0–7)
ERYTHROCYTE [DISTWIDTH] IN BLOOD BY AUTOMATED COUNT: 16.6 % (ref 11.5–14.5)
GLOBULIN SER CALC-MCNC: 4.4 G/DL (ref 2–4)
GLUCOSE SERPL-MCNC: 89 MG/DL (ref 65–100)
HBV SURFACE AB SER QL: NONREACTIVE
HBV SURFACE AB SER-ACNC: 5.38 MIU/ML
HCT VFR BLD AUTO: 33.1 % (ref 35–47)
HCV AB SER IA-ACNC: >11 INDEX
HCV AB SERPL QL IA: REACTIVE
HGB BLD-MCNC: 10.5 G/DL (ref 11.5–16)
IMM GRANULOCYTES # BLD AUTO: 0.02 K/UL (ref 0–0.04)
IMM GRANULOCYTES NFR BLD AUTO: 0.4 % (ref 0–0.5)
LIPASE SERPL-CCNC: 64 U/L (ref 13–75)
LYMPHOCYTES # BLD: 0.73 K/UL (ref 0.8–3.5)
LYMPHOCYTES NFR BLD: 14.5 % (ref 12–49)
MAGNESIUM SERPL-MCNC: 1.8 MG/DL (ref 1.6–2.4)
MCH RBC QN AUTO: 26.5 PG (ref 26–34)
MCHC RBC AUTO-ENTMCNC: 31.7 G/DL (ref 30–36.5)
MCV RBC AUTO: 83.6 FL (ref 80–99)
MONOCYTES # BLD: 0.65 K/UL (ref 0–1)
MONOCYTES NFR BLD: 12.9 % (ref 5–13)
NEUTS SEG # BLD: 3.34 K/UL (ref 1.8–8)
NEUTS SEG NFR BLD: 67 % (ref 32–75)
NRBC # BLD: 0 K/UL (ref 0–0.01)
NRBC BLD-RTO: 0 PER 100 WBC
PLATELET # BLD AUTO: 137 K/UL (ref 150–400)
PMV BLD AUTO: 11.1 FL (ref 8.9–12.9)
POTASSIUM SERPL-SCNC: 3.4 MMOL/L (ref 3.5–5.1)
PROT SERPL-MCNC: 7 G/DL (ref 6.4–8.2)
RBC # BLD AUTO: 3.96 M/UL (ref 3.8–5.2)
RBC MORPH BLD: ABNORMAL
SODIUM SERPL-SCNC: 139 MMOL/L (ref 136–145)
WBC # BLD AUTO: 5 K/UL (ref 3.6–11)

## 2025-05-05 PROCEDURE — 2700000000 HC OXYGEN THERAPY PER DAY

## 2025-05-05 PROCEDURE — 85025 COMPLETE CBC W/AUTO DIFF WBC: CPT

## 2025-05-05 PROCEDURE — 82140 ASSAY OF AMMONIA: CPT

## 2025-05-05 PROCEDURE — 86706 HEP B SURFACE ANTIBODY: CPT

## 2025-05-05 PROCEDURE — 86038 ANTINUCLEAR ANTIBODIES: CPT

## 2025-05-05 PROCEDURE — 6360000002 HC RX W HCPCS: Performed by: HOSPITALIST

## 2025-05-05 PROCEDURE — 86015 ACTIN ANTIBODY EACH: CPT

## 2025-05-05 PROCEDURE — G0480 DRUG TEST DEF 1-7 CLASSES: HCPCS

## 2025-05-05 PROCEDURE — 6370000000 HC RX 637 (ALT 250 FOR IP): Performed by: FAMILY MEDICINE

## 2025-05-05 PROCEDURE — 86708 HEPATITIS A ANTIBODY: CPT

## 2025-05-05 PROCEDURE — 83735 ASSAY OF MAGNESIUM: CPT

## 2025-05-05 PROCEDURE — 2500000003 HC RX 250 WO HCPCS: Performed by: FAMILY MEDICINE

## 2025-05-05 PROCEDURE — 6360000002 HC RX W HCPCS: Performed by: FAMILY MEDICINE

## 2025-05-05 PROCEDURE — 2060000000 HC ICU INTERMEDIATE R&B

## 2025-05-05 PROCEDURE — 94760 N-INVAS EAR/PLS OXIMETRY 1: CPT

## 2025-05-05 PROCEDURE — 6370000000 HC RX 637 (ALT 250 FOR IP): Performed by: HOSPITALIST

## 2025-05-05 PROCEDURE — 83690 ASSAY OF LIPASE: CPT

## 2025-05-05 PROCEDURE — 86803 HEPATITIS C AB TEST: CPT

## 2025-05-05 PROCEDURE — 80053 COMPREHEN METABOLIC PANEL: CPT

## 2025-05-05 PROCEDURE — 82390 ASSAY OF CERULOPLASMIN: CPT

## 2025-05-05 PROCEDURE — 86704 HEP B CORE ANTIBODY TOTAL: CPT

## 2025-05-05 RX ORDER — POTASSIUM CHLORIDE 750 MG/1
40 TABLET, EXTENDED RELEASE ORAL ONCE
Status: COMPLETED | OUTPATIENT
Start: 2025-05-05 | End: 2025-05-05

## 2025-05-05 RX ADMIN — LORAZEPAM 1 MG: 1 TABLET ORAL at 13:43

## 2025-05-05 RX ADMIN — PREGABALIN 150 MG: 75 CAPSULE ORAL at 13:44

## 2025-05-05 RX ADMIN — ENOXAPARIN SODIUM 40 MG: 100 INJECTION SUBCUTANEOUS at 09:25

## 2025-05-05 RX ADMIN — HYDROMORPHONE HYDROCHLORIDE 0.5 MG: 1 INJECTION, SOLUTION INTRAMUSCULAR; INTRAVENOUS; SUBCUTANEOUS at 19:35

## 2025-05-05 RX ADMIN — Medication 100 MG: at 09:25

## 2025-05-05 RX ADMIN — PREGABALIN 150 MG: 75 CAPSULE ORAL at 22:09

## 2025-05-05 RX ADMIN — POTASSIUM CHLORIDE 40 MEQ: 750 TABLET, FILM COATED, EXTENDED RELEASE ORAL at 09:25

## 2025-05-05 RX ADMIN — SODIUM CHLORIDE, PRESERVATIVE FREE 10 ML: 5 INJECTION INTRAVENOUS at 09:26

## 2025-05-05 RX ADMIN — SODIUM CHLORIDE, PRESERVATIVE FREE 10 ML: 5 INJECTION INTRAVENOUS at 19:36

## 2025-05-05 RX ADMIN — PREGABALIN 150 MG: 75 CAPSULE ORAL at 09:25

## 2025-05-05 ASSESSMENT — PAIN DESCRIPTION - ORIENTATION: ORIENTATION: MID;ANTERIOR

## 2025-05-05 ASSESSMENT — PAIN DESCRIPTION - LOCATION: LOCATION: HEAD

## 2025-05-05 ASSESSMENT — PAIN DESCRIPTION - DESCRIPTORS: DESCRIPTORS: ACHING

## 2025-05-05 NOTE — PROGRESS NOTES
Hospitalist Progress Note  Uli Briones MD  Answering service: [phone]        Date of Service:  2025  NAME:  Maty Villarreal  :  1950  MRN:  [id number]      Admission Summary:   \"Maty Villarreal is a 74 y.o. female with past medical history of liver cirrhosis, hepatitis C, opioid use disorder, alcohol use disorder, chronic pain, neuropathy, tuberculosis, osteoarthritis presented to the emergency department chief complaint of: Ankles and shortness of breath.  She also was requesting alcohol detox.  She reported drinks alcohol daily last drink this morning.  She also reportedly takes buprenorphine-naltrexone.  She was last hospitalized from 3/9/2025 to 3/12/2025 with acute hypoxic respiratory failure, edema of lower extremities, alcohol use disorder, opioid use disorder.  Tonight, on arrival emergency department, initial ported vital signs were temperature 98.0 °F, /91, heart rate 93, respiratory rate 18, saturation 98% on room air.  12-lead EKG showed normal sinus rhythm, nonspecific ST/T wave changes, prolonged QTc 482 ms at 91 bpm.  Abnormal labs including K2.8, magnesium 1.2, , albumin 2.1, alkaline phosphatase 141, , lipase 271, hemoglobin 11.1.  ED ordered magnesium sulfate 2000 mg IV, Toradol 15 mg IV, Zofran 4 mg IV, lidocaine 4% patch applied transdermally x 1, Dilaudid 0.5 mg IV x 1, Lyrica 150 mg p.o. x 1, KCl 40 mEq p.o. x 1 + KCl 10 mEq IV x 1.  Patient is now seen for admission to the hospitalist service.  Patient still rates her pain as a 9 out of 10 despite receiving Dilaudid a few hours ago.  When asked about her outpatient use of buprenorphine, she admits that she actually buys buprenorphine from somebody who lives in her apartment building.  I have reviewed  website regarding patient's prescription history showing  buprenorphine-naloxone 2-0.5 mg last filled on 2025.

## 2025-05-05 NOTE — CONSULTS
Initial Addiction Medicine Consultation            IDENTIFICATION:    Patient Name  Maty Villarreal   Date of Birth 1950   [facility] [id number]   Medical Record Number  [id number]      Age  74 y.o.   PCP Jacy Perez APRN - [facility]   Admit date:  5/3/2025    Room Number  406/01  @ [facility]   Date of Service  5/5/2025            ASSESSMENT & PLAN        Maty Villarreal, is a 74 y.o.  female with alcohol use disorder and opioid use disorder (on bup/naloxone) admitted for alcohol withdrawal management.      Treatment options reviewed:   Residential addiction treatment - pt. Declines due to having a cat to care for.   2. Outpatient addiction treatment - pt. Has not found a program that takes her insurance.   3. 12 step programming - pt. Declines due to not wanting to be present while having used alcohol.   4. Prior encounter started acamprosate - she was not able to get this from the pharmacy at discharge.  Cannot use naltrexone due to her using bup/naloxone for oud.       There are some legitimate challenges related to her cat and insurance coverage but encouraged her to keep an open mind about solutions.  Addressed lack of engagement with practical next steps with motivational interviewing.     She reports having a program on an addiction treatment list that she believes accepts her insurance.  Her hard copy of the list is at home.  Case management may have a copy of the same list.  Case management is following.     Will sign off - available by perfect serve for other questions or re-evaluation if necessary.       Visit duration including counseling and care coordination 75 minutes.          Joo Young MD [facility]  [facility]  [phone]  Fax [phone]    HISTORY         REASON FOR HOSPITALIZATION:  CC: \"alcohol withdrawal\".      HISTORY OF PRESENT ILLNESS:    The patient, Maty Villarreal, is a 74 y.o.  female with a opioid use disorder and alcohol use disorder

## 2025-05-06 VITALS
BODY MASS INDEX: 27.84 KG/M2 | DIASTOLIC BLOOD PRESSURE: 83 MMHG | HEIGHT: 63 IN | SYSTOLIC BLOOD PRESSURE: 128 MMHG | WEIGHT: 157.1 LBS | OXYGEN SATURATION: 94 % | RESPIRATION RATE: 11 BRPM | HEART RATE: 3 BPM | TEMPERATURE: 98.7 F

## 2025-05-06 LAB
ANA SER QL: NEGATIVE
ANION GAP SERPL CALC-SCNC: 5 MMOL/L (ref 2–12)
BUN SERPL-MCNC: 11 MG/DL (ref 6–20)
BUN/CREAT SERPL: 19 (ref 12–20)
CALCIUM SERPL-MCNC: 9.2 MG/DL (ref 8.5–10.1)
CERULOPLASMIN SERPL-MCNC: 26.9 MG/DL (ref 19–39)
CHLORIDE SERPL-SCNC: 102 MMOL/L (ref 97–108)
CO2 SERPL-SCNC: 33 MMOL/L (ref 21–32)
CREAT SERPL-MCNC: 0.58 MG/DL (ref 0.55–1.02)
ERYTHROCYTE [DISTWIDTH] IN BLOOD BY AUTOMATED COUNT: 16.7 % (ref 11.5–14.5)
GLUCOSE SERPL-MCNC: 89 MG/DL (ref 65–100)
HAV AB SER QL IA: POSITIVE
HAV IGM SER QL: NONREACTIVE
HBV CORE AB SERPL QL IA: POSITIVE
HBV SURFACE AB SER QL: NONREACTIVE
HBV SURFACE AB SER-ACNC: 5.28 MIU/ML
HBV SURFACE AG SER QL: 0.14 INDEX
HBV SURFACE AG SER QL: NEGATIVE
HCT VFR BLD AUTO: 33.7 % (ref 35–47)
HGB BLD-MCNC: 10.6 G/DL (ref 11.5–16)
MCH RBC QN AUTO: 26.4 PG (ref 26–34)
MCHC RBC AUTO-ENTMCNC: 31.5 G/DL (ref 30–36.5)
MCV RBC AUTO: 84 FL (ref 80–99)
NRBC # BLD: 0 K/UL (ref 0–0.01)
NRBC BLD-RTO: 0 PER 100 WBC
PLATELET # BLD AUTO: 159 K/UL (ref 150–400)
PMV BLD AUTO: 11.2 FL (ref 8.9–12.9)
POTASSIUM SERPL-SCNC: 3.4 MMOL/L (ref 3.5–5.1)
RBC # BLD AUTO: 4.01 M/UL (ref 3.8–5.2)
SMA IGG SER-ACNC: 10 UNITS (ref 0–19)
SODIUM SERPL-SCNC: 140 MMOL/L (ref 136–145)
WBC # BLD AUTO: 4.9 K/UL (ref 3.6–11)

## 2025-05-06 PROCEDURE — 85027 COMPLETE CBC AUTOMATED: CPT

## 2025-05-06 PROCEDURE — 6370000000 HC RX 637 (ALT 250 FOR IP): Performed by: HOSPITALIST

## 2025-05-06 PROCEDURE — 6360000002 HC RX W HCPCS: Performed by: HOSPITALIST

## 2025-05-06 PROCEDURE — 80048 BASIC METABOLIC PNL TOTAL CA: CPT

## 2025-05-06 PROCEDURE — 86709 HEPATITIS A IGM ANTIBODY: CPT

## 2025-05-06 PROCEDURE — 87340 HEPATITIS B SURFACE AG IA: CPT

## 2025-05-06 PROCEDURE — 87522 HEPATITIS C REVRS TRNSCRPJ: CPT

## 2025-05-06 PROCEDURE — 2500000003 HC RX 250 WO HCPCS: Performed by: FAMILY MEDICINE

## 2025-05-06 PROCEDURE — 6370000000 HC RX 637 (ALT 250 FOR IP): Performed by: FAMILY MEDICINE

## 2025-05-06 PROCEDURE — 86706 HEP B SURFACE ANTIBODY: CPT

## 2025-05-06 RX ORDER — POTASSIUM CHLORIDE 750 MG/1
40 TABLET, EXTENDED RELEASE ORAL ONCE
Status: DISCONTINUED | OUTPATIENT
Start: 2025-05-06 | End: 2025-05-06 | Stop reason: HOSPADM

## 2025-05-06 RX ADMIN — ENOXAPARIN SODIUM 40 MG: 100 INJECTION SUBCUTANEOUS at 09:37

## 2025-05-06 RX ADMIN — LORAZEPAM 1 MG: 1 TABLET ORAL at 09:37

## 2025-05-06 RX ADMIN — SODIUM CHLORIDE, PRESERVATIVE FREE 10 ML: 5 INJECTION INTRAVENOUS at 09:38

## 2025-05-06 RX ADMIN — Medication 100 MG: at 09:37

## 2025-05-06 RX ADMIN — PREGABALIN 150 MG: 75 CAPSULE ORAL at 09:37

## 2025-05-06 NOTE — CARE COORDINATION
Transition of Care Plan:    RUR: 22%  Prior Level of Functioning: Independent  Disposition: Home with outpatient substance abuse resources  MARY: 5/7/25  Follow up appointments: Per attending's recommendations  DME needed: CM following for DME needs  Transportation at discharge: Patient may need transportation assistance  IM/IMM Medicare/ letter given: 5/5/25  Is patient a  and connected with VA? No   If yes, was  transfer form completed and VA notified? N/A  Caregiver Contact: Aye Ponce, [phone]  Discharge Caregiver contacted prior to discharge? CM will contact per patient preference  Care Conference needed? No  Barriers to discharge:  Medical stability    CM has provided patient with outpatient substance abuse resources as she does not want to pursue inpatient options at this time. Patient expressed that she was behind on her rent. CM provided patient with a list of resources in [place] that she could call for assistance and financial counseling.    1430: Patient is discharging and needs ride assistance as she does not have anyone who can come and pick her up. CM set up ride with [employer], the estimated cost of the ride is $8-12 and will be charged to [place].     CM to continue to follow for PAT needs.    Kimberly Rivera, LEE ANNN, RN, ONC, CMSRN  Nurse Care Manager, [phone]

## 2025-05-06 NOTE — DISCHARGE SUMMARY
Discharge Summary       PATIENT ID: Mtay Villarreal  MRN: [id number]   YOB: 1950    DATE OF ADMISSION: 5/3/2025  8:15 PM    DATE OF DISCHARGE: 05/06/25     PRIMARY CARE PROVIDER: Jacy Perez APRN - [facility]     ATTENDING PHYSICIAN: Uli Briones MD    DISCHARGING PROVIDER: Uli Briones MD    To contact this individual call [phone] and ask the  to page.  If unavailable ask to be transferred the Adult Hospitalist Department.    CONSULTATIONS:     IP CONSULT TO HOSPITALIST  IP CONSULT TO SOCIAL WORK  IP CONSULT TO SPIRITUAL SERVICES  IP CONSULT TO HEPATOLOGY  IP CONSULT TO ADDICTION MEDICINE    PROCEDURES/SURGERIES: * No surgery found *     ADMITTING DIAGNOSES & HOSPITAL COURSE:   Admission Summary:   \"Maty Villarreal is a 74 y.o. female with past medical history of liver cirrhosis, hepatitis C, opioid use disorder, alcohol use disorder, chronic pain, neuropathy, tuberculosis, osteoarthritis presented to the emergency department chief complaint of: Ankles and shortness of breath.  She also was requesting alcohol detox.  She reported drinks alcohol daily last drink this morning.  She also reportedly takes buprenorphine-naltrexone.  She was last hospitalized from 3/9/2025 to 3/12/2025 with acute hypoxic respiratory failure, edema of lower extremities, alcohol use disorder, opioid use disorder.  Tonight, on arrival emergency department, initial ported vital signs were temperature 98.0 °F, /91, heart rate 93, respiratory rate 18, saturation 98% on room air.  12-lead EKG showed normal sinus rhythm, nonspecific ST/T wave changes, prolonged QTc 482 ms at 91 bpm.  Abnormal labs including K2.8, magnesium 1.2, , albumin 2.1, alkaline phosphatase 141, , lipase 271, hemoglobin 11.1.  ED ordered magnesium sulfate 2000 mg IV, Toradol 15 mg IV, Zofran 4 mg IV, lidocaine 4% patch applied transdermally x 1, Dilaudid 0.5 mg IV x 1, Lyrica 150 mg p.o. x 1, KCl 40 mEq p.o. x 1 + KCl    buprenorphine-naloxone 2-0.5 MG Subl  Commonly known as: SUBOXONE  Place 1 tablet under the tongue daily for 90 days. Max Daily Amount: 1 tablet     * pregabalin 150 MG capsule  Commonly known as: LYRICA     * pregabalin 150 MG capsule  Commonly known as: LYRICA  Take 1 capsule by mouth 3 times daily for 7 days. Max Daily Amount: 450 mg           * This list has 2 medication(s) that are the same as other medications prescribed for you. Read the directions carefully, and ask your doctor or other care provider to review them with you.                ASK your doctor about these medications      buPROPion 150 MG extended release tablet  Commonly known as: WELLBUTRIN XL  Take 1 tablet by mouth daily     pantoprazole 40 MG tablet  Commonly known as: PROTONIX  Take 1 tablet by mouth every morning (before breakfast)     spironolactone 25 MG tablet  Commonly known as: ALDACTONE  Take 1 tablet by mouth daily     thiamine 100 MG tablet  Take 1 tablet by mouth daily                NOTIFY YOUR PHYSICIAN FOR ANY OF THE FOLLOWING:   Fever over 101 degrees for 24 hours.   Chest pain, shortness of breath, fever, chills, nausea, vomiting, diarrhea, change in mentation, falling, weakness, bleeding. Severe pain or pain not relieved by medications.  Or, any other signs or symptoms that you may have questions about.    DISPOSITION:   x Home With:   OT  PT  HH  RN       Long term SNF/Inpatient Rehab    Independent/assisted living    Hospice    Other:       PATIENT CONDITION AT DISCHARGE:     Functional status    Poor     Deconditioned    x Independent      Cognition    x Lucid     Forgetful     Dementia      Catheters/lines (plus indication)    Gee     PICC     PEG    x None      Code status    x Full code     DNR      PHYSICAL EXAMINATION AT DISCHARGE:    General : alert x 3, awake, no acute distress,     HEENT: PEERL, EOMI, moist mucus membrane    Neck: supple, no JVD, no meningeal signs    Chest: Clear to auscultation bilaterally      CVS: S1 S2 heard, Capillary refill less than 2 seconds    Abd: soft/ Non tender, non distended, BS physiological,     Ext: no clubbing, no cyanosis, no edema, brisk 2+ DP pulses    Neuro/Psych: pleasant mood and affect, CN 2-12 grossly intact, sensory grossly within normal limit, Strength 5/5 in all extremities    Skin: warm     CHRONIC MEDICAL DIAGNOSES:  Principal Problem:    Alcohol-induced acute pancreatitis without infection or necrosis  Resolved Problems:    * No resolved hospital problems. *        Greater than 31 minutes were spent with the patient on counseling and coordination of care    Signed:   Uli Briones MD  5/6/2025  2:49 PM

## 2025-05-06 NOTE — PROGRESS NOTES
Patient notes that she was taking buprenorphine 8 mg sublingual after purchasing it on the street.  She clarifies that she actually drinks large undisclosed amount of vodka daily. She notes that she feels like she is in withdrawal. \"       Interval history / Subjective:   Doing well, tolerating regular diet, no nausea, vomiting, abdominal pain.  No evidence of withdrawal, CIWA 0  She is eager to go home  CM to help with transportation       Assessment & Plan:     Acute uncomplicated pancreatitis, likely EtOH induced   -Lipase 271, CT A/P revealed inflammatory changes around the pancreatic head, no associated fluid collection or pancreatic ductal dilatation.   - Resolved with supportive therapy.  Tolerating regular diet.     Alcohol use disorder, presented with intoxication EtOH level of 263 mg/dL  -Endorses daily heavy drinking, Suboxone use(prescription and illicit sources)  - She really did not exhibit much of alcohol withdrawal symptoms, few high CIWA scores probably overestimated given her tremor anxiety.  Recent CIWA 0  -Replace vitamin, thiamine and folic acid  -Patient counseled.  Dr. Young has seen her last admission and he had discussed acamprosate with her.  Patient familiar with acamprosate in the past and would not take it.  She is not a candidate for naltrexone as she is on on Suboxone  - Appreciate recommendation by addiction medicine.  -    History of EtOH induced cirrhosis, hepatitis C  Acute EtOH induced hepatitis, mild.  -T. bili 1.1, INR 12.6.  No indication for steroids  -Followed by hepatologist      Acute hypokalemia, hypomagnesemia  -Monitor and replete as needed    Hypoxia, SpO2 82 on room in the ED.  Does not meet criteria for respiratory failure  -CTA chest negative for PE or any other acute processes.  -Continue supplemental oxygen    Opioid use disorder  - Patient was methadone replacement in the past, she was intolerant so it was replaced to Suboxone.  Per admitting attending (Per  tablet 40 mEq  40 mEq Oral PRN    Or    potassium bicarb-citric acid (EFFER-K) effervescent tablet 40 mEq  40 mEq Oral PRN    Or    potassium chloride 10 mEq/100 mL IVPB (Peripheral Line)  10 mEq IntraVENous PRN    magnesium sulfate 2000 mg in 50 mL IVPB premix  2,000 mg IntraVENous PRN    ondansetron (ZOFRAN-ODT) disintegrating tablet 4 mg  4 mg Oral Q8H PRN    Or    ondansetron (ZOFRAN) injection 4 mg  4 mg IntraVENous Q6H PRN    polyethylene glycol (GLYCOLAX) packet 17 g  17 g Oral Daily PRN    LORazepam (ATIVAN) tablet 1 mg  1 mg Oral Q1H PRN    Or    LORazepam (ATIVAN) injection 1 mg  1 mg IntraVENous Q1H PRN    Or    LORazepam (ATIVAN) tablet 2 mg  2 mg Oral Q1H PRN    Or    LORazepam (ATIVAN) injection 2 mg  2 mg IntraVENous Q1H PRN    Or    LORazepam (ATIVAN) tablet 3 mg  3 mg Oral Q1H PRN    Or    LORazepam (ATIVAN) injection 3 mg  3 mg IntraVENous Q1H PRN    Or    LORazepam (ATIVAN) tablet 4 mg  4 mg Oral Q1H PRN    Or    LORazepam (ATIVAN) injection 4 mg  4 mg IntraVENous Q1H PRN    HYDROmorphone (DILAUDID) injection 0.5 mg  0.5 mg IntraVENous Q4H PRN    pregabalin (LYRICA) capsule 150 mg  150 mg Oral TID    enoxaparin (LOVENOX) injection 40 mg  40 mg SubCUTAneous Daily     ______________________________________________________________________  EXPECTED LENGTH OF STAY: 3  ACTUAL LENGTH OF STAY:          3                 Uli Briones MD

## 2025-05-06 NOTE — DISCHARGE INSTRUCTIONS
Discharge Instructions       PATIENT ID: Maty Villarreal  MRN: [id number]   YOB: 1950    DATE OF ADMISSION: 5/3/2025   DATE OF DISCHARGE: 5/6/2025    PRIMARY CARE PROVIDER: Jacy Perez     ATTENDING PHYSICIAN: Uli Briones MD   DISCHARGING PROVIDER: Uli Briones MD    To contact this individual call [phone] and ask the  to page.   If unavailable ask to be transferred the Adult Hospitalist Department.    DISCHARGE DIAGNOSES   Thank you for the opportunity to be part of your care during your hospitalization.  You were admitted and treated for the following    1.  Acute likely alcohol induced pancreatitis.  You were treated with supportive therapy with IV fluids, pain medication and bowel rest.  You were started on clear liquid diet when your symptoms started improving, your diet was progressively advanced to regular as you tolerated.  We strongly recommend you stop drinking alcohol.     2.  Alcohol use disorder, he presented presented with intoxication EtOH level of 263 mg/dL.  You were monitored for alcohol withdrawal, you did not display much of alcohol withdrawal symptoms.  Inflammation of your pancreas and your liver was caused by alcohol.  Again you need to quit drinking.  We strongly recommend that you engage with addiction treatment programs or counseling such as alcoholic Anonymous.  You are prescribed acamprosate by addiction medicine specialist during your last admission but you have told that you have not started taking this medicine.  You cannot be on naltrexone for alcohol use disorder as you are on Suboxone and the effect of these medications contradict.    3.  As for your opioid use disorder, return to your Suboxone clinic.      CONSULTATIONS:   IP CONSULT TO HOSPITALIST  IP CONSULT TO SOCIAL WORK  IP CONSULT TO SPIRITUAL SERVICES  IP CONSULT TO HEPATOLOGY  IP CONSULT TO ADDICTION MEDICINE    PROCEDURES/SURGERIES: * No surgery found *    PENDING

## 2025-05-07 LAB
HCV GENTYP SERPL NAA+PROBE: NORMAL
HCV RNA SERPL NAA+PROBE-ACNC: NORMAL IU/ML
HCV RNA SERPL NAA+PROBE-LOG IU: NORMAL LOG10 IU/ML
LABORATORY COMMENT REPORT: NORMAL

## 2025-05-10 LAB
PETH BLD QL SCN: POSITIVE
PETH BLD-MCNC: 1735 NG/ML

## 2025-08-16 ENCOUNTER — APPOINTMENT (OUTPATIENT)
Facility: HOSPITAL | Age: 75
DRG: 445 | End: 2025-08-16
Payer: MEDICARE

## 2025-08-16 ENCOUNTER — HOSPITAL ENCOUNTER (INPATIENT)
Facility: HOSPITAL | Age: 75
LOS: 3 days | Discharge: HOME | DRG: 445 | End: 2025-08-19
Attending: STUDENT IN AN ORGANIZED HEALTH CARE EDUCATION/TRAINING PROGRAM | Admitting: FAMILY MEDICINE
Payer: MEDICARE

## 2025-08-16 DIAGNOSIS — E87.6 HYPOKALEMIA: Primary | ICD-10-CM

## 2025-08-16 DIAGNOSIS — R74.01 TRANSAMINITIS: ICD-10-CM

## 2025-08-16 DIAGNOSIS — N39.0 URINARY TRACT INFECTION WITH HEMATURIA, SITE UNSPECIFIED: ICD-10-CM

## 2025-08-16 DIAGNOSIS — M79.89 LEG SWELLING: ICD-10-CM

## 2025-08-16 DIAGNOSIS — D69.6 THROMBOCYTOPENIA: ICD-10-CM

## 2025-08-16 DIAGNOSIS — F10.929 ALCOHOLIC INTOXICATION WITH COMPLICATION: ICD-10-CM

## 2025-08-16 DIAGNOSIS — R31.9 URINARY TRACT INFECTION WITH HEMATURIA, SITE UNSPECIFIED: ICD-10-CM

## 2025-08-16 DIAGNOSIS — R06.02 SHORTNESS OF BREATH: ICD-10-CM

## 2025-08-16 PROBLEM — F10.930 ALCOHOL WITHDRAWAL, UNCOMPLICATED (HCC): Status: ACTIVE | Noted: 2025-08-16

## 2025-08-16 LAB
ALBUMIN SERPL-MCNC: 2.7 G/DL (ref 3.5–5.2)
ALBUMIN/GLOB SERPL: 0.5 (ref 1.1–2.2)
ALP SERPL-CCNC: 145 U/L (ref 35–104)
ALT SERPL-CCNC: 52 U/L (ref 10–35)
AMMONIA PLAS-SCNC: 34 UMOL/L (ref 11–51)
ANION GAP SERPL CALC-SCNC: 15 MMOL/L (ref 2–14)
APPEARANCE UR: CLEAR
AST SERPL-CCNC: 244 U/L (ref 10–35)
BACTERIA URNS QL MICRO: ABNORMAL /HPF
BASOPHILS # BLD: 0.05 K/UL (ref 0–0.1)
BASOPHILS NFR BLD: 1 % (ref 0–1)
BILIRUB SERPL-MCNC: 2.1 MG/DL (ref 0–1.2)
BILIRUB UR QL CFM: POSITIVE
BUN SERPL-MCNC: 10 MG/DL (ref 8–23)
BUN/CREAT SERPL: 18 (ref 12–20)
CALCIUM SERPL-MCNC: 8 MG/DL (ref 8.8–10.2)
CHLORIDE SERPL-SCNC: 98 MMOL/L (ref 98–107)
CO2 SERPL-SCNC: 29 MMOL/L (ref 20–29)
COLOR UR: ABNORMAL
COMMENT:: NORMAL
COMMENT:: NORMAL
CREAT SERPL-MCNC: 0.54 MG/DL (ref 0.6–1)
DIFFERENTIAL METHOD BLD: ABNORMAL
EKG ATRIAL RATE: 89 BPM
EKG DIAGNOSIS: NORMAL
EKG P AXIS: 53 DEGREES
EKG P-R INTERVAL: 140 MS
EKG Q-T INTERVAL: 406 MS
EKG QRS DURATION: 74 MS
EKG QTC CALCULATION (BAZETT): 493 MS
EKG R AXIS: 3 DEGREES
EKG T AXIS: 46 DEGREES
EKG VENTRICULAR RATE: 89 BPM
EOSINOPHIL # BLD: 0.14 K/UL (ref 0–0.4)
EOSINOPHIL NFR BLD: 2.7 % (ref 0–7)
EPITH CASTS URNS QL MICRO: ABNORMAL /LPF
ERYTHROCYTE [DISTWIDTH] IN BLOOD BY AUTOMATED COUNT: 17 % (ref 11.5–14.5)
ETHANOL SERPL-MCNC: 287 MG/DL (ref 0–0.08)
GLOBULIN SER CALC-MCNC: 4.9 G/DL (ref 2–4)
GLUCOSE SERPL-MCNC: 129 MG/DL (ref 65–100)
GLUCOSE UR STRIP.AUTO-MCNC: NEGATIVE MG/DL
HCT VFR BLD AUTO: 31.8 % (ref 35–47)
HGB BLD-MCNC: 10.5 G/DL (ref 11.5–16)
HGB UR QL STRIP: ABNORMAL
IMM GRANULOCYTES # BLD AUTO: 0.01 K/UL (ref 0–0.04)
IMM GRANULOCYTES NFR BLD AUTO: 0.2 % (ref 0–0.5)
INR PPP: 1.3 (ref 0.9–1.1)
KETONES UR QL STRIP.AUTO: NEGATIVE MG/DL
LEUKOCYTE ESTERASE UR QL STRIP.AUTO: ABNORMAL
LIPASE SERPL-CCNC: 38 U/L (ref 13–60)
LYMPHOCYTES # BLD: 1.3 K/UL (ref 0.8–3.5)
LYMPHOCYTES NFR BLD: 24.8 % (ref 12–49)
MAGNESIUM SERPL-MCNC: 1.4 MG/DL (ref 1.6–2.4)
MCH RBC QN AUTO: 28.4 PG (ref 26–34)
MCHC RBC AUTO-ENTMCNC: 33 G/DL (ref 30–36.5)
MCV RBC AUTO: 85.9 FL (ref 80–99)
MONOCYTES # BLD: 0.84 K/UL (ref 0–1)
MONOCYTES NFR BLD: 16 % (ref 5–13)
NEUTS SEG # BLD: 2.9 K/UL (ref 1.8–8)
NEUTS SEG NFR BLD: 55.3 % (ref 32–75)
NITRITE UR QL STRIP.AUTO: POSITIVE
NRBC # BLD: 0 K/UL (ref 0–0.01)
NRBC BLD-RTO: 0 PER 100 WBC
NT PRO BNP: 74 PG/ML (ref 0–450)
PH UR STRIP: 5.5 (ref 5–8)
PLATELET # BLD AUTO: 101 K/UL (ref 150–400)
PMV BLD AUTO: 11.6 FL (ref 8.9–12.9)
POTASSIUM SERPL-SCNC: 2.9 MMOL/L (ref 3.5–5.1)
PROT SERPL-MCNC: 7.6 G/DL (ref 6.4–8.3)
PROT UR STRIP-MCNC: 100 MG/DL
PROTHROMBIN TIME: 13.2 SEC (ref 9.2–11.2)
RBC # BLD AUTO: 3.7 M/UL (ref 3.8–5.2)
RBC #/AREA URNS HPF: ABNORMAL /HPF (ref 0–5)
SODIUM SERPL-SCNC: 141 MMOL/L (ref 136–145)
SP GR UR REFRACTOMETRY: 1.03 (ref 1–1.03)
SPECIMEN HOLD: NORMAL
SPECIMEN HOLD: NORMAL
TROPONIN T SERPL HS-MCNC: 23.1 NG/L (ref 0–14)
UROBILINOGEN UR QL STRIP.AUTO: 2 EU/DL (ref 0.2–1)
WBC # BLD AUTO: 5.2 K/UL (ref 3.6–11)
WBC URNS QL MICRO: ABNORMAL /HPF (ref 0–4)

## 2025-08-16 PROCEDURE — 74177 CT ABD & PELVIS W/CONTRAST: CPT

## 2025-08-16 PROCEDURE — 85610 PROTHROMBIN TIME: CPT

## 2025-08-16 PROCEDURE — 96365 THER/PROPH/DIAG IV INF INIT: CPT

## 2025-08-16 PROCEDURE — 93005 ELECTROCARDIOGRAM TRACING: CPT | Performed by: STUDENT IN AN ORGANIZED HEALTH CARE EDUCATION/TRAINING PROGRAM

## 2025-08-16 PROCEDURE — 76705 ECHO EXAM OF ABDOMEN: CPT

## 2025-08-16 PROCEDURE — 99285 EMERGENCY DEPT VISIT HI MDM: CPT

## 2025-08-16 PROCEDURE — 6360000002 HC RX W HCPCS: Performed by: EMERGENCY MEDICINE

## 2025-08-16 PROCEDURE — 80053 COMPREHEN METABOLIC PANEL: CPT

## 2025-08-16 PROCEDURE — 6360000002 HC RX W HCPCS: Performed by: STUDENT IN AN ORGANIZED HEALTH CARE EDUCATION/TRAINING PROGRAM

## 2025-08-16 PROCEDURE — 2060000000 HC ICU INTERMEDIATE R&B

## 2025-08-16 PROCEDURE — 6360000004 HC RX CONTRAST MEDICATION: Performed by: EMERGENCY MEDICINE

## 2025-08-16 PROCEDURE — 6370000000 HC RX 637 (ALT 250 FOR IP): Performed by: STUDENT IN AN ORGANIZED HEALTH CARE EDUCATION/TRAINING PROGRAM

## 2025-08-16 PROCEDURE — 96366 THER/PROPH/DIAG IV INF ADDON: CPT

## 2025-08-16 PROCEDURE — 2500000003 HC RX 250 WO HCPCS: Performed by: STUDENT IN AN ORGANIZED HEALTH CARE EDUCATION/TRAINING PROGRAM

## 2025-08-16 PROCEDURE — 81001 URINALYSIS AUTO W/SCOPE: CPT

## 2025-08-16 PROCEDURE — 83690 ASSAY OF LIPASE: CPT

## 2025-08-16 PROCEDURE — 96375 TX/PRO/DX INJ NEW DRUG ADDON: CPT

## 2025-08-16 PROCEDURE — 83735 ASSAY OF MAGNESIUM: CPT

## 2025-08-16 PROCEDURE — 82140 ASSAY OF AMMONIA: CPT

## 2025-08-16 PROCEDURE — 85025 COMPLETE CBC W/AUTO DIFF WBC: CPT

## 2025-08-16 PROCEDURE — 82077 ASSAY SPEC XCP UR&BREATH IA: CPT

## 2025-08-16 PROCEDURE — 84484 ASSAY OF TROPONIN QUANT: CPT

## 2025-08-16 PROCEDURE — 71046 X-RAY EXAM CHEST 2 VIEWS: CPT

## 2025-08-16 PROCEDURE — 83880 ASSAY OF NATRIURETIC PEPTIDE: CPT

## 2025-08-16 RX ORDER — LORAZEPAM 2 MG/ML
1 INJECTION INTRAMUSCULAR
Status: DISCONTINUED | OUTPATIENT
Start: 2025-08-16 | End: 2025-08-19 | Stop reason: HOSPADM

## 2025-08-16 RX ORDER — LORAZEPAM 1 MG/1
2 TABLET ORAL
Status: DISCONTINUED | OUTPATIENT
Start: 2025-08-16 | End: 2025-08-19 | Stop reason: HOSPADM

## 2025-08-16 RX ORDER — POLYETHYLENE GLYCOL 3350 17 G/17G
17 POWDER, FOR SOLUTION ORAL DAILY PRN
Status: DISCONTINUED | OUTPATIENT
Start: 2025-08-16 | End: 2025-08-19 | Stop reason: HOSPADM

## 2025-08-16 RX ORDER — LANOLIN ALCOHOL/MO/W.PET/CERES
100 CREAM (GRAM) TOPICAL DAILY
Status: DISCONTINUED | OUTPATIENT
Start: 2025-08-16 | End: 2025-08-19 | Stop reason: HOSPADM

## 2025-08-16 RX ORDER — SODIUM CHLORIDE 0.9 % (FLUSH) 0.9 %
5-40 SYRINGE (ML) INJECTION PRN
Status: DISCONTINUED | OUTPATIENT
Start: 2025-08-16 | End: 2025-08-19 | Stop reason: HOSPADM

## 2025-08-16 RX ORDER — SODIUM CHLORIDE 9 MG/ML
INJECTION, SOLUTION INTRAVENOUS PRN
Status: DISCONTINUED | OUTPATIENT
Start: 2025-08-16 | End: 2025-08-19 | Stop reason: HOSPADM

## 2025-08-16 RX ORDER — MAGNESIUM SULFATE IN WATER 40 MG/ML
2000 INJECTION, SOLUTION INTRAVENOUS ONCE
Status: COMPLETED | OUTPATIENT
Start: 2025-08-16 | End: 2025-08-16

## 2025-08-16 RX ORDER — LORAZEPAM 2 MG/ML
2 INJECTION INTRAMUSCULAR
Status: DISCONTINUED | OUTPATIENT
Start: 2025-08-16 | End: 2025-08-19 | Stop reason: HOSPADM

## 2025-08-16 RX ORDER — LORAZEPAM 1 MG/1
4 TABLET ORAL
Status: DISCONTINUED | OUTPATIENT
Start: 2025-08-16 | End: 2025-08-19 | Stop reason: HOSPADM

## 2025-08-16 RX ORDER — LORAZEPAM 1 MG/1
1 TABLET ORAL
Status: DISCONTINUED | OUTPATIENT
Start: 2025-08-16 | End: 2025-08-19 | Stop reason: HOSPADM

## 2025-08-16 RX ORDER — SODIUM CHLORIDE 0.9 % (FLUSH) 0.9 %
5-40 SYRINGE (ML) INJECTION EVERY 12 HOURS SCHEDULED
Status: DISCONTINUED | OUTPATIENT
Start: 2025-08-16 | End: 2025-08-19 | Stop reason: HOSPADM

## 2025-08-16 RX ORDER — ONDANSETRON 2 MG/ML
4 INJECTION INTRAMUSCULAR; INTRAVENOUS EVERY 6 HOURS PRN
Status: DISCONTINUED | OUTPATIENT
Start: 2025-08-16 | End: 2025-08-19 | Stop reason: HOSPADM

## 2025-08-16 RX ORDER — POTASSIUM CHLORIDE 7.45 MG/ML
10 INJECTION INTRAVENOUS ONCE
Status: COMPLETED | OUTPATIENT
Start: 2025-08-16 | End: 2025-08-16

## 2025-08-16 RX ORDER — LORAZEPAM 2 MG/ML
3 INJECTION INTRAMUSCULAR
Status: DISCONTINUED | OUTPATIENT
Start: 2025-08-16 | End: 2025-08-19 | Stop reason: HOSPADM

## 2025-08-16 RX ORDER — IOPAMIDOL 755 MG/ML
100 INJECTION, SOLUTION INTRAVASCULAR
Status: COMPLETED | OUTPATIENT
Start: 2025-08-16 | End: 2025-08-16

## 2025-08-16 RX ORDER — ENOXAPARIN SODIUM 100 MG/ML
40 INJECTION SUBCUTANEOUS DAILY
Status: DISCONTINUED | OUTPATIENT
Start: 2025-08-16 | End: 2025-08-19 | Stop reason: HOSPADM

## 2025-08-16 RX ORDER — LORAZEPAM 2 MG/ML
4 INJECTION INTRAMUSCULAR
Status: DISCONTINUED | OUTPATIENT
Start: 2025-08-16 | End: 2025-08-19 | Stop reason: HOSPADM

## 2025-08-16 RX ORDER — LORAZEPAM 1 MG/1
3 TABLET ORAL
Status: DISCONTINUED | OUTPATIENT
Start: 2025-08-16 | End: 2025-08-19 | Stop reason: HOSPADM

## 2025-08-16 RX ORDER — ONDANSETRON 4 MG/1
4 TABLET, ORALLY DISINTEGRATING ORAL EVERY 8 HOURS PRN
Status: DISCONTINUED | OUTPATIENT
Start: 2025-08-16 | End: 2025-08-19 | Stop reason: HOSPADM

## 2025-08-16 RX ORDER — POTASSIUM CHLORIDE 7.45 MG/ML
10 INJECTION INTRAVENOUS
Status: COMPLETED | OUTPATIENT
Start: 2025-08-16 | End: 2025-08-16

## 2025-08-16 RX ADMIN — IOPAMIDOL 100 ML: 755 INJECTION, SOLUTION INTRAVENOUS at 16:09

## 2025-08-16 RX ADMIN — Medication 100 MG: at 16:44

## 2025-08-16 RX ADMIN — MAGNESIUM SULFATE HEPTAHYDRATE 2000 MG: 40 INJECTION, SOLUTION INTRAVENOUS at 18:44

## 2025-08-16 RX ADMIN — LORAZEPAM 2 MG: 1 TABLET ORAL at 16:44

## 2025-08-16 RX ADMIN — POTASSIUM CHLORIDE 10 MEQ: 7.46 INJECTION, SOLUTION INTRAVENOUS at 18:46

## 2025-08-16 RX ADMIN — POTASSIUM CHLORIDE 10 MEQ: 7.46 INJECTION, SOLUTION INTRAVENOUS at 16:46

## 2025-08-16 RX ADMIN — WATER 2000 MG: 1 INJECTION INTRAMUSCULAR; INTRAVENOUS; SUBCUTANEOUS at 16:41

## 2025-08-16 ASSESSMENT — PAIN - FUNCTIONAL ASSESSMENT: PAIN_FUNCTIONAL_ASSESSMENT: 0-10

## 2025-08-16 ASSESSMENT — PAIN SCALES - GENERAL
PAINLEVEL_OUTOF10: 0

## 2025-08-17 ENCOUNTER — APPOINTMENT (OUTPATIENT)
Facility: HOSPITAL | Age: 75
DRG: 445 | End: 2025-08-17
Payer: MEDICARE

## 2025-08-17 PROBLEM — F10.929 ALCOHOLIC INTOXICATION WITH COMPLICATION: Status: ACTIVE | Noted: 2025-08-17

## 2025-08-17 PROBLEM — R79.89 ELEVATED TROPONIN LEVEL NOT DUE MYOCARDIAL INFARCTION: Status: ACTIVE | Noted: 2025-08-17

## 2025-08-17 PROBLEM — R74.01 TRANSAMINITIS: Status: ACTIVE | Noted: 2025-08-17

## 2025-08-17 PROBLEM — R06.02 SHORTNESS OF BREATH: Status: ACTIVE | Noted: 2025-08-17

## 2025-08-17 PROBLEM — E87.6 HYPOKALEMIA: Status: ACTIVE | Noted: 2025-08-17

## 2025-08-17 LAB
ALBUMIN SERPL-MCNC: 2.4 G/DL (ref 3.5–5.2)
ALBUMIN/GLOB SERPL: 0.6 (ref 1.1–2.2)
ALP SERPL-CCNC: 134 U/L (ref 35–104)
ALT SERPL-CCNC: 49 U/L (ref 10–35)
ANION GAP SERPL CALC-SCNC: 16 MMOL/L (ref 2–14)
AST SERPL-CCNC: 236 U/L (ref 10–35)
BASOPHILS # BLD: 0.11 K/UL (ref 0–0.1)
BASOPHILS NFR BLD: 2 % (ref 0–1)
BILIRUB SERPL-MCNC: 2.2 MG/DL (ref 0–1.2)
BUN SERPL-MCNC: 7 MG/DL (ref 8–23)
BUN/CREAT SERPL: 16 (ref 12–20)
CALCIUM SERPL-MCNC: 7.7 MG/DL (ref 8.8–10.2)
CHLORIDE SERPL-SCNC: 101 MMOL/L (ref 98–107)
CO2 SERPL-SCNC: 27 MMOL/L (ref 20–29)
COMMENT:: NORMAL
CREAT SERPL-MCNC: 0.46 MG/DL (ref 0.6–1)
DIFFERENTIAL METHOD BLD: ABNORMAL
EOSINOPHIL # BLD: 0 K/UL (ref 0–0.4)
EOSINOPHIL NFR BLD: 0 % (ref 0–7)
ERYTHROCYTE [DISTWIDTH] IN BLOOD BY AUTOMATED COUNT: 17 % (ref 11.5–14.5)
GLOBULIN SER CALC-MCNC: 4.2 G/DL (ref 2–4)
GLUCOSE SERPL-MCNC: 84 MG/DL (ref 65–100)
HCT VFR BLD AUTO: 31.9 % (ref 35–47)
HGB BLD-MCNC: 10.4 G/DL (ref 11.5–16)
IMM GRANULOCYTES # BLD AUTO: 0 K/UL
IMM GRANULOCYTES NFR BLD AUTO: 0 %
LYMPHOCYTES # BLD: 0.99 K/UL (ref 0.8–3.5)
LYMPHOCYTES NFR BLD: 18 % (ref 12–49)
MAGNESIUM SERPL-MCNC: 1.8 MG/DL (ref 1.6–2.4)
MCH RBC QN AUTO: 28.7 PG (ref 26–34)
MCHC RBC AUTO-ENTMCNC: 32.6 G/DL (ref 30–36.5)
MCV RBC AUTO: 87.9 FL (ref 80–99)
MONOCYTES # BLD: 0.33 K/UL (ref 0–1)
MONOCYTES NFR BLD: 6 % (ref 5–13)
NEUTS SEG # BLD: 4.07 K/UL (ref 1.8–8)
NEUTS SEG NFR BLD: 74 % (ref 32–75)
NRBC # BLD: 0 K/UL (ref 0–0.01)
NRBC BLD-RTO: 0 PER 100 WBC
PLATELET # BLD AUTO: 89 K/UL (ref 150–400)
PMV BLD AUTO: 11.6 FL (ref 8.9–12.9)
POTASSIUM SERPL-SCNC: 3 MMOL/L (ref 3.5–5.1)
PROT SERPL-MCNC: 6.6 G/DL (ref 6.4–8.3)
RBC # BLD AUTO: 3.63 M/UL (ref 3.8–5.2)
RBC MORPH BLD: ABNORMAL
SODIUM SERPL-SCNC: 143 MMOL/L (ref 136–145)
SPECIMEN HOLD: NORMAL
TROPONIN T SERPL HS-MCNC: 25.1 NG/L (ref 0–14)
WBC # BLD AUTO: 5.5 K/UL (ref 3.6–11)

## 2025-08-17 PROCEDURE — 6360000002 HC RX W HCPCS: Performed by: INTERNAL MEDICINE

## 2025-08-17 PROCEDURE — 99222 1ST HOSP IP/OBS MODERATE 55: CPT | Performed by: INTERNAL MEDICINE

## 2025-08-17 PROCEDURE — 6370000000 HC RX 637 (ALT 250 FOR IP): Performed by: STUDENT IN AN ORGANIZED HEALTH CARE EDUCATION/TRAINING PROGRAM

## 2025-08-17 PROCEDURE — 2500000003 HC RX 250 WO HCPCS: Performed by: STUDENT IN AN ORGANIZED HEALTH CARE EDUCATION/TRAINING PROGRAM

## 2025-08-17 PROCEDURE — 2500000003 HC RX 250 WO HCPCS: Performed by: FAMILY MEDICINE

## 2025-08-17 PROCEDURE — 80053 COMPREHEN METABOLIC PANEL: CPT

## 2025-08-17 PROCEDURE — 84484 ASSAY OF TROPONIN QUANT: CPT

## 2025-08-17 PROCEDURE — 2060000000 HC ICU INTERMEDIATE R&B

## 2025-08-17 PROCEDURE — 2580000003 HC RX 258: Performed by: STUDENT IN AN ORGANIZED HEALTH CARE EDUCATION/TRAINING PROGRAM

## 2025-08-17 PROCEDURE — 2580000003 HC RX 258: Performed by: FAMILY MEDICINE

## 2025-08-17 PROCEDURE — 99221 1ST HOSP IP/OBS SF/LOW 40: CPT | Performed by: SURGERY

## 2025-08-17 PROCEDURE — 6370000000 HC RX 637 (ALT 250 FOR IP): Performed by: INTERNAL MEDICINE

## 2025-08-17 PROCEDURE — 6360000002 HC RX W HCPCS: Performed by: FAMILY MEDICINE

## 2025-08-17 PROCEDURE — 83735 ASSAY OF MAGNESIUM: CPT

## 2025-08-17 PROCEDURE — 74181 MRI ABDOMEN W/O CONTRAST: CPT

## 2025-08-17 PROCEDURE — 85025 COMPLETE CBC W/AUTO DIFF WBC: CPT

## 2025-08-17 RX ORDER — POTASSIUM CHLORIDE 750 MG/1
40 TABLET, EXTENDED RELEASE ORAL 3 TIMES DAILY
Status: DISPENSED | OUTPATIENT
Start: 2025-08-17 | End: 2025-08-19

## 2025-08-17 RX ORDER — POTASSIUM CHLORIDE 750 MG/1
40 TABLET, EXTENDED RELEASE ORAL PRN
Status: DISCONTINUED | OUTPATIENT
Start: 2025-08-17 | End: 2025-08-19 | Stop reason: HOSPADM

## 2025-08-17 RX ORDER — MAGNESIUM SULFATE IN WATER 40 MG/ML
2000 INJECTION, SOLUTION INTRAVENOUS PRN
Status: DISCONTINUED | OUTPATIENT
Start: 2025-08-17 | End: 2025-08-19 | Stop reason: HOSPADM

## 2025-08-17 RX ORDER — SPIRONOLACTONE 25 MG/1
25 TABLET ORAL DAILY
Status: DISCONTINUED | OUTPATIENT
Start: 2025-08-17 | End: 2025-08-19 | Stop reason: HOSPADM

## 2025-08-17 RX ORDER — LORAZEPAM 1 MG/1
2 TABLET ORAL ONCE
Status: COMPLETED | OUTPATIENT
Start: 2025-08-17 | End: 2025-08-17

## 2025-08-17 RX ORDER — BUMETANIDE 0.25 MG/ML
1 INJECTION, SOLUTION INTRAMUSCULAR; INTRAVENOUS ONCE
Status: COMPLETED | OUTPATIENT
Start: 2025-08-17 | End: 2025-08-17

## 2025-08-17 RX ORDER — FUROSEMIDE 40 MG/1
40 TABLET ORAL 2 TIMES DAILY
Status: DISCONTINUED | OUTPATIENT
Start: 2025-08-17 | End: 2025-08-19 | Stop reason: HOSPADM

## 2025-08-17 RX ORDER — LANOLIN ALCOHOL/MO/W.PET/CERES
400 CREAM (GRAM) TOPICAL 3 TIMES DAILY
Status: DISPENSED | OUTPATIENT
Start: 2025-08-17 | End: 2025-08-19

## 2025-08-17 RX ORDER — POTASSIUM CHLORIDE 7.45 MG/ML
10 INJECTION INTRAVENOUS PRN
Status: DISCONTINUED | OUTPATIENT
Start: 2025-08-17 | End: 2025-08-19 | Stop reason: HOSPADM

## 2025-08-17 RX ADMIN — BUMETANIDE 1 MG: 0.25 INJECTION INTRAMUSCULAR; INTRAVENOUS at 09:24

## 2025-08-17 RX ADMIN — SODIUM CHLORIDE, PRESERVATIVE FREE 10 ML: 5 INJECTION INTRAVENOUS at 01:42

## 2025-08-17 RX ADMIN — PIPERACILLIN AND TAZOBACTAM 4500 MG: 4; .5 INJECTION, POWDER, LYOPHILIZED, FOR SOLUTION INTRAVENOUS at 00:58

## 2025-08-17 RX ADMIN — SODIUM CHLORIDE, PRESERVATIVE FREE 10 ML: 5 INJECTION INTRAVENOUS at 08:25

## 2025-08-17 RX ADMIN — POTASSIUM CHLORIDE 40 MEQ: 750 TABLET, FILM COATED, EXTENDED RELEASE ORAL at 15:18

## 2025-08-17 RX ADMIN — FUROSEMIDE 40 MG: 40 TABLET ORAL at 18:05

## 2025-08-17 RX ADMIN — PANTOPRAZOLE SODIUM 40 MG: 40 INJECTION, POWDER, FOR SOLUTION INTRAVENOUS at 00:50

## 2025-08-17 RX ADMIN — SODIUM CHLORIDE, PRESERVATIVE FREE 10 ML: 5 INJECTION INTRAVENOUS at 08:15

## 2025-08-17 RX ADMIN — SODIUM CHLORIDE, PRESERVATIVE FREE 10 ML: 5 INJECTION INTRAVENOUS at 22:09

## 2025-08-17 RX ADMIN — Medication 400 MG: at 15:18

## 2025-08-17 RX ADMIN — LORAZEPAM 2 MG: 1 TABLET ORAL at 09:24

## 2025-08-17 RX ADMIN — Medication 100 MG: at 09:24

## 2025-08-17 RX ADMIN — Medication 400 MG: at 22:06

## 2025-08-17 RX ADMIN — SODIUM CHLORIDE: 0.9 INJECTION, SOLUTION INTRAVENOUS at 15:32

## 2025-08-17 RX ADMIN — POTASSIUM CHLORIDE 40 MEQ: 750 TABLET, FILM COATED, EXTENDED RELEASE ORAL at 22:06

## 2025-08-17 RX ADMIN — PANTOPRAZOLE SODIUM 40 MG: 40 INJECTION, POWDER, FOR SOLUTION INTRAVENOUS at 18:03

## 2025-08-17 RX ADMIN — PIPERACILLIN AND TAZOBACTAM 3375 MG: 3; .375 INJECTION, POWDER, FOR SOLUTION INTRAVENOUS; PARENTERAL at 15:37

## 2025-08-17 RX ADMIN — POTASSIUM CHLORIDE 10 MEQ: 7.46 INJECTION, SOLUTION INTRAVENOUS at 09:15

## 2025-08-17 RX ADMIN — MAGNESIUM SULFATE HEPTAHYDRATE 2000 MG: 40 INJECTION, SOLUTION INTRAVENOUS at 09:17

## 2025-08-17 RX ADMIN — POTASSIUM CHLORIDE 10 MEQ: 7.46 INJECTION, SOLUTION INTRAVENOUS at 11:24

## 2025-08-17 RX ADMIN — PIPERACILLIN AND TAZOBACTAM 3375 MG: 3; .375 INJECTION, POWDER, FOR SOLUTION INTRAVENOUS; PARENTERAL at 07:04

## 2025-08-17 RX ADMIN — SPIRONOLACTONE 25 MG: 25 TABLET ORAL at 09:23

## 2025-08-17 RX ADMIN — ENOXAPARIN SODIUM 40 MG: 100 INJECTION SUBCUTANEOUS at 09:25

## 2025-08-17 RX ADMIN — LORAZEPAM 1 MG: 1 TABLET ORAL at 04:01

## 2025-08-17 RX ADMIN — LORAZEPAM 1 MG: 1 TABLET ORAL at 00:38

## 2025-08-17 ASSESSMENT — PAIN SCALES - GENERAL
PAINLEVEL_OUTOF10: 0
PAINLEVEL_OUTOF10: 0

## 2025-08-18 ENCOUNTER — APPOINTMENT (OUTPATIENT)
Facility: HOSPITAL | Age: 75
DRG: 445 | End: 2025-08-18
Attending: FAMILY MEDICINE
Payer: MEDICARE

## 2025-08-18 ENCOUNTER — APPOINTMENT (OUTPATIENT)
Facility: HOSPITAL | Age: 75
DRG: 445 | End: 2025-08-18
Attending: INTERNAL MEDICINE
Payer: MEDICARE

## 2025-08-18 ENCOUNTER — ANESTHESIA EVENT (OUTPATIENT)
Facility: HOSPITAL | Age: 75
DRG: 445 | End: 2025-08-18
Payer: MEDICARE

## 2025-08-18 ENCOUNTER — APPOINTMENT (OUTPATIENT)
Facility: HOSPITAL | Age: 75
DRG: 445 | End: 2025-08-18
Payer: MEDICARE

## 2025-08-18 ENCOUNTER — ANESTHESIA (OUTPATIENT)
Facility: HOSPITAL | Age: 75
DRG: 445 | End: 2025-08-18
Payer: MEDICARE

## 2025-08-18 PROBLEM — D69.6 THROMBOCYTOPENIA: Status: ACTIVE | Noted: 2025-08-18

## 2025-08-18 PROBLEM — Z86.19 HEPATITIS C VIRUS INFECTION CURED AFTER ANTIVIRAL DRUG THERAPY: Status: ACTIVE | Noted: 2025-08-18

## 2025-08-18 PROBLEM — K70.9 ALCOHOL INDUCED LIVER DISORDER: Status: ACTIVE | Noted: 2025-08-18

## 2025-08-18 PROBLEM — D64.9 ANEMIA: Status: ACTIVE | Noted: 2025-08-18

## 2025-08-18 PROBLEM — F10.20 ALCOHOL USE DISORDER, SEVERE, DEPENDENCE (HCC): Status: ACTIVE | Noted: 2025-08-18

## 2025-08-18 PROBLEM — K70.10 ALCOHOLIC HEPATITIS WITHOUT ASCITES (HCC): Status: ACTIVE | Noted: 2025-08-18

## 2025-08-18 LAB
ALBUMIN SERPL-MCNC: 2.4 G/DL (ref 3.5–5.2)
ALBUMIN/GLOB SERPL: 0.6 (ref 1.1–2.2)
ALP SERPL-CCNC: 126 U/L (ref 35–104)
ALT SERPL-CCNC: 44 U/L (ref 10–35)
ANION GAP SERPL CALC-SCNC: 11 MMOL/L (ref 2–14)
AST SERPL-CCNC: 177 U/L (ref 10–35)
BASOPHILS # BLD: 0.03 K/UL (ref 0–0.1)
BASOPHILS NFR BLD: 0.5 % (ref 0–1)
BILIRUB SERPL-MCNC: 3 MG/DL (ref 0–1.2)
BUN SERPL-MCNC: 9 MG/DL (ref 8–23)
BUN/CREAT SERPL: 14 (ref 12–20)
CALCIUM SERPL-MCNC: 8 MG/DL (ref 8.8–10.2)
CHLORIDE SERPL-SCNC: 98 MMOL/L (ref 98–107)
CO2 SERPL-SCNC: 29 MMOL/L (ref 20–29)
CREAT SERPL-MCNC: 0.64 MG/DL (ref 0.6–1)
DIFFERENTIAL METHOD BLD: ABNORMAL
EOSINOPHIL # BLD: 0.21 K/UL (ref 0–0.4)
EOSINOPHIL NFR BLD: 3.5 % (ref 0–7)
ERYTHROCYTE [DISTWIDTH] IN BLOOD BY AUTOMATED COUNT: 16.6 % (ref 11.5–14.5)
FERRITIN SERPL-MCNC: 104 NG/ML (ref 13–252)
GLOBULIN SER CALC-MCNC: 4 G/DL (ref 2–4)
GLUCOSE SERPL-MCNC: 87 MG/DL (ref 65–100)
HCT VFR BLD AUTO: 31.3 % (ref 35–47)
HGB BLD-MCNC: 10 G/DL (ref 11.5–16)
IMM GRANULOCYTES # BLD AUTO: 0.03 K/UL (ref 0–0.04)
IMM GRANULOCYTES NFR BLD AUTO: 0.5 % (ref 0–0.5)
IRON SATN MFR SERPL: 39 %
IRON SERPL-MCNC: 110 UG/DL (ref 37–145)
LYMPHOCYTES # BLD: 0.73 K/UL (ref 0.8–3.5)
LYMPHOCYTES NFR BLD: 12 % (ref 12–49)
MCH RBC QN AUTO: 28.9 PG (ref 26–34)
MCHC RBC AUTO-ENTMCNC: 31.9 G/DL (ref 30–36.5)
MCV RBC AUTO: 90.5 FL (ref 80–99)
MONOCYTES # BLD: 0.66 K/UL (ref 0–1)
MONOCYTES NFR BLD: 10.9 % (ref 5–13)
NEUTS SEG # BLD: 4.44 K/UL (ref 1.8–8)
NEUTS SEG NFR BLD: 72.6 % (ref 32–75)
NRBC # BLD: 0 K/UL (ref 0–0.01)
NRBC BLD-RTO: 0 PER 100 WBC
PLATELET # BLD AUTO: 92 K/UL (ref 150–400)
PMV BLD AUTO: 12 FL (ref 8.9–12.9)
POTASSIUM SERPL-SCNC: 3.4 MMOL/L (ref 3.5–5.1)
PROT SERPL-MCNC: 6.4 G/DL (ref 6.4–8.3)
RBC # BLD AUTO: 3.46 M/UL (ref 3.8–5.2)
RBC MORPH BLD: ABNORMAL
SODIUM SERPL-SCNC: 139 MMOL/L (ref 136–145)
TIBC SERPL-MCNC: 285 UG/DL (ref 250–450)
UIBC SERPL-MCNC: 175 UG/DL (ref 112–347)
WBC # BLD AUTO: 6.1 K/UL (ref 3.6–11)

## 2025-08-18 PROCEDURE — 2060000000 HC ICU INTERMEDIATE R&B

## 2025-08-18 PROCEDURE — 0F798DZ DILATION OF COMMON BILE DUCT WITH INTRALUMINAL DEVICE, VIA NATURAL OR ARTIFICIAL OPENING ENDOSCOPIC: ICD-10-PCS | Performed by: INTERNAL MEDICINE

## 2025-08-18 PROCEDURE — 6360000004 HC RX CONTRAST MEDICATION: Performed by: SURGERY

## 2025-08-18 PROCEDURE — 2500000003 HC RX 250 WO HCPCS: Performed by: STUDENT IN AN ORGANIZED HEALTH CARE EDUCATION/TRAINING PROGRAM

## 2025-08-18 PROCEDURE — 82728 ASSAY OF FERRITIN: CPT

## 2025-08-18 PROCEDURE — 2500000003 HC RX 250 WO HCPCS: Performed by: FAMILY MEDICINE

## 2025-08-18 PROCEDURE — 6370000000 HC RX 637 (ALT 250 FOR IP): Performed by: INTERNAL MEDICINE

## 2025-08-18 PROCEDURE — 0FC98ZZ EXTIRPATION OF MATTER FROM COMMON BILE DUCT, VIA NATURAL OR ARTIFICIAL OPENING ENDOSCOPIC: ICD-10-PCS | Performed by: INTERNAL MEDICINE

## 2025-08-18 PROCEDURE — 6360000002 HC RX W HCPCS: Performed by: NURSE ANESTHETIST, CERTIFIED REGISTERED

## 2025-08-18 PROCEDURE — 6370000000 HC RX 637 (ALT 250 FOR IP): Performed by: STUDENT IN AN ORGANIZED HEALTH CARE EDUCATION/TRAINING PROGRAM

## 2025-08-18 PROCEDURE — BF131ZZ FLUOROSCOPY OF GALLBLADDER AND BILE DUCTS USING LOW OSMOLAR CONTRAST: ICD-10-PCS | Performed by: INTERNAL MEDICINE

## 2025-08-18 PROCEDURE — 2709999900 HC NON-CHARGEABLE SUPPLY: Performed by: INTERNAL MEDICINE

## 2025-08-18 PROCEDURE — 88112 CYTOPATH CELL ENHANCE TECH: CPT

## 2025-08-18 PROCEDURE — 6360000004 HC RX CONTRAST MEDICATION: Performed by: INTERNAL MEDICINE

## 2025-08-18 PROCEDURE — 2580000003 HC RX 258: Performed by: NURSE ANESTHETIST, CERTIFIED REGISTERED

## 2025-08-18 PROCEDURE — 85025 COMPLETE CBC W/AUTO DIFF WBC: CPT

## 2025-08-18 PROCEDURE — 6360000002 HC RX W HCPCS: Performed by: HOSPITALIST

## 2025-08-18 PROCEDURE — C1769 GUIDE WIRE: HCPCS | Performed by: INTERNAL MEDICINE

## 2025-08-18 PROCEDURE — 2500000003 HC RX 250 WO HCPCS: Performed by: NURSE ANESTHETIST, CERTIFIED REGISTERED

## 2025-08-18 PROCEDURE — 6360000002 HC RX W HCPCS: Performed by: FAMILY MEDICINE

## 2025-08-18 PROCEDURE — 78227 HEPATOBIL SYST IMAGE W/DRUG: CPT

## 2025-08-18 PROCEDURE — C1726 CATH, BAL DIL, NON-VASCULAR: HCPCS | Performed by: INTERNAL MEDICINE

## 2025-08-18 PROCEDURE — 2580000003 HC RX 258: Performed by: SURGERY

## 2025-08-18 PROCEDURE — A9537 TC99M MEBROFENIN: HCPCS | Performed by: SURGERY

## 2025-08-18 PROCEDURE — 3700000001 HC ADD 15 MINUTES (ANESTHESIA): Performed by: INTERNAL MEDICINE

## 2025-08-18 PROCEDURE — 3600007503: Performed by: INTERNAL MEDICINE

## 2025-08-18 PROCEDURE — 80053 COMPREHEN METABOLIC PANEL: CPT

## 2025-08-18 PROCEDURE — 83540 ASSAY OF IRON: CPT

## 2025-08-18 PROCEDURE — 93970 EXTREMITY STUDY: CPT

## 2025-08-18 PROCEDURE — C1874 STENT, COATED/COV W/DEL SYS: HCPCS | Performed by: INTERNAL MEDICINE

## 2025-08-18 PROCEDURE — 83550 IRON BINDING TEST: CPT

## 2025-08-18 PROCEDURE — 3700000000 HC ANESTHESIA ATTENDED CARE: Performed by: INTERNAL MEDICINE

## 2025-08-18 PROCEDURE — 2720000010 HC SURG SUPPLY STERILE: Performed by: INTERNAL MEDICINE

## 2025-08-18 PROCEDURE — 2580000003 HC RX 258: Performed by: FAMILY MEDICINE

## 2025-08-18 PROCEDURE — 3430000000 HC RX DIAGNOSTIC RADIOPHARMACEUTICAL: Performed by: SURGERY

## 2025-08-18 PROCEDURE — 7100000010 HC PHASE II RECOVERY - FIRST 15 MIN: Performed by: INTERNAL MEDICINE

## 2025-08-18 PROCEDURE — 3600007513: Performed by: INTERNAL MEDICINE

## 2025-08-18 PROCEDURE — 7100000011 HC PHASE II RECOVERY - ADDTL 15 MIN: Performed by: INTERNAL MEDICINE

## 2025-08-18 PROCEDURE — 6370000000 HC RX 637 (ALT 250 FOR IP): Performed by: HOSPITALIST

## 2025-08-18 DEVICE — ENDOPROSTHESIS BILI L 6 CM DIA10 MM CATH L 200 CM DIA8.5 FR: Type: IMPLANTABLE DEVICE | Site: COMMON BILE DUCT | Status: FUNCTIONAL

## 2025-08-18 RX ORDER — PHENOBARBITAL 32.4 MG/1
32.4 TABLET ORAL EVERY 6 HOURS PRN
Status: DISCONTINUED | OUTPATIENT
Start: 2025-08-18 | End: 2025-08-19 | Stop reason: HOSPADM

## 2025-08-18 RX ORDER — SODIUM CHLORIDE 0.9 % (FLUSH) 0.9 %
5-40 SYRINGE (ML) INJECTION PRN
Status: DISCONTINUED | OUTPATIENT
Start: 2025-08-18 | End: 2025-08-18 | Stop reason: HOSPADM

## 2025-08-18 RX ORDER — SODIUM CHLORIDE 0.9 % (FLUSH) 0.9 %
5-40 SYRINGE (ML) INJECTION EVERY 12 HOURS SCHEDULED
Status: DISCONTINUED | OUTPATIENT
Start: 2025-08-18 | End: 2025-08-18 | Stop reason: HOSPADM

## 2025-08-18 RX ORDER — ONDANSETRON 2 MG/ML
INJECTION INTRAMUSCULAR; INTRAVENOUS
Status: DISCONTINUED | OUTPATIENT
Start: 2025-08-18 | End: 2025-08-18 | Stop reason: SDUPTHER

## 2025-08-18 RX ORDER — PHENOBARBITAL 32.4 MG/1
32.4 TABLET ORAL 2 TIMES DAILY
Status: DISCONTINUED | OUTPATIENT
Start: 2025-08-19 | End: 2025-08-18

## 2025-08-18 RX ORDER — PHENYLEPHRINE HCL IN 0.9% NACL 0.4MG/10ML
SYRINGE (ML) INTRAVENOUS
Status: DISCONTINUED | OUTPATIENT
Start: 2025-08-18 | End: 2025-08-18 | Stop reason: SDUPTHER

## 2025-08-18 RX ORDER — IOPAMIDOL 612 MG/ML
INJECTION, SOLUTION INTRAVASCULAR PRN
Status: DISCONTINUED | OUTPATIENT
Start: 2025-08-18 | End: 2025-08-18 | Stop reason: ALTCHOICE

## 2025-08-18 RX ORDER — PHENOBARBITAL 32.4 MG/1
16.2 TABLET ORAL 2 TIMES DAILY
Status: DISCONTINUED | OUTPATIENT
Start: 2025-08-20 | End: 2025-08-18

## 2025-08-18 RX ORDER — PHENOBARBITAL 32.4 MG/1
16.2 TABLET ORAL EVERY 6 HOURS PRN
Status: DISCONTINUED | OUTPATIENT
Start: 2025-08-20 | End: 2025-08-19 | Stop reason: HOSPADM

## 2025-08-18 RX ORDER — SODIUM CHLORIDE 9 MG/ML
INJECTION, SOLUTION INTRAVENOUS PRN
Status: DISCONTINUED | OUTPATIENT
Start: 2025-08-18 | End: 2025-08-18 | Stop reason: HOSPADM

## 2025-08-18 RX ORDER — SODIUM CHLORIDE 9 MG/ML
INJECTION, SOLUTION INTRAVENOUS ONCE
Status: COMPLETED | OUTPATIENT
Start: 2025-08-18 | End: 2025-08-18

## 2025-08-18 RX ORDER — SODIUM CHLORIDE 9 MG/ML
INJECTION, SOLUTION INTRAVENOUS CONTINUOUS
Status: DISCONTINUED | OUTPATIENT
Start: 2025-08-18 | End: 2025-08-18 | Stop reason: HOSPADM

## 2025-08-18 RX ORDER — PROPOFOL 10 MG/ML
INJECTION, EMULSION INTRAVENOUS
Status: DISCONTINUED | OUTPATIENT
Start: 2025-08-18 | End: 2025-08-18 | Stop reason: SDUPTHER

## 2025-08-18 RX ORDER — SINCALIDE 5 UG/5ML
0.02 INJECTION, POWDER, LYOPHILIZED, FOR SOLUTION INTRAVENOUS ONCE
Status: COMPLETED | OUTPATIENT
Start: 2025-08-18 | End: 2025-08-18

## 2025-08-18 RX ORDER — FOLIC ACID 1 MG/1
1 TABLET ORAL DAILY
Status: DISCONTINUED | OUTPATIENT
Start: 2025-08-18 | End: 2025-08-19 | Stop reason: HOSPADM

## 2025-08-18 RX ORDER — MULTIVITAMIN WITH IRON
1 TABLET ORAL DAILY
Status: DISCONTINUED | OUTPATIENT
Start: 2025-08-18 | End: 2025-08-19 | Stop reason: HOSPADM

## 2025-08-18 RX ORDER — PHENOBARBITAL 32.4 MG/1
32.4 TABLET ORAL 4 TIMES DAILY
Status: DISCONTINUED | OUTPATIENT
Start: 2025-08-18 | End: 2025-08-18

## 2025-08-18 RX ORDER — KIT FOR THE PREPARATION OF TECHNETIUM TC 99M MEBROFENIN 45 MG/10ML
5.5 INJECTION, POWDER, LYOPHILIZED, FOR SOLUTION INTRAVENOUS
Status: COMPLETED | OUTPATIENT
Start: 2025-08-18 | End: 2025-08-18

## 2025-08-18 RX ORDER — ROCURONIUM BROMIDE 10 MG/ML
INJECTION, SOLUTION INTRAVENOUS
Status: DISCONTINUED | OUTPATIENT
Start: 2025-08-18 | End: 2025-08-18 | Stop reason: SDUPTHER

## 2025-08-18 RX ORDER — SODIUM CHLORIDE 9 MG/ML
INJECTION, SOLUTION INTRAVENOUS
Status: DISCONTINUED | OUTPATIENT
Start: 2025-08-18 | End: 2025-08-18 | Stop reason: SDUPTHER

## 2025-08-18 RX ORDER — DEXAMETHASONE SODIUM PHOSPHATE 4 MG/ML
INJECTION, SOLUTION INTRA-ARTICULAR; INTRALESIONAL; INTRAMUSCULAR; INTRAVENOUS; SOFT TISSUE
Status: DISCONTINUED | OUTPATIENT
Start: 2025-08-18 | End: 2025-08-18 | Stop reason: SDUPTHER

## 2025-08-18 RX ORDER — LIDOCAINE HYDROCHLORIDE 20 MG/ML
INJECTION, SOLUTION EPIDURAL; INFILTRATION; INTRACAUDAL; PERINEURAL
Status: DISCONTINUED | OUTPATIENT
Start: 2025-08-18 | End: 2025-08-18 | Stop reason: SDUPTHER

## 2025-08-18 RX ORDER — SUCCINYLCHOLINE/SOD CL,ISO/PF 200MG/10ML
SYRINGE (ML) INTRAVENOUS
Status: DISCONTINUED | OUTPATIENT
Start: 2025-08-18 | End: 2025-08-18 | Stop reason: SDUPTHER

## 2025-08-18 RX ADMIN — Medication 100 MG: at 14:11

## 2025-08-18 RX ADMIN — HYDROMORPHONE HYDROCHLORIDE 0.5 MG: 1 INJECTION, SOLUTION INTRAMUSCULAR; INTRAVENOUS; SUBCUTANEOUS at 14:11

## 2025-08-18 RX ADMIN — PIPERACILLIN AND TAZOBACTAM 3375 MG: 3; .375 INJECTION, POWDER, FOR SOLUTION INTRAVENOUS; PARENTERAL at 07:26

## 2025-08-18 RX ADMIN — PANTOPRAZOLE SODIUM 40 MG: 40 INJECTION, POWDER, FOR SOLUTION INTRAVENOUS at 14:11

## 2025-08-18 RX ADMIN — PANTOPRAZOLE SODIUM 40 MG: 40 INJECTION, POWDER, FOR SOLUTION INTRAVENOUS at 23:28

## 2025-08-18 RX ADMIN — PHENOBARBITAL 32.4 MG: 32.4 TABLET ORAL at 19:31

## 2025-08-18 RX ADMIN — DEXAMETHASONE SODIUM PHOSPHATE 4 MG: 4 INJECTION, SOLUTION INTRAMUSCULAR; INTRAVENOUS at 12:06

## 2025-08-18 RX ADMIN — PROPOFOL 150 MG: 10 INJECTION, EMULSION INTRAVENOUS at 11:40

## 2025-08-18 RX ADMIN — ROCURONIUM BROMIDE 10 MG: 10 INJECTION, SOLUTION INTRAVENOUS at 11:40

## 2025-08-18 RX ADMIN — Medication 400 MG: at 14:11

## 2025-08-18 RX ADMIN — Medication 40 MCG: at 11:41

## 2025-08-18 RX ADMIN — PROPOFOL 50 MG: 10 INJECTION, EMULSION INTRAVENOUS at 11:47

## 2025-08-18 RX ADMIN — SPIRONOLACTONE 25 MG: 25 TABLET ORAL at 14:10

## 2025-08-18 RX ADMIN — SODIUM CHLORIDE, PRESERVATIVE FREE 10 ML: 5 INJECTION INTRAVENOUS at 14:13

## 2025-08-18 RX ADMIN — Medication 40 MCG: at 12:06

## 2025-08-18 RX ADMIN — LIDOCAINE HYDROCHLORIDE 60 MG: 20 INJECTION, SOLUTION EPIDURAL; INFILTRATION; INTRACAUDAL; PERINEURAL at 11:40

## 2025-08-18 RX ADMIN — SODIUM CHLORIDE, PRESERVATIVE FREE 10 ML: 5 INJECTION INTRAVENOUS at 22:12

## 2025-08-18 RX ADMIN — Medication 80 MCG: at 12:02

## 2025-08-18 RX ADMIN — POTASSIUM CHLORIDE 40 MEQ: 750 TABLET, FILM COATED, EXTENDED RELEASE ORAL at 22:14

## 2025-08-18 RX ADMIN — Medication 80 MCG: at 12:05

## 2025-08-18 RX ADMIN — Medication 1 MG: at 19:31

## 2025-08-18 RX ADMIN — SODIUM CHLORIDE, PRESERVATIVE FREE 10 ML: 5 INJECTION INTRAVENOUS at 22:13

## 2025-08-18 RX ADMIN — PIPERACILLIN AND TAZOBACTAM 3375 MG: 3; .375 INJECTION, POWDER, FOR SOLUTION INTRAVENOUS; PARENTERAL at 23:08

## 2025-08-18 RX ADMIN — PIPERACILLIN AND TAZOBACTAM 3375 MG: 3; .375 INJECTION, POWDER, FOR SOLUTION INTRAVENOUS; PARENTERAL at 17:35

## 2025-08-18 RX ADMIN — PIPERACILLIN AND TAZOBACTAM 3375 MG: 3; .375 INJECTION, POWDER, FOR SOLUTION INTRAVENOUS; PARENTERAL at 00:00

## 2025-08-18 RX ADMIN — SODIUM CHLORIDE: 9 INJECTION, SOLUTION INTRAVENOUS at 11:15

## 2025-08-18 RX ADMIN — SINCALIDE 1.48 MCG: 5 INJECTION, POWDER, LYOPHILIZED, FOR SOLUTION INTRAVENOUS at 10:35

## 2025-08-18 RX ADMIN — SODIUM CHLORIDE 25 ML: 9 INJECTION, SOLUTION INTRAVENOUS at 10:35

## 2025-08-18 RX ADMIN — LORAZEPAM 1 MG: 1 TABLET ORAL at 23:28

## 2025-08-18 RX ADMIN — THERA TABS 1 TABLET: TAB at 19:31

## 2025-08-18 RX ADMIN — KIT FOR THE PREPARATION OF TECHNETIUM TC 99M MEBROFENIN 5.5 MILLICURIE: 45 INJECTION, POWDER, LYOPHILIZED, FOR SOLUTION INTRAVENOUS at 09:10

## 2025-08-18 RX ADMIN — Medication 400 MG: at 22:17

## 2025-08-18 RX ADMIN — PANTOPRAZOLE SODIUM 40 MG: 40 INJECTION, POWDER, FOR SOLUTION INTRAVENOUS at 07:15

## 2025-08-18 RX ADMIN — POTASSIUM CHLORIDE 40 MEQ: 750 TABLET, FILM COATED, EXTENDED RELEASE ORAL at 14:11

## 2025-08-18 RX ADMIN — ONDANSETRON 4 MG: 2 INJECTION INTRAMUSCULAR; INTRAVENOUS at 12:06

## 2025-08-18 RX ADMIN — Medication 140 MG: at 11:41

## 2025-08-18 ASSESSMENT — PAIN DESCRIPTION - LOCATION
LOCATION: ABDOMEN;THROAT
LOCATION: ABDOMEN

## 2025-08-18 ASSESSMENT — PAIN SCALES - GENERAL
PAINLEVEL_OUTOF10: 2
PAINLEVEL_OUTOF10: 0
PAINLEVEL_OUTOF10: 0
PAINLEVEL_OUTOF10: 7
PAINLEVEL_OUTOF10: 0
PAINLEVEL_OUTOF10: 0

## 2025-08-18 ASSESSMENT — PAIN DESCRIPTION - DESCRIPTORS: DESCRIPTORS: SORE

## 2025-08-18 ASSESSMENT — PAIN - FUNCTIONAL ASSESSMENT
PAIN_FUNCTIONAL_ASSESSMENT: 0-10
PAIN_FUNCTIONAL_ASSESSMENT: 0-10

## 2025-08-18 ASSESSMENT — PAIN DESCRIPTION - ORIENTATION: ORIENTATION: MID

## 2025-08-19 ENCOUNTER — APPOINTMENT (OUTPATIENT)
Facility: HOSPITAL | Age: 75
DRG: 445 | End: 2025-08-19
Attending: INTERNAL MEDICINE
Payer: MEDICARE

## 2025-08-19 VITALS
BODY MASS INDEX: 29.69 KG/M2 | DIASTOLIC BLOOD PRESSURE: 70 MMHG | TEMPERATURE: 98.7 F | WEIGHT: 167.55 LBS | SYSTOLIC BLOOD PRESSURE: 119 MMHG | HEIGHT: 63 IN | OXYGEN SATURATION: 97 % | HEART RATE: 75 BPM | RESPIRATION RATE: 18 BRPM

## 2025-08-19 LAB
ALBUMIN SERPL-MCNC: 2.4 G/DL (ref 3.5–5.2)
ALBUMIN/GLOB SERPL: 0.6 (ref 1.1–2.2)
ALP SERPL-CCNC: 129 U/L (ref 35–104)
ALT SERPL-CCNC: 43 U/L (ref 10–35)
ANION GAP SERPL CALC-SCNC: 10 MMOL/L (ref 2–14)
AST SERPL-CCNC: 143 U/L (ref 10–35)
BASOPHILS # BLD: 0.01 K/UL (ref 0–0.1)
BASOPHILS NFR BLD: 0.1 % (ref 0–1)
BILIRUB SERPL-MCNC: 2.3 MG/DL (ref 0–1.2)
BUN SERPL-MCNC: 9 MG/DL (ref 8–23)
BUN/CREAT SERPL: 14 (ref 12–20)
CALCIUM SERPL-MCNC: 8.3 MG/DL (ref 8.8–10.2)
CHLORIDE SERPL-SCNC: 102 MMOL/L (ref 98–107)
CO2 SERPL-SCNC: 27 MMOL/L (ref 20–29)
CREAT SERPL-MCNC: 0.7 MG/DL (ref 0.6–1)
CYTOLOGY-NON GYN: NORMAL
DIFFERENTIAL METHOD BLD: ABNORMAL
ECHO AO ASC DIAM: 3.1 CM
ECHO AO ASCENDING AORTA INDEX: 1.73 CM/M2
ECHO AO ROOT DIAM: 3.6 CM
ECHO AO ROOT INDEX: 2.01 CM/M2
ECHO AR MAX VEL PISA: 4.1 M/S
ECHO AV AREA PEAK VELOCITY: 2.8 CM2
ECHO AV AREA VTI: 2.8 CM2
ECHO AV AREA/BSA PEAK VELOCITY: 1.6 CM2/M2
ECHO AV AREA/BSA VTI: 1.6 CM2/M2
ECHO AV MEAN GRADIENT: 3 MMHG
ECHO AV MEAN VELOCITY: 0.8 M/S
ECHO AV PEAK GRADIENT: 4 MMHG
ECHO AV PEAK VELOCITY: 1.1 M/S
ECHO AV REGURGITANT PHT: 471.2 MS
ECHO AV VELOCITY RATIO: 0.82
ECHO AV VTI: 26.4 CM
ECHO BSA: 1.85 M2
ECHO BSA: 1.85 M2
ECHO EST RA PRESSURE: 3 MMHG
ECHO LA DIAMETER INDEX: 2.23 CM/M2
ECHO LA DIAMETER: 4 CM
ECHO LA TO AORTIC ROOT RATIO: 1.11
ECHO LA VOL A-L A4C: 98 ML (ref 22–52)
ECHO LA VOL MOD A4C: 94 ML (ref 22–52)
ECHO LA VOLUME INDEX A-L A4C: 55 ML/M2 (ref 16–34)
ECHO LA VOLUME INDEX MOD A4C: 53 ML/M2 (ref 16–34)
ECHO LV EF PHYSICIAN: 65 %
ECHO LV FRACTIONAL SHORTENING: 32 % (ref 28–44)
ECHO LV INTERNAL DIMENSION DIASTOLE INDEX: 2.29 CM/M2
ECHO LV INTERNAL DIMENSION DIASTOLIC: 4.1 CM (ref 3.9–5.3)
ECHO LV INTERNAL DIMENSION SYSTOLIC INDEX: 1.56 CM/M2
ECHO LV INTERNAL DIMENSION SYSTOLIC: 2.8 CM
ECHO LV IVSD: 1 CM (ref 0.6–0.9)
ECHO LV MASS 2D: 132.1 G (ref 67–162)
ECHO LV MASS INDEX 2D: 73.8 G/M2 (ref 43–95)
ECHO LV POSTERIOR WALL DIASTOLIC: 1 CM (ref 0.6–0.9)
ECHO LV RELATIVE WALL THICKNESS RATIO: 0.49
ECHO LVOT AREA: 3.5 CM2
ECHO LVOT AV VTI INDEX: 0.84
ECHO LVOT DIAM: 2.1 CM
ECHO LVOT MEAN GRADIENT: 2 MMHG
ECHO LVOT PEAK GRADIENT: 3 MMHG
ECHO LVOT PEAK VELOCITY: 0.9 M/S
ECHO LVOT STROKE VOLUME INDEX: 43.1 ML/M2
ECHO LVOT SV: 77.2 ML
ECHO LVOT VTI: 22.3 CM
ECHO MV A VELOCITY: 0.5 M/S
ECHO MV AREA PHT: 3.7 CM2
ECHO MV E DECELERATION TIME (DT): 206.3 MS
ECHO MV E VELOCITY: 0.81 M/S
ECHO MV E/A RATIO: 1.62
ECHO MV PRESSURE HALF TIME (PHT): 59.8 MS
ECHO MV REGURGITANT PEAK GRADIENT: 85 MMHG
ECHO MV REGURGITANT PEAK VELOCITY: 4.6 M/S
ECHO PV MAX VELOCITY: 0.7 M/S
ECHO PV PEAK GRADIENT: 2 MMHG
ECHO RIGHT VENTRICULAR SYSTOLIC PRESSURE (RVSP): 22 MMHG
ECHO TV REGURGITANT MAX VELOCITY: 2.16 M/S
ECHO TV REGURGITANT PEAK GRADIENT: 19 MMHG
EOSINOPHIL # BLD: 0.02 K/UL (ref 0–0.4)
EOSINOPHIL NFR BLD: 0.3 % (ref 0–7)
ERYTHROCYTE [DISTWIDTH] IN BLOOD BY AUTOMATED COUNT: 16.1 % (ref 11.5–14.5)
GLOBULIN SER CALC-MCNC: 4.3 G/DL (ref 2–4)
GLUCOSE SERPL-MCNC: 136 MG/DL (ref 65–100)
HCT VFR BLD AUTO: 31.7 % (ref 35–47)
HGB BLD-MCNC: 9.6 G/DL (ref 11.5–16)
IMM GRANULOCYTES # BLD AUTO: 0.03 K/UL (ref 0–0.04)
IMM GRANULOCYTES NFR BLD AUTO: 0.4 % (ref 0–0.5)
INR PPP: 1.4 (ref 0.9–1.1)
LYMPHOCYTES # BLD: 0.54 K/UL (ref 0.8–3.5)
LYMPHOCYTES NFR BLD: 8.1 % (ref 12–49)
MCH RBC QN AUTO: 28.3 PG (ref 26–34)
MCHC RBC AUTO-ENTMCNC: 30.3 G/DL (ref 30–36.5)
MCV RBC AUTO: 93.5 FL (ref 80–99)
MONOCYTES # BLD: 0.65 K/UL (ref 0–1)
MONOCYTES NFR BLD: 9.7 % (ref 5–13)
NEUTS SEG # BLD: 5.45 K/UL (ref 1.8–8)
NEUTS SEG NFR BLD: 81.4 % (ref 32–75)
NRBC # BLD: 0 K/UL (ref 0–0.01)
NRBC BLD-RTO: 0 PER 100 WBC
PLATELET # BLD AUTO: 97 K/UL (ref 150–400)
PMV BLD AUTO: 12.6 FL (ref 8.9–12.9)
POTASSIUM SERPL-SCNC: 5.1 MMOL/L (ref 3.5–5.1)
PROT SERPL-MCNC: 6.7 G/DL (ref 6.4–8.3)
PROTHROMBIN TIME: 14.8 SEC (ref 9.2–11.2)
RBC # BLD AUTO: 3.39 M/UL (ref 3.8–5.2)
SODIUM SERPL-SCNC: 138 MMOL/L (ref 136–145)
WBC # BLD AUTO: 6.7 K/UL (ref 3.6–11)

## 2025-08-19 PROCEDURE — 85025 COMPLETE CBC W/AUTO DIFF WBC: CPT

## 2025-08-19 PROCEDURE — 6360000002 HC RX W HCPCS: Performed by: FAMILY MEDICINE

## 2025-08-19 PROCEDURE — 99232 SBSQ HOSP IP/OBS MODERATE 35: CPT | Performed by: SURGERY

## 2025-08-19 PROCEDURE — 80053 COMPREHEN METABOLIC PANEL: CPT

## 2025-08-19 PROCEDURE — 6370000000 HC RX 637 (ALT 250 FOR IP): Performed by: HOSPITALIST

## 2025-08-19 PROCEDURE — 2580000003 HC RX 258: Performed by: FAMILY MEDICINE

## 2025-08-19 PROCEDURE — 6370000000 HC RX 637 (ALT 250 FOR IP): Performed by: STUDENT IN AN ORGANIZED HEALTH CARE EDUCATION/TRAINING PROGRAM

## 2025-08-19 PROCEDURE — 85610 PROTHROMBIN TIME: CPT

## 2025-08-19 PROCEDURE — G0480 DRUG TEST DEF 1-7 CLASSES: HCPCS

## 2025-08-19 PROCEDURE — 93306 TTE W/DOPPLER COMPLETE: CPT | Performed by: INTERNAL MEDICINE

## 2025-08-19 PROCEDURE — 6370000000 HC RX 637 (ALT 250 FOR IP): Performed by: INTERNAL MEDICINE

## 2025-08-19 PROCEDURE — 93308 TTE F-UP OR LMTD: CPT

## 2025-08-19 RX ORDER — LANOLIN ALCOHOL/MO/W.PET/CERES
100 CREAM (GRAM) TOPICAL DAILY
Qty: 30 TABLET | Refills: 3 | Status: SHIPPED | OUTPATIENT
Start: 2025-08-19

## 2025-08-19 RX ORDER — SPIRONOLACTONE 25 MG/1
25 TABLET ORAL DAILY
Qty: 30 TABLET | Refills: 3 | Status: SHIPPED | OUTPATIENT
Start: 2025-08-19

## 2025-08-19 RX ORDER — MULTIVITAMIN WITH IRON
1 TABLET ORAL DAILY
Qty: 30 TABLET | Refills: 2 | Status: SHIPPED | OUTPATIENT
Start: 2025-08-20

## 2025-08-19 RX ORDER — FOLIC ACID 1 MG/1
1 TABLET ORAL DAILY
Qty: 30 TABLET | Refills: 3 | Status: SHIPPED | OUTPATIENT
Start: 2025-08-20

## 2025-08-19 RX ORDER — FUROSEMIDE 40 MG/1
40 TABLET ORAL 2 TIMES DAILY
Qty: 60 TABLET | Refills: 3 | Status: SHIPPED | OUTPATIENT
Start: 2025-08-19

## 2025-08-19 RX ORDER — PANTOPRAZOLE SODIUM 40 MG/1
40 TABLET, DELAYED RELEASE ORAL
Status: DISCONTINUED | OUTPATIENT
Start: 2025-08-19 | End: 2025-08-19 | Stop reason: HOSPADM

## 2025-08-19 RX ADMIN — SPIRONOLACTONE 25 MG: 25 TABLET ORAL at 09:25

## 2025-08-19 RX ADMIN — FUROSEMIDE 40 MG: 40 TABLET ORAL at 09:24

## 2025-08-19 RX ADMIN — Medication 100 MG: at 09:25

## 2025-08-19 RX ADMIN — Medication 1 MG: at 09:25

## 2025-08-19 RX ADMIN — PHENOBARBITAL 32.4 MG: 32.4 TABLET ORAL at 13:19

## 2025-08-19 RX ADMIN — PHENOBARBITAL 32.4 MG: 32.4 TABLET ORAL at 03:56

## 2025-08-19 RX ADMIN — THERA TABS 1 TABLET: TAB at 09:24

## 2025-08-19 RX ADMIN — PIPERACILLIN AND TAZOBACTAM 3375 MG: 3; .375 INJECTION, POWDER, FOR SOLUTION INTRAVENOUS; PARENTERAL at 07:30

## 2025-08-19 ASSESSMENT — PAIN SCALES - GENERAL
PAINLEVEL_OUTOF10: 0

## 2025-08-23 LAB
PETH BLD QL SCN: POSITIVE
PETH BLD-MCNC: 1686 NG/ML

## 2025-08-26 ENCOUNTER — TELEPHONE (OUTPATIENT)
Age: 75
End: 2025-08-26

## (undated) DEVICE — ORISE PROKNIFE 1.5 MM ELECTRODE: Brand: ORISE™ PROKNIFE

## (undated) DEVICE — DEVICE INFLATION ENDOSCOPIC 20ML CAPACITY BILIARY F/BLLN DILATOR ENCORE 26

## (undated) DEVICE — STERILE POLYISOPRENE POWDER-FREE SURGICAL GLOVES WITH EMOLLIENT COATING: Brand: PROTEXIS

## (undated) DEVICE — SYSTEM BX CAP BILI RAP EXCHG CAP LOK DEV COMPATIBLE W/ OLY

## (undated) DEVICE — BANDAGE COMPR M W6INXL10YD WHT BGE VELC E MTRX HK AND LOOP

## (undated) DEVICE — SYR LR LCK 1ML GRAD NSAF 30ML --

## (undated) DEVICE — RETRIEVAL BALLOON CATHETER: Brand: EXTRACTOR™ PRO RX

## (undated) DEVICE — SYSTEM BX DIA22GA FN W/ PRE LD NDL SHARKCORE

## (undated) DEVICE — SOLUTION IRRIG 3000ML 0.9% SOD CHL FLX CONT 0797208] ICU MEDICAL INC]

## (undated) DEVICE — SUTURE STRATAFIX SYMMETRIC PDS + SZ 1 L18IN ABSRB VLT L48MM SXPP1A400

## (undated) DEVICE — BLUNTFILL WITH FILTER: Brand: MONOJECT

## (undated) DEVICE — CANN NASAL O2 CAPNOGRAPHY AD -- FILTERLINE

## (undated) DEVICE — SOLIDIFIER FLD 2OZ 1500CC N DISINF IN BTL DISP SAFESORB

## (undated) DEVICE — INFECTION CONTROL KIT SYS

## (undated) DEVICE — SIMPLICITY FLUFF UNDERPAD 23X36, MODERATE: Brand: SIMPLICITY

## (undated) DEVICE — HANDPIECE SET WITH BONE CLEANING TIP AND SUCTION TUBE: Brand: INTERPULSE

## (undated) DEVICE — ELECTRODE,RADIOTRANSLUCENT,FOAM,3PK: Brand: MEDLINE

## (undated) DEVICE — SPHINCTEROTOME: Brand: DREAMTOME™ RX 44

## (undated) DEVICE — SOLIDIFIER FLD 3.2OZ 3000CC TRAD IN BTL LIQUI-LOC

## (undated) DEVICE — TOTAL TRAY, 16FR 10ML SIL FOLEY, URN: Brand: MEDLINE

## (undated) DEVICE — 4-PORT MANIFOLD: Brand: NEPTUNE 2

## (undated) DEVICE — GARMENT,MEDLINE,DVT,INT,CALF,LG, GEN2: Brand: MEDLINE

## (undated) DEVICE — TUBING IRRIG COMPATIBLE W ERBE MEDIVATOR PMP HYDR

## (undated) DEVICE — STERILE POLYISOPRENE POWDER-FREE SURGICAL GLOVES: Brand: PROTEXIS

## (undated) DEVICE — Device

## (undated) DEVICE — FORCEPS BX L240CM JAW DIA2.8MM L CAP W/ NDL MIC MESH TOOTH

## (undated) DEVICE — 1200 GUARD II KIT W/5MM TUBE W/O VAC TUBE: Brand: GUARDIAN

## (undated) DEVICE — CATH IV AUTOGRD BC BLU 22GA 25 -- INSYTE

## (undated) DEVICE — SET ADMIN 16ML TBNG L100IN 2 Y INJ SITE IV PIGGY BK DISP (ORDER IN MULIPLES OF 48)

## (undated) DEVICE — BITEBLOCK ENDOSCP 60FR MAXI WHT POLYETH STURDY W/ VELC WVN

## (undated) DEVICE — CATHETER IV 22GA L1IN OD0.8382-0.9144MM ID0.6096-0.6858MM 382523

## (undated) DEVICE — SOLUTION IRRIG 1000ML H2O STRL BLT

## (undated) DEVICE — ELECTRODE PT RET AD L9FT HI MOIST COND ADH HYDRGEL CORDED

## (undated) DEVICE — LINE FILTER NASAL CANNULA SHORT TERM ADULT 6.5

## (undated) DEVICE — SOLIDIFIER MEDC 1200ML -- CONVERT TO 356117

## (undated) DEVICE — SYRINGE MED 30ML STD CLR PLAS LUERLOCK TIP N CTRL DISP

## (undated) DEVICE — BAG SPEC BIOHZRD 10 X 10 IN --

## (undated) DEVICE — SYRINGE MEDICAL 3ML CLEAR PLASTIC STANDARD NON CONTROL LUERLOCK TIP DISPOSABLE

## (undated) DEVICE — NEEDLE HYPO 21GA L1.5IN INTRAMUSCULAR S STL LATCH BVL UP

## (undated) DEVICE — REM POLYHESIVE ADULT PATIENT RETURN ELECTRODE: Brand: VALLEYLAB

## (undated) DEVICE — SUPPLEMENT DIGESTIVE H2O SOL GI-EASE

## (undated) DEVICE — ORISE PROKNIFE 3.0 MM ELECTRODE: Brand: ORISE™ PROKNIFE

## (undated) DEVICE — CANNULATING SPHINCTEROTOME: Brand: JAGTOME™ REVOLUTION RX

## (undated) DEVICE — ADULT SPO2 SENSOR,REMANUFACTURED,REPROCESSED DEVICE FOR SINGLE USE; REPROCESSED BY COVIDIEN LLC: Brand: NELLCOR

## (undated) DEVICE — KIT COLON W/ 1.1OZ LUB AND 2 END

## (undated) DEVICE — CONTAINER SPEC 20ML NEUT BUFF FRMLN PREFIL STATLAB

## (undated) DEVICE — BLUNTFILL: Brand: MONOJECT

## (undated) DEVICE — TUBING, SUCTION, 3/16" X 6', STRAIGHT: Brand: MEDLINE

## (undated) DEVICE — CONTAINER,SPECIMEN,3OZ,OR STRL: Brand: MEDLINE

## (undated) DEVICE — TRAP SURG QUAD PARABOLA SLOT DSGN SFTY SCRN TRAPEASE

## (undated) DEVICE — GARMENT COMPR L FOR 18-22IN CALF INTMIT THER HEM FORC II

## (undated) DEVICE — SOLUTION IV 500ML 0.9% SOD CHL PH 5 INJ USP VIAFLX PLAS

## (undated) DEVICE — CARTRIDGE BNE CEM MIX UNIV TWR VAC ROTOR BRK OFF NOZ W/O

## (undated) DEVICE — SUTURE VCRL SZ 2-0 L36IN ABSRB UD L40MM CT 1/2 CIR J957H

## (undated) DEVICE — 3M™ CUROS™ DISINFECTING CAP FOR NEEDLELESS CONNECTORS 270/CARTON 20 CARTONS/CASE CFF1-270: Brand: CUROS™

## (undated) DEVICE — STRAP,POSITIONING,KNEE/BODY,FOAM,4X60": Brand: MEDLINE

## (undated) DEVICE — SUTURE VCRL SZ 2 L54IN ABSRB UD L65MM TP-1 1/2 CIR J880T

## (undated) DEVICE — BRUSH CYTO L200CM SHTH 75FR NIT DRV WIRE SFT STIFF BRIST

## (undated) DEVICE — SNARE ENDOSCP DIA9MM SHTH DIA2.4MM CLD FOR POLYP EXACTO

## (undated) DEVICE — SYRINGE MED 5ML STD CLR PLAS LUERLOCK TIP N CTRL DISP

## (undated) DEVICE — BLADE SAW W083XL354IN THK0047IN CUT THK0047IN SAG FLR

## (undated) DEVICE — SCRUB DRY SURG EZ SCRUB BRUSH PREOPERATIVE GRN

## (undated) DEVICE — SET GRAV CK VLV NEEDLESS ST 3 GANGED 4WAY STPCOCK HI FLO 10

## (undated) DEVICE — PADDING CST 6IN STERILE --

## (undated) DEVICE — CONTAINER SPEC 20 ML LID NEUT BUFF FORMALIN 10 % POLYPR STS

## (undated) DEVICE — SWABSTICK MEDICATED 1.75 CC SINGLE CHLORAPREP

## (undated) DEVICE — BAG BELONG PT PERS CLEAR HANDL

## (undated) DEVICE — CUFF RMFG BP INF SZ 11 DISP -- LAWSON OEM ITEM 238915

## (undated) DEVICE — SYR 20ML LL STRL LF --

## (undated) DEVICE — SPHINCTEROTOME ENDOSCP TIP 4.4FR L5MM CUT WIRE L20MM GWIRE

## (undated) DEVICE — (D)PREP SKN CHLRAPRP APPL 26ML -- CONVERT TO ITEM 371833

## (undated) DEVICE — ZIMMER® STERILE DISPOSABLE TOURNIQUET CUFF WITH PLC, DUAL PORT, SINGLE BLADDER, 34 IN. (86 CM)